# Patient Record
Sex: FEMALE | Race: WHITE | NOT HISPANIC OR LATINO | Employment: OTHER | URBAN - METROPOLITAN AREA
[De-identification: names, ages, dates, MRNs, and addresses within clinical notes are randomized per-mention and may not be internally consistent; named-entity substitution may affect disease eponyms.]

---

## 2017-01-23 ENCOUNTER — GENERIC CONVERSION - ENCOUNTER (OUTPATIENT)
Dept: OTHER | Facility: OTHER | Age: 62
End: 2017-01-23

## 2017-02-06 ENCOUNTER — ALLSCRIPTS OFFICE VISIT (OUTPATIENT)
Dept: OTHER | Facility: OTHER | Age: 62
End: 2017-02-06

## 2017-08-21 ENCOUNTER — GENERIC CONVERSION - ENCOUNTER (OUTPATIENT)
Dept: OTHER | Facility: OTHER | Age: 62
End: 2017-08-21

## 2017-09-20 ENCOUNTER — APPOINTMENT (OUTPATIENT)
Dept: LAB | Facility: OTHER | Age: 62
End: 2017-09-20
Payer: COMMERCIAL

## 2017-09-20 ENCOUNTER — TRANSCRIBE ORDERS (OUTPATIENT)
Dept: LAB | Facility: OTHER | Age: 62
End: 2017-09-20

## 2017-09-20 DIAGNOSIS — E55.9 UNSPECIFIED VITAMIN D DEFICIENCY: ICD-10-CM

## 2017-09-20 DIAGNOSIS — R74.8 OTHER NONSPECIFIC ABNORMAL SERUM ENZYME LEVELS: ICD-10-CM

## 2017-09-20 DIAGNOSIS — K80.10 CALCULUS OF GALLBLADDER WITH CHOLECYSTITIS WITHOUT BILIARY OBSTRUCTION, UNSPECIFIED CHOLECYSTITIS ACUITY: ICD-10-CM

## 2017-09-20 DIAGNOSIS — F41.9 ANXIETY HYPERVENTILATION: ICD-10-CM

## 2017-09-20 DIAGNOSIS — F45.8 ANXIETY HYPERVENTILATION: ICD-10-CM

## 2017-09-20 DIAGNOSIS — E78.5 OTHER AND UNSPECIFIED HYPERLIPIDEMIA: ICD-10-CM

## 2017-09-20 DIAGNOSIS — R73.09 OTHER ABNORMAL GLUCOSE: Primary | ICD-10-CM

## 2017-09-20 LAB
25(OH)D3 SERPL-MCNC: 34.3 NG/ML (ref 30–100)
ALBUMIN SERPL BCP-MCNC: 3.8 G/DL (ref 3.5–5)
ALP SERPL-CCNC: 58 U/L (ref 46–116)
ALT SERPL W P-5'-P-CCNC: 54 U/L (ref 12–78)
ANION GAP SERPL CALCULATED.3IONS-SCNC: 9 MMOL/L (ref 4–13)
AST SERPL W P-5'-P-CCNC: 41 U/L (ref 5–45)
BASOPHILS # BLD AUTO: 0.03 THOUSANDS/ΜL (ref 0–0.1)
BASOPHILS NFR BLD AUTO: 1 % (ref 0–1)
BILIRUB SERPL-MCNC: 1.06 MG/DL (ref 0.2–1)
BUN SERPL-MCNC: 19 MG/DL (ref 5–25)
CALCIUM SERPL-MCNC: 9.7 MG/DL (ref 8.3–10.1)
CHLORIDE SERPL-SCNC: 105 MMOL/L (ref 100–108)
CHOLEST SERPL-MCNC: 271 MG/DL (ref 50–200)
CO2 SERPL-SCNC: 25 MMOL/L (ref 21–32)
CREAT SERPL-MCNC: 0.67 MG/DL (ref 0.6–1.3)
EOSINOPHIL # BLD AUTO: 0.05 THOUSAND/ΜL (ref 0–0.61)
EOSINOPHIL NFR BLD AUTO: 1 % (ref 0–6)
ERYTHROCYTE [DISTWIDTH] IN BLOOD BY AUTOMATED COUNT: 15.4 % (ref 11.6–15.1)
EST. AVERAGE GLUCOSE BLD GHB EST-MCNC: 108 MG/DL
GFR SERPL CREATININE-BSD FRML MDRD: 95 ML/MIN/1.73SQ M
GLUCOSE P FAST SERPL-MCNC: 100 MG/DL (ref 65–99)
HBA1C MFR BLD: 5.4 % (ref 4.2–6.3)
HCT VFR BLD AUTO: 42.4 % (ref 34.8–46.1)
HDLC SERPL-MCNC: 127 MG/DL (ref 40–60)
HGB BLD-MCNC: 14.4 G/DL (ref 11.5–15.4)
LDLC SERPL CALC-MCNC: 128 MG/DL (ref 0–100)
LYMPHOCYTES # BLD AUTO: 0.63 THOUSANDS/ΜL (ref 0.6–4.47)
LYMPHOCYTES NFR BLD AUTO: 15 % (ref 14–44)
MCH RBC QN AUTO: 33.7 PG (ref 26.8–34.3)
MCHC RBC AUTO-ENTMCNC: 34 G/DL (ref 31.4–37.4)
MCV RBC AUTO: 99 FL (ref 82–98)
MONOCYTES # BLD AUTO: 0.4 THOUSAND/ΜL (ref 0.17–1.22)
MONOCYTES NFR BLD AUTO: 10 % (ref 4–12)
NEUTROPHILS # BLD AUTO: 3.08 THOUSANDS/ΜL (ref 1.85–7.62)
NEUTS SEG NFR BLD AUTO: 73 % (ref 43–75)
NRBC BLD AUTO-RTO: 0 /100 WBCS
PLATELET # BLD AUTO: 113 THOUSANDS/UL (ref 149–390)
PMV BLD AUTO: 10.3 FL (ref 8.9–12.7)
POTASSIUM SERPL-SCNC: 3.6 MMOL/L (ref 3.5–5.3)
PROT SERPL-MCNC: 7.3 G/DL (ref 6.4–8.2)
RBC # BLD AUTO: 4.27 MILLION/UL (ref 3.81–5.12)
SODIUM SERPL-SCNC: 139 MMOL/L (ref 136–145)
TRIGL SERPL-MCNC: 82 MG/DL
TSH SERPL DL<=0.05 MIU/L-ACNC: 0.63 UIU/ML (ref 0.36–3.74)
WBC # BLD AUTO: 4.2 THOUSAND/UL (ref 4.31–10.16)

## 2017-09-20 PROCEDURE — 82306 VITAMIN D 25 HYDROXY: CPT

## 2017-09-20 PROCEDURE — 85025 COMPLETE CBC W/AUTO DIFF WBC: CPT

## 2017-09-20 PROCEDURE — 83036 HEMOGLOBIN GLYCOSYLATED A1C: CPT

## 2017-09-20 PROCEDURE — 80061 LIPID PANEL: CPT

## 2017-09-20 PROCEDURE — 36415 COLL VENOUS BLD VENIPUNCTURE: CPT

## 2017-09-20 PROCEDURE — 84443 ASSAY THYROID STIM HORMONE: CPT

## 2017-09-20 PROCEDURE — 80053 COMPREHEN METABOLIC PANEL: CPT

## 2017-09-22 ENCOUNTER — ALLSCRIPTS OFFICE VISIT (OUTPATIENT)
Dept: OTHER | Facility: OTHER | Age: 62
End: 2017-09-22

## 2017-09-22 DIAGNOSIS — Z01.818 ENCOUNTER FOR OTHER PREPROCEDURAL EXAMINATION: ICD-10-CM

## 2017-10-04 ENCOUNTER — APPOINTMENT (EMERGENCY)
Dept: CT IMAGING | Facility: HOSPITAL | Age: 62
End: 2017-10-04
Payer: COMMERCIAL

## 2017-10-04 ENCOUNTER — HOSPITAL ENCOUNTER (EMERGENCY)
Facility: HOSPITAL | Age: 62
Discharge: HOME | End: 2017-10-04
Attending: EMERGENCY MEDICINE | Admitting: EMERGENCY MEDICINE
Payer: COMMERCIAL

## 2017-10-04 VITALS
DIASTOLIC BLOOD PRESSURE: 80 MMHG | SYSTOLIC BLOOD PRESSURE: 133 MMHG | HEART RATE: 75 BPM | BODY MASS INDEX: 28.72 KG/M2 | WEIGHT: 172.4 LBS | HEIGHT: 65 IN | RESPIRATION RATE: 20 BRPM | OXYGEN SATURATION: 95 % | TEMPERATURE: 97.8 F

## 2017-10-04 DIAGNOSIS — S06.0X9A CONCUSSION: Primary | ICD-10-CM

## 2017-10-04 PROCEDURE — 99283 EMERGENCY DEPT VISIT LOW MDM: CPT

## 2017-10-04 PROCEDURE — 70450 CT HEAD/BRAIN W/O DYE: CPT

## 2017-10-04 PROCEDURE — 70486 CT MAXILLOFACIAL W/O DYE: CPT

## 2017-10-04 RX ORDER — CYCLOBENZAPRINE HCL 5 MG
TABLET ORAL
COMMUNITY
Start: 2017-02-06 | End: 2017-10-04

## 2017-10-04 RX ORDER — CITALOPRAM 20 MG/1
TABLET ORAL
COMMUNITY
Start: 2017-07-14 | End: 2019-02-04 | Stop reason: HOSPADM

## 2017-10-04 RX ORDER — HYDROXYZINE PAMOATE 50 MG/1
CAPSULE ORAL
COMMUNITY
Start: 2016-12-06 | End: 2017-10-04

## 2017-10-04 RX ORDER — ATORVASTATIN CALCIUM 10 MG/1
TABLET, FILM COATED ORAL
COMMUNITY
Start: 2017-03-06 | End: 2019-02-04 | Stop reason: HOSPADM

## 2017-10-04 NOTE — DISCHARGE INSTRUCTIONS
Concussion   WHAT YOU NEED TO KNOW:   A concussion is a mild brain injury  It is usually caused by a bump or blow to the head from a fall, a motor vehicle crash, or a sports injury  Sometimes being shaken forcefully may cause a concussion  DISCHARGE INSTRUCTIONS:   Have someone else call 911 for the following:   · Someone tries to wake you and cannot do so  · You have a seizure, increasing confusion, or a change in personality  · Your speech becomes slurred, or you have new vision problems  Return to the emergency department if:   · You have a severe headache that does not go away  · You have arm or leg weakness, numbness, or new problems with coordination  · You have blood or clear fluid coming out of the ears or nose  Contact your healthcare provider if:   · You have nausea or are vomiting  · You feel more sleepy than usual     · Your symptoms get worse  · Your symptoms last longer than 6 weeks after the injury  · You have questions or concerns about your condition or care  Medicines:   · Acetaminophen  helps to decrease pain  It is available without a doctor's order  Ask how much to take and how often to take it  Follow directions  Acetaminophen can cause liver damage if not taken correctly  · NSAIDs , such as ibuprofen, help decrease swelling and pain  NSAIDs can cause stomach bleeding or kidney problems in certain people  If you take blood thinner medicine, always ask your healthcare provider if NSAIDs are safe for you  Always read the medicine label and follow directions  · Take your medicine as directed  Contact your healthcare provider if you think your medicine is not helping or if you have side effects  Tell him or her if you are allergic to any medicine  Keep a list of the medicines, vitamins, and herbs you take  Include the amounts, and when and why you take them  Bring the list or the pill bottles to follow-up visits   Carry your medicine list with you in case of an emergency  Follow up with your healthcare provider as directed:  Write down your questions so you remember to ask them during your visits  Self-care:   · Rest  from physical and mental activities as directed  Mental activities are those that require thinking, concentration, and attention  You will need to rest until your symptoms are gone  Rest will allow you to recover from your concussion  Ask your healthcare provider when you can return to work and other daily activities  · Have someone stay with you for the first 24 hours after your injury  Your healthcare provider should be contacted if your symptoms get worse, or you develop new symptoms  · Do not participate in sports and physical activities until your healthcare provider says it is okay  They could make your symptoms worse or lead to another concussion  Your healthcare provider will tell you when it is okay for you to return to sports or physical activities  Prevent another concussion:   · Wear protective sports equipment that fit properly  Helmets help decrease your risk of a serious brain injury  Talk to your healthcare provider about ways you can decrease your risk for a concussion if you play sports  · Wear your seat belt  every time you travel  This helps to decrease your risk of a head injury if you are in a car accident  © 2017 [facility] [facility] Information is for End User's use only and may not be sold, redistributed or otherwise used for commercial purposes  All illustrations and images included in CareNotes® are the copyrighted property of [email or web address] , [employer]  or [employer]  The above information is an  only  It is not intended as medical advice for individual conditions or treatments  Talk to your doctor, nurse or pharmacist before following any medical regimen to see if it is safe and effective for you

## 2017-10-04 NOTE — ED NOTES
Discharge instructions reviewed with pt  Pt verbalized understanding, with no further questions at this time       Juan Natarajan RN  10/04/17 0485

## 2017-10-04 NOTE — ED PROVIDER NOTES
History  Chief Complaint   Patient presents with    Headache     Pt c/o hitting head 3 weeks ago and has been getting constant dull headaches  Pt states shes supposed to have surgery tomorrow for gallbladder removal  Pt is anxious  Right sided headache where pt it head  This is a 73-year-old female who presents emergency department with headache  The patient fell approximately 3 weeks ago  She states that she struck her head and right temple area  She states that she has had a persistent headache since that time and is growing increasingly concerned about this  She continues to be tender over the right temple and right orbit  No vision changes  This was a mechanical fall  The patient denies any neck pain  No chest pain  No shortness of breath  No abdominal pain  No back pain  The patient is due to have a gallbladder surgery tomorrow and was concerned that this may affect her surgery  Differential diagnosis includes concussion, intracranial hemorrhage, orbital fracture  Prior to Admission Medications   Prescriptions Last Dose Informant Patient Reported? Taking?   atorvastatin (LIPITOR) 10 mg tablet   Yes Yes   Sig: Take by mouth   cholecalciferol (VITAMIN D3) 1,000 units tablet   Yes Yes   Sig: Take by mouth   citalopram (CeleXA) 20 mg tablet   Yes Yes   Sig: Take by mouth      Facility-Administered Medications: None       Past Medical History:   Diagnosis Date    Anxiety     Hyperlipidemia        Past Surgical History:   Procedure Laterality Date    BILATERAL OOPHORECTOMY      MASTECTOMY Bilateral        History reviewed  No pertinent family history  I have reviewed and agree with the history as documented  Social History   Substance Use Topics    Smoking status: Never Smoker    Smokeless tobacco: Never Used    Alcohol use No        Review of Systems   Constitutional: Negative for chills and fever  HENT: Negative for congestion and facial swelling      Eyes: Negative for photophobia, pain, discharge, redness, itching and visual disturbance  Respiratory: Negative for cough and shortness of breath  Gastrointestinal: Negative for abdominal pain and nausea  Genitourinary: Negative for urgency  Musculoskeletal: Negative for back pain and neck pain  Skin: Negative for color change, rash and wound  Allergic/Immunologic: Negative for immunocompromised state  Neurological: Positive for headaches  Negative for dizziness, facial asymmetry and light-headedness  Hematological: Does not bruise/bleed easily  Psychiatric/Behavioral: Negative for confusion  All other systems reviewed and are negative  Physical Exam  ED Triage Vitals   Temperature Pulse Respirations Blood Pressure SpO2   10/04/17 1142 10/04/17 1142 10/04/17 1142 10/04/17 1147 10/04/17 1142   97 8 °F (36 6 °C) 75 20 133/80 95 %      Temp Source Heart Rate Source Patient Position - Orthostatic VS BP Location FiO2 (%)   10/04/17 1142 10/04/17 1142 10/04/17 1142 10/04/17 1142 --   Oral Monitor Lying Left arm       Pain Score       10/04/17 1142       4           Physical Exam   Constitutional: She is oriented to person, place, and time  She appears well-developed  No distress  HENT:   Head: Normocephalic  Right Ear: External ear normal    Left Ear: External ear normal    Nose: Nose normal    Mouth/Throat: Oropharynx is clear and moist    Tender over right temple and right lateral orbit  No deformity noted  Eyes: Conjunctivae and EOM are normal  Pupils are equal, round, and reactive to light  Right eye exhibits no discharge  Left eye exhibits no discharge  No scleral icterus  Neck: Normal range of motion  Neck supple  Cardiovascular: Normal rate, regular rhythm, normal heart sounds and intact distal pulses  Pulmonary/Chest: Effort normal and breath sounds normal  No stridor  No respiratory distress  She has no wheezes  Abdominal: Soft  She exhibits no distension  There is no tenderness   There is no rebound and no guarding  Musculoskeletal: Normal range of motion  She exhibits no edema, tenderness or deformity  No midline C-spine tenderness  Neurological: She is alert and oriented to person, place, and time  No cranial nerve deficit or sensory deficit  She exhibits normal muscle tone  Coordination normal    Skin: Skin is warm and dry  No rash noted  Psychiatric: She has a normal mood and affect  Thought content normal    Nursing note and vitals reviewed  ED Medications  Medications - No data to display    Diagnostic Studies  Labs Reviewed - No data to display    CT facial bones without contrast   Final Result      No acute displaced fracture seen         Workstation performed: [id number]         CT head without contrast   Final Result      No acute intracranial abnormality  Workstation performed: [id number]             Procedures  Procedures      Phone Contacts  ED Phone Contact    ED Course  ED Course                                MDM    CritCare Time    Disposition  Final diagnoses:   Concussion     ED Disposition     ED Disposition Condition Comment    Discharge  [address] discharge to home/self care  Condition at discharge: Stable        Follow-up Information     Follow up With Specialties Details Why Contact Info    Jordy Drivers, [facility] In 1 week  [address] [place] [address]  [phone]          Patient's Medications   Discharge Prescriptions    No medications on file     No discharge procedures on file      ED Provider  Electronically Signed by       Sher Tam MD  10/04/17 7422

## 2017-10-05 ENCOUNTER — HOSPITAL ENCOUNTER (OUTPATIENT)
Facility: HOSPITAL | Age: 62
Setting detail: OUTPATIENT SURGERY
Discharge: HOME | End: 2017-10-05
Attending: SURGERY | Admitting: SURGERY
Payer: COMMERCIAL

## 2017-10-05 ENCOUNTER — ANESTHESIA (OUTPATIENT)
Dept: PERIOP | Facility: HOSPITAL | Age: 62
End: 2017-10-05
Payer: COMMERCIAL

## 2017-10-05 ENCOUNTER — ANESTHESIA EVENT (OUTPATIENT)
Dept: PERIOP | Facility: HOSPITAL | Age: 62
End: 2017-10-05
Payer: COMMERCIAL

## 2017-10-05 VITALS
DIASTOLIC BLOOD PRESSURE: 77 MMHG | WEIGHT: 164 LBS | TEMPERATURE: 98 F | HEIGHT: 66 IN | SYSTOLIC BLOOD PRESSURE: 142 MMHG | RESPIRATION RATE: 17 BRPM | BODY MASS INDEX: 26.36 KG/M2 | OXYGEN SATURATION: 94 % | HEART RATE: 99 BPM

## 2017-10-05 DIAGNOSIS — K82.4 GALLBLADDER POLYP: ICD-10-CM

## 2017-10-05 PROCEDURE — 88304 TISSUE EXAM BY PATHOLOGIST: CPT | Performed by: SURGERY

## 2017-10-05 RX ORDER — OXYCODONE HYDROCHLORIDE AND ACETAMINOPHEN 5; 325 MG/1; MG/1
2 TABLET ORAL EVERY 4 HOURS PRN
Status: DISCONTINUED | OUTPATIENT
Start: 2017-10-05 | End: 2017-10-05 | Stop reason: HOSPADM

## 2017-10-05 RX ORDER — MEPERIDINE HYDROCHLORIDE 25 MG/ML
25 INJECTION INTRAMUSCULAR; INTRAVENOUS; SUBCUTANEOUS AS NEEDED
Status: DISCONTINUED | OUTPATIENT
Start: 2017-10-05 | End: 2017-10-05 | Stop reason: HOSPADM

## 2017-10-05 RX ORDER — CLONAZEPAM 0.5 MG/1
0.5 TABLET ORAL 2 TIMES DAILY PRN
COMMUNITY
End: 2018-02-05

## 2017-10-05 RX ORDER — METOCLOPRAMIDE HYDROCHLORIDE 5 MG/ML
10 INJECTION INTRAMUSCULAR; INTRAVENOUS ONCE AS NEEDED
Status: DISCONTINUED | OUTPATIENT
Start: 2017-10-05 | End: 2017-10-05 | Stop reason: HOSPADM

## 2017-10-05 RX ORDER — LABETALOL HYDROCHLORIDE 5 MG/ML
INJECTION, SOLUTION INTRAVENOUS AS NEEDED
Status: DISCONTINUED | OUTPATIENT
Start: 2017-10-05 | End: 2017-10-05 | Stop reason: SURG

## 2017-10-05 RX ORDER — PROMETHAZINE HYDROCHLORIDE 25 MG/ML
25 INJECTION, SOLUTION INTRAMUSCULAR; INTRAVENOUS ONCE AS NEEDED
Status: DISCONTINUED | OUTPATIENT
Start: 2017-10-05 | End: 2017-10-05 | Stop reason: HOSPADM

## 2017-10-05 RX ORDER — OXYCODONE HYDROCHLORIDE AND ACETAMINOPHEN 5; 325 MG/1; MG/1
1 TABLET ORAL EVERY 4 HOURS PRN
Status: DISCONTINUED | OUTPATIENT
Start: 2017-10-05 | End: 2017-10-05 | Stop reason: HOSPADM

## 2017-10-05 RX ORDER — MIDAZOLAM HYDROCHLORIDE 1 MG/ML
INJECTION INTRAMUSCULAR; INTRAVENOUS AS NEEDED
Status: DISCONTINUED | OUTPATIENT
Start: 2017-10-05 | End: 2017-10-05 | Stop reason: SURG

## 2017-10-05 RX ORDER — PROPOFOL 10 MG/ML
INJECTION, EMULSION INTRAVENOUS AS NEEDED
Status: DISCONTINUED | OUTPATIENT
Start: 2017-10-05 | End: 2017-10-05 | Stop reason: SURG

## 2017-10-05 RX ORDER — ONDANSETRON 2 MG/ML
INJECTION INTRAMUSCULAR; INTRAVENOUS AS NEEDED
Status: DISCONTINUED | OUTPATIENT
Start: 2017-10-05 | End: 2017-10-05 | Stop reason: SURG

## 2017-10-05 RX ORDER — EPHEDRINE SULFATE 50 MG/ML
INJECTION, SOLUTION INTRAVENOUS AS NEEDED
Status: DISCONTINUED | OUTPATIENT
Start: 2017-10-05 | End: 2017-10-05 | Stop reason: SURG

## 2017-10-05 RX ORDER — LIDOCAINE HYDROCHLORIDE 10 MG/ML
INJECTION, SOLUTION INFILTRATION; PERINEURAL AS NEEDED
Status: DISCONTINUED | OUTPATIENT
Start: 2017-10-05 | End: 2017-10-05 | Stop reason: SURG

## 2017-10-05 RX ORDER — BUPIVACAINE HYDROCHLORIDE AND EPINEPHRINE 2.5; 5 MG/ML; UG/ML
INJECTION, SOLUTION EPIDURAL; INFILTRATION; INTRACAUDAL; PERINEURAL AS NEEDED
Status: DISCONTINUED | OUTPATIENT
Start: 2017-10-05 | End: 2017-10-05 | Stop reason: HOSPADM

## 2017-10-05 RX ORDER — SODIUM CHLORIDE, SODIUM LACTATE, POTASSIUM CHLORIDE, CALCIUM CHLORIDE 600; 310; 30; 20 MG/100ML; MG/100ML; MG/100ML; MG/100ML
INJECTION, SOLUTION INTRAVENOUS CONTINUOUS PRN
Status: DISCONTINUED | OUTPATIENT
Start: 2017-10-05 | End: 2017-10-05 | Stop reason: SURG

## 2017-10-05 RX ORDER — MIDAZOLAM HYDROCHLORIDE 1 MG/ML
1 INJECTION INTRAMUSCULAR; INTRAVENOUS
Status: COMPLETED | OUTPATIENT
Start: 2017-10-05 | End: 2017-10-05

## 2017-10-05 RX ORDER — METOCLOPRAMIDE HYDROCHLORIDE 5 MG/ML
INJECTION INTRAMUSCULAR; INTRAVENOUS AS NEEDED
Status: DISCONTINUED | OUTPATIENT
Start: 2017-10-05 | End: 2017-10-05 | Stop reason: SURG

## 2017-10-05 RX ORDER — FENTANYL CITRATE/PF 50 MCG/ML
25 SYRINGE (ML) INJECTION
Status: COMPLETED | OUTPATIENT
Start: 2017-10-05 | End: 2017-10-05

## 2017-10-05 RX ORDER — FENTANYL CITRATE 50 UG/ML
INJECTION, SOLUTION INTRAMUSCULAR; INTRAVENOUS AS NEEDED
Status: DISCONTINUED | OUTPATIENT
Start: 2017-10-05 | End: 2017-10-05 | Stop reason: SURG

## 2017-10-05 RX ORDER — SUCCINYLCHOLINE CHLORIDE 20 MG/ML
INJECTION INTRAMUSCULAR; INTRAVENOUS AS NEEDED
Status: DISCONTINUED | OUTPATIENT
Start: 2017-10-05 | End: 2017-10-05 | Stop reason: SURG

## 2017-10-05 RX ORDER — SODIUM CHLORIDE 9 MG/ML
INJECTION, SOLUTION INTRAVENOUS AS NEEDED
Status: DISCONTINUED | OUTPATIENT
Start: 2017-10-05 | End: 2017-10-05 | Stop reason: HOSPADM

## 2017-10-05 RX ORDER — ROCURONIUM BROMIDE 10 MG/ML
INJECTION, SOLUTION INTRAVENOUS AS NEEDED
Status: DISCONTINUED | OUTPATIENT
Start: 2017-10-05 | End: 2017-10-05 | Stop reason: SURG

## 2017-10-05 RX ADMIN — MIDAZOLAM 1 MG: 1 INJECTION INTRAMUSCULAR; INTRAVENOUS at 13:23

## 2017-10-05 RX ADMIN — DEXAMETHASONE SODIUM PHOSPHATE 10 MG: 10 INJECTION INTRAMUSCULAR; INTRAVENOUS at 12:04

## 2017-10-05 RX ADMIN — MIDAZOLAM HYDROCHLORIDE 2 MG: 1 INJECTION, SOLUTION INTRAMUSCULAR; INTRAVENOUS at 11:50

## 2017-10-05 RX ADMIN — FENTANYL CITRATE 25 MCG: 50 INJECTION INTRAMUSCULAR; INTRAVENOUS at 13:28

## 2017-10-05 RX ADMIN — ONDANSETRON 4 MG: 2 INJECTION INTRAMUSCULAR; INTRAVENOUS at 12:04

## 2017-10-05 RX ADMIN — LIDOCAINE HYDROCHLORIDE 50 MG: 10 INJECTION, SOLUTION INFILTRATION; PERINEURAL at 11:59

## 2017-10-05 RX ADMIN — LABETALOL HYDROCHLORIDE 10 MG: 5 INJECTION, SOLUTION INTRAVENOUS at 12:57

## 2017-10-05 RX ADMIN — EPHEDRINE SULFATE 5 MG: 50 INJECTION, SOLUTION INTRAMUSCULAR; INTRAVENOUS; SUBCUTANEOUS at 12:21

## 2017-10-05 RX ADMIN — ROCURONIUM BROMIDE 20 MG: 10 INJECTION, SOLUTION INTRAVENOUS at 12:18

## 2017-10-05 RX ADMIN — FENTANYL CITRATE 25 MCG: 50 INJECTION INTRAMUSCULAR; INTRAVENOUS at 13:23

## 2017-10-05 RX ADMIN — FENTANYL CITRATE 100 MCG: 50 INJECTION INTRAMUSCULAR; INTRAVENOUS at 11:59

## 2017-10-05 RX ADMIN — MIDAZOLAM 1 MG: 1 INJECTION INTRAMUSCULAR; INTRAVENOUS at 13:37

## 2017-10-05 RX ADMIN — CEFAZOLIN SODIUM 2000 MG: 2 SOLUTION INTRAVENOUS at 12:01

## 2017-10-05 RX ADMIN — FENTANYL CITRATE 25 MCG: 50 INJECTION INTRAMUSCULAR; INTRAVENOUS at 13:20

## 2017-10-05 RX ADMIN — SODIUM CHLORIDE, SODIUM LACTATE, POTASSIUM CHLORIDE, AND CALCIUM CHLORIDE: .6; .31; .03; .02 INJECTION, SOLUTION INTRAVENOUS at 11:50

## 2017-10-05 RX ADMIN — PROPOFOL 200 MG: 10 INJECTION, EMULSION INTRAVENOUS at 11:59

## 2017-10-05 RX ADMIN — SUCCINYLCHOLINE CHLORIDE 100 MG: 20 INJECTION, SOLUTION INTRAMUSCULAR; INTRAVENOUS at 11:59

## 2017-10-05 RX ADMIN — METOCLOPRAMIDE HYDROCHLORIDE 10 MG: 5 INJECTION INTRAMUSCULAR; INTRAVENOUS at 12:04

## 2017-10-05 RX ADMIN — FENTANYL CITRATE 25 MCG: 50 INJECTION INTRAMUSCULAR; INTRAVENOUS at 13:17

## 2017-10-05 RX ADMIN — LABETALOL HYDROCHLORIDE 10 MG: 5 INJECTION, SOLUTION INTRAVENOUS at 13:01

## 2017-10-05 RX ADMIN — OXYCODONE HYDROCHLORIDE AND ACETAMINOPHEN 1 TABLET: 5; 325 TABLET ORAL at 14:55

## 2017-10-05 NOTE — DISCHARGE INSTRUCTIONS
Please call the office when you leave to schedule an appointment to be seen in 2-3 weeks [phone]  Anesthesia Precautions:  1 ) Have a responsible person drive you home and someone to stay with you at home (in case of dizziness)  2 ) Rest and relax for 24 hours  3 ) Drink Clear liquids until there is no nausea or vomiting, then resume diet as normal   4 ) Diet as tolerated  5 ) Do not drink alcohol, drive any vehicle, operate mechanical equipment (e g  Sewing machine, kitchen stove, etc ) or make any critical decisions for 24 hours  Activity:    Do not lift more than 10 pounds (a gallon of milk) for 1-2 weeks post-operatively    Walking is encouraged  Normal daily activities including climbing steps are okay  Do not engage in strenuous activity or contact sports for 4-6 weeks post-operatively  Return to work:    Return to work to be discussed at first post-operative visit  Diet:    You may return to your normal heart healthy diet  Wound Care: Your wound is closed with Histoacryl  It is okay to shower  Wash incision gently with soap and water and pat dry  Do not soak incisions in bath water or swim for two weeks  Do not apply any creams or ointments  Ice as needed  Pain Medication:    Please take as directed  No driving while taking narcotic pain medications    Other:  If you have questions after discharge please call the office    If you have increased pain, fever >101 5, increased drainage, redness or a bad smell at your surgery site, please call us immediately or come directly to the Emergency Room

## 2017-10-05 NOTE — ANESTHESIA POSTPROCEDURE EVALUATION
Post-Op Assessment Note      CV Status:  Stable    Mental Status:  Alert    Hydration Status:  Stable    PONV Controlled:  None    Airway Patency:  Patent    Post Op Vitals Reviewed: Yes          Staff: Anesthesiologist, CRNA           BP      Temp      Pulse     Resp      SpO2

## 2017-10-05 NOTE — ANESTHESIA PREPROCEDURE EVALUATION
Review of Systems/Medical History  Patient summary reviewed  Chart reviewed  History of anesthetic complications PONV    Cardiovascular  Hyperlipidemia,    Pulmonary  Negative pulmonary ROS ,        GI/Hepatic      Comment: GB polyp     Negative  ROS        Endo/Other  Negative endo/other ROS      GYN       Hematology  Negative hematology ROS      Musculoskeletal  Negative musculoskeletal ROS        Neurology  Negative neurology ROS      Psychology   Anxiety,            Physical Exam    Airway    Mallampati score: II  TM Distance: <3 FB  Neck ROM: full     Dental   No notable dental hx     Cardiovascular      Pulmonary      Other Findings  Poss anterior larynx      Anesthesia Plan  ASA Score- 2       Anesthesia Type- general  Comment: Intraop Anitemics      Induction- intravenous      Informed Consent  Anesthetic plan and risks discussed with patient

## 2017-10-05 NOTE — OP NOTE
OPERATIVE REPORT  PATIENT NAME: Ragini Wong    :  1955  MRN: [id number]  Pt Location: AN OR ROOM 02    SURGERY DATE: 10/5/2017    Surgeon(s) and Role:     * Ella Jean MD - Elvia Sousa MD - Primary    Preop Diagnosis:  Gallbladder polyp [K82 4]    Post-Op Diagnosis Codes:     * Gallbladder polyp [K82 4]    Procedure(s) (LRB):  LAPAROSCOPIC CHOLECYSTECTOMY (N/A)    Specimen(s):  ID Type Source Tests Collected by Time Destination   1 : Fidencio Heart, CRNP Tissue Gallbladder TISSUE EXAM Lorena Lovelace MD 10/5/2017 1222        Estimated Blood Loss:   Minimal    Drains:       Anesthesia Type:   General    Operative Indications:  Gallbladder polyp [K82 4]      Operative Findings:  Independent, ASA 2, wound class 2, BMI 27, weight 164, height 66  History of anesthetic complications PONV    Cardiovascular  Hyperlipidemia,  Pulmonary  Negative pulmonary ROS ,    GI/Hepatic     Comment: GB polyp   Negative  ROS    Endo/Other  Negative endo/other ROS  GYN   Hematology  Negative hematology ROS     Musculoskeletal  Negative musculoskeletal ROS    Neurology  Negative neurology ROS     Psychology   Anxiety,            Complications:   None    Procedure and Technique:  Patient was identified visually and via armband  Placed in supine position  After general anesthesia the abdomen was prepped and draped in a sterile fashion  0 25% Marcaine with epinephrine was utilized  Incision was made in the umbilicus  This carried down to skin subcutaneous tissue  Under direct vision a 10 mm trocar was inserted  This was followed by CO2 insufflation with a back pressure of 15  Three additional trocars placed in upper aspect of the abdomen under direct vision  The gallbladder was distended  A dome down technique was then utilized  The cystic duct was dissected free  The cystic duct was dilated in caliber  It was clipped x3 and divided  Cystic artery was clipped x2 and divided    The gallbladder was then removed from the liver bed using electrocautery  The area was irrigated  It was aspirated dry  Hemostasis was assured  The gallbladder was placed in Endo-Catch bag and removed through the umbilicus  Fascia was closed with 0 Vicryl suture followed by 4 Monocryl suture and Dermabond  The patient tolerated the procedure well  Sponges count correct     I was present for the entire procedure    Patient Disposition:  PACU     SIGNATURE: Christina Santiago MD  DATE: October 5, 2017  TIME: 1:11 PM

## 2017-11-16 ENCOUNTER — ALLSCRIPTS OFFICE VISIT (OUTPATIENT)
Dept: OTHER | Facility: OTHER | Age: 62
End: 2017-11-16

## 2017-11-17 NOTE — PROGRESS NOTES
Assessment    1  S/P bilateral breast implants (V43 82) (Z98 82)   2  Painful breasts (611 71) (N64 4)    Plan  Painful breasts, S/P bilateral breast implants    · 1 - Zoe Ring MD, Angela Samayoa  (Surgical Oncology) Co-Management  *  Status: Active  Requested LZP:[id number]  Care Summary provided  : Yes    Discussion/Summary    Painful and hard breasts s/o b/l implants- no sign of infection  Pt needs to contact her surgeon for appropriate testing and evaluation  Chief Complaint  hard breast implants      History of Present Illness  HPI: Pt comes with hardness and pain of her breast implants  She had elective b/l mastectomy with implants at [facility] 7 years ago due to being BRCA 1+  She didn't like those implants and had them redone in 2013 by a surgeon in [place]  since they have been very firm  Korene Carbon than she thinks they should be  Her niece had the same thing done and hers aren't firm and are much softer  LAtely they have gotten more firm, bordering on hard to the touch and painful to touch  rash, redness, or warmth  No f/c/s  No open sores or drainage   Breast Pain (Brief): The patient is being seen for an initial evaluation of breast pain  Symptoms:  breast pain, but-- no breast lump,-- no breast redness,-- no nipple soreness,-- no localized swelling,-- no localized drainage-- and-- no rash  No associated symptoms are reported  Review of Systems   Constitutional: No fever, no chills, feels well, no tiredness, no recent weight gain or loss  ENT: no ear ache, no loss of hearing, no nosebleeds or nasal discharge, no sore throat or hoarseness  Cardiovascular: no complaints of slow or fast heart rate, no chest pain, no palpitations, no leg claudication or lower extremity edema  Respiratory: no complaints of shortness of breath, no wheezing, no dyspnea on exertion, no orthopnea or PND    Breasts: breast pain, but-- no breast swelling,-- no reddening of the breast,-- no breast lump,-- no breast itching-- and-- no nipple discharge  Gastrointestinal: no complaints of abdominal pain, no constipation, no nausea or diarrhea, no vomiting, no bloody stools  ROS reviewed  Active Problems    1  Abnormal glucose (790 29) (R73 09)   2  Abnormal liver enzymes (790 5) (R74 8)   3  Alcoholism (303 90) (F10 20)   4  Antibody response examination (V72 61) (Z01 84)   5  Anxiety (300 00) (F41 9)   6  Back pain (724 5) (M54 9)   7  Blood in stool (578 1) (K92 1)   8  Depression (311) (F32 9)   9  Dermatitis (692 9) (L30 9)   10  Encounter for gynecological examination without abnormal finding (V72 31) (Z01 419)   11  Encounter for routine gynecological examination with Papanicolaou smear of cervix  (V72 31,V76 2) (Z01 419)   12  Facial trauma (959 09) (S09 93XA)   13  Flu vaccine need (V04 81) (Z23)   14  Gallbladder polyp (575 6) (K82 4)   15  Hyperlipidemia, unspecified (272 4) (E78 5)   16  Knee pain (719 46) (M25 569)   17  Mild vitamin D deficiency (268 9) (E55 9)   18  Need for revaccination (V05 9) (Z23)   19  Need for tetanus booster (V03 7) (Z23)   20  Painful breasts (611 71) (N64 4)   21  Post-menopausal (V49 81) (Z78 0)   22  Preop examination (V72 84) (Z01 818)   23  S/P bilateral breast implants (V43 82) (Z98 82)   24  Screening for HPV (human papillomavirus) (V73 81) (Z11 51)   25  Screening mammogram, encounter for (V76 12) (Z12 31)    Past Medical History  Active Problems And Past Medical History Reviewed: The active problems and past medical history were reviewed and updated today  Surgical History  Surgical History Reviewed: The surgical history was reviewed and updated today  Social History     · Never a smoker   · Social alcohol use (Z78 9)  The social history was reviewed and updated today  Family History  Family History Reviewed: The family history was reviewed and updated today  Current Meds   1   Atorvastatin Calcium 20 MG Oral Tablet; take 1/2 tablet daily as directed; Therapy: 38LOK0660 to (Evaluate:11Nov2017)  Requested for: 21HZX0106; Last Rx:10Zyh1611 Ordered   2  Citalopram Hydrobromide 20 MG Oral Tablet; TAKE 1 TABLET AT BEDTIME; Therapy: 87MKP1370 to (Evaluate:20Jan2018)  Requested for: 15MEJ7310; Last Rx:70Xbg0221 Ordered   3  ClonazePAM 0 5 MG Oral Tablet; take 1 tablet twice a day as needed; Therapy: 96Elj4135 to (21 )  Requested for: 55LRF7010; Last Rx:44Fbm9290 Ordered   4  Desonide 0 05 % External Cream; APPLY TO NASAL AREA BID X 1 WEEK; Therapy: 91OYN0527 to (Last Rx:56Xuf3284)  Requested for: 61TCU4667 Ordered   5  HydrOXYzine Pamoate 50 MG Oral Capsule; TAKE 1 CAPSULE 3 TIMES DAILY AS NEEDED; Therapy: 85IUJ1645 to (Evaluate:15Jan2017)  Requested for: 14OXJ3784; Last Rx:96Uoo3037 Ordered   6  Ibuprofen 600 MG Oral Tablet; TAKE 1 TABLET 3 TIMES DAILY WITH MEALS; Therapy: 91GPK4285 to (Evaluate:20Mar2016)  Requested for: 08KKU5772; Last Rx:06Rrk5222 Ordered   7  Vitamin D 1000 UNIT Oral Tablet; 1 TAB QD; Therapy: 74WIG7080 to (Evaluate:21Nfo6441); Last Rx:10Nov2015 Ordered    The medication list was reviewed and updated today  Allergies  1  No Known Drug Allergies   2  No Known Drug Allergies    Vitals   Recorded: 07OQI0006 10:36AM   Temperature 98 2 F   Heart Rate 83   Systolic 659   Diastolic 68   Height 5 ft 5 in   Weight 168 lb 6 oz   BMI Calculated 28 02   BSA Calculated 1 84   O2 Saturation 93       Physical Exam   Constitutional  General appearance: No acute distress, well appearing and well nourished  Ears, Nose, Mouth, and Throat  Oropharynx: Normal with no erythema, edema, exudate or lesions  Pulmonary  Respiratory effort: No increased work of breathing or signs of respiratory distress  Auscultation of lungs: Clear to auscultation  Cardiovascular  Auscultation of heart: Normal rate and rhythm, normal S1 and S2, without murmurs     Examination of extremities for edema and/or varicosities: Normal    Abdomen  Abdomen: Non-tender, no masses  Lymphatic  Palpation of lymph nodes in neck: No lymphadenopathy  Additional Exam:  b/l breast exam- no redness, or warmth   very firm breasts b/l, tender to touch over entire breast         Future Appointments    Date/Time Provider Specialty Site   11/20/2017 10:30 AM Amanda Mcdermott M D  [facility]       Signatures   Electronically signed by : [facility]; Nov 16 2017 11:55AM EST                       (Author)    Electronically signed by : FRANSICO Kaur ; Nov 16 2017  5:15PM EST

## 2017-11-20 ENCOUNTER — ALLSCRIPTS OFFICE VISIT (OUTPATIENT)
Dept: OTHER | Facility: OTHER | Age: 62
End: 2017-11-20

## 2017-11-27 ENCOUNTER — GENERIC CONVERSION - ENCOUNTER (OUTPATIENT)
Dept: OTHER | Facility: OTHER | Age: 62
End: 2017-11-27

## 2017-12-04 ENCOUNTER — GENERIC CONVERSION - ENCOUNTER (OUTPATIENT)
Dept: OTHER | Facility: OTHER | Age: 62
End: 2017-12-04

## 2018-01-13 VITALS
WEIGHT: 168.38 LBS | TEMPERATURE: 98.2 F | OXYGEN SATURATION: 93 % | SYSTOLIC BLOOD PRESSURE: 104 MMHG | BODY MASS INDEX: 28.06 KG/M2 | HEIGHT: 65 IN | HEART RATE: 83 BPM | DIASTOLIC BLOOD PRESSURE: 68 MMHG

## 2018-01-14 VITALS
DIASTOLIC BLOOD PRESSURE: 76 MMHG | RESPIRATION RATE: 14 BRPM | SYSTOLIC BLOOD PRESSURE: 108 MMHG | HEART RATE: 70 BPM | WEIGHT: 171 LBS | HEIGHT: 65 IN | BODY MASS INDEX: 28.49 KG/M2

## 2018-01-15 VITALS
RESPIRATION RATE: 16 BRPM | SYSTOLIC BLOOD PRESSURE: 110 MMHG | WEIGHT: 164 LBS | BODY MASS INDEX: 27.32 KG/M2 | HEIGHT: 65 IN | DIASTOLIC BLOOD PRESSURE: 78 MMHG | HEART RATE: 90 BPM

## 2018-01-17 NOTE — CONSULTS
History of Present Illness  Today, I am seeing Mrs Bridgette Merida for the first time, who is a 65year old female with a history of BRCA who underwent bilateral prophylactic mastectomy with immediate TE/Implant reconstruction at [facility] in 2009  As per the patient she didn't like the end result and eventually had revisions done in 2014 close to home in [place]  Patient now complaints that over the last 6 months started to develop bilateral breast pain mostly on the medial aspect and associated "hardness " feel of the implants itself  She denies any change in her history  Denies fever, chills, shortness of breast and chest pain with exertion  Of notice, patient was very emotional about discussing her situation  Patient characteristics  Significant medical history: BRCA   Prior abdominal surgery: Open appendectomy, laparoscopic cholecystectomy, laparoscopic ovarian resection   Tobacco use: none   Current bra size: 36 C   Desired bra size: A or B cup   BMI: 27 3  PMH/FH of DVT/PE: none  PMH/FH of miscarriages: none  Occupation: retired flight attendance            Discussion/Summary    Assessment and Plan:  1) Bilateral breast capsular contracture, baker Grad IV     Patient has manifested that discomfort is unbearable and has caused significant stress in her marriage  She would like to know her options  Pertinent reconstruction options were presented as outlined below  A detailed conversation was had regarding the patient's options for breast reconstruction  Five main points, which are explained to all breast reconstruction patients, were discussed  1) Breast reconstruction is an optional process  In addition, breast reconstruction can be performed in an immediate and delayed fashion  Even if a patient does not opt for reconstruction now, it can performed at a later time    2) Breast reconstruction is a multi-stage process which involves multiple surgeries spaced several months apart  The entire process can take over one year  The patient can stop within this process and/or resume it again at any time  3) The major goal of breast reconstruction is to have the patient look normal in clothing  When naked, there will always be scars and other stigmata of the breast reconstruction process  4) Asymmetries are often present during the reconstruction process  Several operations may be needed, including surgery to the non-cancerous breast, to achieve satisfactory results  5) No matter the reconstructive method, there are ways that the reconstruction can fail and a secondary reconstructive plan would need to be created  Next, a general discussion regarding all available methods of breast reconstruction were discussed  The types of reconstructions described included:    1) Tissue expander and implant based reconstruction, both single and multi-stage approaches  2) Autologous only reconstructions, including free abdominal-tissue based reconstructions  3) Combination procedures, particularly latissismus dorsi flaps combined with either expanders or implants  For each of the reconstruction methods mentioned above, the risks, benefits, alternatives, scarring, and recovery time were discussed in great detail  Specific risks detailed included bleeding, infection, hematoma, seroma, scarring, pain, wound healing complications, flap loss, fat necrosis, capsular contracture, need for implant removal, donor site complications, bulge, hernia, umbilical necrosis, need for urgent reoperation, and need for dressing changes were discussed      The patient would be a candidate for:     -Implant based reconstruction: yes    -Abdominally based free flap reconstruction: yes but only if wishes to go smaller cup ( A or B) as she described and will need CT abdomen/pelvis to evaluate perforators given history of surgery    -LD+implant based reconstruction: Yes    After a long discussion about these factors, the patient would like to have both implants removed and not to pursue any other breast reconstruction methods  She is mostly in discomfort due to the pain and is tired of undergoing surgeries to restore her breast      I recommended to undergo bilateral capsulectomy and implant removal  I will have my surgical coordinator, La Todd, to look for potential days  This will be outpatient surgery with possible overnight stay for pain control if needed  We will plan to see her for a preoperative visit once a surgical date has been identified  45 minutes were spent face to face with the patient, of which over half were counseling and coordination of care  The patient was counseled regarding risks and benefits of treatment options        Signatures   Electronically signed by : FRANSICO Law ; Nov 20 2017  2:51PM EST                       (Author)

## 2018-01-22 VITALS
WEIGHT: 167.25 LBS | DIASTOLIC BLOOD PRESSURE: 72 MMHG | HEART RATE: 83 BPM | HEIGHT: 65 IN | OXYGEN SATURATION: 95 % | SYSTOLIC BLOOD PRESSURE: 128 MMHG | BODY MASS INDEX: 27.86 KG/M2

## 2018-01-22 VITALS — WEIGHT: 165 LBS | BODY MASS INDEX: 27.49 KG/M2 | HEIGHT: 65 IN

## 2018-01-23 NOTE — MISCELLANEOUS
Message  GI Reminder Recall Sebas Chinchilla:   Date: 12/04/2017   Dear Kishore Saenz:     Review of our records shows you are due for the following: Colonoscopy  Our records indicate that you are due at this time to have a follow-up examination for a colonoscopy  As you now, these tests are done to prevent colon cancer, a very common disease in the [place] and responsible for the thousands of patient deaths each year  We at [facility] are concerned for your health, and would very much appreciate you getting in touch with us at your earliest convenience, Again, this examination is vital to your proper health maintenance and for the prevention of cancer  Please call the following office to schedule your appointment:   [address] [facility] [phone]  We look forward to hearing from you!      Sincerely,         Signatures   Electronically signed by : Lila Vu, ; Dec  4 2017 12:23PM EST                       (Author)

## 2018-01-24 ENCOUNTER — TELEPHONE (OUTPATIENT)
Dept: GASTROENTEROLOGY | Facility: CLINIC | Age: 63
End: 2018-01-24

## 2018-01-25 PROBLEM — Z12.11 SCREEN FOR COLON CANCER: Status: ACTIVE | Noted: 2018-01-25

## 2018-02-05 ENCOUNTER — OFFICE VISIT (OUTPATIENT)
Dept: INTERNAL MEDICINE CLINIC | Facility: CLINIC | Age: 63
End: 2018-02-05
Payer: COMMERCIAL

## 2018-02-05 ENCOUNTER — APPOINTMENT (OUTPATIENT)
Dept: LAB | Facility: CLINIC | Age: 63
End: 2018-02-05
Payer: COMMERCIAL

## 2018-02-05 VITALS
WEIGHT: 173.2 LBS | SYSTOLIC BLOOD PRESSURE: 106 MMHG | TEMPERATURE: 98 F | OXYGEN SATURATION: 94 % | DIASTOLIC BLOOD PRESSURE: 70 MMHG | HEART RATE: 88 BPM | BODY MASS INDEX: 28.82 KG/M2

## 2018-02-05 DIAGNOSIS — E78.5 HYPERLIPIDEMIA: ICD-10-CM

## 2018-02-05 DIAGNOSIS — R05.9 COUGH: Primary | ICD-10-CM

## 2018-02-05 DIAGNOSIS — F10.982 ALCOHOL-INDUCED INSOMNIA (HCC): ICD-10-CM

## 2018-02-05 DIAGNOSIS — R53.83 FATIGUE, UNSPECIFIED TYPE: ICD-10-CM

## 2018-02-05 DIAGNOSIS — R73.09 OTHER ABNORMAL GLUCOSE: ICD-10-CM

## 2018-02-05 LAB
ALBUMIN SERPL BCP-MCNC: 4.3 G/DL (ref 3.5–5)
ALP SERPL-CCNC: 94 U/L (ref 46–116)
ALT SERPL W P-5'-P-CCNC: 62 U/L (ref 12–78)
ANION GAP SERPL CALCULATED.3IONS-SCNC: 9 MMOL/L (ref 4–13)
AST SERPL W P-5'-P-CCNC: 73 U/L (ref 5–45)
BASOPHILS # BLD AUTO: 0.1 THOUSANDS/ΜL (ref 0–0.1)
BASOPHILS NFR BLD AUTO: 3 % (ref 0–1)
BILIRUB SERPL-MCNC: 0.51 MG/DL (ref 0.2–1)
BUN SERPL-MCNC: 10 MG/DL (ref 5–25)
CALCIUM SERPL-MCNC: 8.8 MG/DL (ref 8.3–10.1)
CHLORIDE SERPL-SCNC: 105 MMOL/L (ref 100–108)
CHOLEST SERPL-MCNC: 314 MG/DL (ref 50–200)
CO2 SERPL-SCNC: 26 MMOL/L (ref 21–32)
CREAT SERPL-MCNC: 0.54 MG/DL (ref 0.6–1.3)
EOSINOPHIL # BLD AUTO: 0.03 THOUSAND/ΜL (ref 0–0.61)
EOSINOPHIL NFR BLD AUTO: 1 % (ref 0–6)
ERYTHROCYTE [DISTWIDTH] IN BLOOD BY AUTOMATED COUNT: 14.3 % (ref 11.6–15.1)
GFR SERPL CREATININE-BSD FRML MDRD: 102 ML/MIN/1.73SQ M
GLUCOSE SERPL-MCNC: 90 MG/DL (ref 65–140)
HCT VFR BLD AUTO: 39.8 % (ref 34.8–46.1)
HDLC SERPL-MCNC: 161 MG/DL (ref 40–60)
HGB BLD-MCNC: 13.2 G/DL (ref 11.5–15.4)
LDLC SERPL CALC-MCNC: 131 MG/DL (ref 0–100)
LYMPHOCYTES # BLD AUTO: 1.02 THOUSANDS/ΜL (ref 0.6–4.47)
LYMPHOCYTES NFR BLD AUTO: 32 % (ref 14–44)
MCH RBC QN AUTO: 31.6 PG (ref 26.8–34.3)
MCHC RBC AUTO-ENTMCNC: 33.2 G/DL (ref 31.4–37.4)
MCV RBC AUTO: 95 FL (ref 82–98)
MONOCYTES # BLD AUTO: 0.26 THOUSAND/ΜL (ref 0.17–1.22)
MONOCYTES NFR BLD AUTO: 8 % (ref 4–12)
NEUTROPHILS # BLD AUTO: 1.78 THOUSANDS/ΜL (ref 1.85–7.62)
NEUTS SEG NFR BLD AUTO: 56 % (ref 43–75)
NRBC BLD AUTO-RTO: 0 /100 WBCS
PLATELET # BLD AUTO: 98 THOUSANDS/UL (ref 149–390)
PMV BLD AUTO: 9.8 FL (ref 8.9–12.7)
POTASSIUM SERPL-SCNC: 4 MMOL/L (ref 3.5–5.3)
PROT SERPL-MCNC: 7.9 G/DL (ref 6.4–8.2)
RBC # BLD AUTO: 4.18 MILLION/UL (ref 3.81–5.12)
SODIUM SERPL-SCNC: 140 MMOL/L (ref 136–145)
TRIGL SERPL-MCNC: 112 MG/DL
TSH SERPL DL<=0.05 MIU/L-ACNC: 0.65 UIU/ML (ref 0.36–3.74)
WBC # BLD AUTO: 3.19 THOUSAND/UL (ref 4.31–10.16)

## 2018-02-05 PROCEDURE — 80053 COMPREHEN METABOLIC PANEL: CPT

## 2018-02-05 PROCEDURE — 36415 COLL VENOUS BLD VENIPUNCTURE: CPT

## 2018-02-05 PROCEDURE — 85025 COMPLETE CBC W/AUTO DIFF WBC: CPT

## 2018-02-05 PROCEDURE — 99214 OFFICE O/P EST MOD 30 MIN: CPT | Performed by: NURSE PRACTITIONER

## 2018-02-05 PROCEDURE — 80061 LIPID PANEL: CPT

## 2018-02-05 PROCEDURE — 84443 ASSAY THYROID STIM HORMONE: CPT

## 2018-02-05 RX ORDER — ATORVASTATIN CALCIUM 20 MG/1
TABLET, FILM COATED ORAL
COMMUNITY
Start: 2017-12-25 | End: 2018-02-05 | Stop reason: CLARIF

## 2018-02-05 RX ORDER — LORAZEPAM 1 MG/1
1 TABLET ORAL
COMMUNITY
Start: 2017-11-27 | End: 2018-02-05

## 2018-02-05 RX ORDER — IBUPROFEN 600 MG/1
1 TABLET ORAL EVERY 8 HOURS
COMMUNITY
Start: 2016-02-29 | End: 2018-02-05

## 2018-02-05 RX ORDER — DOXEPIN HYDROCHLORIDE 50 MG/1
50 CAPSULE ORAL
Qty: 90 CAPSULE | Refills: 3
Start: 2018-02-05 | End: 2018-02-08 | Stop reason: SDUPTHER

## 2018-02-05 NOTE — PROGRESS NOTES
Assessment/Plan:  History of alcoholism still drinking want to quit recommend local rehabs and alternative    Insomnia- take the doxepin every night to sleep    Depression- continue Celexa    Check labs as above    Allergic rhinitis- recommend humidification in the home and recommend Simply Saline nasal wash- you can get this in the childrens section at the pharm     Diagnoses and all orders for this visit:    Cough  -     XR chest pa & lateral    Fatigue, unspecified type  -     CBC and differential; Future  -     Comprehensive metabolic panel; Future  -     Lipid panel; Future  -     TSH, 3rd generation; Future    Alcohol-induced insomnia (HCC)  -     doxepin (SINEquan) 50 mg capsule; Take 1 capsule (50 mg total) by mouth daily at bedtime as needed for sleep    Other orders  -     Discontinue: atorvastatin (LIPITOR) 20 mg tablet;   -     Discontinue: LORazepam (ATIVAN) 1 mg tablet; Take 1 tablet by mouth  -     Discontinue: ibuprofen (MOTRIN) 600 mg tablet; Take 1 tablet by mouth every 8 (eight) hours          Subjective:      Patient ID: Zack Frost is a 65 y o  female  Patient presents today with a multitude of problems  She states most notably she was in rehab again on [place] she admitted herself was a local facility the experience was a nightmare she states that she sat in the room with no clothes for 48 hours there was essentially no heat and her average room temperature was 40 degrees  She was there for approximately 10 days she decided to leave against medical advice and she was taken to a bus stop where her  picked her up  She is back to drinking again she is drinking 3-4 large Bores of vodka  She is frustrated by her need to drink  She wants to quit drinking but she does not know how about going about doing it  She does not have a psychiatrist she never established 1  She has had nasal congestion postnasal drip and cough on and off for a month    No fevers or chills no shortness of breath no wheeze  She finally had her gallbladder out and the pathology was benign    She has been having some painful implants it was suggested that they come out but if she gets the implants out she will not have them replaced  She is not concerned with this at this point        The following portions of the patient's history were reviewed and updated as appropriate: allergies, current medications, past family history, past medical history, past social history, past surgical history and problem list     Review of Systems   Constitutional: Positive for chills and fatigue  Negative for appetite change, diaphoresis, fever and unexpected weight change  HENT: Positive for postnasal drip and sinus pressure  Negative for sneezing  Eyes: Negative for visual disturbance  Respiratory: Positive for cough  Negative for chest tightness and shortness of breath  Cardiovascular: Negative  Negative for chest pain, palpitations and leg swelling  Gastrointestinal: Negative  Negative for abdominal pain and blood in stool  Endocrine: Negative for cold intolerance, heat intolerance, polydipsia, polyphagia and polyuria  Genitourinary: Negative  Negative for difficulty urinating, dysuria, frequency and urgency  Musculoskeletal: Negative  Negative for arthralgias and myalgias  Skin: Negative for rash and wound  Neurological: Negative for dizziness, weakness, light-headedness and headaches  Hematological: Negative for adenopathy  Psychiatric/Behavioral: Positive for sleep disturbance  Negative for confusion and dysphoric mood  The patient is nervous/anxious  Objective:     Physical Exam   Constitutional: She is oriented to person, place, and time  She appears well-developed  No distress  HENT:   Head: Normocephalic and atraumatic     Nose: Nose normal    Mouth/Throat: Oropharynx is clear and moist    Eyes: Conjunctivae and EOM are normal  Pupils are equal, round, and reactive to light    Neck: Normal range of motion  Neck supple  No JVD present  No tracheal deviation present  No thyromegaly present  Cardiovascular: Normal rate, regular rhythm, normal heart sounds and intact distal pulses  Exam reveals no gallop and no friction rub  No murmur heard  Pulmonary/Chest: Effort normal and breath sounds normal  No respiratory distress  She has no wheezes  She has no rales  Abdominal: Soft  Bowel sounds are normal  She exhibits no distension  There is no tenderness  Musculoskeletal: Normal range of motion  She exhibits no edema  Lymphadenopathy:     She has no cervical adenopathy  Neurological: She is alert and oriented to person, place, and time  No cranial nerve deficit  Skin: Skin is warm and dry  No rash noted  She is not diaphoretic     Psychiatric: Judgment and thought content normal    She is quite tearful through the entire exam

## 2018-02-05 NOTE — PATIENT INSTRUCTIONS
History of alcoholism still drinking want to quit recommend local rehabs and alternative    Insomnia- take the doxepin every night to sleep    Depression- continue Celexa    Check labs as above    Allergic rhinitis- recommend humidification in the home and recommend Simply Saline nasal wash- you can get this in the childrens section at the pharm

## 2018-02-06 ENCOUNTER — TRANSCRIBE ORDERS (OUTPATIENT)
Dept: ADMINISTRATIVE | Age: 63
End: 2018-02-06

## 2018-02-06 ENCOUNTER — APPOINTMENT (OUTPATIENT)
Dept: RADIOLOGY | Facility: CLINIC | Age: 63
End: 2018-02-06
Payer: COMMERCIAL

## 2018-02-06 DIAGNOSIS — Z01.818 ENCOUNTER FOR OTHER PREPROCEDURAL EXAMINATION: ICD-10-CM

## 2018-02-06 PROCEDURE — 71046 X-RAY EXAM CHEST 2 VIEWS: CPT

## 2018-02-08 DIAGNOSIS — F10.982 ALCOHOL-INDUCED INSOMNIA (HCC): ICD-10-CM

## 2018-02-08 RX ORDER — DOXEPIN HYDROCHLORIDE 50 MG/1
50 CAPSULE ORAL
Qty: 90 CAPSULE | Refills: 0 | Status: SHIPPED | OUTPATIENT
Start: 2018-02-08 | End: 2018-05-09 | Stop reason: SDUPTHER

## 2018-02-08 NOTE — TELEPHONE ENCOUNTER
PT CALLED FOR RESULTS OF LABS AND CHEST XRAY  SHE ALSO WANTS THE SCRIPTS FOR ALCOHOL REHAB MEDICATION SENT TO [facility]  PT WANTS A CALL FROM DORIE ABOUT HOW TO GO ABOUT THE MEDICATION   [phone]

## 2018-02-08 NOTE — TELEPHONE ENCOUNTER
In chart please advise    Also please advise on medication, pharm never received so I re pended order

## 2018-02-09 ENCOUNTER — TELEPHONE (OUTPATIENT)
Dept: INTERNAL MEDICINE CLINIC | Facility: CLINIC | Age: 63
End: 2018-02-09

## 2018-02-09 NOTE — TELEPHONE ENCOUNTER
WANTS TO SPEAK WITH MARY LOU REGARDING THERE TESTING SHE WANTS PATIENT TO HAVE DONE    PLEASE CALL HER AFTER 12 NOON

## 2018-02-09 NOTE — TELEPHONE ENCOUNTER
Spoke w/ pt to find out more info    She wouldn't give me any info just said it has to do w/ her alcoholism/med and its personal so she needs to speak w/ [facility]

## 2018-03-07 NOTE — PROGRESS NOTES
History of Present Illness    Revaccination   Vaccine Information: Vaccine(s) Given (names): [id number]  Unable to reach by phone  Spoke with regarding vaccine out of temperature range and risks and benefits of revaccination  Action(s): Pt will be revaccinated  Appointment scheduled: [id number]  Pt called (attempt 1): [id number]  Revaccination Completed: [id number]  Active Problems    1  Abnormal glucose (790 29) (R73 09)   2  Abnormal liver enzymes (790 5) (R74 8)   3  Alcoholism (303 90) (F10 20)   4  Antibody response examination (V72 61) (Z01 84)   5  Anxiety (300 00) (F41 9)   6  Back pain (724 5) (M54 9)   7  Blood in stool (578 1) (K92 1)   8  Depression (311) (F32 9)   9  Encounter for gynecological examination without abnormal finding (V72 31) (Z01 419)   10  Encounter for routine gynecological examination with Papanicolaou smear of cervix    (V72 31,V76 2) (Z01 419)   11  Facial trauma (959 09) (S09 93XA)   12  Flu vaccine need (V04 81) (Z23)   13  Gallbladder polyp (575 6) (K82 4)   14  Hyperlipidemia, unspecified (272 4) (E78 5)   15  Knee pain (719 46) (M25 569)   16  Mild vitamin D deficiency (268 9) (E55 9)   17  Need for revaccination (V05 9) (Z23)   18  Need for tetanus booster (V03 7) (Z23)   19  Post-menopausal (V49 81) (Z78 0)   20  Screening for HPV (human papillomavirus) (V73 81) (Z11 51)   21  Screening mammogram, encounter for (V76 12) (Z12 31)    Immunizations  Influenza --- Monty Nice: 10-Nov-2015; Tamika Benedict: Unknown   PPSV --- Monty Nice: Never   Tdap --- Series1: 10-Nov-2015; Tamika Benedict: Unknown   Zoster --- Series1: Never     Current Meds   1  Atorvastatin Calcium 10 MG Oral Tablet; TAKE 1 TABLET DAILY AS DIRECTED   2  Citalopram Hydrobromide 20 MG Oral Tablet; TAKE 1 TABLET AT BEDTIME   3  HydrOXYzine Pamoate 50 MG Oral Capsule; TAKE 1 CAPSULE 3 TIMES DAILY AS   NEEDED   4  Ibuprofen 600 MG Oral Tablet; TAKE 1 TABLET 3 TIMES DAILY WITH MEALS   5   Ibuprofen 800 MG Oral Tablet   6  Vitamin D 1000 UNIT Oral Tablet; 1 TAB QD    Allergies    1  No Known Drug Allergies   2   No Known Drug Allergies    Signatures   Electronically signed by : FRANSICO Amaral ; Dec 22 2016 11:54AM EST

## 2018-03-20 ENCOUNTER — TELEPHONE (OUTPATIENT)
Dept: GASTROENTEROLOGY | Facility: CLINIC | Age: 63
End: 2018-03-20

## 2018-03-20 NOTE — TELEPHONE ENCOUNTER
03/20/18  Screened by: Teofilo Arenas    Referring Provider  Pre- Screening: There is no height or weight on file to calculate BMI  Has patient been referred for a routine screening Colonoscopy? no  Is the patient over 48years of age? yes    Do you have a family history of colon cancer? no    Do you have a personal history of colon polyps? yes    Do you have any of the following symptoms? NONE    Have you had a coronary or vascular stent within the last year? no    Have you had a heart attack or stroke in the last 6 months? no    Have you had intestinal surgery in the last 3 months? no    Do you have problems with: NO    Do you use: NO    Have you been hospitalized in the last Month? no    Have you been diagnosed with a bleeding disorder or anemia? no    Have you had chest pain (angina) or breathing problems  (COPD) in the last 3 months? no    Do you have any difficulty walking up a flight of stairs? no    Have you had Kidney failure or insufficiency? yes    Have you had heart valve surgery? no    Are you confined to a wheelchair? no    Do you take Other blood thinning medications no    Have you been diagnosed with Diabetes or are you taking any   Diabetic medications? no    : If patient answers NO to medical questions, then schedule procedure  If patient answers YES to medical questions, then schedule office appointment  Previous Colonoscopy yes  Date and Facility of last colonoscopy?      Comments: 04/03/18

## 2018-03-21 PROBLEM — Z86.010 HX OF COLONIC POLYP: Status: ACTIVE | Noted: 2018-03-21

## 2018-03-21 PROBLEM — Z86.0100 HX OF COLONIC POLYP: Status: ACTIVE | Noted: 2018-03-21

## 2018-03-27 ENCOUNTER — TRANSCRIBE ORDERS (OUTPATIENT)
Dept: GASTROENTEROLOGY | Facility: CLINIC | Age: 63
End: 2018-03-27

## 2018-04-02 ENCOUNTER — ANESTHESIA EVENT (OUTPATIENT)
Dept: PERIOP | Facility: HOSPITAL | Age: 63
End: 2018-04-02
Payer: COMMERCIAL

## 2018-04-02 RX ORDER — SODIUM CHLORIDE, SODIUM LACTATE, POTASSIUM CHLORIDE, CALCIUM CHLORIDE 600; 310; 30; 20 MG/100ML; MG/100ML; MG/100ML; MG/100ML
100 INJECTION, SOLUTION INTRAVENOUS CONTINUOUS
Status: CANCELLED | OUTPATIENT
Start: 2018-04-02

## 2018-04-03 ENCOUNTER — ANESTHESIA (OUTPATIENT)
Dept: PERIOP | Facility: HOSPITAL | Age: 63
End: 2018-04-03
Payer: COMMERCIAL

## 2018-04-03 ENCOUNTER — HOSPITAL ENCOUNTER (OUTPATIENT)
Facility: HOSPITAL | Age: 63
Setting detail: OUTPATIENT SURGERY
Discharge: HOME | End: 2018-04-03
Attending: INTERNAL MEDICINE | Admitting: INTERNAL MEDICINE
Payer: COMMERCIAL

## 2018-04-03 VITALS
BODY MASS INDEX: 27.58 KG/M2 | HEART RATE: 78 BPM | DIASTOLIC BLOOD PRESSURE: 69 MMHG | TEMPERATURE: 97.6 F | HEIGHT: 65 IN | SYSTOLIC BLOOD PRESSURE: 119 MMHG | RESPIRATION RATE: 15 BRPM | WEIGHT: 165.57 LBS | OXYGEN SATURATION: 97 %

## 2018-04-03 DIAGNOSIS — Z86.010 HX OF COLONIC POLYP: ICD-10-CM

## 2018-04-03 PROCEDURE — 45384 COLONOSCOPY W/LESION REMOVAL: CPT | Performed by: INTERNAL MEDICINE

## 2018-04-03 PROCEDURE — 88305 TISSUE EXAM BY PATHOLOGIST: CPT | Performed by: PATHOLOGY

## 2018-04-03 RX ORDER — PROPOFOL 10 MG/ML
INJECTION, EMULSION INTRAVENOUS AS NEEDED
Status: DISCONTINUED | OUTPATIENT
Start: 2018-04-03 | End: 2018-04-03 | Stop reason: SURG

## 2018-04-03 RX ORDER — SODIUM CHLORIDE, SODIUM LACTATE, POTASSIUM CHLORIDE, CALCIUM CHLORIDE 600; 310; 30; 20 MG/100ML; MG/100ML; MG/100ML; MG/100ML
INJECTION, SOLUTION INTRAVENOUS CONTINUOUS PRN
Status: DISCONTINUED | OUTPATIENT
Start: 2018-04-03 | End: 2018-04-03 | Stop reason: SURG

## 2018-04-03 RX ADMIN — PROPOFOL 50 MG: 10 INJECTION, EMULSION INTRAVENOUS at 09:31

## 2018-04-03 RX ADMIN — PROPOFOL 150 MG: 10 INJECTION, EMULSION INTRAVENOUS at 09:28

## 2018-04-03 RX ADMIN — SODIUM CHLORIDE, SODIUM LACTATE, POTASSIUM CHLORIDE, AND CALCIUM CHLORIDE: .6; .31; .03; .02 INJECTION, SOLUTION INTRAVENOUS at 09:20

## 2018-04-03 RX ADMIN — PROPOFOL 40 MG: 10 INJECTION, EMULSION INTRAVENOUS at 09:34

## 2018-04-03 NOTE — ANESTHESIA POSTPROCEDURE EVALUATION
Post-Op Assessment Note      CV Status:  Stable    Mental Status:  Alert and awake    Hydration Status:  Euvolemic    PONV Controlled:  Controlled    Airway Patency:  Patent    Post Op Vitals Reviewed: Yes          Staff: CRNA           BP   110/76   Temp      Pulse  87   Resp   16   SpO2   99%

## 2018-04-03 NOTE — DISCHARGE INSTRUCTIONS
Colonoscopy   WHAT YOU NEED TO KNOW:   A colonoscopy is a procedure to examine the inside of your colon (intestine) with a scope  Polyps or tissue growths may have been removed during your colonoscopy  It is normal to feel bloated and to have some abdominal discomfort  You should be passing gas  If you have hemorrhoids or you had polyps removed, you may have a small amount of bleeding  DISCHARGE INSTRUCTIONS:   Seek care immediately if:   · You have a large amount of bright red blood in your bowel movements  · Your abdomen is hard and firm and you have severe pain  · You have sudden trouble breathing  Contact your healthcare provider if:   · You develop a rash or hives  · You have a fever within 24 hours of your procedure  · You have not had a bowel movement for 3 days after your procedure  · You have questions or concerns about your condition or care  Activity:   · Do not lift, strain, or run  for 3 days after your procedure  · Rest after your procedure  You have been given medicine to relax you  Do not  drive or make important decisions until the day after your procedure  Return to your normal activity as directed  · Relieve gas and discomfort from bloating  by lying on your right side with a heating pad on your abdomen  You may need to take short walks to help the gas move out  Eat small meals until bloating is relieved  If you had polyps removed: For 7 days after your procedure:  · Do not  take aspirin  · Do not  go on long car rides  Help prevent constipation:   · Eat a variety of healthy foods  Healthy foods include fruit, vegetables, whole-grain breads, low-fat dairy products, beans, lean meat, and fish  Ask if you need to be on a special diet  Your healthcare provider may recommend that you eat high-fiber foods such as cooked beans  Fiber helps you have regular bowel movements  · Drink liquids as directed    Adults should drink between 9 and 13 eight-ounce cups of liquid every day  Ask what amount is best for you  For most people, good liquids to drink are water, juice, and milk  · Exercise as directed  Talk to your healthcare provider about the best exercise plan for you  Exercise can help prevent constipation, decrease your blood pressure and improve your health  Follow up with your healthcare provider as directed:  Write down your questions so you remember to ask them during your visits  © 2017 [employer] Information is for End User's use only and may not be sold, redistributed or otherwise used for commercial purposes  All illustrations and images included in CareNotes® are the copyrighted property of [employer]  or [employer]  The above information is an  only  It is not intended as medical advice for individual conditions or treatments  Talk to your doctor, nurse or pharmacist before following any medical regimen to see if it is safe and effective for you

## 2018-04-03 NOTE — ANESTHESIA PREPROCEDURE EVALUATION
Review of Systems/Medical History  Patient summary reviewed  Chart reviewed      Cardiovascular  Exercise tolerance: good,     Pulmonary  Negative pulmonary ROS        GI/Hepatic    Bowel prep       Negative  ROS        Endo/Other  Negative endo/other ROS      GYN      Comment: Bilateral mastectomy  Hematology  Negative hematology ROS      Musculoskeletal  Negative musculoskeletal ROS        Neurology  Negative neurology ROS      Psychology           Physical Exam    Airway    Mallampati score: III  TM Distance: >3 FB  Neck ROM: full     Dental   Comment: Caps  No loose ,     Cardiovascular  Rhythm: regular, Rate: normal,     Pulmonary  Breath sounds clear to auscultation,     Other Findings        Anesthesia Plan  ASA Score- 2     Anesthesia Type- IV sedation with anesthesia with ASA Monitors  Additional Monitors:   Airway Plan:         Plan Factors-  Patient did not smoke on day of surgery  Induction- intravenous  Postoperative Plan-     Informed Consent- Anesthetic plan and risks discussed with patient  I personally reviewed this patient with the CRNA  Discussed and agreed on the Anesthesia Plan with the CRNA  Leidy Davis

## 2018-04-03 NOTE — PERIOPERATIVE NURSING NOTE
Pt states she had a double masectomy due to strong family hx of breast cancer and had no lymph nodes removed so there is no limb alert necessary

## 2018-04-03 NOTE — OP NOTE
**** GI/ENDOSCOPY REPORT ****     PATIENT NAME: Javi Valadez ------ VISIT ID:  Patient ID:   [id number] YOB: 1955     INTRODUCTION: Colonoscopy - A 65 female patient presents for an outpatient   Colonoscopy at [facility]  PREVIOUS COLONOSCOPY: Patient's last colonoscopy was 6 years ago  INDICATIONS: Surveillance  Screening for personal history of polyps  CONSENT:  The benefits, risks, and alternatives to the procedure were   discussed and informed consent was obtained from the patient  PREPARATION: EKG, pulse, pulse oximetry and blood pressure were monitored   throughout the procedure  The patient was identified by myself both   verbally and by visual inspection of ID band  Airway Assessment   Classification: Airway class 2 - Visualization of the soft palate, fauces   and uvula  ASA Classification: Class 2 - Patient has mild to moderate   systemic disturbance that may or may not be related to the disorder   requiring surgery  MEDICATIONS: Anesthesia-check records     PROCEDURE:  The endoscope was passed without difficulty through the anus   under direct visualization and advanced to the cecum, confirmed by   appendiceal orifice and ileocecal valve  The scope was withdrawn and the   mucosa was carefully examined  The quality of the preparation was adequate   prep  Cecal Intubation Time: 4 minutes(s) Scope Withdrawal Time: 5   minutes(s)     RECTAL EXAM: Normal rectal exam      FINDINGS:  Two polyps, measuring less than 5 mm in size, were found in the   ascending colon  All polyps were completely removed by hot biopsy   polypectomy  The polyps were retrieved  Otherwise, the colon appeared to   be normal  Normal retroversion     COMPLICATIONS: There were no complications  IMPRESSIONS: Two polyps found in the ascending colon; all polyps removed   by hot biopsy polypectomy  RECOMMENDATIONS: Follow-up on the results of the biopsy specimens  Colonoscopy recommended in 5 years  ESTIMATED BLOOD LOSS: None  PATHOLOGY SPECIMENS: Two polyps completely removed by hot biopsy   polypectomy   PROCEDURE CODES: : Colonoscopy with removal of tumor(s), polyp(s), or   other lesion(s) by hot biopsy forceps or bipolar cautery     ICD-9 Codes: V12 72 Personal history of colonic polyps 211 3 Benign   neoplasm of colon     ICD-10 Codes: Z86 010 Personal history of colonic polyps K63 5 Polyp of   colon     PERFORMED BY: FRANSICO Stafford  [facility]  on 04/03/2018  Version 1, electronically signed by FRANSICO Leon , D O  on   04/03/2018 at 09:42

## 2018-05-09 ENCOUNTER — TELEPHONE (OUTPATIENT)
Dept: INTERNAL MEDICINE CLINIC | Facility: CLINIC | Age: 63
End: 2018-05-09

## 2018-05-09 DIAGNOSIS — F10.982 ALCOHOL-INDUCED INSOMNIA (HCC): ICD-10-CM

## 2018-05-09 RX ORDER — DOXEPIN HYDROCHLORIDE 50 MG/1
50 CAPSULE ORAL
Qty: 90 CAPSULE | Refills: 0 | Status: SHIPPED | OUTPATIENT
Start: 2018-05-09 | End: 2018-10-07 | Stop reason: SDUPTHER

## 2018-05-09 NOTE — TELEPHONE ENCOUNTER
I called pt back as per Dr Geovanna Brandon and she is coming in now  As per Mario he needs to talk to her quick before prescribing meds  There is no appt he just said to send her in right away

## 2018-05-09 NOTE — TELEPHONE ENCOUNTER
Pt cld stating she stopped drinking a little bit yesterday to start weining herself and she is shaking like a leaf  Is there anything that Dr Marilynn Huynh could call in  She is feeling miserable  Please call pt and advise [phone]

## 2018-05-29 DIAGNOSIS — E78.5 HYPERLIPIDEMIA, UNSPECIFIED HYPERLIPIDEMIA TYPE: Primary | ICD-10-CM

## 2018-05-29 RX ORDER — ATORVASTATIN CALCIUM 20 MG/1
TABLET, FILM COATED ORAL
Qty: 15 TABLET | Refills: 3 | Status: SHIPPED | OUTPATIENT
Start: 2018-05-29 | End: 2018-10-07 | Stop reason: SDUPTHER

## 2018-10-07 DIAGNOSIS — E78.5 HYPERLIPIDEMIA, UNSPECIFIED HYPERLIPIDEMIA TYPE: ICD-10-CM

## 2018-10-07 DIAGNOSIS — F10.982 ALCOHOL-INDUCED INSOMNIA (HCC): ICD-10-CM

## 2018-10-07 RX ORDER — DOXEPIN HYDROCHLORIDE 50 MG/1
CAPSULE ORAL
Qty: 90 CAPSULE | Refills: 0 | Status: SHIPPED | OUTPATIENT
Start: 2018-10-07 | End: 2019-01-04 | Stop reason: SDUPTHER

## 2018-10-09 RX ORDER — ATORVASTATIN CALCIUM 20 MG/1
TABLET, FILM COATED ORAL
Qty: 15 TABLET | Refills: 3 | Status: SHIPPED | OUTPATIENT
Start: 2018-10-09 | End: 2019-02-08 | Stop reason: SDUPTHER

## 2018-10-11 ENCOUNTER — TELEPHONE (OUTPATIENT)
Dept: INTERNAL MEDICINE CLINIC | Facility: CLINIC | Age: 63
End: 2018-10-11

## 2018-10-11 DIAGNOSIS — N65.1 BREAST RECONSTRUCTION DISPROPORTION: Primary | ICD-10-CM

## 2018-10-11 NOTE — TELEPHONE ENCOUNTER
I put a referral in for Dr Faith Pham, he is in [facility] but he comes down here several times a month   He is perfect for her

## 2018-10-11 NOTE — TELEPHONE ENCOUNTER
Pt would like a referral to see a plastic surgeon in the [place] [address] network  She has breast implants due to having a double mastectomy  One of the implants are deflating and has a lot of scar tissue  She had her implants put in my a  in [place] but she does not want to travel that far   Please advise       [phone]

## 2018-11-01 ENCOUNTER — TELEPHONE (OUTPATIENT)
Dept: PLASTIC SURGERY | Facility: CLINIC | Age: 63
End: 2018-11-01

## 2018-11-01 NOTE — TELEPHONE ENCOUNTER
Patient "No-Show'd" last appointment originally scheduled for 10/31 at our [facility] office with Dr Sourav Gonzáles  Patient returned phone call today after missing her appointment yesterday and stated she is already working with an alternate physician that was located closer for treatment  I advised the patient to please give our office a call if she needs any of our services in the future  She agreed

## 2019-01-04 DIAGNOSIS — F10.982 ALCOHOL-INDUCED INSOMNIA (HCC): ICD-10-CM

## 2019-01-04 RX ORDER — DOXEPIN HYDROCHLORIDE 50 MG/1
CAPSULE ORAL
Qty: 90 CAPSULE | Refills: 0 | Status: SHIPPED | OUTPATIENT
Start: 2019-01-04 | End: 2019-02-04 | Stop reason: HOSPADM

## 2019-01-24 ENCOUNTER — OFFICE VISIT (OUTPATIENT)
Dept: FAMILY MEDICINE CLINIC | Facility: CLINIC | Age: 64
End: 2019-01-24
Payer: COMMERCIAL

## 2019-01-24 VITALS
RESPIRATION RATE: 18 BRPM | DIASTOLIC BLOOD PRESSURE: 78 MMHG | SYSTOLIC BLOOD PRESSURE: 130 MMHG | WEIGHT: 175.2 LBS | TEMPERATURE: 97.8 F | HEART RATE: 82 BPM | OXYGEN SATURATION: 92 % | HEIGHT: 65 IN | BODY MASS INDEX: 29.19 KG/M2

## 2019-01-24 DIAGNOSIS — J02.9 ACUTE PHARYNGITIS, UNSPECIFIED ETIOLOGY: Primary | ICD-10-CM

## 2019-01-24 LAB — S PYO AG THROAT QL: NEGATIVE

## 2019-01-24 PROCEDURE — 87070 CULTURE OTHR SPECIMN AEROBIC: CPT | Performed by: FAMILY MEDICINE

## 2019-01-24 PROCEDURE — 87880 STREP A ASSAY W/OPTIC: CPT | Performed by: FAMILY MEDICINE

## 2019-01-24 PROCEDURE — 99204 OFFICE O/P NEW MOD 45 MIN: CPT | Performed by: FAMILY MEDICINE

## 2019-01-24 RX ORDER — AMOXICILLIN AND CLAVULANATE POTASSIUM 875; 125 MG/1; MG/1
1 TABLET, FILM COATED ORAL EVERY 12 HOURS SCHEDULED
Qty: 14 TABLET | Refills: 0 | Status: SHIPPED | OUTPATIENT
Start: 2019-01-24 | End: 2019-01-31

## 2019-01-24 NOTE — PROGRESS NOTES
Assessment/Plan:    No problem-specific Assessment & Plan notes found for this encounter  Diagnoses and all orders for this visit:    Acute pharyngitis, unspecified etiology  after discussing risks and benefits of medication along with side effects will start Augmentin at this time  She was advised to take throat lozenges as needed  Follow up in 1 week or as needed        Subjective:      Patient ID: Nash Chakraborty is a 59 y o  female  Sore Throat  Patient complains of sore throat  Associated symptoms include sinus and nasal congestion and sore throat  Onset of symptoms was a few days ago, and have been gradually worsening since that time  She is drinking plenty of fluids  She has not had recent close exposure to someone with proven streptococcal pharyngitis  The following portions of the patient's history were reviewed and updated as appropriate:   She  has a past medical history of Anxiety; Cancer ([facility] ); Depression; and Hyperlipidemia  She   Patient Active Problem List    Diagnosis Date Noted    Acute pharyngitis 01/24/2019    Hx of colonic polyp 03/21/2018    Screen for colon cancer 01/25/2018    Alcoholism ([facility] ) 12/06/2016    Depression 12/06/2016    Mild vitamin D deficiency 07/08/2016    Anxiety 08/27/2015    Hyperlipidemia, unspecified 08/27/2015     She  has a past surgical history that includes Mastectomy (Bilateral); Bilateral oophorectomy; Appendectomy; Tonsillectomy; pr lap,cholecystectomy (N/A, 10/5/2017); Cholecystectomy; Colonoscopy; pr colonoscopy flx dx w/collj spec when pfrmd (N/A, 4/3/2018); and Breast reconstruction  Her family history includes Breast cancer in her maternal aunt and mother; Heart failure in her father; Ovarian cancer in her mother  She  reports that she has never smoked  She has never used smokeless tobacco  She reports that she does not drink alcohol or use drugs    Current Outpatient Prescriptions   Medication Sig Dispense Refill    atorvastatin (LIPITOR) 10 mg tablet Take by mouth      atorvastatin (LIPITOR) 20 mg tablet TAKE 1/2 TABLET DAILY AS DIRECTED  15 tablet 3    cholecalciferol (VITAMIN D3) 1,000 units tablet Take by mouth      citalopram (CeleXA) 20 mg tablet Take by mouth      doxepin (SINEquan) 50 mg capsule TAKE ONE CAPSULE BY MOUTH AT BEDTIME AS NEEDED 90 capsule 0     No current facility-administered medications for this visit  Current Outpatient Prescriptions on File Prior to Visit   Medication Sig    atorvastatin (LIPITOR) 10 mg tablet Take by mouth    atorvastatin (LIPITOR) 20 mg tablet TAKE 1/2 TABLET DAILY AS DIRECTED   cholecalciferol (VITAMIN D3) 1,000 units tablet Take by mouth    citalopram (CeleXA) 20 mg tablet Take by mouth    doxepin (SINEquan) 50 mg capsule TAKE ONE CAPSULE BY MOUTH AT BEDTIME AS NEEDED     No current facility-administered medications on file prior to visit  She has No Known Allergies       Review of Systems   Constitutional: Negative for activity change, appetite change, fatigue and fever  HENT: Positive for sore throat  Negative for congestion and ear discharge  Respiratory: Negative for cough and shortness of breath  Cardiovascular: Negative for chest pain and palpitations  Gastrointestinal: Negative for diarrhea and nausea  Musculoskeletal: Negative for arthralgias and back pain  Skin: Negative for color change and rash  Neurological: Negative for dizziness and headaches  Psychiatric/Behavioral: Negative for agitation and behavioral problems  Objective:      /78 (BP Location: Left arm, Patient Position: Sitting, Cuff Size: Standard)   Pulse 82   Temp 97 8 °F (36 6 °C) (Tympanic)   Resp 18   Ht 5' 5" (1 651 m)   Wt 79 5 kg (175 lb 3 2 oz)   SpO2 92%   BMI 29 15 kg/m²          Physical Exam   Constitutional: She is oriented to person, place, and time  She appears well-developed and well-nourished  No distress     HENT:   Head: Normocephalic and atraumatic  Nose: Nose normal    Mouth/Throat: Oropharyngeal exudate present  Eyes: Pupils are equal, round, and reactive to light  Conjunctivae are normal    Cardiovascular: Normal rate, regular rhythm and normal heart sounds  No murmur heard  Pulmonary/Chest: Effort normal and breath sounds normal  No respiratory distress  She has no wheezes  Abdominal: Soft  Bowel sounds are normal  She exhibits no distension  There is no tenderness  Neurological: She is alert and oriented to person, place, and time  Skin: Skin is warm and dry  No rash noted  She is not diaphoretic  No erythema  Psychiatric: She has a normal mood and affect

## 2019-01-27 LAB — BACTERIA THROAT CULT: NORMAL

## 2019-02-02 ENCOUNTER — APPOINTMENT (EMERGENCY)
Dept: CT IMAGING | Facility: HOSPITAL | Age: 64
DRG: 897 | End: 2019-02-02
Payer: COMMERCIAL

## 2019-02-02 ENCOUNTER — HOSPITAL ENCOUNTER (INPATIENT)
Facility: HOSPITAL | Age: 64
LOS: 2 days | Discharge: HOME | DRG: 897 | End: 2019-02-04
Attending: EMERGENCY MEDICINE | Admitting: GENERAL PRACTICE
Payer: COMMERCIAL

## 2019-02-02 ENCOUNTER — APPOINTMENT (INPATIENT)
Dept: ULTRASOUND IMAGING | Facility: HOSPITAL | Age: 64
DRG: 897 | End: 2019-02-02
Payer: COMMERCIAL

## 2019-02-02 ENCOUNTER — APPOINTMENT (EMERGENCY)
Dept: RADIOLOGY | Facility: HOSPITAL | Age: 64
DRG: 897 | End: 2019-02-02
Payer: COMMERCIAL

## 2019-02-02 DIAGNOSIS — S09.93XA INJURY OF TONGUE, INITIAL ENCOUNTER: ICD-10-CM

## 2019-02-02 DIAGNOSIS — F10.239 ALCOHOL WITHDRAWAL SEIZURE (HCC): ICD-10-CM

## 2019-02-02 DIAGNOSIS — F10.239 ALCOHOL WITHDRAWAL SEIZURE WITH COMPLICATION (HCC): ICD-10-CM

## 2019-02-02 DIAGNOSIS — F10.239 ALCOHOL WITHDRAWAL (HCC): Primary | ICD-10-CM

## 2019-02-02 DIAGNOSIS — D61.818 PANCYTOPENIA (HCC): ICD-10-CM

## 2019-02-02 DIAGNOSIS — F10.20 ALCOHOLISM (HCC): ICD-10-CM

## 2019-02-02 DIAGNOSIS — R56.9 ALCOHOL WITHDRAWAL SEIZURE WITH COMPLICATION (HCC): ICD-10-CM

## 2019-02-02 DIAGNOSIS — R56.9 ALCOHOL WITHDRAWAL SEIZURE (HCC): ICD-10-CM

## 2019-02-02 DIAGNOSIS — E87.6 HYPOKALEMIA: ICD-10-CM

## 2019-02-02 PROBLEM — F10.939 ALCOHOL WITHDRAWAL SEIZURE WITH COMPLICATION (HCC): Status: ACTIVE | Noted: 2019-02-02

## 2019-02-02 PROBLEM — E78.00 PURE HYPERCHOLESTEROLEMIA: Status: ACTIVE | Noted: 2019-02-02

## 2019-02-02 LAB
ALBUMIN SERPL BCP-MCNC: 3.3 G/DL (ref 3.5–5)
ALP SERPL-CCNC: 89 U/L (ref 46–116)
ALT SERPL W P-5'-P-CCNC: 36 U/L (ref 12–78)
AMMONIA PLAS-SCNC: 17 UMOL/L (ref 11–35)
AMPHETAMINES SERPL QL SCN: NEGATIVE
ANION GAP SERPL CALCULATED.3IONS-SCNC: 9 MMOL/L (ref 4–13)
ANION GAP SERPL CALCULATED.3IONS-SCNC: 9 MMOL/L (ref 4–13)
APTT PPP: 26 SECONDS (ref 26–38)
AST SERPL W P-5'-P-CCNC: 51 U/L (ref 5–45)
BARBITURATES UR QL: NEGATIVE
BASOPHILS # BLD AUTO: 0.02 THOUSANDS/ΜL (ref 0–0.1)
BASOPHILS NFR BLD AUTO: 1 % (ref 0–1)
BENZODIAZ UR QL: NEGATIVE
BILIRUB SERPL-MCNC: 0.8 MG/DL (ref 0.2–1)
BILIRUB UR QL STRIP: NEGATIVE
BUN SERPL-MCNC: 4 MG/DL (ref 5–25)
BUN SERPL-MCNC: 5 MG/DL (ref 5–25)
CALCIUM SERPL-MCNC: 8.2 MG/DL (ref 8.3–10.1)
CALCIUM SERPL-MCNC: 8.7 MG/DL (ref 8.3–10.1)
CHLORIDE SERPL-SCNC: 101 MMOL/L (ref 100–108)
CHLORIDE SERPL-SCNC: 101 MMOL/L (ref 100–108)
CLARITY UR: CLEAR
CO2 SERPL-SCNC: 25 MMOL/L (ref 21–32)
CO2 SERPL-SCNC: 27 MMOL/L (ref 21–32)
COCAINE UR QL: NEGATIVE
COLOR UR: YELLOW
CREAT SERPL-MCNC: 0.58 MG/DL (ref 0.6–1.3)
CREAT SERPL-MCNC: 0.61 MG/DL (ref 0.6–1.3)
EOSINOPHIL # BLD AUTO: 0.01 THOUSAND/ΜL (ref 0–0.61)
EOSINOPHIL NFR BLD AUTO: 0 % (ref 0–6)
ERYTHROCYTE [DISTWIDTH] IN BLOOD BY AUTOMATED COUNT: 15.9 % (ref 11.6–15.1)
ETHANOL SERPL-MCNC: <3 MG/DL (ref 0–3)
GFR SERPL CREATININE-BSD FRML MDRD: 97 ML/MIN/1.73SQ M
GFR SERPL CREATININE-BSD FRML MDRD: 99 ML/MIN/1.73SQ M
GLUCOSE SERPL-MCNC: 92 MG/DL (ref 65–140)
GLUCOSE SERPL-MCNC: 95 MG/DL (ref 65–140)
GLUCOSE UR STRIP-MCNC: NEGATIVE MG/DL
HCT VFR BLD AUTO: 30.2 % (ref 34.8–46.1)
HGB BLD-MCNC: 10.1 G/DL (ref 11.5–15.4)
HGB UR QL STRIP.AUTO: NEGATIVE
IMM GRANULOCYTES # BLD AUTO: 0.01 THOUSAND/UL (ref 0–0.2)
IMM GRANULOCYTES NFR BLD AUTO: 0 % (ref 0–2)
INR PPP: 1.06 (ref 0.86–1.17)
KETONES UR STRIP-MCNC: ABNORMAL MG/DL
LACTATE SERPL-SCNC: 1.2 MMOL/L (ref 0.5–2)
LEUKOCYTE ESTERASE UR QL STRIP: NEGATIVE
LYMPHOCYTES # BLD AUTO: 0.18 THOUSANDS/ΜL (ref 0.6–4.47)
LYMPHOCYTES NFR BLD AUTO: 8 % (ref 14–44)
MAGNESIUM SERPL-MCNC: 1.1 MG/DL (ref 1.6–2.6)
MAGNESIUM SERPL-MCNC: 1.9 MG/DL (ref 1.6–2.6)
MCH RBC QN AUTO: 30.5 PG (ref 26.8–34.3)
MCHC RBC AUTO-ENTMCNC: 33.4 G/DL (ref 31.4–37.4)
MCV RBC AUTO: 91 FL (ref 82–98)
METHADONE UR QL: NEGATIVE
MONOCYTES # BLD AUTO: 0.16 THOUSAND/ΜL (ref 0.17–1.22)
MONOCYTES NFR BLD AUTO: 7 % (ref 4–12)
NEUTROPHILS # BLD AUTO: 1.95 THOUSANDS/ΜL (ref 1.85–7.62)
NEUTS SEG NFR BLD AUTO: 84 % (ref 43–75)
NITRITE UR QL STRIP: NEGATIVE
NRBC BLD AUTO-RTO: 0 /100 WBCS
OPIATES UR QL SCN: NEGATIVE
PCP UR QL: NEGATIVE
PH UR STRIP.AUTO: 7 [PH] (ref 4.5–8)
PLATELET # BLD AUTO: 35 THOUSANDS/UL (ref 149–390)
PMV BLD AUTO: 9.9 FL (ref 8.9–12.7)
POTASSIUM SERPL-SCNC: 2.9 MMOL/L (ref 3.5–5.3)
POTASSIUM SERPL-SCNC: 3.7 MMOL/L (ref 3.5–5.3)
PROT SERPL-MCNC: 6.1 G/DL (ref 6.4–8.2)
PROT UR STRIP-MCNC: NEGATIVE MG/DL
PROTHROMBIN TIME: 13.7 SECONDS (ref 11.8–14.2)
RBC # BLD AUTO: 3.31 MILLION/UL (ref 3.81–5.12)
RETICS # AUTO: ABNORMAL 10*3/UL (ref 14097–95744)
RETICS # CALC: 3.28 % (ref 0.37–1.87)
SODIUM SERPL-SCNC: 135 MMOL/L (ref 136–145)
SODIUM SERPL-SCNC: 137 MMOL/L (ref 136–145)
SP GR UR STRIP.AUTO: 1.01 (ref 1–1.03)
THC UR QL: POSITIVE
TROPONIN I SERPL-MCNC: <0.02 NG/ML
TSH SERPL DL<=0.05 MIU/L-ACNC: 1.09 UIU/ML (ref 0.36–3.74)
UROBILINOGEN UR QL STRIP.AUTO: 1 E.U./DL
WBC # BLD AUTO: 2.33 THOUSAND/UL (ref 4.31–10.16)

## 2019-02-02 PROCEDURE — 85730 THROMBOPLASTIN TIME PARTIAL: CPT | Performed by: EMERGENCY MEDICINE

## 2019-02-02 PROCEDURE — 85610 PROTHROMBIN TIME: CPT | Performed by: EMERGENCY MEDICINE

## 2019-02-02 PROCEDURE — 82140 ASSAY OF AMMONIA: CPT | Performed by: EMERGENCY MEDICINE

## 2019-02-02 PROCEDURE — 83550 IRON BINDING TEST: CPT | Performed by: GENERAL PRACTICE

## 2019-02-02 PROCEDURE — 96361 HYDRATE IV INFUSION ADD-ON: CPT

## 2019-02-02 PROCEDURE — 80053 COMPREHEN METABOLIC PANEL: CPT | Performed by: EMERGENCY MEDICINE

## 2019-02-02 PROCEDURE — 96365 THER/PROPH/DIAG IV INF INIT: CPT

## 2019-02-02 PROCEDURE — 81003 URINALYSIS AUTO W/O SCOPE: CPT | Performed by: EMERGENCY MEDICINE

## 2019-02-02 PROCEDURE — 85025 COMPLETE CBC W/AUTO DIFF WBC: CPT | Performed by: EMERGENCY MEDICINE

## 2019-02-02 PROCEDURE — 84443 ASSAY THYROID STIM HORMONE: CPT | Performed by: EMERGENCY MEDICINE

## 2019-02-02 PROCEDURE — 82728 ASSAY OF FERRITIN: CPT | Performed by: GENERAL PRACTICE

## 2019-02-02 PROCEDURE — 83735 ASSAY OF MAGNESIUM: CPT | Performed by: EMERGENCY MEDICINE

## 2019-02-02 PROCEDURE — 99223 1ST HOSP IP/OBS HIGH 75: CPT | Performed by: GENERAL PRACTICE

## 2019-02-02 PROCEDURE — 85045 AUTOMATED RETICULOCYTE COUNT: CPT | Performed by: GENERAL PRACTICE

## 2019-02-02 PROCEDURE — 36415 COLL VENOUS BLD VENIPUNCTURE: CPT | Performed by: EMERGENCY MEDICINE

## 2019-02-02 PROCEDURE — 71045 X-RAY EXAM CHEST 1 VIEW: CPT

## 2019-02-02 PROCEDURE — 83540 ASSAY OF IRON: CPT | Performed by: GENERAL PRACTICE

## 2019-02-02 PROCEDURE — 80307 DRUG TEST PRSMV CHEM ANLYZR: CPT | Performed by: EMERGENCY MEDICINE

## 2019-02-02 PROCEDURE — 83735 ASSAY OF MAGNESIUM: CPT | Performed by: GENERAL PRACTICE

## 2019-02-02 PROCEDURE — 82746 ASSAY OF FOLIC ACID SERUM: CPT | Performed by: GENERAL PRACTICE

## 2019-02-02 PROCEDURE — 82607 VITAMIN B-12: CPT | Performed by: GENERAL PRACTICE

## 2019-02-02 PROCEDURE — 76700 US EXAM ABDOM COMPLETE: CPT

## 2019-02-02 PROCEDURE — 80048 BASIC METABOLIC PNL TOTAL CA: CPT | Performed by: GENERAL PRACTICE

## 2019-02-02 PROCEDURE — 70450 CT HEAD/BRAIN W/O DYE: CPT

## 2019-02-02 PROCEDURE — 99285 EMERGENCY DEPT VISIT HI MDM: CPT

## 2019-02-02 PROCEDURE — 96375 TX/PRO/DX INJ NEW DRUG ADDON: CPT

## 2019-02-02 PROCEDURE — 83605 ASSAY OF LACTIC ACID: CPT | Performed by: EMERGENCY MEDICINE

## 2019-02-02 PROCEDURE — 84484 ASSAY OF TROPONIN QUANT: CPT | Performed by: EMERGENCY MEDICINE

## 2019-02-02 PROCEDURE — G0480 DRUG TEST DEF 1-7 CLASSES: HCPCS | Performed by: EMERGENCY MEDICINE

## 2019-02-02 PROCEDURE — 93005 ELECTROCARDIOGRAM TRACING: CPT

## 2019-02-02 PROCEDURE — 80320 DRUG SCREEN QUANTALCOHOLS: CPT | Performed by: EMERGENCY MEDICINE

## 2019-02-02 RX ORDER — CLONIDINE HYDROCHLORIDE 0.1 MG/1
0.1 TABLET ORAL EVERY 12 HOURS SCHEDULED
Status: DISCONTINUED | OUTPATIENT
Start: 2019-02-02 | End: 2019-02-03

## 2019-02-02 RX ORDER — CHLORDIAZEPOXIDE HYDROCHLORIDE 25 MG/1
25 CAPSULE, GELATIN COATED ORAL ONCE
Status: COMPLETED | OUTPATIENT
Start: 2019-02-02 | End: 2019-02-02

## 2019-02-02 RX ORDER — OXYMETAZOLINE HYDROCHLORIDE 0.05 G/100ML
2 SPRAY NASAL ONCE
Status: COMPLETED | OUTPATIENT
Start: 2019-02-02 | End: 2019-02-02

## 2019-02-02 RX ORDER — ACETAMINOPHEN 325 MG/1
650 TABLET ORAL EVERY 6 HOURS PRN
Status: DISCONTINUED | OUTPATIENT
Start: 2019-02-02 | End: 2019-02-04 | Stop reason: HOSPADM

## 2019-02-02 RX ORDER — POTASSIUM CHLORIDE 20 MEQ/1
40 TABLET, EXTENDED RELEASE ORAL 2 TIMES DAILY
Status: COMPLETED | OUTPATIENT
Start: 2019-02-02 | End: 2019-02-03

## 2019-02-02 RX ORDER — MAGNESIUM SULFATE HEPTAHYDRATE 40 MG/ML
2 INJECTION, SOLUTION INTRAVENOUS ONCE
Status: COMPLETED | OUTPATIENT
Start: 2019-02-02 | End: 2019-02-02

## 2019-02-02 RX ORDER — POTASSIUM CHLORIDE 14.9 MG/ML
20 INJECTION INTRAVENOUS ONCE
Status: COMPLETED | OUTPATIENT
Start: 2019-02-02 | End: 2019-02-02

## 2019-02-02 RX ORDER — DIAZEPAM 5 MG/ML
10 INJECTION, SOLUTION INTRAMUSCULAR; INTRAVENOUS ONCE
Status: COMPLETED | OUTPATIENT
Start: 2019-02-02 | End: 2019-02-02

## 2019-02-02 RX ORDER — CHLORDIAZEPOXIDE HYDROCHLORIDE 25 MG/1
25 CAPSULE, GELATIN COATED ORAL EVERY 6 HOURS SCHEDULED
Status: DISCONTINUED | OUTPATIENT
Start: 2019-02-02 | End: 2019-02-04 | Stop reason: HOSPADM

## 2019-02-02 RX ORDER — ONDANSETRON 2 MG/ML
4 INJECTION INTRAMUSCULAR; INTRAVENOUS ONCE
Status: COMPLETED | OUTPATIENT
Start: 2019-02-02 | End: 2019-02-02

## 2019-02-02 RX ORDER — ECHINACEA PURPUREA EXTRACT 125 MG
1 TABLET ORAL 3 TIMES DAILY PRN
Status: DISCONTINUED | OUTPATIENT
Start: 2019-02-02 | End: 2019-02-04 | Stop reason: HOSPADM

## 2019-02-02 RX ADMIN — ONDANSETRON 4 MG: 2 INJECTION INTRAMUSCULAR; INTRAVENOUS at 09:13

## 2019-02-02 RX ADMIN — OXYMETAZOLINE HCL 2 SPRAY: 0.05 SPRAY NASAL at 10:07

## 2019-02-02 RX ADMIN — SODIUM CHLORIDE 1000 ML: 0.9 INJECTION, SOLUTION INTRAVENOUS at 09:11

## 2019-02-02 RX ADMIN — CHLORDIAZEPOXIDE HYDROCHLORIDE 25 MG: 25 CAPSULE ORAL at 17:29

## 2019-02-02 RX ADMIN — Medication 10 MG: at 09:15

## 2019-02-02 RX ADMIN — POTASSIUM CHLORIDE 20 MEQ: 200 INJECTION, SOLUTION INTRAVENOUS at 10:26

## 2019-02-02 RX ADMIN — THIAMINE HYDROCHLORIDE: 100 INJECTION, SOLUTION INTRAMUSCULAR; INTRAVENOUS at 17:30

## 2019-02-02 RX ADMIN — POTASSIUM CHLORIDE 40 MEQ: 1500 TABLET, EXTENDED RELEASE ORAL at 17:29

## 2019-02-02 RX ADMIN — MAGNESIUM SULFATE HEPTAHYDRATE 2 G: 40 INJECTION, SOLUTION INTRAVENOUS at 10:26

## 2019-02-02 RX ADMIN — CLONIDINE HYDROCHLORIDE 0.1 MG: 0.1 TABLET ORAL at 21:06

## 2019-02-02 RX ADMIN — SALINE NASAL SPRAY 1 SPRAY: 1.5 SOLUTION NASAL at 17:35

## 2019-02-02 RX ADMIN — CHLORDIAZEPOXIDE HYDROCHLORIDE 25 MG: 25 CAPSULE ORAL at 11:19

## 2019-02-02 NOTE — H&P
H&P- Froylan Felty 1955, 59 y o  female MRN: [id number]    Unit/Bed#: ED 26 Encounter: [id number]    Primary Care Provider: Komal Conner MD   Date and time admitted to hospital: 2/2/2019  8:46 AM        Hypokalemia   Assessment & Plan    Replace k   replace mag     Pancytopenia ([facility] )   Assessment & Plan    Likely due to etoh abuse; h/o thrombocytopenia  Anemia labs  Liver/spleen us     Pure hypercholesterolemia   Assessment & Plan    On lipitor as outpatient     Alcohol withdrawal seizure with complication ([facility] )   Assessment & Plan    Last use 3 days ago  Standing librium and clonidine  CIWA protocol liver/spleen us  Vitamin replacement IV     Depression   Assessment & Plan    On celexa-will hold for now     Anxiety   Assessment & Plan    On celexa and doxepin  Will hold for now with seizure     Alcoholism ([facility] )   Assessment & Plan    With seizure and tongue biting  Last etoh use 3 days ago  Start librium CIWA protocol  Clonidine             VTE Prophylaxis: Pharmacologic VTE Prophylaxis contraindicated due to thrombocytopenia  / sequential compression device   Code Status: full  POLST: POLST is not applicable to this patient  Discussion with family:     Anticipated Length of Stay:  Patient will be admitted on an Inpatient basis with an anticipated length of stay of  > 2 midnights  Justification for Hospital Stay:     Total Time for Visit, including Counseling / Coordination of Care: 30 minutes  Greater than 50% of this total time spent on direct patient counseling and coordination of care  Chief Complaint:   seizure    History of Present Illness:    Froylan Felty is a 59 y o  female who presents with seizure  Patient has h o etoh abuse for several years drinking etoh 0 5-1 bottle vodka daily  She was in rehab 1 year ago and relapsed after discharge-she quit drinking 3 days ago and has been having difficulty walking and tremors   Last night she had difficulty sleeping and this morning  found her with blood around her mouth and but tongue-no seizure activity noted and was sent to ED  Patient denies h/o etoh seizure  Review of Systems:    Review of Systems   HENT: Positive for postnasal drip and rhinorrhea  Eyes: Negative  Respiratory: Negative  Cardiovascular: Negative  Gastrointestinal: Negative  Genitourinary: Positive for frequency  Negative for difficulty urinating and dysuria  Musculoskeletal: Negative  Neurological: Positive for dizziness, tremors, seizures, weakness and light-headedness  Hematological: Negative  Psychiatric/Behavioral: Positive for agitation  Past Medical and Surgical History:     Past Medical History:   Diagnosis Date    Anxiety     Cancer [facility])     breast/ovarian had preventative masectomy/ovary removal    Depression     Last Assessed 8/27/2015    Hyperlipidemia     Last Assessed 8/7/2015       Past Surgical History:   Procedure Laterality Date    APPENDECTOMY      BILATERAL OOPHORECTOMY      Last Assessed 8/27/2015    BREAST RECONSTRUCTION      Last Assessed 8/27/2015    CHOLECYSTECTOMY      COLONOSCOPY      MASTECTOMY Bilateral     Radical; Last Assessed 8/27/2015    OH COLONOSCOPY FLX DX W/COLLJ SPEC WHEN PFRMD N/A 4/3/2018    Procedure: COLONOSCOPY;  Surgeon: Amrik Soria MD;  Location: [facility]; Service: Gastroenterology    OH LAP,CHOLECYSTECTOMY N/A 10/5/2017    Procedure: LAPAROSCOPIC CHOLECYSTECTOMY;  Surgeon: Bethanie Salazar MD;  Location: [facility];  Service: General    TONSILLECTOMY         Meds/Allergies:    Prior to Admission medications    Medication Sig Start Date End Date Taking?  Authorizing Provider   atorvastatin (LIPITOR) 10 mg tablet Take by mouth 3/6/17  Yes Historical Provider, MD   atorvastatin (LIPITOR) 20 mg tablet TAKE 1/2 TABLET DAILY AS DIRECTED  10/9/18  Yes SHERLY Alvarez   cholecalciferol (VITAMIN D3) 1,000 units tablet Take by mouth 11/10/15  Yes Historical Provider, MD   citalopram (CeleXA) 20 mg tablet Take by mouth 7/14/17  Yes Historical Provider, MD   doxepin (SINEquan) 50 mg capsule TAKE ONE CAPSULE BY MOUTH AT BEDTIME AS NEEDED 1/4/19  Yes Oliver Mullins MD     I have reviewed home medications with patient personally  Allergies: No Known Allergies    Social History:     Marital Status: /Civil Union   Occupation:   Patient Pre-hospital Living Situation:   Patient Pre-hospital Level of Mobility:   Patient Pre-hospital Diet Restrictions:   Substance Use History:   History   Alcohol Use    Yes     Comment: 1/2 bottel of vodka or wine per day      History   Smoking Status    Never Smoker   Smokeless Tobacco    Never Used     History   Drug Use No       Family History:    non-contributory    Physical Exam:     Vitals:   Blood Pressure: 146/90 (02/02/19 1100)  Pulse: (!) 106 (02/02/19 1100)  Temperature: 98 3 °F (36 8 °C) (02/02/19 0847)  Temp Source: Oral (02/02/19 0847)  Respirations: 20 (02/02/19 1100)  Height: 5' 6" (167 6 cm) (02/02/19 0847)  Weight - Scale: 79 7 kg (175 lb 11 3 oz) (02/02/19 0847)  SpO2: 96 % (02/02/19 1100)    Physical Exam   Constitutional: She is oriented to person, place, and time  She appears well-developed and well-nourished  HENT:   Head: Normocephalic and atraumatic  Eyes: Pupils are equal, round, and reactive to light  Cardiovascular: Normal rate and regular rhythm  Pulmonary/Chest: Effort normal and breath sounds normal    Abdominal: Soft  Bowel sounds are normal    Musculoskeletal: She exhibits no edema  Neurological: She is alert and oriented to person, place, and time  +resting hand treors   Skin: Skin is warm and dry  Additional Data:     Lab Results: I have personally reviewed pertinent reports          Results from last 7 days  Lab Units 02/02/19  0929   WBC Thousand/uL 2 33*   HEMOGLOBIN g/dL 10 1*   HEMATOCRIT % 30 2*   PLATELETS Thousands/uL 35*   NEUTROS PCT % 84*   LYMPHS PCT % 8*   MONOS PCT % 7 EOS PCT % 0       Results from last 7 days  Lab Units 02/02/19  0929   SODIUM mmol/L 137   POTASSIUM mmol/L 2 9*   CHLORIDE mmol/L 101   CO2 mmol/L 27   BUN mg/dL 5   CREATININE mg/dL 0 58*   ANION GAP mmol/L 9   CALCIUM mg/dL 8 2*   ALBUMIN g/dL 3 3*   TOTAL BILIRUBIN mg/dL 0 80   ALK PHOS U/L 89   ALT U/L 36   AST U/L 51*   GLUCOSE RANDOM mg/dL 95       Results from last 7 days  Lab Units 02/02/19  0929   INR  1 06               Results from last 7 days  Lab Units 02/02/19  0929   LACTIC ACID mmol/L 1 2       Imaging: I have personally reviewed pertinent reports  CT head without contrast   Final Result by Modesto Timmons MD (02/02 1010)      No acute intracranial abnormality  Workstation performed: [id number]         XR chest 1 view portable   ED Interpretation by Dorina Ramirez DO (02/02 0956)   No acute abnormality      Final Result by Modesto Timmons MD (02/02 1002)      Large hiatal hernia  No acute abnormality in the chest             Workstation performed: [id number]             EKG, Pathology, and Other Studies Reviewed on Admission:   · EKG:     Allscripts / Epic Records Reviewed: Yes     ** Please Note: This note has been constructed using a voice recognition system   **

## 2019-02-02 NOTE — PLAN OF CARE
DISCHARGE PLANNING     Discharge to home or other facility with appropriate resources Progressing        Knowledge Deficit     Patient/family/caregiver demonstrates understanding of disease process, treatment plan, medications, and discharge instructions Progressing        NEUROSENSORY - ADULT     Achieves stable or improved neurological status Progressing     Absence of seizures Progressing     Remains free of injury related to seizures activity Progressing     Achieves maximal functionality and self care Progressing        Potential for Falls     Patient will remain free of falls Progressing

## 2019-02-02 NOTE — ASSESSMENT & PLAN NOTE
· Found by  with blood around mouth with tongue bite  · Last use 4 days ago  · Librium and Clonidine  · CIWA protocol  · Seizure precautions

## 2019-02-02 NOTE — ED NOTES
Patient transported to [address] [facility], [address] [place] [address]  02/02/19 0113

## 2019-02-02 NOTE — ASSESSMENT & PLAN NOTE
Last use 3 days ago  Standing librium and clonidine  CIWA protocol liver/spleen us  Vitamin replacement IV

## 2019-02-02 NOTE — ED PROVIDER NOTES
History  Chief Complaint   Patient presents with    Withdrawal - Alcohol     pt brought in by EMS for alcohol withdrawal, last drink on Thursday about 1/2 bottle of vodka  pt drinks about 1/2 of bottle of vodka of wine daily, + confusin      59-year-old female with a history of alcoholism presenting for evaluation of possible confusion and blood coming from her mouth  The patient is a long-time alcoholic, she reports that she electively tried to stop drinking by herself on Thursday, her last drink was Thursday morning  She states that yesterday she had severe shaking in her typical withdrawal symptoms she went to bed last night in her usual health, she woke up this morning and her tongue was sore she had small amount of blood on her lips her , she appeared confused, EMS arrived and found her to mildly confused with heart rate in 130s, although it she is at proved in bedside although history physical exam concerning for seizure  She notes ongoing generalized shakiness and feeling uncomfortable, anxiety, rhinorrhea  But otherwise she feels her withdrawal symptoms are improving and she now has an appetite  She last tried to self detox approximately 5 weeks ago, she denies ever having DTs or withdrawal seizures before  She denies other drug use, she is interested in detoxing  At this time she notes a large contusion to the left anterior tongue but otherwise denies headache current auditory visual or balance complaint including hallucinations are formication neck pain or stiffness weakness paresthesias or anesthesia she denies having chest pain cough shortness of breath vomiting abdominal pain change in bowels or bladder joint pain swelling rashes injuries or other associated complaints  Complete review systems otherwise negative as noted            Prior to Admission Medications   Prescriptions Last Dose Informant Patient Reported?  Taking?   atorvastatin (LIPITOR) 10 mg tablet  Self Yes Yes   Sig: Take by mouth   atorvastatin (LIPITOR) 20 mg tablet   No Yes   Sig: TAKE 1/2 TABLET DAILY AS DIRECTED  cholecalciferol (VITAMIN D3) 1,000 units tablet  Self Yes Yes   Sig: Take by mouth   citalopram (CeleXA) 20 mg tablet  Self Yes Yes   Sig: Take by mouth   doxepin (SINEquan) 50 mg capsule   No Yes   Sig: TAKE ONE CAPSULE BY MOUTH AT BEDTIME AS NEEDED      Facility-Administered Medications: None       Past Medical History:   Diagnosis Date    Anxiety     Cancer [facility])     breast/ovarian had preventative masectomy/ovary removal    Depression     Last Assessed 8/27/2015    Hyperlipidemia     Last Assessed 8/7/2015       Past Surgical History:   Procedure Laterality Date    APPENDECTOMY      BILATERAL OOPHORECTOMY      Last Assessed 8/27/2015    BREAST RECONSTRUCTION      Last Assessed 8/27/2015    CHOLECYSTECTOMY      COLONOSCOPY      MASTECTOMY Bilateral     Radical; Last Assessed 8/27/2015    TX COLONOSCOPY FLX DX W/COLLJ SPEC WHEN PFRMD N/A 4/3/2018    Procedure: COLONOSCOPY;  Surgeon: Marilou Maya MD;  Location: [facility]; Service: Gastroenterology    TX LAP,CHOLECYSTECTOMY N/A 10/5/2017    Procedure: Herminia Kendall;  Surgeon: Lexi Marie MD;  [facility];  Service: General    TONSILLECTOMY         Family History   Problem Relation Age of Onset    Breast cancer Mother     Ovarian cancer Mother         unspecified laterality    Heart failure Father     Breast cancer Maternal Aunt      I have reviewed and agree with the history as documented  Social History   Substance Use Topics    Smoking status: Never Smoker    Smokeless tobacco: Never Used    Alcohol use Yes      Comment: 1/2 bottel of vodka or wine per day         Review of Systems   Constitutional: Positive for fatigue  Negative for activity change, appetite change, chills and fever  HENT: Positive for mouth sores  Negative for rhinorrhea and sore throat      Eyes: Negative for photophobia, pain and visual disturbance  Respiratory: Negative for cough and shortness of breath  Cardiovascular: Negative for chest pain and palpitations  Gastrointestinal: Negative for abdominal pain, diarrhea, nausea and vomiting  Genitourinary: Negative for dysuria, frequency and urgency  Musculoskeletal: Negative for arthralgias, back pain, myalgias, neck pain and neck stiffness  Skin: Negative for color change, rash and wound  Neurological: Positive for tremors  Negative for dizziness, weakness, light-headedness and headaches  Hematological: Negative for adenopathy  Does not bruise/bleed easily  Psychiatric/Behavioral: Positive for confusion  Negative for agitation, behavioral problems, hallucinations and sleep disturbance  The patient is not hyperactive  All other systems reviewed and are negative  Physical Exam  Physical Exam   Constitutional: She is oriented to person, place, and time  She appears well-developed and well-nourished  No distress  Awake alert clinically sober no acute distress she has moderate tremor   HENT:   Head: Normocephalic and atraumatic  Right Ear: External ear normal    Left Ear: External ear normal    Patient has mild-to-moderate contusion to her left anterior lateral tongue consistent with bite injury there is no active bleeding posterior oropharynx widely patent   Eyes: Pupils are equal, round, and reactive to light  EOM are normal    Neck: Normal range of motion  Neck supple  No tracheal deviation present  Cardiovascular: Normal rate, regular rhythm and normal heart sounds  Exam reveals no gallop and no friction rub  No murmur heard  Pulmonary/Chest: Effort normal and breath sounds normal  She has no wheezes  She has no rales  Abdominal: Soft  Bowel sounds are normal  She exhibits no distension  There is no tenderness  There is no rebound and no guarding  Musculoskeletal: Normal range of motion  She exhibits no edema or tenderness     Neurological: She is alert and oriented to person, place, and time  No cranial nerve deficit  She exhibits normal muscle tone  Coordination normal    Patient is clinically sober she has moderate resting tremor finger to nose or pronator drift intact muscle strength is 5 5 globally cranial nerves 2-12 were intact there is no nuchal rigidity or meningismus   Skin: Skin is warm and dry  No rash noted  Psychiatric: She has a normal mood and affect  Her behavior is normal    Nursing note and vitals reviewed        Vital Signs  ED Triage Vitals [02/02/19 0847]   Temperature Pulse Respirations Blood Pressure SpO2   98 3 °F (36 8 °C) 98 18 142/91 96 %      Temp Source Heart Rate Source Patient Position - Orthostatic VS BP Location FiO2 (%)   Oral Monitor Lying Right arm --      Pain Score       No Pain           Vitals:    02/02/19 1236 02/02/19 1329 02/02/19 1700 02/02/19 2100   BP: 133/65 134/66 142/74 139/78   Pulse: 94 95 92 92   Patient Position - Orthostatic VS: Lying Lying Lying Lying       Visual Acuity      ED Medications  Medications   potassium chloride (K-DUR,KLOR-CON) CR tablet 40 mEq (40 mEq Oral Given 7/3/78 9808)   folic acid 1 mg, thiamine (VITAMIN B1) 100 mg in sodium chloride 0 9 % 100 mL IV piggyback ( Intravenous New Bag 2/2/19 1730)   acetaminophen (TYLENOL) tablet 650 mg (not administered)   chlordiazePOXIDE (LIBRIUM) capsule 25 mg (25 mg Oral Given 2/2/19 1729)   cloNIDine (CATAPRES) tablet 0 1 mg (0 1 mg Oral Given 2/2/19 2106)   influenza vaccine, recombinant, quadrivalent (FLUBLOK) IM injection 0 5 mL (not administered)   sodium chloride (OCEAN) 0 65 % nasal spray 1 spray (1 spray Each Nare Given 2/2/19 1735)   oxymetazoline (AFRIN) 0 05 % nasal spray 2 spray (2 sprays Each Nare Given 2/2/19 1007)   ondansetron (ZOFRAN) injection 4 mg (4 mg Intravenous Given 2/2/19 0913)   diazepam (VALIUM) injection 10 mg (10 mg Intravenous Given 2/2/19 0915)   sodium chloride 0 9 % bolus 1,000 mL (0 mL Intravenous Stopped 2/2/19 1025) magnesium sulfate 2 g/50 mL IVPB (premix) 2 g (0 g Intravenous Stopped 2/2/19 1236)   potassium chloride 20 mEq IVPB (premix) (0 mEq Intravenous Stopped 2/2/19 1235)   chlordiazePOXIDE (LIBRIUM) capsule 25 mg (25 mg Oral Given 2/2/19 1119)       Diagnostic Studies  Results Reviewed     Procedure Component Value Units Date/Time    Reticulocytes [[id number]]  (Abnormal) Collected:  02/02/19 0929    Lab Status:  Final result Specimen:  Blood from Arm, Right Updated:  02/02/19 1402     Retic Ct Abs 110,900 (H)     Retic Ct Pct 3 28 (H) %     Vitamin B12 [[id number]] Collected:  02/02/19 0929    Lab Status: In process Specimen:  Blood from Arm, Right Updated:  02/02/19 1358    Iron Saturation % [[id number]] Collected:  02/02/19 0929    Lab Status: In process Specimen:  Blood from Arm, Right Updated:  02/02/19 1358    Folate [[id number]] Collected:  02/02/19 0929    Lab Status: In process Specimen:  Blood from Arm, Right Updated:  02/02/19 1357    Ferritin [[id number]] Collected:  02/02/19 0929    Lab Status: In process Specimen:  Blood from Arm, Right Updated:  02/02/19 1357    Rapid drug screen, urine [[id number]]  (Abnormal) Collected:  02/02/19 1119    Lab Status:  Final result Specimen:  Urine from Urine, Clean Catch Updated:  02/02/19 1137     Amph/Meth UR Negative     Barbiturate Ur Negative     Benzodiazepine Urine Negative     Cocaine Urine Negative     Methadone Urine Negative     Opiate Urine Negative     PCP Ur Negative     THC Urine Positive (A)    Narrative:         Presumptive report  If requested, specimen will be sent to reference lab for confirmation  FOR MEDICAL PURPOSES ONLY  IF CONFIRMATION NEEDED PLEASE CONTACT THE LAB WITHIN 5 DAYS      Drug Screen Cutoff Levels:  AMPHETAMINE/METHAMPHETAMINES  1000 ng/mL  BARBITURATES     200 ng/mL  BENZODIAZEPINES     200 ng/mL  COCAINE      300 ng/mL  METHADONE      300 ng/mL  OPIATES      300 ng/mL  PHENCYCLIDINE     25 ng/mL  THC       50 ng/mL    UA w Reflex to Microscopic w Reflex to Culture [[id number]]  (Abnormal) Collected:  02/02/19 1119    Lab Status:  Final result Specimen:  Urine from Urine, Clean Catch Updated:  02/02/19 1126     Color, UA Yellow     Clarity, UA Clear     Specific Gravity, UA 1 015     pH, UA 7 0     Leukocytes, UA Negative     Nitrite, UA Negative     Protein, UA Negative mg/dl      Glucose, UA Negative mg/dl      Ketones, UA Trace (A) mg/dl      Urobilinogen, UA 1 0 E U /dl      Bilirubin, UA Negative     Blood, UA Negative    CBC and differential [[id number]]  (Abnormal) Collected:  02/02/19 0929    Lab Status:  Final result Specimen:  Blood from Arm, Right Updated:  02/02/19 1020     WBC 2 33 (L) Thousand/uL      RBC 3 31 (L) Million/uL      Hemoglobin 10 1 (L) g/dL      Hematocrit 30 2 (L) %      MCV 91 fL      MCH 30 5 pg      MCHC 33 4 g/dL      RDW 15 9 (H) %      MPV 9 9 fL      Platelets 35 (LL) Thousands/uL      nRBC 0 /100 WBCs      Neutrophils Relative 84 (H) %      Immat GRANS % 0 %      Lymphocytes Relative 8 (L) %      Monocytes Relative 7 %      Eosinophils Relative 0 %      Basophils Relative 1 %      Neutrophils Absolute 1 95 Thousands/µL      Immature Grans Absolute 0 01 Thousand/uL      Lymphocytes Absolute 0 18 (L) Thousands/µL      Monocytes Absolute 0 16 (L) Thousand/µL      Eosinophils Absolute 0 01 Thousand/µL      Basophils Absolute 0 02 Thousands/µL     TSH [[id number]]  (Normal) Collected:  02/02/19 0929    Lab Status:  Final result Specimen:  Blood from Arm, Right Updated:  02/02/19 1009     TSH 3RD GENERATON 1 087 uIU/mL     Narrative:         Patients undergoing fluorescein dye angiography may retain small amounts of fluorescein in the body for 48-72 hours post procedure  Samples containing fluorescein can produce falsely depressed TSH values  If the patient had this procedure,a specimen should be resubmitted post fluorescein clearance            The recommended reference ranges for TSH during pregnancy are as follows:  First trimester 0 1 to 2 5 uIU/mL  Second trimester  0 2 to 3 0 uIU/mL  Third trimester 0 3 to 3 0 uIU/m      Magnesium [[id number]]  (Abnormal) Collected:  02/02/19 0929    Lab Status:  Final result Specimen:  Blood from Arm, Right Updated:  02/02/19 1009     Magnesium 1 1 (L) mg/dL     Comprehensive metabolic panel [[id number]]  (Abnormal) Collected:  02/02/19 0929    Lab Status:  Final result Specimen:  Blood from Arm, Right Updated:  02/02/19 1008     Sodium 137 mmol/L      Potassium 2 9 (L) mmol/L      Chloride 101 mmol/L      CO2 27 mmol/L      ANION GAP 9 mmol/L      BUN 5 mg/dL      Creatinine 0 58 (L) mg/dL      Glucose 95 mg/dL      Calcium 8 2 (L) mg/dL      AST 51 (H) U/L      ALT 36 U/L      Alkaline Phosphatase 89 U/L      Total Protein 6 1 (L) g/dL      Albumin 3 3 (L) g/dL      Total Bilirubin 0 80 mg/dL      eGFR 99 ml/min/1 73sq m     Narrative:         National Kidney Disease Education Program recommendations are as follows:  GFR calculation is accurate only with a steady state creatinine  Chronic Kidney disease less than 60 ml/min/1 73 sq  meters  Kidney failure less than 15 ml/min/1 73 sq  meters  Lactic acid, plasma [[id number]]  (Normal) Collected:  02/02/19 0929    Lab Status:  Final result Specimen:  Blood from Arm, Right Updated:  02/02/19 1001     LACTIC ACID 1 2 mmol/L     Narrative:         Result may be elevated if tourniquet was used during collection      Troponin I [[id number]]  (Normal) Collected:  02/02/19 0929    Lab Status:  Final result Specimen:  Blood from Arm, Right Updated:  02/02/19 1000     Troponin I <0 02 ng/mL     Ammonia [[id number]]  (Normal) Collected:  02/02/19 0929    Lab Status:  Final result Specimen:  Blood from Arm, Right Updated:  02/02/19 0953     Ammonia 17 umol/L     Ethanol [[id number]]  (Normal) Collected:  02/02/19 0929    Lab Status:  Final result Specimen:  Blood from Arm, Right Updated:  02/02/19 0952     Ethanol Lvl <3 mg/dL     Protime-INR [[id number]]  (Normal) Collected:  02/02/19 0929    Lab Status:  Final result Specimen:  Blood from Arm, Right Updated:  02/02/19 0949     Protime 13 7 seconds      INR 1 06    APTT [[id number]]  (Normal) Collected:  02/02/19 0929    Lab Status:  Final result Specimen:  Blood from Arm, Right Updated:  02/02/19 0949     PTT 26 seconds                  US abdomen complete   Final Result by Eric Garcia MD (02/02 1831)      Mild hepatomegaly and mild fatty liver change  Workstation performed: [id number]         CT head without contrast   Final Result by Demario Ugarte MD (02/02 1010)      No acute intracranial abnormality  Workstation performed: [id number]         XR chest 1 view portable   ED Interpretation by Trish Langley DO (02/02 0956)   No acute abnormality      Final Result by Demario Ugarte MD (02/02 1002)      Large hiatal hernia    No acute abnormality in the chest             Workstation performed: [id number]                    Procedures  Procedures       Phone Contacts  ED Phone Contact    ED Course                               MDM    Disposition  Final diagnoses:   Alcohol withdrawal ([facility] )   Alcohol withdrawal seizure ([facility] )   Injury of tongue, initial encounter   Hypokalemia     Time reflects when diagnosis was documented in both MDM as applicable and the Disposition within this note     Time User Action Codes Description Comment    2/2/2019 11:41 AM Magdalen Yue Add [F10 239] Alcohol withdrawal ([facility] )     2/2/2019 11:41 AM Magdalen Yue Add [F10 239,  R56 9] Alcohol withdrawal seizure ([facility] )     2/2/2019 11:41 AM Magdalen Yue Add [S09 93XA] Injury of tongue, initial encounter     2/2/2019 11:41 AM Dannis Officer W Add [E87 6] Hypokalemia     2/2/2019 11:41 AM Dannis Officer W Add [D75 9] Myelosuppression     2/2/2019 11:43 AM Eric Cr Remove [D75 9] Myelosuppression       ED Disposition     ED Disposition Condition Date/Time Comment    Admit Sat Feb 2, 2019 11:42 AM Case was discussed with Colin and the patient's admission status was agreed to be Admission Status: inpatient status to the service of Dr Rhona Manriquez   Follow-up Information    None         Current Discharge Medication List      CONTINUE these medications which have NOT CHANGED    Details   !! atorvastatin (LIPITOR) 10 mg tablet Take by mouth      !! atorvastatin (LIPITOR) 20 mg tablet TAKE 1/2 TABLET DAILY AS DIRECTED  Qty: 15 tablet, Refills: 3    Associated Diagnoses: Hyperlipidemia, unspecified hyperlipidemia type      cholecalciferol (VITAMIN D3) 1,000 units tablet Take by mouth      citalopram (CeleXA) 20 mg tablet Take by mouth      doxepin (SINEquan) 50 mg capsule TAKE ONE CAPSULE BY MOUTH AT BEDTIME AS NEEDED  Qty: 90 capsule, Refills: 0    Associated Diagnoses: Alcohol-induced insomnia ([address] )       ! ! - Potential duplicate medications found  Please discuss with provider  No discharge procedures on file      ED Provider  Electronically Signed by           Eb Miller DO  02/02/19 0894

## 2019-02-03 LAB
ALBUMIN SERPL BCP-MCNC: 3.3 G/DL (ref 3.5–5)
ALP SERPL-CCNC: 91 U/L (ref 46–116)
ALT SERPL W P-5'-P-CCNC: 33 U/L (ref 12–78)
ANION GAP SERPL CALCULATED.3IONS-SCNC: 10 MMOL/L (ref 4–13)
AST SERPL W P-5'-P-CCNC: 43 U/L (ref 5–45)
BILIRUB SERPL-MCNC: 0.8 MG/DL (ref 0.2–1)
BUN SERPL-MCNC: 5 MG/DL (ref 5–25)
CALCIUM SERPL-MCNC: 9 MG/DL (ref 8.3–10.1)
CHLORIDE SERPL-SCNC: 105 MMOL/L (ref 100–108)
CO2 SERPL-SCNC: 23 MMOL/L (ref 21–32)
CREAT SERPL-MCNC: 0.61 MG/DL (ref 0.6–1.3)
ERYTHROCYTE [DISTWIDTH] IN BLOOD BY AUTOMATED COUNT: 16.4 % (ref 11.6–15.1)
FERRITIN SERPL-MCNC: 51 NG/ML (ref 8–388)
FOLATE SERPL-MCNC: 19.8 NG/ML (ref 3.1–17.5)
GFR SERPL CREATININE-BSD FRML MDRD: 97 ML/MIN/1.73SQ M
GLUCOSE SERPL-MCNC: 107 MG/DL (ref 65–140)
GLUCOSE SERPL-MCNC: 83 MG/DL (ref 65–140)
HCT VFR BLD AUTO: 34.5 % (ref 34.8–46.1)
HGB BLD-MCNC: 11 G/DL (ref 11.5–15.4)
IRON SATN MFR SERPL: 38 %
IRON SERPL-MCNC: 108 UG/DL (ref 50–170)
MAGNESIUM SERPL-MCNC: 2.1 MG/DL (ref 1.6–2.6)
MCH RBC QN AUTO: 29.9 PG (ref 26.8–34.3)
MCHC RBC AUTO-ENTMCNC: 31.9 G/DL (ref 31.4–37.4)
MCV RBC AUTO: 94 FL (ref 82–98)
PLATELET # BLD AUTO: 46 THOUSANDS/UL (ref 149–390)
PMV BLD AUTO: 10 FL (ref 8.9–12.7)
POTASSIUM SERPL-SCNC: 3.7 MMOL/L (ref 3.5–5.3)
PROT SERPL-MCNC: 6.3 G/DL (ref 6.4–8.2)
RBC # BLD AUTO: 3.68 MILLION/UL (ref 3.81–5.12)
SODIUM SERPL-SCNC: 138 MMOL/L (ref 136–145)
TIBC SERPL-MCNC: 283 UG/DL (ref 250–450)
VIT B12 SERPL-MCNC: 482 PG/ML (ref 100–900)
WBC # BLD AUTO: 2.81 THOUSAND/UL (ref 4.31–10.16)

## 2019-02-03 PROCEDURE — 97163 PT EVAL HIGH COMPLEX 45 MIN: CPT

## 2019-02-03 PROCEDURE — 80053 COMPREHEN METABOLIC PANEL: CPT | Performed by: GENERAL PRACTICE

## 2019-02-03 PROCEDURE — 99232 SBSQ HOSP IP/OBS MODERATE 35: CPT | Performed by: PHYSICIAN ASSISTANT

## 2019-02-03 PROCEDURE — G8978 MOBILITY CURRENT STATUS: HCPCS

## 2019-02-03 PROCEDURE — 82948 REAGENT STRIP/BLOOD GLUCOSE: CPT

## 2019-02-03 PROCEDURE — G8979 MOBILITY GOAL STATUS: HCPCS

## 2019-02-03 PROCEDURE — 86038 ANTINUCLEAR ANTIBODIES: CPT | Performed by: PHYSICIAN ASSISTANT

## 2019-02-03 PROCEDURE — 80074 ACUTE HEPATITIS PANEL: CPT | Performed by: PHYSICIAN ASSISTANT

## 2019-02-03 PROCEDURE — 83735 ASSAY OF MAGNESIUM: CPT | Performed by: GENERAL PRACTICE

## 2019-02-03 PROCEDURE — 85027 COMPLETE CBC AUTOMATED: CPT | Performed by: GENERAL PRACTICE

## 2019-02-03 PROCEDURE — 87389 HIV-1 AG W/HIV-1&-2 AB AG IA: CPT | Performed by: PHYSICIAN ASSISTANT

## 2019-02-03 RX ADMIN — THIAMINE HYDROCHLORIDE: 100 INJECTION, SOLUTION INTRAMUSCULAR; INTRAVENOUS at 08:18

## 2019-02-03 RX ADMIN — POTASSIUM CHLORIDE 40 MEQ: 1500 TABLET, EXTENDED RELEASE ORAL at 08:10

## 2019-02-03 RX ADMIN — CHLORDIAZEPOXIDE HYDROCHLORIDE 25 MG: 25 CAPSULE ORAL at 13:21

## 2019-02-03 RX ADMIN — CHLORDIAZEPOXIDE HYDROCHLORIDE 25 MG: 25 CAPSULE ORAL at 23:16

## 2019-02-03 RX ADMIN — CHLORDIAZEPOXIDE HYDROCHLORIDE 25 MG: 25 CAPSULE ORAL at 05:36

## 2019-02-03 RX ADMIN — CHLORDIAZEPOXIDE HYDROCHLORIDE 25 MG: 25 CAPSULE ORAL at 18:12

## 2019-02-03 RX ADMIN — CLONIDINE HYDROCHLORIDE 0.1 MG: 0.1 TABLET ORAL at 08:10

## 2019-02-03 RX ADMIN — CHLORDIAZEPOXIDE HYDROCHLORIDE 25 MG: 25 CAPSULE ORAL at 00:26

## 2019-02-03 NOTE — ASSESSMENT & PLAN NOTE
· With suspected ETOH withdrawal seizure: found by  with tongue bite with blood around mouth  · Last ETOH use was 4 days ago  · On Librium and Clonidine  Plan to taper  · CIWA protocol  Currently, scores are low  · She declines rehab  She has been to rehab twice  She is interested in outpatient ETOH cessation resources  Case management involvement  · Continued ETOH cessation  Thiamine/Folate/MVN

## 2019-02-03 NOTE — CONSULTS
Patient name & :    Jose Francisco Hdez [birth year]  Date & time of consultation:  2/3/19, 6pm EST  Location of patient:  [facility] medicine  Location of doctor: [place], [place]  ___________________________________________________________  Chief Complaint: Telepsychiatry consultation  This evaluation was conducted via Telepsychiatry with the assistance of onsite staff  History of Present Illness: This pt is a  59 yr old female  Chart reviewed and case discussed with treatment team  She came to the hosptial and was admitted on  after she had an alcoohol withdrawal related seizure  She was also found to have hypokalemia and pancytopenia  She was admitted to medicine   Telepsychiatry was consulted for assessment and recommendations for management  Per treatment team, the reason for the consult is 'alcohol withdrawal ' There is no concern for SI or HI or agitation per treatment team     Per chart her home psych meds of celexa and doxepin were held on admission due to the seizure  Upon interview pt is very cooperative, calm , interactive, pleasant and appropriate  Her  is at bedside with pt's permission  She says that she came to the hosptial due to an alcohol withdrawal related seizure  This is her first seizure  She was trying to detox from alcohol on her own  She was sober from alcohol for around 90 days when she relapsed weeks ago after she found a bottle of alcohol in the home  She has had an alcohol issue for years  She denies drug use  She has been to rehab before  She seems very open to treatment but wants to consider other treatment options  She reports a psych history of depression and anxiety  She takes celexa at home, consistently  She has been on it for 3-4 years  It has had limited efficacy but she has taken it in conjuction with her alcohol use  She is also on doxepin which she takes at night only when needed   She does not have a psychiatrist  Her meds are prescribed by her PCP  She has never been hosptialized before in a psychiatric facility  She says that lately her mood has not been overly depressed  She has had some mild depression  She reports good support from her   He does not drink  Her  says that due to her being retired she often gets bored at home and this contributes to her alcohol probelm  Pt says that her depression has never been associated with SI  She denies hx of suicide attempts  Collateral:  see HPI    did not endorse any safety concerns  Psychiatric History/Treatment History:    -Inpatient: denies  -Outpatient:   denies  -History of suicide attempts: denies     Drug/Alcohol History: see HPI   BAL 0 on admission, UDS + Cannabis    Medical History:  -Medical problems: see chart  -Current medications:   see chart  Her home psych meds of celexa 20mg daily and doxepin 50mg qhs prn were held on admission   -Medication Allergies:  nkda     Family Psychiatric History:  brother with hx of alcohol abuse and suicide completion     Social History:      -Employment: retired; used to work in the [employer] : pt lives with   -Stressors: yes see HPI  -Strength/supports: pt says that her  is very supportive     Mental Status Exam:  Appearance and attire:  hospital attire, seated on bed,  well nourished, fair grooming and hygiene  Attitude and behavior: cooperative, calm, interactive, appropriate, pleasant, jovial  Speech: clear, coherent, RRR, not rapid or pressured  Affect and mood: full, nonlabile, mood congruent  Association and thought processes: goal directed  Thought content: no overt delusions  denies SI and HI  Perception:  pt does not appear to be responding to internal stimuli    Sensorium, memory, and orientation:  AAOx3  Intellectual functioning: average  Insight and judgment: fair     Diagnosis: Unspecified depressive disorder  Unspecified anxiety disorder  Alcohol use disorder     Impression/Risk Assessment/Treatment Recommendations:  -Recommended level of care: The patient is a 59 yr old female who presented to the hospital for an alcohol withdrawal seizure and she was also found to have hypokalemia and pancytopenia  She reports a long hx of alcohol abuse and hx of mild depression and anxiety  She says that her depressino has never been associated with SI  Her  did not endorse any safety concerns  Thus the patient does not meet criteria for inpatient psychiatric hospitalization, as she  is not suicidal, homicidal, grossly psychotic or manic  As such, the patient is psychiatrically stable for discharge  If pt's condition changes please reconsult  Please have the team  provide the pt will referrals to outpt psychiatry, outpt therapy, and substance abuse treatment   The patient was strongly encouraged to follow up with these services and return to ED/call 911 if sx worsen  Pt and her  expressed agreement with this plan  -Recommended pharmacology/therapy/other treatments:     Continue alcohol withdrawal protocol  Continue to hold her home psych meds of celexa and doxepin given the recent seizure  They should be held for at least another two weeks  Pt was told to follow up closely with her outpt provider so that they can monitor how she is doing and provide her with insturctions on how to resume her psych medications once appropriate  Pt expressed agreement with this plan  All her questions were answered to her satisfaction  Thank you for this consult, please re-consult for follow up if indicated            Neil Song MD  Telepsychiatry

## 2019-02-03 NOTE — PLAN OF CARE
Problem: PHYSICAL THERAPY ADULT  Goal: Performs mobility at highest level of function for planned discharge setting  See evaluation for individualized goals  Treatment/Interventions: Functional transfer training, LE strengthening/ROM, Elevations, Therapeutic exercise, Endurance training, Patient/family training, Equipment eval/education, Bed mobility, Gait training, Spoke to nursing          See flowsheet documentation for full assessment, interventions and recommendations  Prognosis: Good  Problem List: Decreased endurance, Impaired balance, Decreased mobility  Assessment: Pt is 59 y o  female seen for PT evaluation s/p admit to [facility] on 2019 w/ Alcohol withdrawal seizure with complication ([facility] [address] )  PT consulted to assess pt's functional mobility and d/c needs  Order placed for PT eval and tx, w/ up w/ A order  Performed at least 2 patient identifiers during session: Name and   Comorbidities affecting pt's physical performance at time of assessment include: anxiety, cancer, depression, hyperlipidemia  PTA, pt was independent w/ all functional mobility w/ no AD, ambulates community distances and elevations, ambulates unrestricted distances and all terrain, has 0 BAIRON + 12 stairs from basement to bed/bath and retired  Personal factors affecting pt at time of IE include: inaccessible home environment, lives in multi-level house, inability to navigate community distances, unable to perform dynamic tasks in community, compliance and inability to perform IADLs  Please find objective findings from PT assessment regarding body systems outlined above with impairments and limitations including impaired balance, decreased endurance, decreased activity tolerance, decreased functional mobility tolerance and fall risk  The following objective measures performed on IE also reveal limitations: Barthel Index: 55/100 and Modified [place]: 3 (moderate disability)   Pt's clinical presentation is currently unstable/unpredictable seen in pt's presentation of unstable medical status requiring ongoing medical management/monitoring, tolerance to only 12 feet x 2 of ambulation and fatigue + c/o lightheadedness impacting overall mobility status  Pt to benefit from continued PT tx to address deficits as defined above and maximize level of functional independent mobility and consistency  From PT/mobility standpoint, recommendation at time of d/c would be home with family support pending progress in order to facilitate return to PLOF  Barriers to Discharge: Inaccessible home environment  Barriers to Discharge Comments: 12 stairs from basement to bed/bath  Recommendation: Home with family support     PT - OK to Discharge: (S) No    See flowsheet documentation for full assessment

## 2019-02-03 NOTE — ASSESSMENT & PLAN NOTE
· Likely due to ETOH abuse/ETOH Liver disease  Has a h/o thrombocytopenia  · Ultrasound showed a fatty/enlarged liver but spleen appeared normal/no evidence of portal hypertension noted  · Vitamin B12, Iron, and Folate normal   · ETOH Cessation  Monitor/trend CBC  · Check Hepatitis panel, HIV, [place]  · Hematology consult

## 2019-02-03 NOTE — UTILIZATION REVIEW
Initial Clinical Review    Admission: Date/Time/Statement: 2/2/19 @ 1143   Orders Placed This Encounter   Procedures    Inpatient Admission     Standing Status:   Standing     Number of Occurrences:   1     Order Specific Question:   Admitting Physician     Answer:   Rene Stein [[id number]]     Order Specific Question:   Level of Care     Answer:   Med Surg [[id number]]     Order Specific Question:   Estimated length of stay     Answer:   More than 2 Midnights     Order Specific Question:   Certification     Answer:   I certify that inpatient services are medically necessary for this patient for a duration of greater than two midnights  See H&P and MD Progress Notes for additional information about the patient's course of treatment  ED: Date/Time/Mode of Arrival:   ED Arrival Information     Expected Arrival Acuity Means of Arrival Escorted By Service Admission Type    - 2/2/2019 08:45 Emergent Ambulance [address]  [facility] General Medicine Emergency    Arrival Complaint    ALCOHOL WITHDRAWL        Chief Complaint:   Chief Complaint   Patient presents with    Withdrawal - Alcohol     pt brought in by EMS for alcohol withdrawal, last drink on Thursday about 1/2 bottle of vodka  pt drinks about 1/2 of bottle of vodka of wine daily, + confusin      History of Illness: 60 yo female to ED w/ seizure , biting tongue  Drinks 0 5-1 bottle vodka a day , last drink 3 days ago , difficulty walking and tremors        PE : resting hand tremors, agitation     ED Vital Signs:   ED Triage Vitals [02/02/19 0847]   Temperature Pulse Respirations Blood Pressure SpO2   98 3 °F (36 8 °C) 98 18 142/91 96 %      Temp Source Heart Rate Source Patient Position - Orthostatic VS BP Location FiO2 (%)   Oral Monitor Lying Right arm --      Pain Score       No Pain        Wt Readings from Last 1 Encounters:   02/02/19 79 kg (174 lb 2 6 oz)     Vital Signs (abnormal):  wnl     Pertinent Labs/Diagnostic Test Results: TCH + , wbc 2 33, H&H 10 1  30 2, plt   35, mg   1 1, K   2 9, BUN creat   5  0 58, roseanne   8 2, ast  51, total prot  6 1, alb 3 3  CT head- wnl    PCXR - wnl     ED Treatment:   Medication Administration from 02/02/2019 0845 to 02/02/2019 1320       Date/Time Order Dose Route Action Action by Comments     02/02/2019 1007 oxymetazoline (AFRIN) 0 05 % nasal spray 2 spray 2 spray Each Nare Given Beto Sebastian RN      02/02/2019 0913 ondansetron (ZOFRAN) injection 4 mg 4 mg Intravenous Given Beto Sebastian RN      02/02/2019 0915 diazepam (VALIUM) injection 10 mg 10 mg Intravenous Given Beto Sebastian RN      02/02/2019 1025 sodium chloride 0 9 % bolus 1,000 mL 0 mL Intravenous Stopped Beto Sebastian RN      02/02/2019 0911 sodium chloride 0 9 % bolus 1,000 mL 1,000 mL Intravenous [address] Beto Sebastian RN      02/02/2019 1236 magnesium sulfate 2 g/50 mL IVPB (premix) 2 g 0 g Intravenous Stopped Beto Sebastian RN      02/02/2019 1026 magnesium sulfate 2 g/50 mL IVPB (premix) 2 g 2 g Intravenous [address] [facility], [address] [place] [address]      02/02/2019 1235 potassium chloride 20 mEq IVPB (premix) 0 mEq Intravenous Stopped Beto Sebastian RN      02/02/2019 1026 potassium chloride 20 mEq IVPB (premix) 20 mEq Intravenous [address] Beto Sebastian RN      02/02/2019 1119 chlordiazePOXIDE (LIBRIUM) capsule 25 mg 25 mg Oral Given Beto Sebastian RN         Past Medical/Surgical History:    Active Ambulatory Problems     Diagnosis Date Noted    Screen for colon cancer 01/25/2018    [facility] 12/06/2016    Anxiety 08/27/2015    Depression 12/06/2016    Hyperlipidemia, unspecified 08/27/2015    Mild vitamin D deficiency 07/08/2016    Hx of colonic polyp 03/21/2018    Acute pharyngitis 01/24/2019     Past Medical History:   Diagnosis Date    Anxiety     Cancer ([address] )     Depression     Hyperlipidemia      Admitting Diagnosis: Alcohol withdrawal ([place] [address] ) [F10 239]  Hypokalemia [E87 6]  Alcohol withdrawal seizure ([place] [address] ) [B27 349, R56 9]  Injury of tongue, initial encounter [S09 93XA]  Age/Sex: 59 y o  female  Assessment/Plan:        Hypokalemia   Assessment & Plan     Replace k   replace mag      [facility] ([facility] )   Assessment & Plan     Likely due to etoh abuse; h/o thrombocytopenia  Anemia labs  Liver/spleen us      Pure hypercholesterolemia   Assessment & Plan     On lipitor as outpatient      Alcohol withdrawal seizure with complication ([facility] )   Assessment & Plan     Last use 3 days ago  Standing librium and clonidine  CIWA protocol liver/spleen us  Vitamin replacement IV      Depression   Assessment & Plan     On celexa-will hold for now      Anxiety   Assessment & Plan     On celexa and doxepin  Will hold for now with seizure      Alcoholism ([facility] )   Assessment & Plan     With seizure and tongue biting  Last etoh use 3 days ago  Start librium CIWA protocol  Clonidine             Admission Orders:  Scheduled Meds:   Current Facility-Administered Medications:  acetaminophen 650 mg Oral Q6H PRN    chlordiazePOXIDE 25 mg Oral Q6H [address]    cloNIDine 0 1 mg Oral H21D ROSE MARY    folic acid 1 mg, thiamine 100 mg in 0 9% sodium chloride 100 mL IVPB  Intravenous Daily    influenza vaccine 0 5 mL Intramuscular Prior to discharge    potassium chloride 40 mEq Oral BID    sodium chloride 1 spray Each Nare TID PRN       CIWA   SCD  Reg diet   Psych consult   OT PT eval   Bassam  Aspiration and seizure precautions  Up w/ assist   2/3 mg, cmp, cbc  Wbc  2 81, H&H   11 0  34 5, plt  46, total prot  6 3, alb 3 3

## 2019-02-03 NOTE — PHYSICAL THERAPY NOTE
Physical Therapy Evaluation     Patient's Name: Matt Clinton    Admitting Diagnosis  Alcohol withdrawal ([place] [address] ) [F10 239]  Hypokalemia [E87 6]  Alcohol withdrawal seizure ([place] [address] ) [V89 113, R56 9]  Injury of tongue, initial encounter [S09 93XA]    Problem List  Patient Active Problem List   Diagnosis    Screen for colon cancer    Alcoholism ([place] [address] )    Anxiety    Depression    Hyperlipidemia, unspecified    Mild vitamin D deficiency    Hx of colonic polyp    Acute pharyngitis    Alcohol withdrawal seizure with complication ([place] [address] )    Pure hypercholesterolemia    Pancytopenia ([place] [address] )    Hypokalemia       Past Medical History  Past Medical History:   Diagnosis Date    Anxiety     Cancer [facility])     breast/ovarian had preventative masectomy/ovary removal    Depression     Last Assessed 8/27/2015    Hyperlipidemia     Last Assessed 8/7/2015       Past Surgical History  Past Surgical History:   Procedure Laterality Date    APPENDECTOMY      BILATERAL OOPHORECTOMY      Last Assessed 8/27/2015    BREAST RECONSTRUCTION      Last Assessed 8/27/2015    CHOLECYSTECTOMY      COLONOSCOPY      MASTECTOMY Bilateral     Radical; Last Assessed 8/27/2015    AZ COLONOSCOPY FLX DX W/COLLJ SPEC WHEN PFRMD N/A 4/3/2018    Procedure: COLONOSCOPY;  Surgeon: Arnold Olivo MD;  Location: [facility];   Service: Gastroenterology    AZ LAP,CHOLECYSTECTOMY N/A 10/5/2017    Procedure: LAPAROSCOPIC CHOLECYSTECTOMY;  Surgeon: Haily Bowden MD;  Location: [facility];  Service: General    TONSILLECTOMY          02/03/19 1102   Note Type   Note type Eval only   Pain Assessment   Pain Assessment No/denies pain   Pain Score No Pain   Home Living   Type of Home House   Home Layout Multi-level;Bed/bath upstairs  (0 BAIRON + 12 stairs from basement)   Bathroom Shower/Tub Walk-in shower   Bathroom Toilet Standard   Bathroom Equipment Built-in shower seat   [address] Other (Comment)  (no DME) Prior Function   Level of [place] Independent with ADLs and functional mobility   Lives With Spouse   Receives Help From Family   ADL Assistance Independent   IADLs Needs assistance   Falls in the last 6 months 0   Vocational Retired   Comments Drives   Restrictions/Precautions   [place] Bearing Precautions Per Order No   Braces or Orthoses Other (Comment)  (none per patient)   Other Precautions Aspiration; Seizure; Fall Risk;Bed Alarm; Chair Alarm   General   Additional Pertinent History CT head without contrast (2/2/2019): IMPRESSION: No acute intracranial abnormality  Family/Caregiver Present No   Cognition   Overall Cognitive Status WFL   Arousal/Participation Uncooperative   Orientation Level Oriented X4   Memory Within functional limits   Following Commands Follows all commands and directions without difficulty   Comments Pt agreeable to participate in skilled PT evaluation   RUE Assessment   RUE Assessment WFL  (based on functional assessment)   LUE Assessment   LUE Assessment WFL  (based on functional assessment)   RLE Assessment   RLE Assessment WFL  (grossly assessed with functional mobility: at least 4/5)   LLE Assessment   LLE Assessment WFL  (grossly assessed with functional mobility: at least 4/5)   Coordination   Movements are Fluid and Coordinated 1   Sensation WFL   Light Touch   RLE Light Touch Grossly intact   LLE Light Touch Grossly intact   Bed Mobility   Supine to Sit 5  Supervision   Additional items Assist x 1; Increased time required;Verbal cues   Sit to Supine 6  Modified independent   Additional items Increased time required;HOB elevated   Transfers   Sit to Stand 5  Supervision   Additional items Assist x 1; Increased time required;Verbal cues   Stand to Sit 5  Supervision   Additional items Assist x 1; Increased time required;Verbal cues   Additional Comments Noted lightheadedness upon positional changes + ambulation; deferred stair training at this time    Ambulation/Elevation   Gait pattern WNL   Gait Assistance 5  Supervision  (c/o lightheadedness)   Additional items Assist x 1;Verbal cues   Assistive Device None   Distance 12' x 2   Stair Management Assistance Not tested   Balance   Static Sitting Good   Dynamic Sitting Good   Static Standing Fair +   Dynamic Standing Fair +   Ambulatory Fair   Endurance Deficit   Endurance Deficit Yes   Activity Tolerance   Activity Tolerance Treatment limited secondary to medical complications (Comment)  (Noted lightheadedness upon positional changes + ambulation)   Nurse Made Aware Frantz Modi CM verbalized patient appropriate for PT evaluation; made aware of session outcomes; post-session: patient returned BTB, all needs within reach and bed alarm engaged   Assessment   Prognosis Good   Problem List Decreased endurance; Impaired balance;Decreased mobility   Assessment Pt is 59 y o  female seen for PT evaluation s/p admit to [facility] on 2019 w/ Alcohol withdrawal seizure with complication ([facility] [address] )  PT consulted to assess pt's functional mobility and d/c needs  Order placed for PT eval and tx, w/ up w/ A order  Performed at least 2 patient identifiers during session: Name and   Comorbidities affecting pt's physical performance at time of assessment include: anxiety, cancer, depression, hyperlipidemia  PTA, pt was independent w/ all functional mobility w/ no AD, ambulates community distances and elevations, ambulates unrestricted distances and all terrain, has 0 BAIRON + 12 stairs from basement to bed/bath and retired  Personal factors affecting pt at time of IE include: inaccessible home environment, lives in multi-level house, inability to navigate community distances, unable to perform dynamic tasks in community, compliance and inability to perform IADLs   Please find objective findings from PT assessment regarding body systems outlined above with impairments and limitations including impaired balance, decreased endurance, decreased activity tolerance, decreased functional mobility tolerance and fall risk  The following objective measures performed on IE also reveal limitations: Barthel Index: 55/100 and Modified [place]: 3 (moderate disability)  Pt's clinical presentation is currently unstable/unpredictable seen in pt's presentation of unstable medical status requiring ongoing medical management/monitoring, tolerance to only 12 feet x 2 of ambulation and fatigue + c/o lightheadedness impacting overall mobility status  Pt to benefit from continued PT tx to address deficits as defined above and maximize level of functional independent mobility and consistency  From PT/mobility standpoint, recommendation at time of d/c would be home with family support pending progress in order to facilitate return to PLOF  Barriers to Discharge Inaccessible home environment   Barriers to Discharge Comments 12 stairs from basement to bed/bath   Goals   Patient Goals to return home   STG Expiration Date 02/10/19   Short Term Goal #1 In 7 days: Perform all bed mobility tasks independently to decrease caregiver burden, Perform all transfers independently to improve independence, Ambulate > 150 ft  with no AD independently w/o LOB and w/ normalized gait pattern 100% of the time, Navigate 12 stairs modified independent with unilateral handrail to facilitate return to previous living environment, Increase all balance 1 grade to decrease risk for falls, Complete exercise program independently and Complete % of the time   Treatment Day 0   Plan   Treatment/Interventions Functional transfer training;LE strengthening/ROM; Elevations; Therapeutic exercise; Endurance training;Patient/family training;Equipment eval/education; Bed mobility;Gait training;Spoke to nursing   PT Frequency Follow-up visit only   Recommendation   Recommendation Home with family support   PT - OK to Discharge No   Additional Comments Patient to trial stairs prior to d/c   Modified [place] Scale   Modified [place] Scale 3   Barthel Index   Feeding 10   Bathing 0   Grooming Score 5   Dressing Score 5   Bladder Score 10   Bowels Score 10   Toilet Use Score 5   Transfers (Bed/Chair) Score 10   Mobility (Level Surface) Score 0   Stairs Score 0   Barthel Index Score 55         Eddie Cage, PT, DPT

## 2019-02-03 NOTE — PROGRESS NOTES
Progress Note - Oren Paul 1955, 59 y o  female MRN: [id number]    Unit/Bed#: -01 Encounter: [id number]    Primary Care Provider: Sarita Akins MD   Date and time admitted to hospital: 2/2/2019  8:46 AM        Alcoholism ([facility] )   Assessment & Plan    · With suspected ETOH withdrawal seizure: found by  with tongue bite with blood around mouth  · Last ETOH use was 4 days ago  · On Librium and Clonidine  Plan to taper  · CIWA protocol  Currently, scores are low  · She declines rehab  She has been to rehab twice  She is interested in outpatient ETOH cessation resources  Case management involvement  · Continued ETOH cessation  Thiamine/Folate/MVN  Pancytopenia ([place] [address] )   Assessment & Plan    · Likely due to ETOH abuse/ETOH Liver disease  Has a h/o thrombocytopenia  · Ultrasound showed a fatty/enlarged liver but spleen appeared normal/no evidence of portal hypertension noted  · Vitamin B12, Iron, and Folate normal   · ETOH Cessation  Monitor/trend CBC  · Check Hepatitis panel, HIV, [place]  · Hematology consult  * Alcohol withdrawal seizure with complication ([place] [address] )   Assessment & Plan    · Found by  with blood around mouth with tongue bite  · Last use 4 days ago  · Librium and Clonidine  · CIWA protocol  · Seizure precautions  Hypokalemia   Assessment & Plan    · Potassium and Magnesium normalized with replacement  Pure hypercholesterolemia   Assessment & Plan    On Lipitor as outpatient  Depression   Assessment & Plan    · On Celexa as outpatient - held on admission  Anxiety   Assessment & Plan    · On Celexa and Doxepin- held on admission with seizure  VTE Pharmacologic Prophylaxis:   Pharmacologic: Pharmacologic VTE Prophylaxis contraindicated due to profound pancytopenia  Mechanical VTE Prophylaxis in Place: Yes    Patient Centered Rounds: I have performed bedside rounds with nursing staff today        Education and Discussions with Family / Patient: Discussed with patient  Time Spent for Care: 30 minutes  More than 50% of total time spent on counseling and coordination of care as described above  Current Length of Stay: 1 day(s)    Current Patient Status: Inpatient   Certification Statement: The patient will continue to require additional inpatient hospital stay due to treatment of ETOH withdrawal     Discharge Plan: Not medically cleared  Code Status: Level 1 - Full Code      Subjective:   Patient reports she feels better today  She reports minimal tremor  No anxiety  No chest pain or SOB  No nausea or vomiting  She reports she relapsed drinking ETOH about a month ago  She was found by her  with blood around her mouth and a tongue bite and withdrawal seizure was suspected  She reports she has been in rehab twice and is not interested in going back to rehab  She is interested in outpatient resources for alcohol cessation  Objective:     Vitals:   Temp (24hrs), Av 5 °F (36 9 °C), Min:98 1 °F (36 7 °C), Max:99 2 °F (37 3 °C)    Temp:  [98 1 °F (36 7 °C)-99 2 °F (37 3 °C)] 98 5 °F (36 9 °C)  HR:  [] 79  Resp:  [18-20] 18  BP: ()/(48-90) 90/54  SpO2:  [92 %-100 %] 93 %  Body mass index is 28 98 kg/m²  Input and Output Summary (last 24 hours): Intake/Output Summary (Last 24 hours) at 19 1030  Last data filed at 19 1848   Gross per 24 hour   Intake              390 ml   Output                0 ml   Net              390 ml       Physical Exam:     Physical Exam   Constitutional: She is oriented to person, place, and time  No distress  HENT:   Head: Normocephalic and atraumatic  Eyes: No scleral icterus  Neck: Neck supple  Cardiovascular: Normal rate and regular rhythm  No murmur heard  Pulmonary/Chest: Effort normal and breath sounds normal  No respiratory distress  She has no wheezes  She has no rales  Abdominal: Soft   Bowel sounds are normal  She exhibits no distension  There is no tenderness  Musculoskeletal: She exhibits no edema  Neurological: She is alert and oriented to person, place, and time  Skin: Skin is warm and dry  She is not diaphoretic  Vitals reviewed  Additional Data:     Labs:      Results from last 7 days  Lab Units 02/03/19  0531 02/02/19  0929   WBC Thousand/uL 2 81* 2 33*   HEMOGLOBIN g/dL 11 0* 10 1*   HEMATOCRIT % 34 5* 30 2*   PLATELETS Thousands/uL 46* 35*   NEUTROS PCT %  --  84*   LYMPHS PCT %  --  8*   MONOS PCT %  --  7   EOS PCT %  --  0       Results from last 7 days  Lab Units 02/03/19  0531   POTASSIUM mmol/L 3 7   CHLORIDE mmol/L 105   CO2 mmol/L 23   BUN mg/dL 5   CREATININE mg/dL 0 61   CALCIUM mg/dL 9 0   ALK PHOS U/L 91   ALT U/L 33   AST U/L 43       Results from last 7 days  Lab Units 02/02/19  0929   INR  1 06       * I Have Reviewed All Lab Data Listed Above  * Additional Pertinent Lab Tests Reviewed: All Labs Within Last 24 Hours Reviewed    Imaging:    Imaging Reports Reviewed Today Include:   US: mild hepatomegaly, spleen normal   CT Head: no acute intracranial abnormality  Recent Cultures (last 7 days):           Last 24 Hours Medication List:     Current Facility-Administered Medications:  acetaminophen 650 mg Oral Q6H PRN Angel Epperson MD   chlordiazePOXIDE 25 mg Oral Q6H [address] Maria E Cr MD   cloNIDine 0 1 mg Oral Q12H [address] Maria E Cr MD   folic acid 1 mg, thiamine 100 mg in 0 9% sodium chloride 100 mL IVPB  Intravenous Daily Maria E Cr MD   influenza vaccine 0 5 mL Intramuscular Prior to discharge Laurence Cr MD   sodium chloride 1 spray Each Nare TID PRN Angel Epperson MD        Today, Patient Was Seen By: Jordyn Newby PA-C    ** Please Note: Dictation voice to text software may have been used in the creation of this document   **

## 2019-02-04 ENCOUNTER — TRANSITIONAL CARE MANAGEMENT (OUTPATIENT)
Dept: INTERNAL MEDICINE CLINIC | Facility: CLINIC | Age: 64
End: 2019-02-04

## 2019-02-04 VITALS
BODY MASS INDEX: 29.02 KG/M2 | SYSTOLIC BLOOD PRESSURE: 139 MMHG | RESPIRATION RATE: 18 BRPM | WEIGHT: 174.16 LBS | TEMPERATURE: 98.2 F | HEART RATE: 78 BPM | DIASTOLIC BLOOD PRESSURE: 90 MMHG | OXYGEN SATURATION: 99 % | HEIGHT: 65 IN

## 2019-02-04 LAB
ALBUMIN SERPL BCP-MCNC: 2.9 G/DL (ref 3.5–5)
ALP SERPL-CCNC: 79 U/L (ref 46–116)
ALT SERPL W P-5'-P-CCNC: 32 U/L (ref 12–78)
ANION GAP SERPL CALCULATED.3IONS-SCNC: 9 MMOL/L (ref 4–13)
AST SERPL W P-5'-P-CCNC: 44 U/L (ref 5–45)
ATRIAL RATE: 98 BPM
BASOPHILS # BLD AUTO: 0.03 THOUSANDS/ΜL (ref 0–0.1)
BASOPHILS NFR BLD AUTO: 1 % (ref 0–1)
BILIRUB SERPL-MCNC: 0.4 MG/DL (ref 0.2–1)
BUN SERPL-MCNC: 14 MG/DL (ref 5–25)
CALCIUM SERPL-MCNC: 8.9 MG/DL (ref 8.3–10.1)
CHLORIDE SERPL-SCNC: 106 MMOL/L (ref 100–108)
CO2 SERPL-SCNC: 24 MMOL/L (ref 21–32)
CREAT SERPL-MCNC: 0.69 MG/DL (ref 0.6–1.3)
EOSINOPHIL # BLD AUTO: 0.08 THOUSAND/ΜL (ref 0–0.61)
EOSINOPHIL NFR BLD AUTO: 3 % (ref 0–6)
ERYTHROCYTE [DISTWIDTH] IN BLOOD BY AUTOMATED COUNT: 16.7 % (ref 11.6–15.1)
GFR SERPL CREATININE-BSD FRML MDRD: 94 ML/MIN/1.73SQ M
GLUCOSE SERPL-MCNC: 96 MG/DL (ref 65–140)
HAV IGM SER QL: NORMAL
HBV CORE IGM SER QL: NORMAL
HBV SURFACE AG SER QL: NORMAL
HCT VFR BLD AUTO: 32.1 % (ref 34.8–46.1)
HCV AB SER QL: NORMAL
HGB BLD-MCNC: 10.3 G/DL (ref 11.5–15.4)
HIV 1+2 AB+HIV1 P24 AG SERPL QL IA: NORMAL
IMM GRANULOCYTES # BLD AUTO: 0.03 THOUSAND/UL (ref 0–0.2)
IMM GRANULOCYTES NFR BLD AUTO: 1 % (ref 0–2)
LYMPHOCYTES # BLD AUTO: 0.72 THOUSANDS/ΜL (ref 0.6–4.47)
LYMPHOCYTES NFR BLD AUTO: 28 % (ref 14–44)
MCH RBC QN AUTO: 30.1 PG (ref 26.8–34.3)
MCHC RBC AUTO-ENTMCNC: 32.1 G/DL (ref 31.4–37.4)
MCV RBC AUTO: 94 FL (ref 82–98)
MONOCYTES # BLD AUTO: 0.26 THOUSAND/ΜL (ref 0.17–1.22)
MONOCYTES NFR BLD AUTO: 10 % (ref 4–12)
NEUTROPHILS # BLD AUTO: 1.5 THOUSANDS/ΜL (ref 1.85–7.62)
NEUTS SEG NFR BLD AUTO: 57 % (ref 43–75)
NRBC BLD AUTO-RTO: 0 /100 WBCS
P AXIS: 62 DEGREES
PLATELET # BLD AUTO: 60 THOUSANDS/UL (ref 149–390)
PMV BLD AUTO: 10.2 FL (ref 8.9–12.7)
POTASSIUM SERPL-SCNC: 4.1 MMOL/L (ref 3.5–5.3)
PR INTERVAL: 162 MS
PROT SERPL-MCNC: 5.9 G/DL (ref 6.4–8.2)
QRS AXIS: 71 DEGREES
QRSD INTERVAL: 96 MS
QT INTERVAL: 386 MS
QTC INTERVAL: 492 MS
RBC # BLD AUTO: 3.42 MILLION/UL (ref 3.81–5.12)
RYE IGE QN: NEGATIVE
SODIUM SERPL-SCNC: 139 MMOL/L (ref 136–145)
T WAVE AXIS: 48 DEGREES
VENTRICULAR RATE: 98 BPM
WBC # BLD AUTO: 2.62 THOUSAND/UL (ref 4.31–10.16)

## 2019-02-04 PROCEDURE — 99239 HOSP IP/OBS DSCHRG MGMT >30: CPT | Performed by: INTERNAL MEDICINE

## 2019-02-04 PROCEDURE — 85025 COMPLETE CBC W/AUTO DIFF WBC: CPT | Performed by: PHYSICIAN ASSISTANT

## 2019-02-04 PROCEDURE — 90682 RIV4 VACC RECOMBINANT DNA IM: CPT | Performed by: GENERAL PRACTICE

## 2019-02-04 PROCEDURE — 93010 ELECTROCARDIOGRAM REPORT: CPT | Performed by: INTERNAL MEDICINE

## 2019-02-04 PROCEDURE — 80053 COMPREHEN METABOLIC PANEL: CPT | Performed by: PHYSICIAN ASSISTANT

## 2019-02-04 RX ORDER — CHLORDIAZEPOXIDE HYDROCHLORIDE 25 MG/1
CAPSULE, GELATIN COATED ORAL
Qty: 18 CAPSULE | Refills: 0 | Status: SHIPPED | OUTPATIENT
Start: 2019-02-04 | End: 2019-02-15 | Stop reason: CLARIF

## 2019-02-04 RX ADMIN — CHLORDIAZEPOXIDE HYDROCHLORIDE 25 MG: 25 CAPSULE ORAL at 05:28

## 2019-02-04 RX ADMIN — THIAMINE HYDROCHLORIDE: 100 INJECTION, SOLUTION INTRAMUSCULAR; INTRAVENOUS at 09:44

## 2019-02-04 RX ADMIN — INFLUENZA A VIRUS A/MICHIGAN/45/2015 (H1N1) RECOMBINANT HEMAGGLUTININ ANTIGEN, INFLUENZA A VIRUS A/SINGAPORE/INFIMH-16-0019/2016 (H3N2) RECOMBINANT HEMAGGLUTININ ANTIGEN, INFLUENZA B VIRUS B/MARYLAND/15/2016 RECOMBINANT HEMAGGLUTININ ANTIGEN, AND INFLUENZA B VIRUS B/PHUKET/3073/2013 RECOMBINANT HEMAGGLUTININ ANTIGEN 0.5 ML: 45; 45; 45; 45 INJECTION INTRAMUSCULAR at 13:15

## 2019-02-04 RX ADMIN — CHLORDIAZEPOXIDE HYDROCHLORIDE 25 MG: 25 CAPSULE ORAL at 12:00

## 2019-02-04 NOTE — DISCHARGE SUMMARY
Discharge Summary - Espinoza Jurado 59 y o  female MRN: [id number]  Unit/Bed#: -01 Encounter: [id number]    Admission Date:    2/2/2019   Discharge Date:   02/04/19   Admitting Diagnosis:   Alcohol withdrawal ([facility] [address] ) [F10 239]  Hypokalemia [E87 6]  Alcohol withdrawal seizure ([facility] [place] [address] ) [F10 239, R56 9]  Injury of tongue, initial encounter [O28 17IX]  Admitting Provider:   Noemí Mariee MD  Discharge Provider:   Kash Moffett MD     Primary Care Physician at Discharge:   Kash Moffett MD,[phone]    HPI:   Espinoza Jurado is a 59 y o  female who presents with seizure  Patient has h o etoh abuse for several years drinking etoh 0 5-1 bottle vodka daily  She was in rehab 1 year ago and relapsed after discharge-she quit drinking 3 days ago and has been having difficulty walking and tremors  Last night she had difficulty sleeping and this morning  found her with blood around her mouth and but tongue-no seizure activity noted and was sent to ED  Patient denies h/o etoh seizure  Procedures Performed:   No orders of the defined types were placed in this encounter  Hospital Course:   Patient remained alert, cooperative, seizure free and hemodynamically stable throughout the hospitalization  She only required 1 dose of Librium per CIWA protocol  She was maintained on Librium 25 mg every 6 hr during this hospitalization  On the morning of discharge patient and I had a lengthy conversation regarding her alcoholism and life-threatening consequences of alcohol withdrawal prompting this admission  Patient verbalized understanding and willingness to get back into her outpatient program as well as alcohol occur anonymous  She reached out to a sponsor and plans to go to a meeting this evening  She plans to work the 12 steps    She refuses inpatient rehab stating she did not get as much out of that last time as she did with her outpatient program   She is committed to her sobriety today   She will taper off Librium over the next 9 days by taking 25 mg 3 times daily for 3 days, b i d  For 3 days and then q h s  For 3 days  Patient was evaluated by Psychiatry and felt stable for discharge with close outpatient follow-up  Because of the seizure it was advised that she stay off of citalopram as well as doxepin for the next 2 weeks  Pancytopenia-patient is to be evaluated by Hematology prior to discharge and outpatient follow-up will be arranged  I have ordered CBC to be completed prior to our follow-up in 1 week  Current Facility-Administered Medications:     acetaminophen (TYLENOL) tablet 650 mg, 650 mg, Oral, Q6H PRN, Derek Dixon MD    chlordiazePOXIDE (LIBRIUM) capsule 25 mg, 25 mg, Oral, Q6H [facility], Khadiajh Cr MD, 25 mg at 84/68/38 0696    folic acid 1 mg, thiamine (VITAMIN B1) 100 mg in sodium chloride 0 9 % 100 mL IV piggyback, , Intravenous, Daily, Khadijah Cr MD    influenza vaccine, recombinant, quadrivalent (FLUBLOK) IM injection 0 5 mL, 0 5 mL, Intramuscular, Prior to discharge, Derek Dixon MD    sodium chloride (OCEAN) 0 65 % nasal spray 1 spray, 1 spray, Each Nare, TID PRN, Derek Dixon MD, 1 spray at 02/02/19 3660      Consulting Providers   Psychiatry, hematology    Significant Findings, Care, Treatment and Services Provided:   CT brain:IMPRESSION:     No acute intracranial abnormality  Abdominal ultrasound:IMPRESSION:     Mild hepatomegaly and mild fatty liver change      Complications:   None  Physical Exam:  General Appearance:    Alert, cooperative, no distress   Head:    Normocephalic, without obvious abnormality, atraumatic   Eyes:    PERRL, conjunctiva/corneas clear, EOM's intact       Nose:   Moist mucous membranes, no drainage or sinus tenderness   Throat:   No tenderness, no exudates   Neck:   Supple, symmetrical, trachea midline, no JVD   Lungs:     Clear to auscultation bilaterally, respirations unlabored   Heart:    Regular rate and rhythm, S1 and S2 normal, no murmur, rub   or gallop   Abdomen: Soft, non-tender, positive bowel sounds, no masses, no organomegaly   Extremities:  No pedal edema, calf tenderness  Distal pulses palpable  Neurologic:     CNII-XII intact  Labs:   Lab Results   Component Value Date    WBC 2 62 (L) 02/04/2019    WBC 3 17 (L) 11/04/2015    RBC 3 42 (L) 02/04/2019    RBC 4 56 11/04/2015    HGB 10 3 (L) 02/04/2019    HGB 15 0 11/04/2015    HCT 32 1 (L) 02/04/2019    HCT 44 8 11/04/2015    MCV 94 02/04/2019    MCV 98 11/04/2015    MCH 30 1 02/04/2019    MCH 32 9 11/04/2015    RDW 16 7 (H) 02/04/2019    RDW 13 4 11/04/2015    PLT 60 (L) 02/04/2019     11/04/2015     Lab Results   Component Value Date    CREATININE 0 69 02/04/2019    CREATININE 0 62 11/04/2015    BUN 14 02/04/2019    BUN 16 11/04/2015     11/04/2015    K 4 1 02/04/2019    K 4 2 11/04/2015     02/04/2019     11/04/2015    CO2 24 02/04/2019    CO2 29 11/04/2015    GLUCOSE 84 11/04/2015    PROT 7 7 11/04/2015    ALKPHOS 79 02/04/2019    ALKPHOS 74 11/04/2015    ALT 32 02/04/2019     (H) 11/04/2015    AST 44 02/04/2019    AST 62 (H) 11/04/2015    BILIDIR 0 14 11/04/2015       Treatments:  Benzodiazepines under [facility] protocol    Discharge Diagnosis:   Principal Problem:    Alcohol withdrawal seizure with complication ([facility] [address] )  Active Problems:    Alcoholism ([facility] [address] )    Anxiety    Depression    Pure hypercholesterolemia    Pancytopenia ([facility] [address] )    Hypokalemia  Resolved Problems:    * No resolved hospital problems  *      Condition at Discharge:   Good     Code Status: Level 1 - Full Code  Advance Directive and Living Will: <no information>  Power of :    POLST:      Discharge instructions/Information to patient and family:   See after visit summary for information provided to patient and family        Provisions for Follow-Up Care:  See after visit summary for information related to follow-up care and any pertinent home health orders  Disposition:   Home    Planned Readmission:   No    Discharge Statement   I spent 35 minutes discharging the patient  This time was spent on the day of discharge  I had direct contact with the patient on the day of discharge  Additional documentation is required if more than 30 minutes were spent on discharge  Greater than 50% of the total time was spent examining patient, answering all patient questions, arranging and discussing plan of care with patient as well as directly providing post-discharge instructions  Additional time then spent on discharge activities  Discharge Medications:  See after visit summary for reconciled discharge medications provided to patient and family        Jaime Contreras MD  2/4/2019,10:20 AM

## 2019-02-04 NOTE — DISCHARGE INSTRUCTIONS
Chlordiazepoxide (By mouth)   Chlordiazepoxide Hydrochloride (klor-dye-az-e-POX-alexus jorge l-droe-KLOR-alexus)  Treats anxiety, symptoms of alcohol withdrawal, and tremor  This medicine is a benzodiazepine  Brand Name(s):   There may be other brand names for this medicine  When This Medicine Should Not Be Used: You should not use chlordiazepoxide if you have had an allergic reaction to any other benzodiazepine medicines, such as Valium®, Xanax®, and Halcion®  How to Use This Medicine:   Tablet, Capsule  · Your doctor will tell you how much medicine to take and how often  · May be taken with or without food  · Drink plenty of liquids  If a dose is missed:   · Take as soon as possible if you are less than 1 hour late with your dose  · If it is almost time for your next regular dose, wait until then to take your medicine and skip the missed dose  · You should not use two doses at the same time  How to Store and Dispose of This Medicine:   · Store at room temperature, away from excess heat, moisture, and light  · Keep all medicine out of the reach of children  Drugs and Foods to Avoid:   Ask your doctor or pharmacist before using any other medicine, including over-the-counter medicines, vitamins, and herbal products  · Avoid drinking alcohol while taking this medicine  · You should not use this medicine with other drugs that make you sleepy such as sedatives, sleeping pills, antihistamines, some antidepressants, and narcotic pain-killers  Warnings While Using This Medicine:   · If you are pregnant or breastfeeding, talk to your doctor before taking this medicine  · Make sure your doctor knows if you have porphyria or liver or kidney disease before you take this medicine  · This medicine can be habit-forming  You should not use more than the prescribed amount or for longer periods than your doctor has ordered  · This medicine can cause dizziness or drowsiness   Be careful if driving or using machinery  · If you have taken this medicine for a long time or in large doses, do not stop taking it suddenly  You may need to take smaller and smaller doses before completely stopping  · The side effects of this medicine may be stronger in older adults and children  Possible Side Effects While Using This Medicine:   Call your doctor right away if you notice any of these side effects:  · Depressed mood or severe confusion  · Extreme unsteadiness (trouble standing)  · Severe drowsiness and weakness  · Slow heartbeat  · Sudden mood changes  · Trouble breathing  If you notice these less serious side effects, talk with your doctor:   · Blurred vision, headache  · Diarrhea or constipation  · Drowsiness, dizziness, clumsiness  · Dry mouth, upset stomach  · Feeling "hungover" the next morning after bedtime use  · Trouble concentrating  If you notice other side effects that you think are caused by this medicine, tell your doctor  Call your doctor for medical advice about side effects  You may report side effects to FDA at 3-993-FDA-8503  © 2017 [address] Information is for End User's use only and may not be sold, redistributed or otherwise used for commercial purposes  The above information is an  only  It is not intended as medical advice for individual conditions or treatments  Talk to your doctor, nurse or pharmacist before following any medical regimen to see if it is safe and effective for you

## 2019-02-05 NOTE — UTILIZATION REVIEW
Notification of Discharge  This is a Notification of Discharge from our facility [address]  Please be advised that this patient has been discharge from our facility  Below you will find the admission and discharge date and time including the patients disposition  PRESENTATION DATE: 2/2/2019  8:46 AM  IP ADMISSION DATE: 2/2/19 1143  DISCHARGE DATE: 2/4/2019  1:36 PM  DISPOSITION: [facility] Utilization Review Department  Phone: [phone]; Fax [phone]  [email or web address]  ATTENTION: Please call with any questions or concerns to [phone]  and carefully listen to the prompts so that you are directed to the right person  Send all requests for admission clinical reviews, approved or denied determinations and any other requests to fax [phone]   All voicemails are confidential

## 2019-02-08 DIAGNOSIS — E78.5 HYPERLIPIDEMIA, UNSPECIFIED HYPERLIPIDEMIA TYPE: ICD-10-CM

## 2019-02-08 RX ORDER — ATORVASTATIN CALCIUM 20 MG/1
TABLET, FILM COATED ORAL
Qty: 15 TABLET | Refills: 3 | Status: SHIPPED | OUTPATIENT
Start: 2019-02-08 | End: 2019-06-10 | Stop reason: SDUPTHER

## 2019-02-15 ENCOUNTER — TELEPHONE (OUTPATIENT)
Dept: INTERNAL MEDICINE CLINIC | Facility: CLINIC | Age: 64
End: 2019-02-15

## 2019-02-15 ENCOUNTER — OFFICE VISIT (OUTPATIENT)
Dept: INTERNAL MEDICINE CLINIC | Facility: CLINIC | Age: 64
End: 2019-02-15
Payer: COMMERCIAL

## 2019-02-15 VITALS
TEMPERATURE: 97.4 F | HEIGHT: 65 IN | HEART RATE: 99 BPM | WEIGHT: 171.8 LBS | SYSTOLIC BLOOD PRESSURE: 108 MMHG | DIASTOLIC BLOOD PRESSURE: 76 MMHG | BODY MASS INDEX: 28.62 KG/M2 | OXYGEN SATURATION: 98 %

## 2019-02-15 DIAGNOSIS — E78.00 PURE HYPERCHOLESTEROLEMIA: ICD-10-CM

## 2019-02-15 DIAGNOSIS — H10.31 ACUTE CONJUNCTIVITIS OF RIGHT EYE, UNSPECIFIED ACUTE CONJUNCTIVITIS TYPE: ICD-10-CM

## 2019-02-15 DIAGNOSIS — R94.31 QT PROLONGATION: ICD-10-CM

## 2019-02-15 DIAGNOSIS — F33.9 RECURRENT MAJOR DEPRESSIVE DISORDER, REMISSION STATUS UNSPECIFIED (HCC): ICD-10-CM

## 2019-02-15 DIAGNOSIS — R56.9 ALCOHOL WITHDRAWAL SEIZURE WITH COMPLICATION (HCC): Primary | ICD-10-CM

## 2019-02-15 DIAGNOSIS — F10.239 ALCOHOL WITHDRAWAL SEIZURE WITH COMPLICATION (HCC): Primary | ICD-10-CM

## 2019-02-15 PROCEDURE — 93000 ELECTROCARDIOGRAM COMPLETE: CPT | Performed by: PHYSICIAN ASSISTANT

## 2019-02-15 PROCEDURE — 99495 TRANSJ CARE MGMT MOD F2F 14D: CPT | Performed by: PHYSICIAN ASSISTANT

## 2019-02-15 PROCEDURE — 1111F DSCHRG MED/CURRENT MED MERGE: CPT | Performed by: PHYSICIAN ASSISTANT

## 2019-02-15 RX ORDER — CITALOPRAM 20 MG/1
1 TABLET ORAL
COMMUNITY
Start: 2015-12-31 | End: 2019-02-15 | Stop reason: CLARIF

## 2019-02-15 RX ORDER — TOBRAMYCIN 3 MG/ML
2 SOLUTION/ DROPS OPHTHALMIC
Qty: 5 ML | Refills: 0 | Status: SHIPPED | OUTPATIENT
Start: 2019-02-15 | End: 2019-03-21

## 2019-02-15 NOTE — PROGRESS NOTES
Assessment/Plan:  I reviewed all of her hospital records no further seizures she has a red eye on the right but her vision is okay she blames this on sleeping with her contact I will treated with antibiotic eyedrops she will see Ophthalmology as soon as possible  She will be attending [place] [address] meetings  She has low hemoglobin and white count which are stable her platelets have returned to normal   I repeated EKG today which showed resolution of prolonged QT  Will follow-up in a week she has appointments to see Hematology  She is off of Librium and SSRI her mood is good  No problem-specific Assessment & Plan notes found for this encounter  Diagnoses and all orders for this visit:    Alcohol withdrawal seizure with complication ([facility] [address] )  -     POCT ECG  -     tobramycin (TOBREX) 0 3 % SOLN; Administer 2 drops to the right eye every 4 (four) hours while awake    Pure hypercholesterolemia  -     POCT ECG  -     tobramycin (TOBREX) 0 3 % SOLN; Administer 2 drops to the right eye every 4 (four) hours while awake    Recurrent major depressive disorder, remission status unspecified (HCC)  -     POCT ECG  -     tobramycin (TOBREX) 0 3 % SOLN; Administer 2 drops to the right eye every 4 (four) hours while awake    Acute conjunctivitis of right eye, unspecified acute conjunctivitis type  -     POCT ECG  -     tobramycin (TOBREX) 0 3 % SOLN; Administer 2 drops to the right eye every 4 (four) hours while awake    QT prolongation  -     POCT ECG  -     tobramycin (TOBREX) 0 3 % SOLN; Administer 2 drops to the right eye every 4 (four) hours while awake    Other orders  -     Discontinue: citalopram (CeleXA) 20 mg tablet; Take 1 tablet by mouth        Subjective:     Patient ID: Mira Arana is a 59 y o  female  She is here hospital l follow up  She had a alcohol withdrawal seizure a week and a half ago with pancytopenia some slight liver enzyme abnormalities and prolonged QT    Since her discharge she is feeling well   She has not gone to any AA meetings yet  She has a previous history of alcoholism and rehab  No previous DTs or withdrawal seizures  She has developed redness of her right eye from sleeping with her contact in since her discharge no change in her level of consciousness or appetite has been good she is feeling well active ambulatory now off of Librium and Celexa      The following portions of the patient's history were reviewed and updated as appropriate:  She  has a past medical history of Anxiety, Cancer ([place] [address] ), Depression, and Hyperlipidemia  She   Patient Active Problem List    Diagnosis Date Noted    Alcohol withdrawal seizure with complication ([place] [address] ) 56/77/1473    Pure hypercholesterolemia 02/02/2019    Pancytopenia ([place] [address] ) 02/02/2019    Hypokalemia 02/02/2019    Acute pharyngitis 01/24/2019    Hx of colonic polyp 03/21/2018    Screen for colon cancer 01/25/2018    Alcoholism ([place] [address] ) 12/06/2016    Depression 12/06/2016    Mild vitamin D deficiency 07/08/2016    Anxiety 08/27/2015    Hyperlipidemia, unspecified 08/27/2015     She  has a past surgical history that includes Mastectomy (Bilateral); Bilateral oophorectomy; Appendectomy; Tonsillectomy; pr lap,cholecystectomy (N/A, 10/5/2017); Cholecystectomy; Colonoscopy; pr colonoscopy flx dx w/collj spec when pfrmd (N/A, 4/3/2018); and Breast reconstruction  Her family history includes Breast cancer in her maternal aunt and mother; Heart failure in her father; Ovarian cancer in her mother  She  reports that she has never smoked  She has never used smokeless tobacco  She reports that she drinks alcohol  She reports that she does not use drugs  Current Outpatient Medications   Medication Sig Dispense Refill    atorvastatin (LIPITOR) 20 mg tablet TAKE 1/2 TABLET BY MOUTH DAILY AS DIRECTED   15 tablet 3    cholecalciferol (VITAMIN D3) 1,000 units tablet Take by mouth      tobramycin (TOBREX) 0 3 % SOLN Administer 2 drops to the right eye every 4 (four) hours while awake 5 mL 0     No current facility-administered medications for this visit  She has No Known Allergies       Review of Systems   Constitutional: Negative for activity change, appetite change, chills, diaphoresis, fatigue, fever and unexpected weight change  HENT: Negative for congestion, dental problem, drooling, ear discharge, ear pain, facial swelling, hearing loss, nosebleeds, postnasal drip, rhinorrhea, sinus pressure, sinus pain, sneezing, sore throat, tinnitus, trouble swallowing and voice change  Eyes: Positive for redness  Negative for photophobia, pain, discharge, itching and visual disturbance  Respiratory: Negative for apnea, cough, choking, chest tightness, shortness of breath, wheezing and stridor  Cardiovascular: Negative for chest pain, palpitations and leg swelling  Gastrointestinal: Negative for abdominal distention, abdominal pain, anal bleeding, blood in stool, constipation, diarrhea, nausea, rectal pain and vomiting  Endocrine: Negative for cold intolerance, heat intolerance, polydipsia, polyphagia and polyuria  Genitourinary: Negative for decreased urine volume, difficulty urinating, dysuria, enuresis, flank pain, frequency, genital sores, hematuria and urgency  Musculoskeletal: Negative for arthralgias, back pain, gait problem, joint swelling, myalgias, neck pain and neck stiffness  Skin: Negative for color change, pallor, rash and wound  Neurological: Negative for dizziness, tremors, seizures, syncope, facial asymmetry, speech difficulty, weakness, light-headedness, numbness and headaches  Hematological: Negative for adenopathy  Does not bruise/bleed easily  Psychiatric/Behavioral: Positive for sleep disturbance  Negative for agitation, behavioral problems, confusion, decreased concentration, dysphoric mood, hallucinations, self-injury and suicidal ideas  The patient is nervous/anxious  The patient is not hyperactive            Objective: Physical Exam   Constitutional: She is oriented to person, place, and time  She appears well-developed  HENT:   Head: Normocephalic  Right Ear: External ear normal    Left Ear: External ear normal    Mouth/Throat: Oropharynx is clear and moist    Eyes: Pupils are equal, round, and reactive to light  Right eye exhibits no chemosis  Right conjunctiva is injected  Neck: Neck supple  No thyromegaly present  Cardiovascular: Normal rate, regular rhythm, normal heart sounds and intact distal pulses  Pulmonary/Chest: Effort normal and breath sounds normal    Abdominal: Soft  Bowel sounds are normal    Musculoskeletal: Normal range of motion  She exhibits no edema  Neurological: She is alert and oriented to person, place, and time  She has normal reflexes  She displays normal reflexes  No cranial nerve deficit or sensory deficit  She exhibits normal muscle tone  Coordination normal    Skin: Skin is warm and dry  Vitals:    02/15/19 1525   BP: 108/76   BP Location: Left arm   Patient Position: Sitting   Pulse: 99   Temp: (!) 97 4 °F (36 3 °C)   TempSrc: Oral   SpO2: 98%   Weight: 77 9 kg (171 lb 12 8 oz)   Height: 5' 5" (1 651 m)       Transitional Care Management Review:  Horacio Zuniga is a 59 y o  female here for TCM follow up  During the TCM phone call patient stated:    TCM Call (since 1/15/2019)     Hospital care reviewed  Records reviewed    Patient was hospitial[facility] at  [facility]    Date of Admission  02/02/19    Date of discharge  02/04/19    Diagnosis  Alcohol withdrawal seizure; tongue injury    Disposition  Home      TCM Call (since 1/15/2019)     Scheduled for follow up?   Not Clinically Warranted    Not clinically warranted  substance abuse    Patients specialists  Other (comment)    Other specialists names  Venu/Onc-Wil Vasquez MD PhD    Other specialists contcat #  [phone]    Did you obtain your prescribed medications  -- LIBRIUM    Living Arrangements  Spouse or Significiant other              Lauren Cortez PA-C

## 2019-02-16 NOTE — TELEPHONE ENCOUNTER
David Silverman from [facility] called checking on status of alternative  They do not have tobramycin 0 3% in stock   Please advise     [phone], press option 2 twice

## 2019-02-16 NOTE — TELEPHONE ENCOUNTER
Informed Kp No at the pharmacy that the eye drop can be switched to Ofloxicin as per [place]

## 2019-02-18 ENCOUNTER — TELEPHONE (OUTPATIENT)
Dept: INTERNAL MEDICINE CLINIC | Facility: CLINIC | Age: 64
End: 2019-02-18

## 2019-02-18 NOTE — TELEPHONE ENCOUNTER
Patient saw [address] [place] [address] on 2/15 he suggested that she goes to an eye specialist  She cannot remember who he suggested     Please call patient back with info she would like to make this appt asap    Patient contact # [phone]

## 2019-02-21 ENCOUNTER — OFFICE VISIT (OUTPATIENT)
Dept: INTERNAL MEDICINE CLINIC | Facility: CLINIC | Age: 64
End: 2019-02-21
Payer: COMMERCIAL

## 2019-02-21 VITALS
SYSTOLIC BLOOD PRESSURE: 100 MMHG | BODY MASS INDEX: 28.89 KG/M2 | DIASTOLIC BLOOD PRESSURE: 62 MMHG | WEIGHT: 173.6 LBS | HEART RATE: 113 BPM | TEMPERATURE: 98.2 F | OXYGEN SATURATION: 95 %

## 2019-02-21 DIAGNOSIS — D61.818 PANCYTOPENIA (HCC): ICD-10-CM

## 2019-02-21 DIAGNOSIS — E78.00 PURE HYPERCHOLESTEROLEMIA: ICD-10-CM

## 2019-02-21 DIAGNOSIS — R56.9 ALCOHOL WITHDRAWAL SEIZURE WITH COMPLICATION (HCC): Primary | ICD-10-CM

## 2019-02-21 DIAGNOSIS — F33.9 RECURRENT MAJOR DEPRESSIVE DISORDER, REMISSION STATUS UNSPECIFIED (HCC): ICD-10-CM

## 2019-02-21 DIAGNOSIS — F10.239 ALCOHOL WITHDRAWAL SEIZURE WITH COMPLICATION (HCC): Primary | ICD-10-CM

## 2019-02-21 PROCEDURE — 99214 OFFICE O/P EST MOD 30 MIN: CPT | Performed by: PHYSICIAN ASSISTANT

## 2019-02-21 RX ORDER — MOXIFLOXACIN 5 MG/ML
SOLUTION/ DROPS OPHTHALMIC
Refills: 0 | COMMUNITY
Start: 2019-02-19 | End: 2019-03-21

## 2019-02-21 NOTE — PROGRESS NOTES
Assessment/Plan:  She is doing very well she has had no alcohol she is going to meetings inflammation right eye is improving following with Ophthalmology for corneal ulcers from her contacts six-month follow-up       Diagnoses and all orders for this visit:    Alcohol withdrawal seizure with complication ([facility] )    Pancytopenia ([facility] )    Recurrent major depressive disorder, remission status unspecified ([facility] )    Pure hypercholesterolemia    Other orders  -     moxifloxacin (VIGAMOX) 0 5 % ophthalmic solution; INSTILL 1 DROP INTO RIGHT EYE AS DIRECTED EVERY 1 HOUR WHILE AWAKE        No problem-specific Assessment & Plan notes found for this encounter  Subjective:      Patient ID: Abimbola Mazariegos is a 59 y o  female  She is back for follow-up she is doing very well she was seen last week because of hospitalization for alcohol withdrawal seizures  She has had no alcohol or any seizures her mood is good she sleeping pretty well  She was found have corneal ulcers on the right which are healing followed by Ophthalmology  Had pancytopenia she is scheduled to follow up with Hematology her numbers have improved LFTs are fine      The following portions of the patient's history were reviewed and updated as appropriate:   She has a past medical history of Anxiety, Cancer ([facility] ), Depression, and Hyperlipidemia ,  does not have any pertinent problems on file  ,   has a past surgical history that includes Mastectomy (Bilateral); Bilateral oophorectomy; Appendectomy; Tonsillectomy; pr lap,cholecystectomy (N/A, 10/5/2017); Cholecystectomy; Colonoscopy; pr colonoscopy flx dx w/collj spec when pfrmd (N/A, 4/3/2018); and Breast reconstruction  ,  family history includes Breast cancer in her maternal aunt and mother; Heart failure in her father; Ovarian cancer in her mother  ,   reports that she has never smoked  She has never used smokeless tobacco  She reports that she drank alcohol   She reports that she does not use drugs ,  has No Known Allergies     Current Outpatient Medications   Medication Sig Dispense Refill    atorvastatin (LIPITOR) 20 mg tablet TAKE 1/2 TABLET BY MOUTH DAILY AS DIRECTED  15 tablet 3    cholecalciferol (VITAMIN D3) 1,000 units tablet Take by mouth      moxifloxacin (VIGAMOX) 0 5 % ophthalmic solution INSTILL 1 DROP INTO RIGHT EYE AS DIRECTED EVERY 1 HOUR WHILE AWAKE  0    tobramycin (TOBREX) 0 3 % SOLN Administer 2 drops to the right eye every 4 (four) hours while awake (Patient not taking: Reported on 2/21/2019) 5 mL 0     No current facility-administered medications for this visit  Review of Systems   Constitutional: Negative for activity change, appetite change, chills, diaphoresis, fatigue, fever and unexpected weight change  HENT: Negative for congestion, dental problem, drooling, ear discharge, ear pain, facial swelling, hearing loss, nosebleeds, postnasal drip, rhinorrhea, sinus pressure, sinus pain, sneezing, sore throat, tinnitus, trouble swallowing and voice change  Eyes: Negative for photophobia, pain, discharge, redness, itching and visual disturbance  Respiratory: Negative for apnea, cough, choking, chest tightness, shortness of breath, wheezing and stridor  Cardiovascular: Negative for chest pain, palpitations and leg swelling  Gastrointestinal: Negative for abdominal distention, abdominal pain, anal bleeding, blood in stool, constipation, diarrhea, nausea, rectal pain and vomiting  Endocrine: Negative for cold intolerance, heat intolerance, polydipsia, polyphagia and polyuria  Genitourinary: Negative for decreased urine volume, difficulty urinating, dysuria, enuresis, flank pain, frequency, genital sores, hematuria and urgency  Musculoskeletal: Negative for arthralgias, back pain, gait problem, joint swelling, myalgias, neck pain and neck stiffness  Skin: Negative for color change, pallor, rash and wound     Neurological: Negative for dizziness, tremors, seizures, syncope, facial asymmetry, speech difficulty, weakness, light-headedness, numbness and headaches  Hematological: Negative for adenopathy  Does not bruise/bleed easily  Psychiatric/Behavioral: Negative for agitation, behavioral problems, confusion, decreased concentration, dysphoric mood, hallucinations, self-injury, sleep disturbance and suicidal ideas  The patient is not nervous/anxious and is not hyperactive  Objective:  Vitals:    02/21/19 1414   BP: 100/62   BP Location: Left arm   Patient Position: Sitting   Cuff Size: Standard   Pulse: (!) 113   Temp: 98 2 °F (36 8 °C)   SpO2: 95%   Weight: 78 7 kg (173 lb 9 6 oz)     Body mass index is 28 89 kg/m²  Physical Exam   Constitutional: She is oriented to person, place, and time  She appears well-developed  HENT:   Head: Normocephalic  Right Ear: External ear normal    Left Ear: External ear normal    Mouth/Throat: Oropharynx is clear and moist    Eyes: Pupils are equal, round, and reactive to light  Right conjunctiva is injected (Mild)  Left conjunctiva is not injected  Neck: Neck supple  No thyromegaly present  Cardiovascular: Normal rate, regular rhythm, normal heart sounds and intact distal pulses  Pulmonary/Chest: Effort normal and breath sounds normal    Abdominal: Soft  Bowel sounds are normal    Musculoskeletal: Normal range of motion  Neurological: She is alert and oriented to person, place, and time  She has normal reflexes  Skin: Skin is warm and dry

## 2019-03-04 ENCOUNTER — OFFICE VISIT (OUTPATIENT)
Dept: INTERNAL MEDICINE CLINIC | Facility: CLINIC | Age: 64
End: 2019-03-04
Payer: COMMERCIAL

## 2019-03-04 ENCOUNTER — APPOINTMENT (OUTPATIENT)
Dept: LAB | Facility: CLINIC | Age: 64
End: 2019-03-04
Payer: COMMERCIAL

## 2019-03-04 ENCOUNTER — TELEPHONE (OUTPATIENT)
Dept: INTERNAL MEDICINE CLINIC | Facility: CLINIC | Age: 64
End: 2019-03-04

## 2019-03-04 VITALS — DIASTOLIC BLOOD PRESSURE: 80 MMHG | HEART RATE: 94 BPM | SYSTOLIC BLOOD PRESSURE: 102 MMHG | OXYGEN SATURATION: 99 %

## 2019-03-04 DIAGNOSIS — D61.818 PANCYTOPENIA (HCC): Primary | ICD-10-CM

## 2019-03-04 DIAGNOSIS — E78.00 PURE HYPERCHOLESTEROLEMIA: ICD-10-CM

## 2019-03-04 DIAGNOSIS — M25.50 ARTHRALGIA, UNSPECIFIED JOINT: ICD-10-CM

## 2019-03-04 DIAGNOSIS — E78.5 HYPERLIPIDEMIA, UNSPECIFIED HYPERLIPIDEMIA TYPE: ICD-10-CM

## 2019-03-04 DIAGNOSIS — R53.83 FATIGUE, UNSPECIFIED TYPE: ICD-10-CM

## 2019-03-04 DIAGNOSIS — E55.9 VITAMIN D DEFICIENCY: ICD-10-CM

## 2019-03-04 DIAGNOSIS — F41.9 ANXIETY: ICD-10-CM

## 2019-03-04 DIAGNOSIS — F10.20 ALCOHOLISM (HCC): ICD-10-CM

## 2019-03-04 DIAGNOSIS — D61.818 PANCYTOPENIA (HCC): ICD-10-CM

## 2019-03-04 LAB
25(OH)D3 SERPL-MCNC: 39.5 NG/ML (ref 30–100)
BASOPHILS # BLD AUTO: 0.07 THOUSANDS/ΜL (ref 0–0.1)
BASOPHILS NFR BLD AUTO: 1 % (ref 0–1)
CHOLEST SERPL-MCNC: 159 MG/DL (ref 50–200)
CK SERPL-CCNC: 46 U/L (ref 26–192)
CRP SERPL QL: <3 MG/L
EOSINOPHIL # BLD AUTO: 0.05 THOUSAND/ΜL (ref 0–0.61)
EOSINOPHIL NFR BLD AUTO: 1 % (ref 0–6)
ERYTHROCYTE [DISTWIDTH] IN BLOOD BY AUTOMATED COUNT: 16.1 % (ref 11.6–15.1)
ERYTHROCYTE [SEDIMENTATION RATE] IN BLOOD: 56 MM/HOUR (ref 0–20)
HCT VFR BLD AUTO: 25.3 % (ref 34.8–46.1)
HDLC SERPL-MCNC: 58 MG/DL (ref 40–60)
HGB BLD-MCNC: 7.7 G/DL (ref 11.5–15.4)
IMM GRANULOCYTES # BLD AUTO: 0.02 THOUSAND/UL (ref 0–0.2)
IMM GRANULOCYTES NFR BLD AUTO: 0 % (ref 0–2)
LDLC SERPL CALC-MCNC: 72 MG/DL (ref 0–100)
LYMPHOCYTES # BLD AUTO: 0.98 THOUSANDS/ΜL (ref 0.6–4.47)
LYMPHOCYTES NFR BLD AUTO: 20 % (ref 14–44)
MAGNESIUM SERPL-MCNC: 2.2 MG/DL (ref 1.6–2.6)
MCH RBC QN AUTO: 28.1 PG (ref 26.8–34.3)
MCHC RBC AUTO-ENTMCNC: 30.4 G/DL (ref 31.4–37.4)
MCV RBC AUTO: 92 FL (ref 82–98)
MONOCYTES # BLD AUTO: 0.36 THOUSAND/ΜL (ref 0.17–1.22)
MONOCYTES NFR BLD AUTO: 7 % (ref 4–12)
NEUTROPHILS # BLD AUTO: 3.36 THOUSANDS/ΜL (ref 1.85–7.62)
NEUTS SEG NFR BLD AUTO: 71 % (ref 43–75)
NONHDLC SERPL-MCNC: 101 MG/DL
NRBC BLD AUTO-RTO: 0 /100 WBCS
PHOSPHATE SERPL-MCNC: 4.1 MG/DL (ref 2.3–4.1)
PLATELET # BLD AUTO: 265 THOUSANDS/UL (ref 149–390)
PMV BLD AUTO: 9.9 FL (ref 8.9–12.7)
RBC # BLD AUTO: 2.74 MILLION/UL (ref 3.81–5.12)
TRIGL SERPL-MCNC: 143 MG/DL
WBC # BLD AUTO: 4.84 THOUSAND/UL (ref 4.31–10.16)

## 2019-03-04 PROCEDURE — 80061 LIPID PANEL: CPT

## 2019-03-04 PROCEDURE — 36415 COLL VENOUS BLD VENIPUNCTURE: CPT

## 2019-03-04 PROCEDURE — 85025 COMPLETE CBC W/AUTO DIFF WBC: CPT

## 2019-03-04 PROCEDURE — 86200 CCP ANTIBODY: CPT

## 2019-03-04 PROCEDURE — 86430 RHEUMATOID FACTOR TEST QUAL: CPT

## 2019-03-04 PROCEDURE — 86431 RHEUMATOID FACTOR QUANT: CPT

## 2019-03-04 PROCEDURE — 82306 VITAMIN D 25 HYDROXY: CPT

## 2019-03-04 PROCEDURE — 82550 ASSAY OF CK (CPK): CPT

## 2019-03-04 PROCEDURE — 99214 OFFICE O/P EST MOD 30 MIN: CPT | Performed by: NURSE PRACTITIONER

## 2019-03-04 PROCEDURE — 84100 ASSAY OF PHOSPHORUS: CPT

## 2019-03-04 PROCEDURE — 83735 ASSAY OF MAGNESIUM: CPT

## 2019-03-04 PROCEDURE — 86140 C-REACTIVE PROTEIN: CPT

## 2019-03-04 PROCEDURE — 1036F TOBACCO NON-USER: CPT | Performed by: NURSE PRACTITIONER

## 2019-03-04 PROCEDURE — 85652 RBC SED RATE AUTOMATED: CPT

## 2019-03-04 PROCEDURE — 86618 LYME DISEASE ANTIBODY: CPT

## 2019-03-04 RX ORDER — DOXEPIN HYDROCHLORIDE 50 MG/1
50 CAPSULE ORAL
Refills: 0 | COMMUNITY
Start: 2019-02-28 | End: 2019-03-21

## 2019-03-04 NOTE — PROGRESS NOTES
Assessment/Plan:    Patient Instructions   ETOH abuse status post alcohol withdrawal seizure  She has not had a drink since her hospitalization  She is going to [employer] 3 times a week  Insomnia depression anxiety I will talk with Dr Natividad Rob to see what the safest antidepression medication would be for her I would like to consider Cymbalta with her myalgias arthralgias but this is concerning with history of seizure consider trazodone but did have abnormal EKG    Myalgias/arthralgias consider fibromyalgia with poor sleep check labs as noted to rule out autoimmune versus infectious versus other    Corneal abrasion after falling asleep with contact in following with Ophthalmology         Diagnoses and all orders for this visit:    Pancytopenia ([facility] )  -     CBC and differential; Future    Fatigue, unspecified type  -     Phosphorus; Future  -     Magnesium; Future    Arthralgia, unspecified joint  -     Sedimentation rate, automated; Future  -     RF Screen w/ Reflex to Titer; Future  -     CK; Future  -     C-reactive protein; Future  -     Lyme Antibody Profile with reflex to WB; Future  -     Cyclic citrul peptide antibody, IgG; Future    Pure hypercholesterolemia  -     Lipid panel; Future    Vitamin D deficiency  -     Vitamin D 25 hydroxy; Future    Alcoholism ([facility] )    Anxiety    Hyperlipidemia, unspecified hyperlipidemia type    Other orders  -     doxepin (SINEquan) 50 mg capsule; Take 50 mg by mouth daily at bedtime as needed         Subjective:      Patient ID: Froylan Felty is a 59 y o  female    Patient was hospitalized for alcohol withdrawal seizure she has not had a drink since the hospitalization  She is going AA meetings 3 times a week  She is trying to keep herself busy  Her biggest issue is sleep  She cannot fall asleep  She has doxepin at home that she still been taking  Her citalopram was discontinued during her hospitalization  She used to take clonazepam as well    She has an appointment with Hematology in several weeks  She has been feeling achy all over specifically to her shoulders upper arms wrists elbows hips and knees she wonders if it is arthritis    No bug bites tick bites no headaches no rashes etc        Current Outpatient Medications:     atorvastatin (LIPITOR) 20 mg tablet, TAKE 1/2 TABLET BY MOUTH DAILY AS DIRECTED , Disp: 15 tablet, Rfl: 3    cholecalciferol (VITAMIN D3) 1,000 units tablet, Take by mouth, Disp: , Rfl:     doxepin (SINEquan) 50 mg capsule, Take 50 mg by mouth daily at bedtime as needed, Disp: , Rfl: 0    moxifloxacin (VIGAMOX) 0 5 % ophthalmic solution, INSTILL 1 DROP INTO RIGHT EYE AS DIRECTED EVERY 1 HOUR WHILE AWAKE, Disp: , Rfl: 0    tobramycin (TOBREX) 0 3 % SOLN, Administer 2 drops to the right eye every 4 (four) hours while awake (Patient not taking: Reported on 2/21/2019), Disp: 5 mL, Rfl: 0    Recent Results (from the past 1008 hour(s))   POCT rapid strepA    Collection Time: 01/24/19  3:36 PM   Result Value Ref Range     RAPID STREP A Negative Negative   Throat culture    Collection Time: 01/24/19  3:43 PM   Result Value Ref Range    Throat Culture Negative for beta-hemolytic Streptococcus    ECG 12 lead    Collection Time: 02/02/19  8:58 AM   Result Value Ref Range    Ventricular Rate 98 BPM    Atrial Rate 98 BPM    OR Interval 162 ms    QRSD Interval 96 ms    QT Interval 386 ms    QTC Interval 492 ms    P [place] 62 degrees    QRS Axis 71 degrees    T Wave Axis 48 degrees   CBC and differential    Collection Time: 02/02/19  9:29 AM   Result Value Ref Range    WBC 2 33 (L) 4 31 - 10 16 Thousand/uL    RBC 3 31 (L) 3 81 - 5 12 Million/uL    Hemoglobin 10 1 (L) 11 5 - 15 4 g/dL    Hematocrit 30 2 (L) 34 8 - 46 1 %    MCV 91 82 - 98 fL    MCH 30 5 26 8 - 34 3 pg    MCHC 33 4 31 4 - 37 4 g/dL    RDW 15 9 (H) 11 6 - 15 1 %    MPV 9 9 8 9 - 12 7 fL    Platelets 35 (LL) 061 - 390 Thousands/uL    nRBC 0 /100 WBCs    Neutrophils Relative 84 (H) 43 - 75 %    Immat GRANS % 0 0 - 2 %    Lymphocytes Relative 8 (L) 14 - 44 %    Monocytes Relative 7 4 - 12 %    Eosinophils Relative 0 0 - 6 %    Basophils Relative 1 0 - 1 %    Neutrophils Absolute 1 95 1 85 - 7 62 Thousands/µL    Immature Grans Absolute 0 01 0 00 - 0 20 Thousand/uL    Lymphocytes Absolute 0 18 (L) 0 60 - 4 47 Thousands/µL    Monocytes Absolute 0 16 (L) 0 17 - 1 22 Thousand/µL    Eosinophils Absolute 0 01 0 00 - 0 61 Thousand/µL    Basophils Absolute 0 02 0 00 - 0 10 Thousands/µL   Protime-INR    Collection Time: 02/02/19  9:29 AM   Result Value Ref Range    Protime 13 7 11 8 - 14 2 seconds    INR 1 06 0 86 - 1 17   APTT    Collection Time: 02/02/19  9:29 AM   Result Value Ref Range    PTT 26 26 - 38 seconds   Comprehensive metabolic panel    Collection Time: 02/02/19  9:29 AM   Result Value Ref Range    Sodium 137 136 - 145 mmol/L    Potassium 2 9 (L) 3 5 - 5 3 mmol/L    Chloride 101 100 - 108 mmol/L    CO2 27 21 - 32 mmol/L    ANION GAP 9 4 - 13 mmol/L    BUN 5 5 - 25 mg/dL    Creatinine 0 58 (L) 0 60 - 1 30 mg/dL    Glucose 95 65 - 140 mg/dL    Calcium 8 2 (L) 8 3 - 10 1 mg/dL    AST 51 (H) 5 - 45 U/L    ALT 36 12 - 78 U/L    Alkaline Phosphatase 89 46 - 116 U/L    Total Protein 6 1 (L) 6 4 - 8 2 g/dL    Albumin 3 3 (L) 3 5 - 5 0 g/dL    Total Bilirubin 0 80 0 20 - 1 00 mg/dL    eGFR 99 ml/min/1 73sq m   TSH    Collection Time: 02/02/19  9:29 AM   Result Value Ref Range    TSH 3RD GENERATON 1 087 0 358 - 3 740 uIU/mL   Magnesium    Collection Time: 02/02/19  9:29 AM   Result Value Ref Range    Magnesium 1 1 (L) 1 6 - 2 6 mg/dL   Lactic acid, plasma    Collection Time: 02/02/19  9:29 AM   Result Value Ref Range    LACTIC ACID 1 2 0 5 - 2 0 mmol/L   Troponin I    Collection Time: 02/02/19  9:29 AM   Result Value Ref Range    Troponin I <0 02 <=0 04 ng/mL   Ammonia    Collection Time: 02/02/19  9:29 AM   Result Value Ref Range    Ammonia 17 11 - 35 umol/L   Ethanol    Collection Time: 02/02/19  9:29 AM Result Value Ref Range    Ethanol Lvl <3 0 - 3 mg/dL   Iron Saturation %    Collection Time: 02/02/19  9:29 AM   Result Value Ref Range    Iron Saturation 38 %    TIBC 283 250 - 450 ug/dL    Iron 108 50 - 170 ug/dL   Ferritin    Collection Time: 02/02/19  9:29 AM   Result Value Ref Range    Ferritin 51 8 - 388 ng/mL   Vitamin B12    Collection Time: 02/02/19  9:29 AM   Result Value Ref Range    Vitamin B-12 482 100 - 900 pg/mL   Folate    Collection Time: 02/02/19  9:29 AM   Result Value Ref Range    Folate 19 8 (H) 3 1 - 17 5 ng/mL   Reticulocytes    Collection Time: 02/02/19  9:29 AM   Result Value Ref Range    Retic Ct Abs 110,900 (H) 39,355-33,481    Retic Ct Pct 3 28 (H) 0 37 - 1 87 %   UA w Reflex to Microscopic w Reflex to Culture    Collection Time: 02/02/19 11:19 AM   Result Value Ref Range    Color, UA Yellow     Clarity, UA Clear     Specific [place], UA 1 015 1 003 - 1 030    pH, UA 7 0 4 5 - 8 0    Leukocytes, UA Negative Negative    Nitrite, UA Negative Negative    Protein, UA Negative Negative mg/dl    Glucose, UA Negative Negative mg/dl    Ketones, UA Trace (A) Negative mg/dl    Urobilinogen, UA 1 0 0 2, 1 0 E U /dl E U /dl    Bilirubin, UA Negative Negative    Blood, UA Negative Negative   Rapid drug screen, urine    Collection Time: 02/02/19 11:19 AM   Result Value Ref Range    Amph/Meth UR Negative Negative    Barbiturate Ur Negative Negative    Benzodiazepine Urine Negative Negative    Cocaine Urine Negative Negative    Methadone Urine Negative Negative    Opiate Urine Negative Negative    PCP Ur Negative Negative    THC Urine Positive (A) Negative   Basic metabolic panel    Collection Time: 02/02/19  4:20 PM   Result Value Ref Range    Sodium 135 (L) 136 - 145 mmol/L    Potassium 3 7 3 5 - 5 3 mmol/L    Chloride 101 100 - 108 mmol/L    CO2 25 21 - 32 mmol/L    ANION GAP 9 4 - 13 mmol/L    BUN 4 (L) 5 - 25 mg/dL    Creatinine 0 61 0 60 - 1 30 mg/dL    Glucose 92 65 - 140 mg/dL    Calcium 8 7 8 3 - 10 1 mg/dL    eGFR 97 ml/min/1 73sq m   Magnesium    Collection Time: 02/02/19  4:20 PM   Result Value Ref Range    Magnesium 1 9 1 6 - 2 6 mg/dL   CBC (With Platelets)    Collection Time: 02/03/19  5:31 AM   Result Value Ref Range    WBC 2 81 (L) 4 31 - 10 16 Thousand/uL    RBC 3 68 (L) 3 81 - 5 12 Million/uL    Hemoglobin 11 0 (L) 11 5 - 15 4 g/dL    Hematocrit 34 5 (L) 34 8 - 46 1 %    MCV 94 82 - 98 fL    MCH 29 9 26 8 - 34 3 pg    MCHC 31 9 31 4 - 37 4 g/dL    RDW 16 4 (H) 11 6 - 15 1 %    Platelets 46 (LL) 871 - 390 Thousands/uL    MPV 10 0 8 9 - 12 7 fL   Comprehensive metabolic panel    Collection Time: 02/03/19  5:31 AM   Result Value Ref Range    Sodium 138 136 - 145 mmol/L    Potassium 3 7 3 5 - 5 3 mmol/L    Chloride 105 100 - 108 mmol/L    CO2 23 21 - 32 mmol/L    ANION GAP 10 4 - 13 mmol/L    BUN 5 5 - 25 mg/dL    Creatinine 0 61 0 60 - 1 30 mg/dL    Glucose 83 65 - 140 mg/dL    Calcium 9 0 8 3 - 10 1 mg/dL    AST 43 5 - 45 U/L    ALT 33 12 - 78 U/L    Alkaline Phosphatase 91 46 - 116 U/L    Total Protein 6 3 (L) 6 4 - 8 2 g/dL    Albumin 3 3 (L) 3 5 - 5 0 g/dL    Total Bilirubin 0 80 0 20 - 1 00 mg/dL    eGFR 97 ml/min/1 73sq m   Magnesium    Collection Time: 02/03/19  5:31 AM   Result Value Ref Range    Magnesium 2 1 1 6 - 2 6 mg/dL   Hepatitis panel, acute    Collection Time: 02/03/19  9:59 AM   Result Value Ref Range    Hepatitis B Surface Ag Non-reactive Non-reactive, NonReactive - Confirmed    Hep A IgM Non-reactive Non-reactive, Equivocal-Suggest Recollect    Hepatitis C Ab Non-reactive Non-reactive    Hep B C IgM Non-reactive Non-reactive   HIV 1/2 AG-AB combo    Collection Time: 02/03/19  9:59 AM   Result Value Ref Range    HIV-1/HIV-2 Ab Non-Reactive Non-Reactive   [place] Screen w/ Reflex to Titer/Pattern    Collection Time: 02/03/19  9:59 AM   Result Value Ref Range    [place] Negative Negative   Fingerstick Glucose (POCT)    Collection Time: 02/03/19  3:59 PM   Result Value Ref Range    POC Glucose 107 65 - 140 mg/dl   CBC and differential    Collection Time: 02/04/19  4:55 AM   Result Value Ref Range    WBC 2 62 (L) 4 31 - 10 16 Thousand/uL    RBC 3 42 (L) 3 81 - 5 12 Million/uL    Hemoglobin 10 3 (L) 11 5 - 15 4 g/dL    Hematocrit 32 1 (L) 34 8 - 46 1 %    MCV 94 82 - 98 fL    MCH 30 1 26 8 - 34 3 pg    MCHC 32 1 31 4 - 37 4 g/dL    RDW 16 7 (H) 11 6 - 15 1 %    MPV 10 2 8 9 - 12 7 fL    Platelets 60 (L) 930 - 390 Thousands/uL    nRBC 0 /100 WBCs    Neutrophils Relative 57 43 - 75 %    Immat GRANS % 1 0 - 2 %    Lymphocytes Relative 28 14 - 44 %    Monocytes Relative 10 4 - 12 %    Eosinophils Relative 3 0 - 6 %    Basophils Relative 1 0 - 1 %    Neutrophils Absolute 1 50 (L) 1 85 - 7 62 Thousands/µL    Immature Grans Absolute 0 03 0 00 - 0 20 Thousand/uL    Lymphocytes Absolute 0 72 0 60 - 4 47 Thousands/µL    Monocytes Absolute 0 26 0 17 - 1 22 Thousand/µL    Eosinophils Absolute 0 08 0 00 - 0 61 Thousand/µL    Basophils Absolute 0 03 0 00 - 0 10 Thousands/µL   Comprehensive metabolic panel    Collection Time: 02/04/19  4:55 AM   Result Value Ref Range    Sodium 139 136 - 145 mmol/L    Potassium 4 1 3 5 - 5 3 mmol/L    Chloride 106 100 - 108 mmol/L    CO2 24 21 - 32 mmol/L    ANION GAP 9 4 - 13 mmol/L    BUN 14 5 - 25 mg/dL    Creatinine 0 69 0 60 - 1 30 mg/dL    Glucose 96 65 - 140 mg/dL    Calcium 8 9 8 3 - 10 1 mg/dL    AST 44 5 - 45 U/L    ALT 32 12 - 78 U/L    Alkaline Phosphatase 79 46 - 116 U/L    Total Protein 5 9 (L) 6 4 - 8 2 g/dL    Albumin 2 9 (L) 3 5 - 5 0 g/dL    Total Bilirubin 0 40 0 20 - 1 00 mg/dL    eGFR 94 ml/min/1 73sq m       The following portions of the patient's history were reviewed and updated as appropriate: allergies, current medications, past family history, past medical history, past social history, past surgical history and problem list      Review of Systems   Constitutional: Negative for appetite change, chills, diaphoresis, fatigue, fever and unexpected weight change  HENT: Negative for postnasal drip and sneezing  Eyes: Negative for visual disturbance  Respiratory: Negative for chest tightness and shortness of breath  Cardiovascular: Negative for chest pain, palpitations and leg swelling  Gastrointestinal: Negative for abdominal pain and blood in stool  Endocrine: Negative for cold intolerance, heat intolerance, polydipsia, polyphagia and polyuria  Genitourinary: Negative for difficulty urinating, dysuria, frequency and urgency  Musculoskeletal: Positive for arthralgias and myalgias  Skin: Negative for rash and wound  Neurological: Negative for dizziness, weakness, light-headedness and headaches  Hematological: Negative for adenopathy  Psychiatric/Behavioral: Negative for confusion, dysphoric mood and sleep disturbance  The patient is not nervous/anxious  Objective:      /80 (BP Location: Left arm, Patient Position: Sitting)   Pulse 94   SpO2 99%        Physical Exam   Constitutional: She is oriented to person, place, and time  She appears well-developed  No distress  HENT:   Head: Normocephalic and atraumatic  Nose: Nose normal    Mouth/Throat: Oropharynx is clear and moist    Eyes: Pupils are equal, round, and reactive to light  Conjunctivae and EOM are normal    Neck: Normal range of motion  Neck supple  No JVD present  No tracheal deviation present  No thyromegaly present  Cardiovascular: Normal rate, regular rhythm, normal heart sounds and intact distal pulses  Exam reveals no gallop and no friction rub  No murmur heard  Pulmonary/Chest: Effort normal and breath sounds normal  No respiratory distress  She has no wheezes  She has no rales  Abdominal: Soft  Bowel sounds are normal  She exhibits no distension  There is no tenderness  Musculoskeletal: Normal range of motion  She exhibits no edema  Lymphadenopathy:     She has no cervical adenopathy     Neurological: She is alert and oriented to person, place, and time  No cranial nerve deficit  Skin: Skin is warm and dry  No rash noted  She is not diaphoretic  Psychiatric: She has a normal mood and affect   Her behavior is normal  Judgment and thought content normal

## 2019-03-04 NOTE — TELEPHONE ENCOUNTER
----- Message from Lisset Brooks, [address] sent at 3/4/2019  5:09 PM EST -----  I dont have all her labs back but her hemoglobin came back even lower-   It should be 12 she was 10 in the hospital but now she is 7 7- I need her to get a sooner appt w/ hematology and I need her to do a stool test, I will put order in the system

## 2019-03-04 NOTE — PATIENT INSTRUCTIONS
ETOH abuse status post alcohol withdrawal seizure  She has not had a drink since her hospitalization  She is going to [employer] 3 times a week      Insomnia depression anxiety I will talk with Dr Wilder Manzano to see what the safest antidepression medication would be for her I would like to consider Cymbalta with her myalgias arthralgias but this is concerning with history of seizure consider trazodone but did have abnormal EKG    Myalgias/arthralgias consider fibromyalgia with poor sleep check labs as noted to rule out autoimmune versus infectious versus other    Corneal abrasion after falling asleep with contact in following with Ophthalmology

## 2019-03-05 ENCOUNTER — TRANSCRIBE ORDERS (OUTPATIENT)
Dept: LAB | Facility: CLINIC | Age: 64
End: 2019-03-05

## 2019-03-05 DIAGNOSIS — D61.818 PANCYTOPENIA (HCC): Primary | ICD-10-CM

## 2019-03-05 LAB
B BURGDOR IGG SER IA-ACNC: 0.05
B BURGDOR IGM SER IA-ACNC: 0.31
CRYOGLOB RF SER-ACNC: ABNORMAL [IU]/ML
RHEUMATOID FACT SER QL LA: POSITIVE

## 2019-03-06 ENCOUNTER — TELEPHONE (OUTPATIENT)
Dept: INTERNAL MEDICINE CLINIC | Facility: CLINIC | Age: 64
End: 2019-03-06

## 2019-03-06 LAB — CCP IGA+IGG SERPL IA-ACNC: 4 UNITS (ref 0–19)

## 2019-03-06 NOTE — TELEPHONE ENCOUNTER
The safest thing would be arthritis strength Tylenol 2 tablets 2-3 times daily available over the counter

## 2019-03-06 NOTE — TELEPHONE ENCOUNTER
Patient see's [place] she is going out of town today @ 4:30 pm ,but she is having bad joint pain would like something for the pain to be sent to her [facility] pharmacy          Please call patient went sent to pharmacy

## 2019-03-11 ENCOUNTER — TELEPHONE (OUTPATIENT)
Dept: INTERNAL MEDICINE CLINIC | Facility: CLINIC | Age: 64
End: 2019-03-11

## 2019-03-11 DIAGNOSIS — F41.9 ANXIETY: ICD-10-CM

## 2019-03-11 DIAGNOSIS — M25.50 ARTHRALGIA, UNSPECIFIED JOINT: Primary | ICD-10-CM

## 2019-03-11 DIAGNOSIS — F10.20 ALCOHOLISM (HCC): ICD-10-CM

## 2019-03-11 NOTE — TELEPHONE ENCOUNTER
PT  [facility]LIN   HAD  SOME  BLOOD  WORK  DONE  TODAY  HAVING  IT  DONE  IN [place]   WANTS  [address]  TO  CALL  HER  TOMORROW  WITH  RESULTS  AND  DOES  SHE  NEED  ANOTHER  APPT   CALL  PT  [phone]

## 2019-03-12 ENCOUNTER — APPOINTMENT (OUTPATIENT)
Dept: LAB | Facility: CLINIC | Age: 64
End: 2019-03-12
Payer: COMMERCIAL

## 2019-03-12 DIAGNOSIS — D61.818 PANCYTOPENIA (HCC): ICD-10-CM

## 2019-03-12 LAB — HEMOCCULT STL QL IA: NEGATIVE

## 2019-03-12 PROCEDURE — G0328 FECAL BLOOD SCRN IMMUNOASSAY: HCPCS

## 2019-03-12 RX ORDER — GABAPENTIN 300 MG/1
CAPSULE ORAL
Qty: 45 CAPSULE | Refills: 2 | Status: SHIPPED | OUTPATIENT
Start: 2019-03-12 | End: 2019-03-21 | Stop reason: SDUPTHER

## 2019-03-12 NOTE — TELEPHONE ENCOUNTER
Spoke to pt we will start neurontin 300mg at bedtime x 3 nights then icnrease to 600mg and follow up in 10 days

## 2019-03-13 ENCOUNTER — TELEPHONE (OUTPATIENT)
Dept: INTERNAL MEDICINE CLINIC | Facility: CLINIC | Age: 64
End: 2019-03-13

## 2019-03-15 ENCOUNTER — DOCUMENTATION (OUTPATIENT)
Dept: HEMATOLOGY ONCOLOGY | Facility: CLINIC | Age: 64
End: 2019-03-15

## 2019-03-15 NOTE — PROGRESS NOTES
Called pt & went over her benefits & the services the f/c offer she said that she has no concerns at this time but will call back if she does

## 2019-03-21 ENCOUNTER — TELEPHONE (OUTPATIENT)
Dept: INTERNAL MEDICINE CLINIC | Facility: CLINIC | Age: 64
End: 2019-03-21

## 2019-03-21 ENCOUNTER — OFFICE VISIT (OUTPATIENT)
Dept: INTERNAL MEDICINE CLINIC | Facility: CLINIC | Age: 64
End: 2019-03-21
Payer: COMMERCIAL

## 2019-03-21 VITALS
HEIGHT: 65 IN | RESPIRATION RATE: 14 BRPM | DIASTOLIC BLOOD PRESSURE: 72 MMHG | BODY MASS INDEX: 28.89 KG/M2 | HEART RATE: 92 BPM | SYSTOLIC BLOOD PRESSURE: 102 MMHG

## 2019-03-21 DIAGNOSIS — F41.9 ANXIETY: ICD-10-CM

## 2019-03-21 DIAGNOSIS — D61.818 PANCYTOPENIA (HCC): ICD-10-CM

## 2019-03-21 DIAGNOSIS — M25.50 ARTHRALGIA, UNSPECIFIED JOINT: ICD-10-CM

## 2019-03-21 DIAGNOSIS — R56.9 ALCOHOL WITHDRAWAL SEIZURE WITH COMPLICATION (HCC): ICD-10-CM

## 2019-03-21 DIAGNOSIS — E78.00 PURE HYPERCHOLESTEROLEMIA: Primary | ICD-10-CM

## 2019-03-21 DIAGNOSIS — F10.20 ALCOHOLISM (HCC): ICD-10-CM

## 2019-03-21 DIAGNOSIS — D61.818 PANCYTOPENIA (HCC): Primary | ICD-10-CM

## 2019-03-21 DIAGNOSIS — F10.239 ALCOHOL WITHDRAWAL SEIZURE WITH COMPLICATION (HCC): ICD-10-CM

## 2019-03-21 DIAGNOSIS — M77.02 MEDIAL EPICONDYLITIS OF LEFT ELBOW: ICD-10-CM

## 2019-03-21 DIAGNOSIS — R73.01 IMPAIRED FASTING BLOOD SUGAR: ICD-10-CM

## 2019-03-21 DIAGNOSIS — E78.5 HYPERLIPIDEMIA, UNSPECIFIED HYPERLIPIDEMIA TYPE: ICD-10-CM

## 2019-03-21 PROCEDURE — 99214 OFFICE O/P EST MOD 30 MIN: CPT | Performed by: NURSE PRACTITIONER

## 2019-03-21 RX ORDER — MELOXICAM 15 MG/1
15 TABLET ORAL DAILY
Refills: 6 | COMMUNITY
Start: 2019-03-09 | End: 2019-04-15

## 2019-03-21 RX ORDER — GABAPENTIN 300 MG/1
CAPSULE ORAL
Qty: 120 CAPSULE | Refills: 5 | Status: SHIPPED | OUTPATIENT
Start: 2019-03-21 | End: 2019-05-20

## 2019-03-21 NOTE — ASSESSMENT & PLAN NOTE
Still with anxiety during the day states her sleep is markedly improved with 600 mg of Neurontin, we will add 300 of Neurontin in the morning for several days and then increase to 1 in the afternoon as well    We will contact by phone in 2 weeks to see how she is feeling we could consider adding back an SSRI if she still has anxiety

## 2019-03-21 NOTE — ASSESSMENT & PLAN NOTE
On 03/04 patient completed labs that showed a white count of 2 7 with a hemoglobin of 7 7  At that point we tried to expedite an appointment with Hematology, and we recommend a follow-up CBC in approximately 3 days  In reviewing her chart does not look like she ever completed those labs    We will check stat CBC today

## 2019-03-21 NOTE — PROGRESS NOTES
Assessment/Plan:    Alcoholism ([facility] )  Patient has not had a drink in approximately 6 weeks  She goes to 3 meetings a week  Anxiety  Still with anxiety during the day states her sleep is markedly improved with 600 mg of Neurontin, we will add 300 of Neurontin in the morning for several days and then increase to 1 in the afternoon as well  We will contact by phone in 2 weeks to see how she is feeling we could consider adding back an SSRI if she still has anxiety    Hyperlipidemia, unspecified  Stable on statin recheck labs approximately 4 months    Pancytopenia ([facility] )  On 03/04 patient completed labs that showed a white count of 2 7 with a hemoglobin of 7 7  At that point we tried to expedite an appointment with Hematology, and we recommend a follow-up CBC in approximately 3 days  In reviewing her chart does not look like she ever completed those labs  We will check stat CBC today    Alcohol withdrawal seizure with complication [facility])  Patient has not had a drink in approximately 6 weeks       Diagnoses and all orders for this visit:    Pure hypercholesterolemia    Arthralgia, unspecified joint  -     gabapentin (NEURONTIN) 300 mg capsule; Take 1 in the am and 1 in the afternoon and 2 tabs at bedtime    Anxiety  -     gabapentin (NEURONTIN) 300 mg capsule; Take 1 in the am and 1 in the afternoon and 2 tabs at bedtime    Alcoholism (HCC)  -     gabapentin (NEURONTIN) 300 mg capsule; Take 1 in the am and 1 in the afternoon and 2 tabs at bedtime    Hyperlipidemia, unspecified hyperlipidemia type  -     CBC and differential; Future  -     Comprehensive metabolic panel; Future  -     Lipid panel; Future  -     TSH, 3rd generation with Free T4 reflex; Future    Impaired fasting blood sugar  -     Hemoglobin A1C; Future    Medial epicondylitis of left elbow  -     Ambulatory referral to Sports Medicine;  Future    Pancytopenia ([facility] )    Alcohol withdrawal seizure with complication ([place] [id number] )    Other orders  -     meloxicam (MOBIC) 15 mg tablet; Take 15 mg by mouth daily With food         There are no Patient Instructions on file for this visit  Subjective:      Patient ID: Amita Hernandez is a 59 y o  female    Patient started on Neurontin she started with 1 tablet at bedtime and then after several days she increased it to 2 tablets she states her sleep is markedly improved  She still feels anxious during the day  She gets this weird creepy crawly sensation all over her body  She still has pain in her left elbow and in her left knee  She would like to see an orthopedic for this    No chest pain or shortness of breath  No evidence of bleeding        Current Outpatient Medications:     atorvastatin (LIPITOR) 20 mg tablet, TAKE 1/2 TABLET BY MOUTH DAILY AS DIRECTED , Disp: 15 tablet, Rfl: 3    cholecalciferol (VITAMIN D3) 1,000 units tablet, Take by mouth, Disp: , Rfl:     gabapentin (NEURONTIN) 300 mg capsule, Take 1 in the am and 1 in the afternoon and 2 tabs at bedtime, Disp: 120 capsule, Rfl: 5    meloxicam (MOBIC) 15 mg tablet, Take 15 mg by mouth daily With food, Disp: , Rfl: 6    Recent Results (from the past 1008 hour(s))   CBC and differential    Collection Time: 03/04/19  1:58 PM   Result Value Ref Range    WBC 4 84 4 31 - 10 16 Thousand/uL    RBC 2 74 (L) 3 81 - 5 12 Million/uL    Hemoglobin 7 7 (L) 11 5 - 15 4 g/dL    Hematocrit 25 3 (L) 34 8 - 46 1 %    MCV 92 82 - 98 fL    MCH 28 1 26 8 - 34 3 pg    MCHC 30 4 (L) 31 4 - 37 4 g/dL    RDW 16 1 (H) 11 6 - 15 1 %    MPV 9 9 8 9 - 12 7 fL    Platelets 119 529 - 607 Thousands/uL    nRBC 0 /100 WBCs    Neutrophils Relative 71 43 - 75 %    Immat GRANS % 0 0 - 2 %    Lymphocytes Relative 20 14 - 44 %    Monocytes Relative 7 4 - 12 %    Eosinophils Relative 1 0 - 6 %    Basophils Relative 1 0 - 1 %    Neutrophils Absolute 3 36 1 85 - 7 62 Thousands/µL    Immature Grans Absolute 0 02 0 00 - 0 20 Thousand/uL    Lymphocytes Absolute 0 98 0 60 - 4 47 Thousands/µL Monocytes Absolute 0 36 0 17 - 1 22 Thousand/µL    Eosinophils Absolute 0 05 0 00 - 0 61 Thousand/µL    Basophils Absolute 0 07 0 00 - 0 10 Thousands/µL   Lipid panel    Collection Time: 03/04/19  1:58 PM   Result Value Ref Range    Cholesterol 159 50 - 200 mg/dL    Triglycerides 143 <=150 mg/dL    HDL, Direct 58 40 - 60 mg/dL    LDL Calculated 72 0 - 100 mg/dL    Non-HDL-Chol (CHOL-HDL) 101 mg/dl   Vitamin D 25 hydroxy    Collection Time: 03/04/19  1:58 PM   Result Value Ref Range    Vit D, 25-Hydroxy 39 5 30 0 - 100 0 ng/mL   Sedimentation rate, automated    Collection Time: 03/04/19  1:58 PM   Result Value Ref Range    Sed Rate 56 (H) 0 - 20 mm/hour   Phosphorus    Collection Time: 03/04/19  1:58 PM   Result Value Ref Range    Phosphorus 4 1 2 3 - 4 1 mg/dL   Magnesium    Collection Time: 03/04/19  1:58 PM   Result Value Ref Range    Magnesium 2 2 1 6 - 2 6 mg/dL   RF Screen w/ Reflex to Titer    Collection Time: 03/04/19  1:58 PM   Result Value Ref Range    Rheumatoid Factor Positive (A) Negative   CK    Collection Time: 03/04/19  1:58 PM   Result Value Ref Range    Total CK 46 26 - 192 U/L   C-reactive protein    Collection Time: 03/04/19  1:58 PM   Result Value Ref Range    CRP <3 0 <3 0 mg/L   Lyme Antibody Profile with reflex to WB    Collection Time: 03/04/19  1:58 PM   Result Value Ref Range    LYME AB IGG 0 05 0 00 - 0 79    LYME AB IGM 0 31 0 10 - 0 07   Cyclic citrul peptide antibody, IgG    Collection Time: 03/04/19  1:58 PM   Result Value Ref Range    Cyclic Citrullin Peptide Ab 4 0 - 19 units   Rheumatoid factor quant (Reflex Only- Do Not Order)    Collection Time: 03/04/19  1:58 PM   Result Value Ref Range    RF Quantitation 10 IU/mL (A) (none)   Occult Blood, Fecal Immunochemical    Collection Time: 03/12/19  2:36 PM   Result Value Ref Range    OCCULT BLD, FECAL IMMUNOLOGICAL Negative Negative       The following portions of the patient's history were reviewed and updated as appropriate: allergies, current medications, past family history, past medical history, past social history, past surgical history and problem list      Review of Systems   Constitutional: Negative for appetite change, chills, diaphoresis, fatigue, fever and unexpected weight change  HENT: Negative for postnasal drip and sneezing  Eyes: Negative for visual disturbance  Respiratory: Negative for chest tightness and shortness of breath  Cardiovascular: Negative for chest pain, palpitations and leg swelling  Gastrointestinal: Negative for abdominal pain and blood in stool  Endocrine: Negative for cold intolerance, heat intolerance, polydipsia, polyphagia and polyuria  Genitourinary: Negative for difficulty urinating, dysuria, frequency and urgency  Musculoskeletal: Positive for arthralgias and myalgias  Skin: Negative for rash and wound  Neurological: Negative for dizziness, weakness, light-headedness and headaches  Hematological: Negative for adenopathy  Psychiatric/Behavioral: Negative for confusion, dysphoric mood and sleep disturbance  The patient is not nervous/anxious  Objective:      Vitals:    03/21/19 1425   BP: 102/72   Pulse: 92   Resp: 14          Physical Exam   Constitutional: She is oriented to person, place, and time  She appears well-developed  No distress  HENT:   Head: Normocephalic and atraumatic  Nose: Nose normal    Mouth/Throat: Oropharynx is clear and moist    Eyes: Pupils are equal, round, and reactive to light  Conjunctivae and EOM are normal    Neck: Normal range of motion  Neck supple  No JVD present  No tracheal deviation present  No thyromegaly present  Cardiovascular: Normal rate, regular rhythm, normal heart sounds and intact distal pulses  Exam reveals no gallop and no friction rub  No murmur heard  Pulmonary/Chest: Effort normal and breath sounds normal  No respiratory distress  She has no wheezes  She has no rales  Abdominal: Soft   Bowel sounds are normal  She exhibits no distension  There is no tenderness  Musculoskeletal: Normal range of motion  She exhibits no edema  Lymphadenopathy:     She has no cervical adenopathy  Neurological: She is alert and oriented to person, place, and time  No cranial nerve deficit  Skin: Skin is warm and dry  No rash noted  She is not diaphoretic  Psychiatric: She has a normal mood and affect   Her behavior is normal  Judgment and thought content normal

## 2019-03-22 ENCOUNTER — TELEPHONE (OUTPATIENT)
Dept: INTERNAL MEDICINE CLINIC | Facility: CLINIC | Age: 64
End: 2019-03-22

## 2019-03-22 ENCOUNTER — APPOINTMENT (OUTPATIENT)
Dept: LAB | Facility: CLINIC | Age: 64
End: 2019-03-22
Payer: COMMERCIAL

## 2019-03-22 DIAGNOSIS — D61.818 PANCYTOPENIA (HCC): ICD-10-CM

## 2019-03-22 LAB
BASOPHILS # BLD AUTO: 0.07 THOUSANDS/ΜL (ref 0–0.1)
BASOPHILS NFR BLD AUTO: 2 % (ref 0–1)
EOSINOPHIL # BLD AUTO: 0.05 THOUSAND/ΜL (ref 0–0.61)
EOSINOPHIL NFR BLD AUTO: 1 % (ref 0–6)
ERYTHROCYTE [DISTWIDTH] IN BLOOD BY AUTOMATED COUNT: 15.9 % (ref 11.6–15.1)
HCT VFR BLD AUTO: 28.4 % (ref 34.8–46.1)
HGB BLD-MCNC: 8.3 G/DL (ref 11.5–15.4)
IMM GRANULOCYTES # BLD AUTO: 0.01 THOUSAND/UL (ref 0–0.2)
IMM GRANULOCYTES NFR BLD AUTO: 0 % (ref 0–2)
LYMPHOCYTES # BLD AUTO: 1.2 THOUSANDS/ΜL (ref 0.6–4.47)
LYMPHOCYTES NFR BLD AUTO: 31 % (ref 14–44)
MCH RBC QN AUTO: 25.9 PG (ref 26.8–34.3)
MCHC RBC AUTO-ENTMCNC: 29.2 G/DL (ref 31.4–37.4)
MCV RBC AUTO: 89 FL (ref 82–98)
MONOCYTES # BLD AUTO: 0.3 THOUSAND/ΜL (ref 0.17–1.22)
MONOCYTES NFR BLD AUTO: 8 % (ref 4–12)
NEUTROPHILS # BLD AUTO: 2.23 THOUSANDS/ΜL (ref 1.85–7.62)
NEUTS SEG NFR BLD AUTO: 58 % (ref 43–75)
NRBC BLD AUTO-RTO: 0 /100 WBCS
PLATELET # BLD AUTO: 283 THOUSANDS/UL (ref 149–390)
PMV BLD AUTO: 9.8 FL (ref 8.9–12.7)
RBC # BLD AUTO: 3.21 MILLION/UL (ref 3.81–5.12)
WBC # BLD AUTO: 3.86 THOUSAND/UL (ref 4.31–10.16)

## 2019-03-22 PROCEDURE — 85025 COMPLETE CBC W/AUTO DIFF WBC: CPT

## 2019-03-22 PROCEDURE — 36415 COLL VENOUS BLD VENIPUNCTURE: CPT

## 2019-03-22 NOTE — TELEPHONE ENCOUNTER
Danelle from [facility] called for [employer], labs are scanned into chart   FYI

## 2019-03-22 NOTE — TELEPHONE ENCOUNTER
Pt called for blood work results, she had them done this morning so they are still active   Call when finalized     [phone]

## 2019-03-22 NOTE — TELEPHONE ENCOUNTER
----- Message from Joseph Weir sent at 3/22/2019  5:16 PM EDT -----  Her hemoglobin is better it was 7 7 now 8 3  We will see what hematology says

## 2019-03-28 DIAGNOSIS — F10.982 ALCOHOL-INDUCED INSOMNIA (HCC): ICD-10-CM

## 2019-03-28 RX ORDER — DOXEPIN HYDROCHLORIDE 50 MG/1
CAPSULE ORAL
Qty: 90 CAPSULE | Refills: 0 | Status: SHIPPED | OUTPATIENT
Start: 2019-03-28 | End: 2019-09-20 | Stop reason: ALTCHOICE

## 2019-04-11 ENCOUNTER — APPOINTMENT (OUTPATIENT)
Dept: RADIOLOGY | Facility: CLINIC | Age: 64
End: 2019-04-11
Payer: COMMERCIAL

## 2019-04-11 ENCOUNTER — OFFICE VISIT (OUTPATIENT)
Dept: OBGYN CLINIC | Facility: CLINIC | Age: 64
End: 2019-04-11
Payer: COMMERCIAL

## 2019-04-11 ENCOUNTER — OFFICE VISIT (OUTPATIENT)
Dept: HEMATOLOGY ONCOLOGY | Facility: CLINIC | Age: 64
End: 2019-04-11
Payer: COMMERCIAL

## 2019-04-11 VITALS
HEIGHT: 65 IN | BODY MASS INDEX: 28.16 KG/M2 | DIASTOLIC BLOOD PRESSURE: 76 MMHG | HEART RATE: 96 BPM | SYSTOLIC BLOOD PRESSURE: 110 MMHG | WEIGHT: 169 LBS

## 2019-04-11 VITALS
OXYGEN SATURATION: 99 % | WEIGHT: 169 LBS | HEIGHT: 65 IN | BODY MASS INDEX: 28.16 KG/M2 | DIASTOLIC BLOOD PRESSURE: 76 MMHG | SYSTOLIC BLOOD PRESSURE: 110 MMHG | RESPIRATION RATE: 16 BRPM | HEART RATE: 96 BPM

## 2019-04-11 DIAGNOSIS — D61.818 PANCYTOPENIA (HCC): Primary | ICD-10-CM

## 2019-04-11 DIAGNOSIS — M25.562 CHRONIC PAIN OF BOTH KNEES: ICD-10-CM

## 2019-04-11 DIAGNOSIS — M17.0 PRIMARY OSTEOARTHRITIS OF BOTH KNEES: Primary | ICD-10-CM

## 2019-04-11 DIAGNOSIS — M22.2X2 PATELLOFEMORAL SYNDROME OF BOTH KNEES: ICD-10-CM

## 2019-04-11 DIAGNOSIS — M25.561 CHRONIC PAIN OF BOTH KNEES: ICD-10-CM

## 2019-04-11 DIAGNOSIS — D64.9 ANEMIA, UNSPECIFIED TYPE: ICD-10-CM

## 2019-04-11 DIAGNOSIS — G89.29 CHRONIC PAIN OF BOTH KNEES: ICD-10-CM

## 2019-04-11 DIAGNOSIS — M22.2X1 PATELLOFEMORAL SYNDROME OF BOTH KNEES: ICD-10-CM

## 2019-04-11 PROCEDURE — 73562 X-RAY EXAM OF KNEE 3: CPT

## 2019-04-11 PROCEDURE — 99243 OFF/OP CNSLTJ NEW/EST LOW 30: CPT | Performed by: FAMILY MEDICINE

## 2019-04-11 PROCEDURE — 99243 OFF/OP CNSLTJ NEW/EST LOW 30: CPT | Performed by: INTERNAL MEDICINE

## 2019-04-11 RX ORDER — MELOXICAM 15 MG/1
15 TABLET ORAL DAILY
Qty: 30 TABLET | Refills: 1 | Status: SHIPPED | OUTPATIENT
Start: 2019-04-11 | End: 2019-09-20 | Stop reason: ALTCHOICE

## 2019-04-15 ENCOUNTER — OFFICE VISIT (OUTPATIENT)
Dept: OBGYN CLINIC | Facility: CLINIC | Age: 64
End: 2019-04-15
Payer: COMMERCIAL

## 2019-04-15 ENCOUNTER — TELEPHONE (OUTPATIENT)
Dept: OBGYN CLINIC | Facility: HOSPITAL | Age: 64
End: 2019-04-15

## 2019-04-15 VITALS
HEART RATE: 101 BPM | HEIGHT: 65 IN | WEIGHT: 169 LBS | SYSTOLIC BLOOD PRESSURE: 109 MMHG | BODY MASS INDEX: 28.16 KG/M2 | DIASTOLIC BLOOD PRESSURE: 75 MMHG

## 2019-04-15 DIAGNOSIS — M75.102 ROTATOR CUFF SYNDROME OF LEFT SHOULDER: ICD-10-CM

## 2019-04-15 DIAGNOSIS — M77.12 LATERAL EPICONDYLITIS OF LEFT ELBOW: Primary | ICD-10-CM

## 2019-04-15 PROCEDURE — 99213 OFFICE O/P EST LOW 20 MIN: CPT | Performed by: FAMILY MEDICINE

## 2019-04-15 PROCEDURE — 20551 NJX 1 TENDON ORIGIN/INSJ: CPT | Performed by: FAMILY MEDICINE

## 2019-04-15 RX ORDER — LIDOCAINE HYDROCHLORIDE 20 MG/ML
2 INJECTION, SOLUTION INFILTRATION; PERINEURAL
Status: COMPLETED | OUTPATIENT
Start: 2019-04-15 | End: 2019-04-15

## 2019-04-15 RX ORDER — TRIAMCINOLONE ACETONIDE 40 MG/ML
40 INJECTION, SUSPENSION INTRA-ARTICULAR; INTRAMUSCULAR
Status: COMPLETED | OUTPATIENT
Start: 2019-04-15 | End: 2019-04-15

## 2019-04-15 RX ADMIN — LIDOCAINE HYDROCHLORIDE 2 ML: 20 INJECTION, SOLUTION INFILTRATION; PERINEURAL at 13:55

## 2019-04-15 RX ADMIN — TRIAMCINOLONE ACETONIDE 40 MG: 40 INJECTION, SUSPENSION INTRA-ARTICULAR; INTRAMUSCULAR at 13:55

## 2019-04-16 ENCOUNTER — APPOINTMENT (OUTPATIENT)
Dept: LAB | Facility: CLINIC | Age: 64
End: 2019-04-16
Payer: COMMERCIAL

## 2019-04-16 DIAGNOSIS — D61.818 PANCYTOPENIA (HCC): ICD-10-CM

## 2019-04-16 DIAGNOSIS — D64.9 ANEMIA, UNSPECIFIED TYPE: ICD-10-CM

## 2019-04-16 LAB
BASOPHILS # BLD AUTO: 0.02 THOUSANDS/ΜL (ref 0–0.1)
BASOPHILS NFR BLD AUTO: 0 % (ref 0–1)
EOSINOPHIL # BLD AUTO: 0 THOUSAND/ΜL (ref 0–0.61)
EOSINOPHIL NFR BLD AUTO: 0 % (ref 0–6)
ERYTHROCYTE [DISTWIDTH] IN BLOOD BY AUTOMATED COUNT: 16.5 % (ref 11.6–15.1)
FERRITIN SERPL-MCNC: 5 NG/ML (ref 8–388)
HCT VFR BLD AUTO: 27.1 % (ref 34.8–46.1)
HGB BLD-MCNC: 7.8 G/DL (ref 11.5–15.4)
HGB RETIC QN AUTO: 19.6 PG (ref 30–38.3)
IMM GRANULOCYTES # BLD AUTO: 0.02 THOUSAND/UL (ref 0–0.2)
IMM GRANULOCYTES NFR BLD AUTO: 0 % (ref 0–2)
IMM RETICS NFR: 28.8 % (ref 0–14)
IRON SATN MFR SERPL: 5 %
IRON SERPL-MCNC: 22 UG/DL (ref 50–170)
LYMPHOCYTES # BLD AUTO: 0.52 THOUSANDS/ΜL (ref 0.6–4.47)
LYMPHOCYTES NFR BLD AUTO: 7 % (ref 14–44)
MCH RBC QN AUTO: 23.7 PG (ref 26.8–34.3)
MCHC RBC AUTO-ENTMCNC: 28.8 G/DL (ref 31.4–37.4)
MCV RBC AUTO: 82 FL (ref 82–98)
MONOCYTES # BLD AUTO: 0.21 THOUSAND/ΜL (ref 0.17–1.22)
MONOCYTES NFR BLD AUTO: 3 % (ref 4–12)
NEUTROPHILS # BLD AUTO: 6.22 THOUSANDS/ΜL (ref 1.85–7.62)
NEUTS SEG NFR BLD AUTO: 90 % (ref 43–75)
NRBC BLD AUTO-RTO: 0 /100 WBCS
PLATELET # BLD AUTO: 347 THOUSANDS/UL (ref 149–390)
PMV BLD AUTO: 9.8 FL (ref 8.9–12.7)
RBC # BLD AUTO: 3.29 MILLION/UL (ref 3.81–5.12)
RETICS # AUTO: ABNORMAL 10*3/UL (ref 14097–95744)
RETICS # CALC: 2.29 % (ref 0.37–1.87)
TIBC SERPL-MCNC: 474 UG/DL (ref 250–450)
VIT B12 SERPL-MCNC: 395 PG/ML (ref 100–900)
WBC # BLD AUTO: 6.99 THOUSAND/UL (ref 4.31–10.16)

## 2019-04-16 PROCEDURE — 85025 COMPLETE CBC W/AUTO DIFF WBC: CPT | Performed by: INTERNAL MEDICINE

## 2019-04-16 PROCEDURE — 36415 COLL VENOUS BLD VENIPUNCTURE: CPT | Performed by: INTERNAL MEDICINE

## 2019-04-16 PROCEDURE — 85046 RETICYTE/HGB CONCENTRATE: CPT | Performed by: INTERNAL MEDICINE

## 2019-04-16 PROCEDURE — 83010 ASSAY OF HAPTOGLOBIN QUANT: CPT | Performed by: INTERNAL MEDICINE

## 2019-04-16 PROCEDURE — 83540 ASSAY OF IRON: CPT

## 2019-04-16 PROCEDURE — 82607 VITAMIN B-12: CPT | Performed by: INTERNAL MEDICINE

## 2019-04-16 PROCEDURE — 82728 ASSAY OF FERRITIN: CPT

## 2019-04-16 PROCEDURE — 83550 IRON BINDING TEST: CPT

## 2019-04-17 LAB — HAPTOGLOB SERPL-MCNC: 155 MG/DL (ref 34–200)

## 2019-04-26 ENCOUNTER — OFFICE VISIT (OUTPATIENT)
Dept: HEMATOLOGY ONCOLOGY | Facility: CLINIC | Age: 64
End: 2019-04-26
Payer: COMMERCIAL

## 2019-04-26 VITALS
DIASTOLIC BLOOD PRESSURE: 64 MMHG | HEART RATE: 105 BPM | TEMPERATURE: 97.8 F | WEIGHT: 170.5 LBS | BODY MASS INDEX: 28.41 KG/M2 | SYSTOLIC BLOOD PRESSURE: 108 MMHG | OXYGEN SATURATION: 98 % | HEIGHT: 65 IN | RESPIRATION RATE: 18 BRPM

## 2019-04-26 DIAGNOSIS — D64.9 ANEMIA, UNSPECIFIED TYPE: Primary | ICD-10-CM

## 2019-04-26 DIAGNOSIS — D61.818 PANCYTOPENIA (HCC): ICD-10-CM

## 2019-04-26 PROCEDURE — 99214 OFFICE O/P EST MOD 30 MIN: CPT | Performed by: INTERNAL MEDICINE

## 2019-04-30 RX ORDER — SODIUM CHLORIDE 9 MG/ML
20 INJECTION, SOLUTION INTRAVENOUS CONTINUOUS
Status: DISCONTINUED | OUTPATIENT
Start: 2019-05-01 | End: 2019-05-01

## 2019-05-01 ENCOUNTER — HOSPITAL ENCOUNTER (OUTPATIENT)
Dept: INFUSION CENTER | Facility: CLINIC | Age: 64
Discharge: HOME | End: 2019-05-01

## 2019-05-01 RX ORDER — SODIUM CHLORIDE 9 MG/ML
20 INJECTION, SOLUTION INTRAVENOUS CONTINUOUS
Status: DISCONTINUED | OUTPATIENT
Start: 2019-05-02 | End: 2019-05-05 | Stop reason: HOSPADM

## 2019-05-02 ENCOUNTER — OFFICE VISIT (OUTPATIENT)
Dept: GASTROENTEROLOGY | Facility: CLINIC | Age: 64
End: 2019-05-02
Payer: COMMERCIAL

## 2019-05-02 ENCOUNTER — HOSPITAL ENCOUNTER (OUTPATIENT)
Dept: INFUSION CENTER | Facility: CLINIC | Age: 64
Discharge: HOME | End: 2019-05-02
Payer: COMMERCIAL

## 2019-05-02 VITALS
SYSTOLIC BLOOD PRESSURE: 112 MMHG | RESPIRATION RATE: 16 BRPM | DIASTOLIC BLOOD PRESSURE: 74 MMHG | HEIGHT: 65 IN | WEIGHT: 169 LBS | HEART RATE: 104 BPM | BODY MASS INDEX: 28.16 KG/M2

## 2019-05-02 VITALS
SYSTOLIC BLOOD PRESSURE: 115 MMHG | HEART RATE: 103 BPM | DIASTOLIC BLOOD PRESSURE: 60 MMHG | TEMPERATURE: 98.5 F | RESPIRATION RATE: 18 BRPM

## 2019-05-02 DIAGNOSIS — D50.8 OTHER IRON DEFICIENCY ANEMIA: ICD-10-CM

## 2019-05-02 DIAGNOSIS — D61.818 PANCYTOPENIA (HCC): ICD-10-CM

## 2019-05-02 DIAGNOSIS — Z86.010 HX OF COLONIC POLYP: Primary | ICD-10-CM

## 2019-05-02 PROBLEM — D64.9 ANEMIA: Status: ACTIVE | Noted: 2019-05-02

## 2019-05-02 PROBLEM — D64.9 ABSOLUTE ANEMIA: Status: ACTIVE | Noted: 2019-05-02

## 2019-05-02 PROCEDURE — 96365 THER/PROPH/DIAG IV INF INIT: CPT

## 2019-05-02 PROCEDURE — 99214 OFFICE O/P EST MOD 30 MIN: CPT | Performed by: NURSE PRACTITIONER

## 2019-05-02 RX ADMIN — SODIUM CHLORIDE 20 ML/HR: 0.9 INJECTION, SOLUTION INTRAVENOUS at 11:44

## 2019-05-02 RX ADMIN — IRON SUCROSE 300 MG: 20 INJECTION, SOLUTION INTRAVENOUS at 11:58

## 2019-05-02 NOTE — PROGRESS NOTES
Pt tolerated #1/4 venofer infusion well without incident  Pt discharged in stable condition and is aware of next appointment  AVS provided

## 2019-05-02 NOTE — PLAN OF CARE
Problem: Potential for Falls  Goal: Patient will remain free of falls  Description  INTERVENTIONS:  - Assess patient frequently for physical needs  -  Identify cognitive and physical deficits and behaviors that affect risk of falls  -  [place] fall precautions as indicated by assessment   - Educate patient/family on patient safety including physical limitations  - Instruct patient to call for assistance with activity based on assessment  - Modify environment to reduce risk of injury  - Consider OT/PT consult to assist with strengthening/mobility  Outcome: Progressing     Problem: Knowledge Deficit  Goal: Patient/family/caregiver demonstrates understanding of disease process, treatment plan, medications, and discharge instructions  Description  Complete learning assessment and assess knowledge base    Interventions:  - Provide teaching at level of understanding  - Provide teaching via preferred learning methods  Outcome: Progressing

## 2019-05-07 RX ORDER — SODIUM CHLORIDE 9 MG/ML
20 INJECTION, SOLUTION INTRAVENOUS CONTINUOUS
Status: DISCONTINUED | OUTPATIENT
Start: 2019-05-08 | End: 2019-05-11 | Stop reason: HOSPADM

## 2019-05-08 ENCOUNTER — HOSPITAL ENCOUNTER (OUTPATIENT)
Dept: INFUSION CENTER | Facility: CLINIC | Age: 64
Discharge: HOME | End: 2019-05-08
Payer: COMMERCIAL

## 2019-05-08 VITALS
TEMPERATURE: 98.2 F | SYSTOLIC BLOOD PRESSURE: 121 MMHG | HEART RATE: 86 BPM | RESPIRATION RATE: 18 BRPM | DIASTOLIC BLOOD PRESSURE: 65 MMHG

## 2019-05-08 PROCEDURE — 96365 THER/PROPH/DIAG IV INF INIT: CPT

## 2019-05-08 RX ADMIN — SODIUM CHLORIDE 20 ML/HR: 0.9 INJECTION, SOLUTION INTRAVENOUS at 11:46

## 2019-05-08 RX ADMIN — IRON SUCROSE 300 MG: 20 INJECTION, SOLUTION INTRAVENOUS at 11:59

## 2019-05-08 NOTE — PROGRESS NOTES
Pt offers no complaints  Tolerated #2/4 venofer infusion well without incident  Pt discharged in stable condition and is aware of next appointment

## 2019-05-08 NOTE — PLAN OF CARE
Problem: Potential for Falls  Goal: Patient will remain free of falls  Description  INTERVENTIONS:  - Assess patient frequently for physical needs  -  Identify cognitive and physical deficits and behaviors that affect risk of falls  -  [place] fall precautions as indicated by assessment   - Educate patient/family on patient safety including physical limitations  - Instruct patient to call for assistance with activity based on assessment  - Modify environment to reduce risk of injury  - Consider OT/PT consult to assist with strengthening/mobility  Outcome: Progressing     Problem: Knowledge Deficit  Goal: Patient/family/caregiver demonstrates understanding of disease process, treatment plan, medications, and discharge instructions  Description  Complete learning assessment and assess knowledge base    Interventions:  - Provide teaching at level of understanding  - Provide teaching via preferred learning methods  Outcome: Progressing

## 2019-05-10 RX ORDER — SODIUM CHLORIDE 9 MG/ML
20 INJECTION, SOLUTION INTRAVENOUS CONTINUOUS
Status: DISCONTINUED | OUTPATIENT
Start: 2019-05-13 | End: 2019-05-16 | Stop reason: HOSPADM

## 2019-05-13 ENCOUNTER — TELEPHONE (OUTPATIENT)
Dept: GASTROENTEROLOGY | Facility: CLINIC | Age: 64
End: 2019-05-13

## 2019-05-13 ENCOUNTER — HOSPITAL ENCOUNTER (OUTPATIENT)
Dept: INFUSION CENTER | Facility: CLINIC | Age: 64
Discharge: HOME | End: 2019-05-13
Payer: COMMERCIAL

## 2019-05-13 VITALS
RESPIRATION RATE: 18 BRPM | SYSTOLIC BLOOD PRESSURE: 101 MMHG | HEART RATE: 99 BPM | TEMPERATURE: 98 F | DIASTOLIC BLOOD PRESSURE: 58 MMHG

## 2019-05-13 PROCEDURE — 96365 THER/PROPH/DIAG IV INF INIT: CPT

## 2019-05-13 RX ADMIN — SODIUM CHLORIDE 20 ML/HR: 0.9 INJECTION, SOLUTION INTRAVENOUS at 09:43

## 2019-05-13 RX ADMIN — IRON SUCROSE 300 MG: 20 INJECTION, SOLUTION INTRAVENOUS at 09:43

## 2019-05-13 NOTE — PROGRESS NOTES
Pt tolerated Venofer 3/4 without difficulty or complaint  Pt AVS Provided   Pt D/ishaan in stable condition

## 2019-05-17 DIAGNOSIS — D50.9 IRON DEFICIENCY ANEMIA, UNSPECIFIED IRON DEFICIENCY ANEMIA TYPE: ICD-10-CM

## 2019-05-17 RX ORDER — SODIUM CHLORIDE 9 MG/ML
20 INJECTION, SOLUTION INTRAVENOUS ONCE
Status: CANCELLED | OUTPATIENT
Start: 2019-05-22

## 2019-05-19 ENCOUNTER — ANESTHESIA EVENT (OUTPATIENT)
Dept: GASTROENTEROLOGY | Facility: HOSPITAL | Age: 64
End: 2019-05-19

## 2019-05-20 ENCOUNTER — ANESTHESIA (OUTPATIENT)
Dept: GASTROENTEROLOGY | Facility: HOSPITAL | Age: 64
End: 2019-05-20

## 2019-05-20 ENCOUNTER — HOSPITAL ENCOUNTER (OUTPATIENT)
Dept: GASTROENTEROLOGY | Facility: HOSPITAL | Age: 64
Setting detail: OUTPATIENT SURGERY
Discharge: HOME | End: 2019-05-20
Attending: INTERNAL MEDICINE | Admitting: INTERNAL MEDICINE
Payer: COMMERCIAL

## 2019-05-20 ENCOUNTER — TELEPHONE (OUTPATIENT)
Dept: GASTROENTEROLOGY | Facility: CLINIC | Age: 64
End: 2019-05-20

## 2019-05-20 VITALS
TEMPERATURE: 97.7 F | DIASTOLIC BLOOD PRESSURE: 60 MMHG | HEART RATE: 79 BPM | RESPIRATION RATE: 24 BRPM | HEIGHT: 65 IN | WEIGHT: 163.14 LBS | OXYGEN SATURATION: 96 % | SYSTOLIC BLOOD PRESSURE: 126 MMHG | BODY MASS INDEX: 27.18 KG/M2

## 2019-05-20 DIAGNOSIS — D64.9 ANEMIA: ICD-10-CM

## 2019-05-20 PROCEDURE — 43235 EGD DIAGNOSTIC BRUSH WASH: CPT | Performed by: INTERNAL MEDICINE

## 2019-05-20 PROCEDURE — 45378 DIAGNOSTIC COLONOSCOPY: CPT | Performed by: INTERNAL MEDICINE

## 2019-05-20 RX ORDER — LIDOCAINE HYDROCHLORIDE 10 MG/ML
INJECTION, SOLUTION INFILTRATION; PERINEURAL AS NEEDED
Status: DISCONTINUED | OUTPATIENT
Start: 2019-05-20 | End: 2019-05-20 | Stop reason: SURG

## 2019-05-20 RX ORDER — SODIUM CHLORIDE, SODIUM LACTATE, POTASSIUM CHLORIDE, CALCIUM CHLORIDE 600; 310; 30; 20 MG/100ML; MG/100ML; MG/100ML; MG/100ML
INJECTION, SOLUTION INTRAVENOUS CONTINUOUS PRN
Status: DISCONTINUED | OUTPATIENT
Start: 2019-05-20 | End: 2019-05-20 | Stop reason: SURG

## 2019-05-20 RX ORDER — PROPOFOL 10 MG/ML
INJECTION, EMULSION INTRAVENOUS AS NEEDED
Status: DISCONTINUED | OUTPATIENT
Start: 2019-05-20 | End: 2019-05-20 | Stop reason: SURG

## 2019-05-20 RX ORDER — SODIUM CHLORIDE, SODIUM LACTATE, POTASSIUM CHLORIDE, CALCIUM CHLORIDE 600; 310; 30; 20 MG/100ML; MG/100ML; MG/100ML; MG/100ML
125 INJECTION, SOLUTION INTRAVENOUS CONTINUOUS
Status: DISCONTINUED | OUTPATIENT
Start: 2019-05-20 | End: 2019-05-24 | Stop reason: HOSPADM

## 2019-05-20 RX ADMIN — LIDOCAINE HYDROCHLORIDE 50 MG: 10 INJECTION, SOLUTION INFILTRATION; PERINEURAL at 09:59

## 2019-05-20 RX ADMIN — PROPOFOL 40 MG: 10 INJECTION, EMULSION INTRAVENOUS at 10:10

## 2019-05-20 RX ADMIN — PROPOFOL 20 MG: 10 INJECTION, EMULSION INTRAVENOUS at 10:12

## 2019-05-20 RX ADMIN — PROPOFOL 50 MG: 10 INJECTION, EMULSION INTRAVENOUS at 10:00

## 2019-05-20 RX ADMIN — PROPOFOL 50 MG: 10 INJECTION, EMULSION INTRAVENOUS at 09:59

## 2019-05-20 RX ADMIN — SODIUM CHLORIDE, SODIUM LACTATE, POTASSIUM CHLORIDE, AND CALCIUM CHLORIDE 125 ML/HR: .6; .31; .03; .02 INJECTION, SOLUTION INTRAVENOUS at 09:17

## 2019-05-20 RX ADMIN — PROPOFOL 40 MG: 10 INJECTION, EMULSION INTRAVENOUS at 10:08

## 2019-05-20 RX ADMIN — PROPOFOL 20 MG: 10 INJECTION, EMULSION INTRAVENOUS at 10:01

## 2019-05-20 RX ADMIN — PROPOFOL 20 MG: 10 INJECTION, EMULSION INTRAVENOUS at 10:06

## 2019-05-20 RX ADMIN — PROPOFOL 40 MG: 10 INJECTION, EMULSION INTRAVENOUS at 10:04

## 2019-05-20 RX ADMIN — PROPOFOL 20 MG: 10 INJECTION, EMULSION INTRAVENOUS at 10:02

## 2019-05-20 RX ADMIN — PROPOFOL 20 MG: 10 INJECTION, EMULSION INTRAVENOUS at 10:14

## 2019-05-20 RX ADMIN — SODIUM CHLORIDE, SODIUM LACTATE, POTASSIUM CHLORIDE, AND CALCIUM CHLORIDE: .6; .31; .03; .02 INJECTION, SOLUTION INTRAVENOUS at 09:45

## 2019-05-22 ENCOUNTER — HOSPITAL ENCOUNTER (OUTPATIENT)
Dept: INFUSION CENTER | Facility: CLINIC | Age: 64
Discharge: HOME | End: 2019-05-22
Payer: COMMERCIAL

## 2019-05-22 VITALS
TEMPERATURE: 97.8 F | RESPIRATION RATE: 18 BRPM | SYSTOLIC BLOOD PRESSURE: 107 MMHG | DIASTOLIC BLOOD PRESSURE: 76 MMHG | HEART RATE: 112 BPM

## 2019-05-22 DIAGNOSIS — D50.9 IRON DEFICIENCY ANEMIA, UNSPECIFIED IRON DEFICIENCY ANEMIA TYPE: Primary | ICD-10-CM

## 2019-05-22 PROCEDURE — 96365 THER/PROPH/DIAG IV INF INIT: CPT

## 2019-05-22 PROCEDURE — 96366 THER/PROPH/DIAG IV INF ADDON: CPT

## 2019-05-22 RX ORDER — SODIUM CHLORIDE 9 MG/ML
20 INJECTION, SOLUTION INTRAVENOUS ONCE
Status: CANCELLED | OUTPATIENT
Start: 2019-05-22

## 2019-05-22 RX ORDER — SODIUM CHLORIDE 9 MG/ML
20 INJECTION, SOLUTION INTRAVENOUS ONCE
Status: COMPLETED | OUTPATIENT
Start: 2019-05-22 | End: 2019-05-22

## 2019-05-22 RX ADMIN — IRON SUCROSE 300 MG: 20 INJECTION, SOLUTION INTRAVENOUS at 10:31

## 2019-05-22 RX ADMIN — SODIUM CHLORIDE 20 ML/HR: 0.9 INJECTION, SOLUTION INTRAVENOUS at 10:24

## 2019-05-22 NOTE — PROGRESS NOTES
Pt completed her treatment 4 of 4 of Venofer without any difficulty, complaints, or reaction  PT d/c'ed in stable condition

## 2019-06-10 DIAGNOSIS — E78.5 HYPERLIPIDEMIA, UNSPECIFIED HYPERLIPIDEMIA TYPE: ICD-10-CM

## 2019-06-10 RX ORDER — ATORVASTATIN CALCIUM 20 MG/1
TABLET, FILM COATED ORAL
Qty: 15 TABLET | Refills: 3 | Status: SHIPPED | OUTPATIENT
Start: 2019-06-10 | End: 2019-09-20 | Stop reason: ALTCHOICE

## 2019-07-24 ENCOUNTER — APPOINTMENT (OUTPATIENT)
Dept: LAB | Facility: CLINIC | Age: 64
End: 2019-07-24
Payer: COMMERCIAL

## 2019-07-24 DIAGNOSIS — R73.01 IMPAIRED FASTING BLOOD SUGAR: ICD-10-CM

## 2019-07-24 DIAGNOSIS — E78.5 HYPERLIPIDEMIA, UNSPECIFIED HYPERLIPIDEMIA TYPE: ICD-10-CM

## 2019-07-24 LAB
ALBUMIN SERPL BCP-MCNC: 3.9 G/DL (ref 3.5–5)
ALP SERPL-CCNC: 70 U/L (ref 46–116)
ALT SERPL W P-5'-P-CCNC: 34 U/L (ref 12–78)
ANION GAP SERPL CALCULATED.3IONS-SCNC: 3 MMOL/L (ref 4–13)
AST SERPL W P-5'-P-CCNC: 21 U/L (ref 5–45)
BASOPHILS # BLD AUTO: 0.03 THOUSANDS/ΜL (ref 0–0.1)
BASOPHILS NFR BLD AUTO: 1 % (ref 0–1)
BILIRUB SERPL-MCNC: 0.3 MG/DL (ref 0.2–1)
BUN SERPL-MCNC: 13 MG/DL (ref 5–25)
CALCIUM SERPL-MCNC: 9.1 MG/DL (ref 8.3–10.1)
CHLORIDE SERPL-SCNC: 107 MMOL/L (ref 100–108)
CHOLEST SERPL-MCNC: 165 MG/DL (ref 50–200)
CO2 SERPL-SCNC: 25 MMOL/L (ref 21–32)
CREAT SERPL-MCNC: 0.6 MG/DL (ref 0.6–1.3)
EOSINOPHIL # BLD AUTO: 0.04 THOUSAND/ΜL (ref 0–0.61)
EOSINOPHIL NFR BLD AUTO: 1 % (ref 0–6)
ERYTHROCYTE [DISTWIDTH] IN BLOOD BY AUTOMATED COUNT: 18.2 % (ref 11.6–15.1)
EST. AVERAGE GLUCOSE BLD GHB EST-MCNC: 111 MG/DL
GFR SERPL CREATININE-BSD FRML MDRD: 97 ML/MIN/1.73SQ M
GLUCOSE P FAST SERPL-MCNC: 114 MG/DL (ref 65–99)
HBA1C MFR BLD: 5.5 % (ref 4.2–6.3)
HCT VFR BLD AUTO: 38.3 % (ref 34.8–46.1)
HDLC SERPL-MCNC: 59 MG/DL (ref 40–60)
HGB BLD-MCNC: 12.5 G/DL (ref 11.5–15.4)
IMM GRANULOCYTES # BLD AUTO: 0.01 THOUSAND/UL (ref 0–0.2)
IMM GRANULOCYTES NFR BLD AUTO: 0 % (ref 0–2)
LDLC SERPL CALC-MCNC: 88 MG/DL (ref 0–100)
LYMPHOCYTES # BLD AUTO: 1.25 THOUSANDS/ΜL (ref 0.6–4.47)
LYMPHOCYTES NFR BLD AUTO: 28 % (ref 14–44)
MCH RBC QN AUTO: 28.8 PG (ref 26.8–34.3)
MCHC RBC AUTO-ENTMCNC: 32.6 G/DL (ref 31.4–37.4)
MCV RBC AUTO: 88 FL (ref 82–98)
MONOCYTES # BLD AUTO: 0.3 THOUSAND/ΜL (ref 0.17–1.22)
MONOCYTES NFR BLD AUTO: 7 % (ref 4–12)
NEUTROPHILS # BLD AUTO: 2.81 THOUSANDS/ΜL (ref 1.85–7.62)
NEUTS SEG NFR BLD AUTO: 63 % (ref 43–75)
NONHDLC SERPL-MCNC: 106 MG/DL
NRBC BLD AUTO-RTO: 0 /100 WBCS
PLATELET # BLD AUTO: 223 THOUSANDS/UL (ref 149–390)
PMV BLD AUTO: 10.5 FL (ref 8.9–12.7)
POTASSIUM SERPL-SCNC: 3.8 MMOL/L (ref 3.5–5.3)
PROT SERPL-MCNC: 7 G/DL (ref 6.4–8.2)
RBC # BLD AUTO: 4.34 MILLION/UL (ref 3.81–5.12)
SODIUM SERPL-SCNC: 135 MMOL/L (ref 136–145)
TRIGL SERPL-MCNC: 90 MG/DL
TSH SERPL DL<=0.05 MIU/L-ACNC: 1.11 UIU/ML (ref 0.36–3.74)
WBC # BLD AUTO: 4.44 THOUSAND/UL (ref 4.31–10.16)

## 2019-07-24 PROCEDURE — 36415 COLL VENOUS BLD VENIPUNCTURE: CPT

## 2019-07-24 PROCEDURE — 85025 COMPLETE CBC W/AUTO DIFF WBC: CPT

## 2019-07-24 PROCEDURE — 84443 ASSAY THYROID STIM HORMONE: CPT

## 2019-07-24 PROCEDURE — 80053 COMPREHEN METABOLIC PANEL: CPT

## 2019-07-24 PROCEDURE — 83036 HEMOGLOBIN GLYCOSYLATED A1C: CPT

## 2019-07-24 PROCEDURE — 80061 LIPID PANEL: CPT

## 2019-07-26 ENCOUNTER — OFFICE VISIT (OUTPATIENT)
Dept: HEMATOLOGY ONCOLOGY | Facility: CLINIC | Age: 64
End: 2019-07-26
Payer: COMMERCIAL

## 2019-07-26 VITALS
DIASTOLIC BLOOD PRESSURE: 74 MMHG | HEIGHT: 60 IN | OXYGEN SATURATION: 98 % | TEMPERATURE: 98 F | RESPIRATION RATE: 18 BRPM | HEART RATE: 62 BPM | WEIGHT: 154.5 LBS | BODY MASS INDEX: 30.33 KG/M2 | SYSTOLIC BLOOD PRESSURE: 102 MMHG

## 2019-07-26 DIAGNOSIS — D50.9 IRON DEFICIENCY ANEMIA, UNSPECIFIED IRON DEFICIENCY ANEMIA TYPE: Primary | ICD-10-CM

## 2019-07-26 PROCEDURE — 99214 OFFICE O/P EST MOD 30 MIN: CPT | Performed by: INTERNAL MEDICINE

## 2019-07-27 NOTE — PROGRESS NOTES
HEMATOLOGY / ONCOLOGY CLINIC NOTE    Primary Care Provider: Julia Bowen MD  Referring Provider:    MRN: [id number]  : 1955    Reason for Encounter:    Chief Complaint   Patient presents with    Follow-up     3 Months          Hematology / Oncology History:     Maria D Espinoza is a 62 y o  female who came in to establish care with hematology    1, anemia  - iron deficiency anemia  - had a colonoscopy in 2018, with small polyps  2, pancytopenia  - likely due to chronic drinking habits / possible underlying bone marrow deficiency, poor production, and liver injury       Assessment / Plan:       1  Pancytopenia ([address] )  White blood cell and platelets are normal     2  Anemia, unspecified type  - microcytic anemia  - status post EGD colonoscopy, in 2019  No major evidence for bleeding  Stop  - received Venofer in 2019, iron, hemoglobin in normal range  Will continue observation from now  - will check labs, every 3-4 months in follow patient up in 1 year        - CBC and differential; Future  - Iron Panel (Includes Iron Saturation, Iron, and TIBC); Future        25   minutes were spent face to face with patient with greater than 50% of the time spent in counseling or coordination of care including discussions of treatment instructions  All of the patient's questions were answered to their satisfactory during this discussion  Advised pt to call if there is any further questions  Interval History:   2019:  Patient is a 26-year-old female, with history of alcoholism, has been sober for 2 months, depression, cytopenia, came in to establish care with hematology for cytopenia  Reported overall has been at baseline health status  She was hospitalized for possible alcohol withdrawal, seizure, has been sober for 2 months  Reported has no bleeding, body weight stable, no other constitutional symptoms    No history of malignancies, no history of chemo radiation therapy in the past         4/26/2019:  Came in for follow-up  Reported intermittent positional dizziness lightheadedness  No over gross bleeding  Not on blood thinner;  do not take NSAIDs on a regular basis      7/26/2019:  Came for follow-up  Reported doing well, has been sober  No bleeding, energy level increased after iron infusion  Problem list:       Patient Active Problem List   Diagnosis    Screen for colon cancer    [facility] ([facility] )    Anxiety    Depression    Hyperlipidemia, unspecified    Mild vitamin D deficiency    Hx of colonic polyp    Acute pharyngitis    Alcohol withdrawal seizure with complication (HCC)    Pure hypercholesterolemia    Pancytopenia (HCC)    Hypokalemia    Absolute anemia    Anemia    Iron deficiency anemia, unspecified       PHYSICIAL EXAMINATION:       Vital Signs:   [unfilled]  Body mass index is 30 17 kg/m²  Body surface area is 1 67 meters squared  No major finding on physical examination  GEN: Alert, awake oriented x3, in no acute distress  HEENT- No pallor, icterus, cyanosis, no oral mucosal lesions,   LAD - no palpable cervical, clavicle, axillary, inguinal LAD  Heart- normal S1 S2, regular rate and rhythm, No murmur, rubs  Lungs- decreased breathing sound bilateral    Abdomen- soft, Non tender, bowel sounds present  Extremities- No cyanosis, clubbing, edema  Neuro- No focal neurological deficit           PAST MEDICAL HISTORY:   has a past medical history of Anemia, Anxiety, Cancer ([facility] ), Colon polyp, Depression, and Hyperlipidemia  PAST SURGICAL HISTORY:   has a past surgical history that includes Mastectomy (Bilateral); Bilateral oophorectomy; Appendectomy; Tonsillectomy; pr lap,cholecystectomy (N/A, 10/5/2017); Cholecystectomy; Colonoscopy; pr colonoscopy flx dx w/collj spec when pfrmd (N/A, 4/3/2018); and Breast reconstruction      CURRENT MEDICATIONS:     Current Outpatient Medications   Medication Sig Dispense Refill    atorvastatin (LIPITOR) 20 mg tablet TAKE 1/2 TABLET BY MOUTH DAILY AS DIRECTED  15 tablet 3    calcium acetate (PHOSLO) 667 mg capsule Take 1,334 mg by mouth 3 (three) times a day with meals      cholecalciferol (VITAMIN D3) 1,000 units tablet Take by mouth      doxepin (SINEquan) 50 mg capsule TAKE ONE CAPSULE BY MOUTH AT BEDTIME AS NEEDED 90 capsule 0    meloxicam (MOBIC) 15 mg tablet Take 1 tablet (15 mg total) by mouth daily 30 tablet 1     No current facility-administered medications for this visit  [unfilled]    SOCIAL HISTORY:   reports that she has been smoking  She has smoked for the past 0 00 years  She has never used smokeless tobacco  She reports that she drank alcohol  She reports that she does not use drugs  FAMILY HISTORY:  family history includes Breast cancer in her maternal aunt and mother; Heart failure in her father; Ovarian cancer in her mother  ALLERGIES:  has No Known Allergies  REVIEW OF SYSTEMS:  Please note that a 14-point review of systems was performed to include Constitutional, HEENT, Respiratory, CVS, GI, , Musculoskeletal, Integumentary, Neurologic, Rheumatologic, Endocrinologic, Psychiatric, Lymphatic, and Hematologic/Oncologic systems were reviewed and are negative unless otherwise stated in HPI  Positive and negative findings pertinent to this evaluation are incorporated into the history of present illness            Lab Re  Lab Results   Component Value Date    WBC 4 44 07/24/2019    HGB 12 5 07/24/2019    HCT 38 3 07/24/2019    MCV 88 07/24/2019     07/24/2019   sults   Component Value Date    WBC 6 99 04/16/2019    HGB 7 8 (L) 04/16/2019    HCT 27 1 (L) 04/16/2019    MCV 82 04/16/2019     04/16/2019      Component Value Date     11/04/2015    SODIUM 135 (L) 07/24/2019    K 3 8 07/24/2019     07/24/2019    CO2 25 07/24/2019    ANIONGAP 6 11/04/2015    AGAP 3 (L) 07/24/2019    BUN 13 07/24/2019    CREATININE 0 60 07/24/2019    GLUC 96 02/04/2019 GLUF 114 (H) 07/24/2019    CALCIUM 9 1 07/24/2019    AST 21 07/24/2019    ALT 34 07/24/2019    ALKPHOS 70 07/24/2019    PROT 7 7 11/04/2015    TP 7 0 07/24/2019    BILITOT 0 62 11/04/2015    TBILI 0 30 07/24/2019    EGFR 97 07/24/2019       CBC with diff:   Results from last 7 days   Lab Units 07/24/19  1302   WBC Thousand/uL 4 44   HEMOGLOBIN g/dL 12 5   HEMATOCRIT % 38 3   MCV fL 88   PLATELETS Thousands/uL 223   MCH pg 28 8   MCHC g/dL 32 6   RDW % 18 2*   MPV fL 10 5   NRBC AUTO /100 WBCs 0       CMP:  Results from last 7 days   Lab Units 07/24/19  1302   POTASSIUM mmol/L 3 8   CHLORIDE mmol/L 107   CO2 mmol/L 25   BUN mg/dL 13   CREATININE mg/dL 0 60   CALCIUM mg/dL 9 1   AST U/L 21   ALT U/L 34   ALK PHOS U/L 70   EGFR ml/min/1 73sq m 97           IMAGING:    No orders to display     No results found

## 2019-09-17 ENCOUNTER — TELEPHONE (OUTPATIENT)
Dept: INTERNAL MEDICINE CLINIC | Facility: CLINIC | Age: 64
End: 2019-09-17

## 2019-09-17 NOTE — TELEPHONE ENCOUNTER
Pt has not been seen since 03/21/19   Pt wanted to make an appt to see Graham Guerrero for a checkup  I made an appt for her on Friday at 9:45  Pt wanted to know does she need lab work before her appt          Please call patient to let her [phone]  OR LEAVE VOICEMAIL pt home phone

## 2019-09-20 ENCOUNTER — OFFICE VISIT (OUTPATIENT)
Dept: INTERNAL MEDICINE CLINIC | Facility: CLINIC | Age: 64
End: 2019-09-20
Payer: COMMERCIAL

## 2019-09-20 VITALS
BODY MASS INDEX: 29.8 KG/M2 | WEIGHT: 151.8 LBS | DIASTOLIC BLOOD PRESSURE: 72 MMHG | SYSTOLIC BLOOD PRESSURE: 104 MMHG | RESPIRATION RATE: 14 BRPM | HEART RATE: 64 BPM | HEIGHT: 60 IN

## 2019-09-20 DIAGNOSIS — F41.9 ANXIETY: ICD-10-CM

## 2019-09-20 DIAGNOSIS — M25.50 ARTHRALGIA, UNSPECIFIED JOINT: ICD-10-CM

## 2019-09-20 DIAGNOSIS — Z78.0 POSTMENOPAUSAL: ICD-10-CM

## 2019-09-20 DIAGNOSIS — F10.20 ALCOHOLISM (HCC): ICD-10-CM

## 2019-09-20 DIAGNOSIS — R73.01 IMPAIRED FASTING BLOOD SUGAR: ICD-10-CM

## 2019-09-20 DIAGNOSIS — E78.00 PURE HYPERCHOLESTEROLEMIA: ICD-10-CM

## 2019-09-20 DIAGNOSIS — E78.5 HYPERLIPIDEMIA, UNSPECIFIED HYPERLIPIDEMIA TYPE: Primary | ICD-10-CM

## 2019-09-20 DIAGNOSIS — Z12.4 SCREENING FOR CERVICAL CANCER: ICD-10-CM

## 2019-09-20 DIAGNOSIS — M54.5 LOW BACK PAIN, UNSPECIFIED BACK PAIN LATERALITY, UNSPECIFIED CHRONICITY, WITH SCIATICA PRESENCE UNSPECIFIED: ICD-10-CM

## 2019-09-20 DIAGNOSIS — Z23 ENCOUNTER FOR IMMUNIZATION: ICD-10-CM

## 2019-09-20 DIAGNOSIS — M17.0 PRIMARY OSTEOARTHRITIS OF BOTH KNEES: ICD-10-CM

## 2019-09-20 PROCEDURE — 90682 RIV4 VACC RECOMBINANT DNA IM: CPT | Performed by: NURSE PRACTITIONER

## 2019-09-20 PROCEDURE — 90471 IMMUNIZATION ADMIN: CPT | Performed by: NURSE PRACTITIONER

## 2019-09-20 PROCEDURE — 99214 OFFICE O/P EST MOD 30 MIN: CPT | Performed by: NURSE PRACTITIONER

## 2019-09-20 RX ORDER — GABAPENTIN 300 MG/1
CAPSULE ORAL
Refills: 5 | COMMUNITY
Start: 2019-08-17 | End: 2019-10-29 | Stop reason: SDUPTHER

## 2019-09-20 RX ORDER — GABAPENTIN 300 MG/1
CAPSULE ORAL
Qty: 120 CAPSULE | Refills: 5 | Status: SHIPPED | OUTPATIENT
Start: 2019-09-20 | End: 2020-12-03

## 2019-09-20 RX ORDER — MELOXICAM 15 MG/1
15 TABLET ORAL DAILY
Qty: 30 TABLET | Refills: 1
Start: 2019-09-20 | End: 2019-10-31

## 2019-09-20 NOTE — PROGRESS NOTES
Assessment/Plan:    Patient Instructions    ETOH has not had a drink since January congratulations! Hyperlipidemia she would like to consider coming off of her cholesterol medicine in light of recently losing 25 lb  I discussed that this is a reasonable plan  We will stop medication recheck labs for months she will follow back at that time as well  Lower back pain likely musculoskeletal refer to physical therapy     health maintenance flu shot given, prescription for bone density and gynecology referral made         Diagnoses and all orders for this visit:    Hyperlipidemia, unspecified hyperlipidemia type  -     Lipid panel; Future  -     TSH, 3rd generation with Free T4 reflex; Future    Primary osteoarthritis of both knees  -     meloxicam (MOBIC) 15 mg tablet; Take 1 tablet (15 mg total) by mouth daily    Impaired fasting blood sugar  -     CBC and differential; Future  -     Comprehensive metabolic panel; Future  -     Hemoglobin A1C; Future    Pure hypercholesterolemia    Screening for cervical cancer  -     Ambulatory referral to Gynecology; Future    Postmenopausal  -     DXA bone density spine hip and pelvis; Future    Low back pain, unspecified back pain laterality, unspecified chronicity, with sciatica presence unspecified  -     Ambulatory referral to Physical Therapy; Future    Encounter for immunization  -     FLUBLOK: influenza vaccine, quadrivalent, recombinant, PF, 0 5 mL    Other orders  -     gabapentin (NEURONTIN) 300 mg capsule; TAKE 1 CAPSULE IN THE AM AND 1 CAPSULE IN THE AFTERNOON AND 2 CAPSULES AT BEDTIME         Subjective:      Patient ID: Kandis Lopez is a 62 y o  female     Patient is feeling great, she has not had a drink since January  She occasionally will do a meeting but does not do them on a routine basis  Anxiety is well controlled without any significant medication  She does suffer from lower back pain    She would like to consider seeing an orthopedic for her back  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 she thinks that is contributing to the back pain  She had her implants changed around THE [facility] because she was having a lot of problems and discomfort with them  She has no history of breast cancer but she was bra cup positive  Strong family history of breast cancer  She needs to see gynecology  She has never had a bone density  She is taking calcium and vitamin- D  She is now going to the gym 3 times a week  She would like to consider coming off her cholesterol medicine  Current Outpatient Medications:     cholecalciferol (VITAMIN D3) 1,000 units tablet, Take by mouth, Disp: , Rfl:     gabapentin (NEURONTIN) 300 mg capsule, TAKE 1 CAPSULE IN THE AM AND 1 CAPSULE IN THE AFTERNOON AND 2 CAPSULES AT BEDTIME, Disp: , Rfl: 5    meloxicam (MOBIC) 15 mg tablet, Take 1 tablet (15 mg total) by mouth daily, Disp: 30 tablet, Rfl: 1    No results found for this or any previous visit (from the past 1008 hour(s))  The following portions of the patient's history were reviewed and updated as appropriate: allergies, current medications, past family history, past medical history, past social history, past surgical history and problem list      Review of Systems   Constitutional: Negative for appetite change, chills, diaphoresis, fatigue, fever and unexpected weight change  HENT: Negative for postnasal drip and sneezing  Eyes: Negative for visual disturbance  Respiratory: Negative for chest tightness and shortness of breath  Cardiovascular: Negative for chest pain, palpitations and leg swelling  Gastrointestinal: Negative for abdominal pain and blood in stool  Endocrine: Negative for cold intolerance, heat intolerance, polydipsia, polyphagia and polyuria  Genitourinary: Negative for difficulty urinating, dysuria, frequency and urgency  Musculoskeletal: Positive for back pain  Negative for arthralgias and myalgias     Skin: Negative for rash and wound  Neurological: Negative for dizziness, weakness, light-headedness and headaches  Hematological: Negative for adenopathy  Psychiatric/Behavioral: Negative for confusion, dysphoric mood and sleep disturbance  The patient is not nervous/anxious  Objective:      /72 (BP Location: Left arm, Patient Position: Sitting, Cuff Size: Standard)   Pulse 64   Resp 14   Ht 5' (1 524 m)   Wt 68 9 kg (151 lb 12 8 oz)   BMI 29 65 kg/m²        Physical Exam   Constitutional: She is oriented to person, place, and time  She appears well-developed  No distress  HENT:   Head: Normocephalic and atraumatic  Nose: Nose normal    Mouth/Throat: Oropharynx is clear and moist    Eyes: Pupils are equal, round, and reactive to light  Conjunctivae and EOM are normal    Neck: Normal range of motion  Neck supple  No JVD present  No tracheal deviation present  No thyromegaly present  Cardiovascular: Normal rate, regular rhythm, normal heart sounds and intact distal pulses  Exam reveals no gallop and no friction rub  No murmur heard  Pulmonary/Chest: Effort normal and breath sounds normal  No respiratory distress  She has no wheezes  She has no rales  Abdominal: Soft  Bowel sounds are normal  She exhibits no distension  There is no tenderness  Musculoskeletal: Normal range of motion  She exhibits no edema  Lymphadenopathy:     She has no cervical adenopathy  Neurological: She is alert and oriented to person, place, and time  No cranial nerve deficit  Skin: Skin is warm and dry  No rash noted  She is not diaphoretic  Psychiatric: She has a normal mood and affect  Her behavior is normal  Judgment and thought content normal    BMI Counseling: Body mass index is 29 65 kg/m²  The BMI is above normal  Nutrition recommendations include decreasing fast food intake and moderation in carbohydrate intake  Exercise recommendations include moderate physical activity 150 minutes/week   No pharmacotherapy was ordered

## 2019-09-20 NOTE — PATIENT INSTRUCTIONS
ETOH has not had a drink since January congratulations! Hyperlipidemia she would like to consider coming off of her cholesterol medicine in light of recently losing 25 lb  I discussed that this is a reasonable plan  We will stop medication recheck labs for months she will follow back at that time as well        Lower back pain likely musculoskeletal refer to physical therapy     health maintenance flu shot given, prescription for bone density and gynecology referral made

## 2019-10-07 ENCOUNTER — EVALUATION (OUTPATIENT)
Dept: PHYSICAL THERAPY | Facility: CLINIC | Age: 64
End: 2019-10-07
Payer: COMMERCIAL

## 2019-10-07 DIAGNOSIS — M54.50 RIGHT LOW BACK PAIN, UNSPECIFIED CHRONICITY, UNSPECIFIED WHETHER SCIATICA PRESENT: Primary | ICD-10-CM

## 2019-10-07 PROCEDURE — 97112 NEUROMUSCULAR REEDUCATION: CPT | Performed by: PHYSICAL THERAPIST

## 2019-10-07 PROCEDURE — 97162 PT EVAL MOD COMPLEX 30 MIN: CPT | Performed by: PHYSICAL THERAPIST

## 2019-10-07 PROCEDURE — 97110 THERAPEUTIC EXERCISES: CPT | Performed by: PHYSICAL THERAPIST

## 2019-10-07 NOTE — PROGRESS NOTES
PT Evaluation     Today's date: 10/7/2019  Patient name: Kristopher Gillespie  : 1955  MRN: [id number]  Referring provider: SHERLY Buenrostro  Dx:   Encounter Diagnosis     ICD-10-CM    1  Right low back pain, unspecified chronicity, unspecified whether sciatica present M54 5                   Assessment  Assessment details: Kristopher Gillespie is a 62 y o  female referred with primary diagnosis of Right low back pain, unspecified chronicity, unspecified whether sciatica present  (primary encounter diagnosis)   Patient presents with the following functional limitations: Pain with rolling over in bed, prolonged standing and sitting  Patient demonstrates poor postural awareness and significant deficits in lumbar ROM and core strength  Back pain decreased with contraction of lower abs and lumbar paraspinals  Treatment to include: Manual therapy techniques, extremity/core strengthening, neuromuscular control exercises, instruction in a comprehensive HEP, and modalities as needed  They will benefit from skilled PT services to address the above functional deficits and to decrease pain to promote a return to their premorbid level of function  Impairments: abnormal or restricted ROM, activity intolerance, pain with function, poor posture  and poor body mechanics  Functional limitations: Pain with rolling over in bed, prolonged standing and sitting  Understanding of Dx/Px/POC: good   Prognosis: fair    Goals  STG 4 weeks  1  Patient will report pain as a 3-4/10 at worst with working  2  Patient will correct posture with VC's  LTG (8 weeks)  1  Patient will report pain as a 0-1/10 at worst with work activities  2  Patient will roll over in bed without pain  3  Patient will resume her previous level of function without limitations related to pain  4  Patient will be independent and compliant with a HEP in order to maintain gains made with skilled PT services      Plan  Patient would benefit from: skilled physical therapy  Planned therapy interventions: abdominal trunk stabilization, manual therapy, neuromuscular re-education, patient education, postural training, strengthening, stretching, therapeutic activities, therapeutic exercise and home exercise program  Frequency: 2x week  Duration in weeks: 8  Plan of Care beginning date: 10/7/2019  Plan of Care expiration date: 2019  Treatment plan discussed with: patient        Subjective Evaluation    History of Present Illness  Mechanism of injury: 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  Since that time her back started hurting  Has been going to the chiropractor every other week with only temporary symptom relief  She is referred now to outpatient PT services  Not a recurrent problem   Quality of life: fair    Pain  Current pain ratin  At best pain ratin  At worst pain rating: 10  Location: Across entire lower back  Quality: throbbing and dull ache    Social Support  Steps to enter house: yes  15  Stairs in house: no   Lives in: multiple-level home  Lives with: spouse    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[employer])  Exercise history: 2x/wk  light weights, stretching and yoga  Diagnostic Tests  No diagnostic tests performed    FCE comments: No sleep disturbances related to pain  (-) cough/sneeze, bowel/bladder  No LE weakness  Pain with getting in/out of the car after working  Pain sit to stand transfers  Negotiates stairs step-to  No saddle anesthesiasTreatments  Previous treatment: medication  Current treatment: medication  Patient Goals  Patient goals for therapy: decreased pain, independence with ADLs/IADLs and return to sport/leisure activities          Objective     Concurrent Complaints  Positive for history of cancer   Negative for night pain, disturbed sleep, bladder dysfunction, bowel dysfunction, saddle (S4) numbness and history of trauma    Static Posture     Lumbar Spine   Increased lordosis  Postural Observations  Seated posture: fair  Standing posture: fair  Correction of posture: makes symptoms better        Palpation   Left   No palpable tenderness to the quadratus lumborum  Tenderness of the erector spinae and lumbar paraspinals  Right   No palpable tenderness to the quadratus lumborum  Tenderness of the erector spinae and lumbar paraspinals  Tenderness     Lumbar Spine  Tenderness in the spinous process (L3-5)  No tenderness in the left transverse process and right transverse process  Left Hip   No tenderness in the ASIS and PSIS  Right Hip   No tenderness in the ASIS and PSIS       Neurological Testing     Sensation     Lumbar   Left   Intact: light touch    Right   Intact: light touch    Reflexes   Left   Patellar (L4): normal (2+)  Achilles (S1): normal (2+)    Right   Patellar (L4): normal (2+)  Achilles (S1): normal (2+)    Active Range of Motion     Lumbar   Flexion:  with pain Restriction level: moderate  Extension:  with pain Restriction level: maximal  Left rotation:  with pain Restriction level: moderate  Right rotation:  with pain Restriction level: moderate    Joint Play   Joints within functional limits: L1, L2, L3, L4 and L5     Pain: L3, L4 and L5   Mechanical Assessment    Cervical      Thoracic      Lumbar    Standing flexion: repeated movements   Pain intensity: worse  Pain level: increased  Lying flexion: repeated movements  Pain intensity: worse  Pain level: increased  Standing extension: repeated movements  Pain intensity: worse  Pain level: increased  Lying extension: repeated movements  Pain intensity: worse  Pain level: increased    Strength/Myotome Testing     Left Hip   Planes of Motion   Flexion: 4  Extension: 4    Right Hip   Planes of Motion   Flexion: 4  Extension: 4    Left Knee   Flexion: 4+  Extension: 4+    Right Knee   Flexion: 4+  Extension: 4+    Left Ankle/Foot   Dorsiflexion: 4+    Right Ankle/Foot   Dorsiflexion: 4+    Tests     Lumbar   Positive prone instability   Negative SIJ compression, sacroiliac distraction, sacral thrust  and sacral spring   Left   Negative passive SLR and slump test      Right   Negative passive SLR and slump test      Left Hip   Negative LISETTE and FADIR  Right Hip   Negative LISETTE and FADIR  Additional Tests Details  Bilateral LE traction relieved pain    General Comments:    Lower quarter screen   Hips: unremarkable  Knees: unremarkable  Foot/ankle: unremarkable             Precautions: Depression, Anxiety, Anemia, CA      Manual              Ari  LE traction                                                                     Exercise Diary  10/7            Pt education Lumbar anatomy and pathology  Postural awareness and body mechanics  Core stabilization            Nustep with UE             Webslide rows H,M,L             Standing HS stretch ari Instructed 3x30"            Standing quad stretch ari  Instructed 3x30"            PPT Instructed 5" x20            PPT with march             bridges             T-band hip abd supine hooklying             Sidelying clamshells ari               Prone LE raise             Prone UE raise                                                                                                                         Modalities

## 2019-10-08 ENCOUNTER — OFFICE VISIT (OUTPATIENT)
Dept: PHYSICAL THERAPY | Facility: CLINIC | Age: 64
End: 2019-10-08
Payer: COMMERCIAL

## 2019-10-08 DIAGNOSIS — M54.50 RIGHT LOW BACK PAIN, UNSPECIFIED CHRONICITY, UNSPECIFIED WHETHER SCIATICA PRESENT: Primary | ICD-10-CM

## 2019-10-08 PROCEDURE — 97140 MANUAL THERAPY 1/> REGIONS: CPT | Performed by: PHYSICAL THERAPIST

## 2019-10-08 PROCEDURE — 97110 THERAPEUTIC EXERCISES: CPT | Performed by: PHYSICAL THERAPIST

## 2019-10-08 PROCEDURE — 97112 NEUROMUSCULAR REEDUCATION: CPT | Performed by: PHYSICAL THERAPIST

## 2019-10-08 NOTE — PROGRESS NOTES
Daily Note     Today's date: 10/8/2019  Patient name: Krystal Little  : 1955  MRN: [id number]  Referring provider: SHERLY Bragg  Dx:   Encounter Diagnosis     ICD-10-CM    1  Right low back pain, unspecified chronicity, unspecified whether sciatica present M54 5                   Subjective: Patient reports she is feeling a little stiff this morning, otherwise no new complaints since IE yesterday  Objective: See treatment diary below      Assessment: Initiated exercises this date to address impairments noted during initial evaluation  Pt required minimal verbal cues to perform exercises with correct form and technique  Focus of session was on proper core muscle activation and maintaining neutral lumbar spine during UE strengthening exercises  Tolerated treatment well  Patient demonstrated fatigue post treatment and would benefit from continued PT  Educated pt that she may experience post exercise soreness and that it is normal       Plan: Progress treatment as tolerated  Precautions: Depression, Anxiety, Anemia, CA      Manual  10/7 10-8           Ari  LE traction  JG                                                                   Exercise Diary  10/7 10-8           Pt education Lumbar anatomy and pathology  Postural awareness and body mechanics  Core stabilization            Nustep with UE  7 mins, L 5           Webslide rows H,M,L  2 x 10 ea GTB           Standing HS stretch ari Instructed 3x30" 3 x 30" ea           Standing quad stretch ari  Instructed 3x30" 3 x 30" ea           PPT Instructed 5" x20 20 x 5"           PPT with march  2  x 10 2"   hold           bridges  20 x 5"            T-band hip abd supine hooklying  20 x 5" GTB           Sidelying clamshells ari    20 x 5" ea GTB           Prone LE raise  NV           Prone UE raise  NV                                                                                                                       Modalities

## 2019-10-16 ENCOUNTER — OFFICE VISIT (OUTPATIENT)
Dept: PHYSICAL THERAPY | Facility: CLINIC | Age: 64
End: 2019-10-16
Payer: COMMERCIAL

## 2019-10-16 DIAGNOSIS — M54.50 RIGHT LOW BACK PAIN, UNSPECIFIED CHRONICITY, UNSPECIFIED WHETHER SCIATICA PRESENT: Primary | ICD-10-CM

## 2019-10-16 PROCEDURE — 97110 THERAPEUTIC EXERCISES: CPT | Performed by: PHYSICAL THERAPIST

## 2019-10-16 PROCEDURE — 97140 MANUAL THERAPY 1/> REGIONS: CPT | Performed by: PHYSICAL THERAPIST

## 2019-10-16 NOTE — PROGRESS NOTES
Daily Note     Today's date: 10/16/2019  Patient name: Marizol Shi  : 1955  MRN: [id number]  Referring provider: SHERLY Lewis  Dx:   Encounter Diagnosis     ICD-10-CM    1  Right low back pain, unspecified chronicity, unspecified whether sciatica present M54 5                   Subjective: States she has not been compliant w/ HEP, notices soreness after work that resolves with rest  Is eager to get stronger  Objective: See treatment diary below      Assessment: Completed exercise with correct technique and demonstrated moderate fatigue after exercise  Stressed importance of HEP this session  Pt would benefit from continued PT services to reduce pain and increase level of function  Plan: Progress treatment as tolerated  Precautions: Depression, Anxiety, Anemia, CA      Manual  10/7 10-8 10-16          [place]  LE traction  JG MM                                                                  Exercise Diary  10/7 10-8 10-16          Pt education Lumbar anatomy and pathology  Postural awareness and body mechanics  Core stabilization            Nustep with UE  7 mins, L 5 7 min L5          Webslide rows H,M,L  2 x 10 ea GTB 2x10 ea GTB          Standing HS stretch adair Instructed 3x30" 3 x 30" ea 2x30" ea          Standing quad stretch adair  Instructed 3x30" 3 x 30" ea 3x30" ea          PPT Instructed 5" x20 20 x 5" 20x5"          PPT with march  2  x 10 2"   hold 2x10x2"          bridges  20 x 5"  20x5"          T-band hip abd supine hooklying  20 x 5" GTB 20 x 5" GTB          Sidelying clamshells adair    20 x 5" ea GTB 20x5" ea GTB          Prone LE raise  NV x20 ea          Prone UE raise  NV x20 ea2                                                                                                                      Modalities

## 2019-10-18 ENCOUNTER — OFFICE VISIT (OUTPATIENT)
Dept: PHYSICAL THERAPY | Facility: CLINIC | Age: 64
End: 2019-10-18
Payer: COMMERCIAL

## 2019-10-18 DIAGNOSIS — M54.50 RIGHT LOW BACK PAIN, UNSPECIFIED CHRONICITY, UNSPECIFIED WHETHER SCIATICA PRESENT: Primary | ICD-10-CM

## 2019-10-18 PROCEDURE — 97110 THERAPEUTIC EXERCISES: CPT | Performed by: PHYSICAL THERAPIST

## 2019-10-18 PROCEDURE — 97112 NEUROMUSCULAR REEDUCATION: CPT | Performed by: PHYSICAL THERAPIST

## 2019-10-18 PROCEDURE — 97140 MANUAL THERAPY 1/> REGIONS: CPT | Performed by: PHYSICAL THERAPIST

## 2019-10-18 NOTE — PROGRESS NOTES
Daily Note     Today's date: 10/18/2019  Patient name: Wesly Baker  : 1955  MRN: [id number]  Referring provider: SHERLY Cardenas  Dx:   Encounter Diagnosis     ICD-10-CM    1  Right low back pain, unspecified chronicity, unspecified whether sciatica present M54 5                   Subjective: Patient reports continued non-compliance with HEP despite therapist education regarding importance of HEP performance last session  Not having any pain today since she has not been working  Objective: See treatment diary below      Assessment: Tolerated treatment well  Patient would benefit from continued PT   VC's for correct performance of TE today  No pain during or after session  Provided WHEP and encouraged patient again to perform daily  Plan: Continue per plan of care  Precautions: Depression, Anxiety, Anemia, CA      Manual  10/7 10-8 10-16 10/18         Ari  LE traction  JG MM JF                                                                 Exercise Diary  10/7 10-8 10-16 10/18         Pt education Lumbar anatomy and pathology  Postural awareness and body mechanics  Core stabilization            Nustep with UE  7 mins, L 5 7 min L5 L5x8'         Webslide rows H,M,L  2 x 10 ea GTB 2x10 ea GTB 2x15 ea GTB         Standing HS stretch ari Instructed 3x30" 3 x 30" ea 2x30" ea 3x30"         Standing quad stretch ari  Instructed 3x30" 3 x 30" ea 3x30" ea 3x30"         PPT Instructed 5" x20 20 x 5" 20x5" DC         PPT with march  2  x 10 2"   hold 2x10x2" 3" x30         bridges  20 x 5"  20x5" 5" x30         T-band hip abd supine hooklying  20 x 5" GTB 20 x 5" GTB 20 x 5" GTB         Sidelying clamshells ari    20 x 5" ea GTB 20x5" ea GTB 20 x 5" GTB         Prone LE raise  NV x20 ea Opposite UE/LE x20         Prone UE raise  NV x20 ea2 DC                                                                                                                     Modalities

## 2019-10-23 ENCOUNTER — OFFICE VISIT (OUTPATIENT)
Dept: PHYSICAL THERAPY | Facility: CLINIC | Age: 64
End: 2019-10-23
Payer: COMMERCIAL

## 2019-10-23 DIAGNOSIS — M54.50 RIGHT LOW BACK PAIN, UNSPECIFIED CHRONICITY, UNSPECIFIED WHETHER SCIATICA PRESENT: Primary | ICD-10-CM

## 2019-10-23 PROCEDURE — 97110 THERAPEUTIC EXERCISES: CPT

## 2019-10-23 PROCEDURE — 97140 MANUAL THERAPY 1/> REGIONS: CPT

## 2019-10-23 PROCEDURE — 97112 NEUROMUSCULAR REEDUCATION: CPT

## 2019-10-23 NOTE — PROGRESS NOTES
Daily Note     Today's date: 10/23/2019  Patient name: Edouard Sánchez  : 1955  MRN: [id number]  Referring provider: SHERLY Jones  Dx:   Encounter Diagnosis     ICD-10-CM    1  Right low back pain, unspecified chronicity, unspecified whether sciatica present M54 5        Start Time: 0816  Stop Time: 0900  Total time in clinic (min): 44 minutes  1 on 1 time from 8:16am to 9:00am    Subjective: Patient reported that her pain varies from 3/10 to 8/10 on R side of her lower back  Objective: See treatment diary below      Assessment:  Continued with treatment plan, progressed to higher resistance level with postural re- education muscle strength  Pt reported with progressions  Tolerated treatment well  Patient demonstrated fatigue post treatment, exhibited good technique with therapeutic exercises and would benefit from continued PTOverall post treatment patient reported minimal R sided LB soreness but no pain  Plan: Continue per plan of care  Continue to progress patient as able  Precautions: Depression, Anxiety, Anemia, CA      Manual  10/7 10-8 10-16 10/18 10/23        Ari  LE traction  JG MM JF SC        R lumbar paraspinal IASTM      NV? Exercise Diary  10/7 10-8 10-16 10/18 10/23       Pt education Lumbar anatomy and pathology  Postural awareness and body mechanics    Core stabilization           Nustep with UE  7 mins, L 5 7 min L5 L5x8' L5 10 min        Webslide rows H,M,L  2 x 10 ea GTB 2x10 ea GTB 2x15 ea GTB 2x 15 BTB        Standing HS stretch ari Instructed 3x30" 3 x 30" ea 2x30" ea 3x30" 30" x 3        Standing quad stretch ari  Instructed 3x30" 3 x 30" ea 3x30" ea 3x30" 30" x 3        PPT Instructed 5" x20 20 x 5" 20x5" DC D/C       PPT with march  2  x 10 2"   hold 2x10x2" 3" x30 30x 3"        bridges  20 x 5"  20x5" 5" x30 5" 30x        T-band hip abd supine hooklying  20 x 5" GTB 20 x 5" GTB 20 x 5" GTB resume NV with blue        Sidelying clamshells adair  20 x 5" ea GTB 20x5" ea GTB 20 x 5" GTB BTB 20x 5"        Prone LE raise  NV x20 ea Opposite UE/LE x20 Opp   UE and LE 20x        Prone UE raise  NV x20 ea2 DC D/C                   Progress as able:             Hip 3 ways with wts      NV       [place] walking all directions                                                                 Modalities

## 2019-10-25 ENCOUNTER — OFFICE VISIT (OUTPATIENT)
Dept: PHYSICAL THERAPY | Facility: CLINIC | Age: 64
End: 2019-10-25
Payer: COMMERCIAL

## 2019-10-25 DIAGNOSIS — M54.50 RIGHT LOW BACK PAIN, UNSPECIFIED CHRONICITY, UNSPECIFIED WHETHER SCIATICA PRESENT: Primary | ICD-10-CM

## 2019-10-25 PROCEDURE — 97150 GROUP THERAPEUTIC PROCEDURES: CPT

## 2019-10-25 PROCEDURE — 97140 MANUAL THERAPY 1/> REGIONS: CPT

## 2019-10-25 PROCEDURE — 97112 NEUROMUSCULAR REEDUCATION: CPT

## 2019-10-25 NOTE — PROGRESS NOTES
Daily Note     Today's date: 10/25/2019  Patient name: Seema Cardenas  : 1955  MRN: [id number]  Referring provider: SHERLY Pathak  Dx:   Encounter Diagnosis     ICD-10-CM    1  Right low back pain, unspecified chronicity, unspecified whether sciatica present M54 5                   Subjective: Patient states she continues to have the pinch in R lower lumbar region  Reports she has not been working for 9 days and got new shoes which seem to be helping with the pain  Denies soreness from last visit  Objective: See treatment diary below      Assessment: Tolerated treatment well  Patient was late to therapy today, therefore did not complete full minutes on NuStep  Continued Introduced standing hip 3 ways this visit with good tolerance and minimal fatigue  Initiate IASTM to R lumbar paraspinal next visit  Patient demonstrated fatigue post treatment, exhibited good technique with therapeutic exercises and would benefit from continued PT      Plan: Continue per plan of care  Progress treatment as tolerated  Precautions: Depression, Anxiety, Anemia, CA      Manual  10/7 10-8 10-16 10/18 10/23 10/25       Ari  LE traction  JG MM JF SC RA       R lumbar paraspinal IASTM       NV                                                  Exercise Diary  10/7 10-8 10-16 10/18 10/23 10/25      Pt education Lumbar anatomy and pathology  Postural awareness and body mechanics    Core stabilization           Nustep with UE  7 mins, L 5 7 min L5 L5x8' L5 10 min  L6 8 min      Webslide rows H,M,L  2 x 10 ea GTB 2x10 ea GTB 2x15 ea GTB 2x 15 BTB  2x15  BTB      Standing HS stretch ari Instructed 3x30" 3 x 30" ea 2x30" ea 3x30" 30" x 3  nv      Standing quad stretch ari  Instructed 3x30" 3 x 30" ea 3x30" ea 3x30" 30" x 3  nv      PPT Instructed 5" x20 20 x 5" 20x5" DC D/C       PPT with march  2  x 10 2"   hold 2x10x2" 3" x30 30x 3"  30x 3"      bridges  20 x 5"  20x5" 5" x30 5" 30x  5" 30x      T-band hip abd supine hooklying  20 x 5" GTB 20 x 5" GTB 20 x 5" GTB resume NV with blue  20x 5"      Sidelying clamshells adair  20 x 5" ea GTB 20x5" ea GTB 20 x 5" GTB BTB 20x 5"  BTB  20x 5"      Prone LE raise  NV x20 ea Opposite UE/LE x20 Opp   UE and LE 20x  opp UE and LE 20x      Prone UE raise  NV x20 ea2 DC D/C                   Progress as able:             Hip 3 ways with wts      NV 1#  2x10 ea      [place] walking all directions                                                                 Modalities

## 2019-10-29 ENCOUNTER — TELEPHONE (OUTPATIENT)
Dept: INTERNAL MEDICINE CLINIC | Facility: CLINIC | Age: 64
End: 2019-10-29

## 2019-10-29 ENCOUNTER — OFFICE VISIT (OUTPATIENT)
Dept: OBGYN CLINIC | Facility: CLINIC | Age: 64
End: 2019-10-29
Payer: COMMERCIAL

## 2019-10-29 VITALS
HEART RATE: 83 BPM | DIASTOLIC BLOOD PRESSURE: 60 MMHG | HEIGHT: 61 IN | BODY MASS INDEX: 28.32 KG/M2 | SYSTOLIC BLOOD PRESSURE: 90 MMHG | WEIGHT: 150 LBS

## 2019-10-29 DIAGNOSIS — M62.9 HAMSTRING TIGHTNESS OF BOTH LOWER EXTREMITIES: ICD-10-CM

## 2019-10-29 DIAGNOSIS — M77.12 LATERAL EPICONDYLITIS OF LEFT ELBOW: ICD-10-CM

## 2019-10-29 DIAGNOSIS — R29.898 WEAKNESS OF BOTH HIPS: ICD-10-CM

## 2019-10-29 DIAGNOSIS — M79.673 PAIN OF FOOT, UNSPECIFIED LATERALITY: Primary | ICD-10-CM

## 2019-10-29 DIAGNOSIS — M17.12 PRIMARY OSTEOARTHRITIS OF LEFT KNEE: Primary | ICD-10-CM

## 2019-10-29 DIAGNOSIS — M22.2X2 PATELLOFEMORAL SYNDROME OF BOTH KNEES: ICD-10-CM

## 2019-10-29 DIAGNOSIS — M17.11 PRIMARY OSTEOARTHRITIS OF RIGHT KNEE: ICD-10-CM

## 2019-10-29 DIAGNOSIS — M22.2X1 PATELLOFEMORAL SYNDROME OF BOTH KNEES: ICD-10-CM

## 2019-10-29 PROCEDURE — 20610 DRAIN/INJ JOINT/BURSA W/O US: CPT | Performed by: FAMILY MEDICINE

## 2019-10-29 PROCEDURE — 99213 OFFICE O/P EST LOW 20 MIN: CPT | Performed by: FAMILY MEDICINE

## 2019-10-29 RX ORDER — LIDOCAINE HYDROCHLORIDE 10 MG/ML
4 INJECTION, SOLUTION INFILTRATION; PERINEURAL
Status: COMPLETED | OUTPATIENT
Start: 2019-10-29 | End: 2019-10-29

## 2019-10-29 RX ORDER — MELOXICAM 15 MG/1
15 TABLET ORAL DAILY
Qty: 30 TABLET | Refills: 2 | Status: SHIPPED | OUTPATIENT
Start: 2019-10-29 | End: 2020-02-07 | Stop reason: ALTCHOICE

## 2019-10-29 RX ORDER — LIDOCAINE HYDROCHLORIDE 10 MG/ML
2 INJECTION, SOLUTION INFILTRATION; PERINEURAL
Status: COMPLETED | OUTPATIENT
Start: 2019-10-29 | End: 2019-10-29

## 2019-10-29 RX ORDER — TRIAMCINOLONE ACETONIDE 40 MG/ML
40 INJECTION, SUSPENSION INTRA-ARTICULAR; INTRAMUSCULAR
Status: COMPLETED | OUTPATIENT
Start: 2019-10-29 | End: 2019-10-29

## 2019-10-29 RX ADMIN — LIDOCAINE HYDROCHLORIDE 2 ML: 10 INJECTION, SOLUTION INFILTRATION; PERINEURAL at 10:58

## 2019-10-29 RX ADMIN — TRIAMCINOLONE ACETONIDE 40 MG: 40 INJECTION, SUSPENSION INTRA-ARTICULAR; INTRAMUSCULAR at 10:58

## 2019-10-29 RX ADMIN — LIDOCAINE HYDROCHLORIDE 4 ML: 10 INJECTION, SOLUTION INFILTRATION; PERINEURAL at 10:58

## 2019-10-29 NOTE — TELEPHONE ENCOUNTER
Referred To:   HECTOR Ja[facility]        Phone [phone]  [facility] [address]

## 2019-10-29 NOTE — PROGRESS NOTES
Assessment/Plan:  Assessment/Plan   Diagnoses and all orders for this visit:    Primary osteoarthritis of left knee  -     Large joint arthrocentesis: L knee  -     meloxicam (MOBIC) 15 mg tablet; Take 1 tablet (15 mg total) by mouth daily  -     Ambulatory referral to Physical Therapy; Future    Primary osteoarthritis of right knee  -     meloxicam (MOBIC) 15 mg tablet; Take 1 tablet (15 mg total) by mouth daily  -     Ambulatory referral to Physical Therapy; Future    Patellofemoral syndrome of both knees  -     Ambulatory referral to Physical Therapy; Future    Weakness of both hips  -     Ambulatory referral to Physical Therapy; Future    Hamstring tightness of both lower extremities  -     Ambulatory referral to Physical Therapy; Future    Lateral epicondylitis of left elbow  -     Ambulatory referral to Orthopedic Surgery; Future      81-year-old female with osteoarthritis of both knees with left knee pain more than 1 year duration  Discussed with patient physical exam, impression and plan  Physical exam noted for mild swelling of the knee  She has tenderness of the medial and lateral joint lines of the knee  She has range of motion limited to extension of -5° and flexion to 120° both knees  She has hamstring tightness both lower extremities and weakness both hips with abduction  There is no groin pain with LISETTE and FADDIR maneuvers of the hips  Clinical impression is symptomatic from osteoarthritis and patellofemoral syndrome  I discussed regimen anti-inflammatory, supplements, physical therapy, and corticosteroid injection to which she agreed  I administered mixture of 4 cc 1% lidocaine and 1 cc Kenalog to the left knee without complication  She is to resume taking meloxicam 15 mg once daily food consistently, take tumeric 500 mg twice daily, take Osteo Bi-Flex twice daily, and start physical therapy as soon as possible and do home exercises as directed    She will follow up with me in 6 weeks at which point she will be re-evaluated  Subjective:   Patient ID: Jazz Pierre is a 62 y o  female  Chief Complaint   Patient presents with    Left Elbow - Follow-up, Pain    Left Knee - Follow-up, Pain    elbow recurring pain      pt states pain to elbow feels bruising throbbing pain constant, sharp    Knee Pain     pt states knee injection requesting next possible       29-year-old female with osteoarthritis of both knees following up for bilateral knee pain more than 1 year duration  She was last seen in this regard more than 6 months ago at which point she was prescribed meloxicam 15 mg once daily and recommended on tumeric and Osteo Bi-Flex supplements  She reports worsening of pain in the left knee over the past month  She has pain described as generalized to the knee but worse at the medial aspect, intermittent, achy and sometimes sharp, nonradiating, associated swelling, worse with bearing weight and ambulation, and improved with rest   She has been working part time as a  and spending more time standing and ambulating which worsens her pain  Knee Pain   This is a chronic problem  The current episode started more than 1 year ago  The problem has been gradually worsening  Associated symptoms include arthralgias and joint swelling  Pertinent negatives include no numbness or weakness  The symptoms are aggravated by standing and walking  She has tried rest for the symptoms  The treatment provided mild relief  Review of Systems   Musculoskeletal: Positive for arthralgias and joint swelling  Neurological: Negative for weakness and numbness  Objective:  Vitals:    10/29/19 1034   BP: 90/60   Pulse: 83   Weight: 68 kg (150 lb)   Height: 5' 1" (1 549 m)     Right Knee Exam     Muscle Strength   The patient has normal right knee strength      Range of Motion   Extension: -5   Flexion: 120       Left Knee Exam     Muscle Strength   The patient has normal left knee strength  Tenderness   The patient is experiencing tenderness in the medial joint line and lateral joint line  Range of Motion   Extension: -5   Flexion: 120     Tests   Varus: negative Valgus: negative    Other   Swelling: mild  Effusion: no effusion present      Right Hip Exam     Muscle Strength   Flexion: 5/5     Tests   LISETTE: negative    Comments:  Hamstring tightness      Left Hip Exam     Muscle Strength   Flexion: 5/5     Tests   LISETTE: negative    Comments:  Hamstring tightness          Observations   Left Knee   Negative for effusion  Physical Exam   Constitutional: She is oriented to person, place, and time  She appears well-developed  No distress  HENT:   Head: Normocephalic  Eyes: Conjunctivae are normal    Neck: No tracheal deviation present  Cardiovascular: Normal rate  Pulmonary/Chest: Effort normal  No respiratory distress  Abdominal: She exhibits no distension  Musculoskeletal:        Left knee: She exhibits no effusion  Neurological: She is alert and oriented to person, place, and time  Skin: Skin is warm and dry  Psychiatric: She has a normal mood and affect  Her behavior is normal    Nursing note and vitals reviewed          Large joint arthrocentesis: L knee  Date/Time: 10/29/2019 10:58 AM  Consent given by: patient  Site marked: site marked  Timeout: Immediately prior to procedure a time out was called to verify the correct patient, procedure, equipment, support staff and site/side marked as required   Supporting Documentation  Indications: pain   Procedure Details  Location: knee - L knee  Preparation: Patient was prepped and draped in the usual sterile fashion  Needle size: 22 G  Ultrasound guidance: no  Approach: anteromedial  Medications administered: 2 mL lidocaine 1 %; 4 mL lidocaine 1 %; 40 mg triamcinolone acetonide 40 mg/mL    Patient tolerance: patient tolerated the procedure well with no immediate complications  Dressing:  Sterile dressing applied

## 2019-10-29 NOTE — TELEPHONE ENCOUNTER
Pt called she wants to know if Emmy Borges could order her a referral to a podiatrist    She would like to go to someone either in [facility] or [facility] area        Pt phone [phone]

## 2019-10-30 ENCOUNTER — OFFICE VISIT (OUTPATIENT)
Dept: PHYSICAL THERAPY | Facility: CLINIC | Age: 64
End: 2019-10-30
Payer: COMMERCIAL

## 2019-10-30 DIAGNOSIS — M54.50 RIGHT LOW BACK PAIN, UNSPECIFIED CHRONICITY, UNSPECIFIED WHETHER SCIATICA PRESENT: Primary | ICD-10-CM

## 2019-10-30 PROCEDURE — 97112 NEUROMUSCULAR REEDUCATION: CPT | Performed by: PHYSICAL THERAPIST

## 2019-10-30 PROCEDURE — 97110 THERAPEUTIC EXERCISES: CPT | Performed by: PHYSICAL THERAPIST

## 2019-10-30 NOTE — PROGRESS NOTES
Daily Note     Today's date: 10/30/2019  Patient name: Nicho Roche  : 1955  MRN: [id number]  Referring provider: SHERLY Sahni  Dx:   Encounter Diagnosis     ICD-10-CM    1  Right low back pain, unspecified chronicity, unspecified whether sciatica present M54 5                   Subjective: No pain today  She reports pain is less intense, less frequent and doesn't last as long  Objective: See treatment diary below      Assessment: Tolerated treatment well  Patient would benefit from continued PT  Trial of massage roller to right lumbar paraspinals  Increased resistance of TE and added new TE for core strengthening  Reports feeling very good after session  Plan: Continue per plan of care  Precautions: Depression, Anxiety, Anemia, CA      Manual  10/7 10-8 10-16 10/18 10/23 10/25 10/30      Ari  LE traction  JG MM JF SC RA JF      R lumbar paraspinal IASTM       NV Green roller stick JF                                                 Exercise Diary  10/7 10-8 10-16 10/18 10/23 10/25 10/30     Pt education Lumbar anatomy and pathology  Postural awareness and body mechanics  Core stabilization           Nustep with UE  7 mins, L 5 7 min L5 L5x8' L5 10 min  L6 8 min upright bike today L3x8'     Webslide rows H,M,L  2 x 10 ea GTB 2x10 ea GTB 2x15 ea GTB 2x 15 BTB  2x15  BTB 2x15  BTB     T-band D2 flexion diagnols       GTB 2x10     Standing HS stretch ari Instructed 3x30" 3 x 30" ea 2x30" ea 3x30" 30" x 3  nv 3x30"     Standing quad stretch ari  Instructed 3x30" 3 x 30" ea 3x30" ea 3x30" 30" x 3  nv      PPT Instructed 5" x20 20 x 5" 20x5" DC D/C       PPT with march  2  x 10 2"   hold 2x10x2" 3" x30 30x 3"  30x 3" Level 1 x20     bridges  20 x 5"  20x5" 5" x30 5" 30x  5" 30x GTB 5" x30     T-band hip abd supine hooklying  20 x 5" GTB 20 x 5" GTB 20 x 5" GTB resume NV with blue  20x 5" DC     Sidelying clamshells ari    20 x 5" ea GTB 20x5" ea GTB 20 x 5" GTB BTB 20x 5" BTB  20x 5" BTB  20x 5"     Prone LE raise  NV x20 ea Opposite UE/LE x20 Opp   UE and LE 20x  opp UE and LE 20x opp UE and LE 20x     Prone UE raise  NV x20 ea2 DC D/C                   Progress as able:             Hip 3 ways with wts      NV 1#  2x10 ea np     [place] walking all directions                                                                 Modalities

## 2019-10-31 ENCOUNTER — OFFICE VISIT (OUTPATIENT)
Dept: OBGYN CLINIC | Facility: CLINIC | Age: 64
End: 2019-10-31
Payer: COMMERCIAL

## 2019-10-31 VITALS
RESPIRATION RATE: 18 BRPM | HEIGHT: 61 IN | WEIGHT: 151.3 LBS | SYSTOLIC BLOOD PRESSURE: 103 MMHG | DIASTOLIC BLOOD PRESSURE: 61 MMHG | HEART RATE: 70 BPM | BODY MASS INDEX: 28.57 KG/M2

## 2019-10-31 DIAGNOSIS — M18.0 OSTEOARTHRITIS OF CARPOMETACARPAL (CMC) JOINT OF BOTH THUMBS: Primary | ICD-10-CM

## 2019-10-31 DIAGNOSIS — M77.12 LATERAL EPICONDYLITIS OF LEFT ELBOW: ICD-10-CM

## 2019-10-31 PROCEDURE — 20600 DRAIN/INJ JOINT/BURSA W/O US: CPT | Performed by: ORTHOPAEDIC SURGERY

## 2019-10-31 PROCEDURE — 20551 NJX 1 TENDON ORIGIN/INSJ: CPT | Performed by: ORTHOPAEDIC SURGERY

## 2019-10-31 PROCEDURE — 99213 OFFICE O/P EST LOW 20 MIN: CPT | Performed by: ORTHOPAEDIC SURGERY

## 2019-10-31 RX ORDER — TRIAMCINOLONE ACETONIDE 40 MG/ML
20 INJECTION, SUSPENSION INTRA-ARTICULAR; INTRAMUSCULAR
Status: COMPLETED | OUTPATIENT
Start: 2019-10-31 | End: 2019-10-31

## 2019-10-31 RX ORDER — LIDOCAINE HYDROCHLORIDE 10 MG/ML
2.5 INJECTION, SOLUTION INFILTRATION; PERINEURAL
Status: COMPLETED | OUTPATIENT
Start: 2019-10-31 | End: 2019-10-31

## 2019-10-31 RX ADMIN — Medication 0.05 MEQ: at 11:56

## 2019-10-31 RX ADMIN — LIDOCAINE HYDROCHLORIDE 2.5 ML: 10 INJECTION, SOLUTION INFILTRATION; PERINEURAL at 11:56

## 2019-10-31 RX ADMIN — TRIAMCINOLONE ACETONIDE 20 MG: 40 INJECTION, SUSPENSION INTRA-ARTICULAR; INTRAMUSCULAR at 11:56

## 2019-10-31 NOTE — PROGRESS NOTES
CHIEF COMPLAINT:  Chief Complaint   Patient presents with    Left Elbow - Pain       SUBJECTIVE:  Irma Carrillo is a 62y o  year old  female who presents to the office for evaluation of her left elbow and bilateral thumb pain after being refered by Dr Megan Moctezuma  Pt received 1 CSI for left lateral epicondylitis by dr Megan Moctezuma on 4/15/19  Pt states that she believes that she previously say me at [facility]  Pt states that the pain in her left elbow returned about 1 month ago  Pt states that she has been having bilateral thumb pain that makes it difficult to grasp objects  Pt denies injury  PAST MEDICAL HISTORY:  Past Medical History:   Diagnosis Date    Anemia     IRON  INFUSIONS    Anxiety     Cancer ([facility] [address] )     breast/ovarian had preventative masectomy/ovary removal    Colon polyp     Depression     Last Assessed 8/27/2015    Hyperlipidemia     Last Assessed 8/7/2015       PAST SURGICAL HISTORY:  Past Surgical History:   Procedure Laterality Date    APPENDECTOMY      BILATERAL OOPHORECTOMY      Last Assessed 8/27/2015    BREAST RECONSTRUCTION      Last Assessed 8/27/2015    CHOLECYSTECTOMY      COLONOSCOPY      MASTECTOMY Bilateral     Radical; Last Assessed 8/27/2015    PA COLONOSCOPY FLX DX W/COLLJ SPEC WHEN PFRMD N/A 4/3/2018    Procedure: COLONOSCOPY;  Surgeon: Lauren Dickey MD;  Location: [facility];   Service: Gastroenterology    PA LAP,CHOLECYSTECTOMY N/A 10/5/2017    Procedure: Dede Caruso;  Surgeon: Hugo Marion MD;  [facility];  Service: General    TONSILLECTOMY         FAMILY HISTORY:  Family History   Problem Relation Age of Onset    Breast cancer Mother     Ovarian cancer Mother         unspecified laterality    Heart failure Father     Breast cancer Maternal Aunt        SOCIAL HISTORY:  Social History     Tobacco Use    Smoking status: Never Smoker    Smokeless tobacco: Never Used   Substance Use Topics    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    Drug use: No       MEDICATIONS:    Current Outpatient Medications:     cholecalciferol (VITAMIN D3) 1,000 units tablet, Take by mouth, Disp: , Rfl:     gabapentin (NEURONTIN) 300 mg capsule, TAKE 1 CAPSULE IN THE AM AND 1 CAPSULE IN THE AFTERNOON AND 2 CAPSULES AT BEDTIME, Disp: 120 capsule, Rfl: 5    meloxicam (MOBIC) 15 mg tablet, Take 1 tablet (15 mg total) by mouth daily, Disp: 30 tablet, Rfl: 2    ALLERGIES:  No Known Allergies    REVIEW OF SYSTEMS:  Review of Systems   Constitutional: Negative for chills, fever and unexpected weight change  HENT: Negative for hearing loss, nosebleeds and sore throat  Eyes: Negative for pain, redness and visual disturbance  Respiratory: Negative for cough, shortness of breath and wheezing  Cardiovascular: Negative for chest pain, palpitations and leg swelling  Gastrointestinal: Negative for abdominal pain, nausea and vomiting  Endocrine: Negative for polydipsia and polyuria  Genitourinary: Negative for dysuria and hematuria  Skin: Negative for rash and wound  Neurological: Negative for dizziness and headaches  Psychiatric/Behavioral: Negative for decreased concentration, dysphoric mood and suicidal ideas  The patient is not nervous/anxious          VITALS:  Vitals:    10/31/19 1132   BP: 103/61   Pulse: 70   Resp: 18       LABS:  HgA1c:   Lab Results   Component Value Date    HGBA1C 5 5 07/24/2019     BMP:   Lab Results   Component Value Date    GLUCOSE 84 11/04/2015    CALCIUM 9 1 07/24/2019     11/04/2015    K 3 8 07/24/2019    CO2 25 07/24/2019     07/24/2019    BUN 13 07/24/2019    CREATININE 0 60 07/24/2019       _____________________________________________________  PHYSICAL EXAMINATION:  General: well developed and well nourished, alert, oriented times 3 and appears comfortable  Psychiatric: Normal  HEENT: Trachea Midline, No torticollis  Pulmonary: No audible wheezing or respiratory distress   Skin: No masses, erythema, lacerations, fluctation, ulcerations  Neurovascular: Sensation Intact to the Median, Ulnar, Radial Nerve, Motor Intact to the Median, Ulnar, Radial Nerve and Pulses Intact    MUSCULOSKELETAL EXAMINATION:  bilateral CMC Exam:  No adduction contracture  No hyperextension deformity of MCP joint  Positive localized tenderness over radial and dorsal aspect of thumb (CMC joint)  Grind test is Positive for pain and Positive for crepitus  No triggering or tenderness over the A1 pulley  No pain with Finkelsteins maneuver     Left Elbow:    No swelling or deformity  Full range of motion with flexion, extension, supination and pronation  Positive tenderness to palpation over the Lateral Epicondyle  Pain with passive flexion of the wrist with elbow fully extended  Pain with resisted wrist extension with elbow fully extended  Pain with resisted forearm supination with the elbow fully extended  Positive tinels over the ulnar nerve at the elbow  ___________________________________________________  STUDIES REVIEWED:  No studies reviewed         PROCEDURES PERFORMED:  Small joint arthrocentesis: L thumb CMC  Date/Time: 10/31/2019 11:56 AM  Consent given by: patient  Site marked: site marked  Timeout: Immediately prior to procedure a time out was called to verify the correct patient, procedure, equipment, support staff and site/side marked as required   Supporting Documentation  Indications: pain   Procedure Details  Location: thumb - L thumb CMC  Preparation: Patient was prepped and draped in the usual sterile fashion  Needle size: 27 G  Ultrasound guidance: no  Medications administered: 0 05 mEq sodium bicarbonate 8 4 %; 20 mg triamcinolone acetonide 40 mg/mL; 2 5 mL lidocaine 1 %    Patient tolerance: patient tolerated the procedure well with no immediate complications  Dressing:  Sterile dressing applied    Hand/upper extremity injection: L elbow  Date/Time: 10/31/2019 11:57 AM  Consent given by: patient  Site marked: site marked  Timeout: Immediately prior to procedure a time out was called to verify the correct patient, procedure, equipment, support staff and site/side marked as required   Supporting Documentation  Indications: pain   Procedure Details  Condition:lateral epicondylitis Site: L elbow   Preparation: Patient was prepped and draped in the usual sterile fashion  Ultrasound guidance: no  Patient tolerance: patient tolerated the procedure well with no immediate complications  Dressing:  Sterile dressing applied             _____________________________________________________  ASSESSMENT/PLAN:    Bilateral CMC OA    *left sided CSI was administered today without complications  *Pt was advised that they may have increased pain for the next 24-36 hours from the CSI before feeling relief, during this time pt was advised to take NSAIDs and apply ice  *After the initial 36 hours pt is advised to apply heat and continue motion of the thumb to decrease discomfort  *Pt was advised that activity modification such as resting after increased activity or taking NSAIDs prior to increased activity may be necessary    *Pt was advised that these CSI should not be administered more frequently than 3 months apart  *Pt will follow up in 2 weeks where we will consider right Aia 16 CSI    left lateral epicondylitis  * CSI was offered and accepted by patient  * pt will follow up in the office in 2 weeks      Follow Up:  Return in about 2 weeks (around 11/14/2019)  To Do Next Visit:  Re-evaluation of current issue    General Discussions:  Aia 16 Arthritis: The anatomy and physiology of carpometacarpal joint arthritis was discussed with the patient today in the office    Deterioration of the articular cartilage eventually leads to hypermobility at the thumb Aia 16 joint, resulting in joint subluxation, osteophyte formation, cystic changes within the trapezium and base of the first metacarpal, as well as subchondral sclerosis  Eventually, pain, limited mobility, and compensatory hyperextension at the metacarpophalangeal joint may develop  While normal activity and usage of the thumb joint may provide a painful experience to the patient, this typically does not result in damage to the thumb or hand  Treatment options include resting thumb spica splints to decreased joint edema, pain, and inflammation  Therapy exercises to strengthen the thenar musculature may relieve pain, but do not alter the overall continued development of osteoarthritis  Oral medications, topical medications, corticosteroid injections may decrease pain and increase overall function  Eventually, approximately 5% of patients may require surgical intervention  Lateral Epicondylitis: The anatomy and physiology of lateral epicondylitis was discussed with the patient today  Typically, degenerative changes in the extensor carpi radialis brevis muscle occur over time  These degenerative changes appear as tears of the tendon on MRI  This creates pain over the lateral epicondyle (outside part of elbow)  This pain typically is made worse with palm down lifting activities as well as anything that involves strength and stability of the wrist   The pain may radiate from the wrist up to the elbow  At times, the shoulder may be weak as well which can predispose or cause continuation of the problem  Conservative treatment usually cures a majority of patients; however, this may take up to 6-9 months  Conservative treatment options typically include activity modification, therapy for strengthening of the shoulder and elbow, tennis elbow straps, and possible corticosteroid injections    Corticosteroid injections are very effective at relieving pain but do not alter the natural history of this process  Rather, steroid injections decrease the pain temporarily to allow for therapy to take place without discomfort  It was discussed that therapy to prevent recurrence is a "life long" process and that if patient relies on the steroid injection alone without performing therapeutic exercises the risk of recurrence is likely  Typical home regimen includes heat and stretching and resisted wrist extension exercises discussed in the office  Surgery is required in fewer than 10% of patients        Scribe Attestation    I,:   Barbara Aguayoely am acting as a scribe while in the presence of the attending physician :        I,:   Poonam Quigley MD personally performed the services described in this documentation    as scribed in my presence :

## 2019-11-01 ENCOUNTER — OFFICE VISIT (OUTPATIENT)
Dept: PHYSICAL THERAPY | Facility: CLINIC | Age: 64
End: 2019-11-01
Payer: COMMERCIAL

## 2019-11-01 DIAGNOSIS — M54.50 RIGHT LOW BACK PAIN, UNSPECIFIED CHRONICITY, UNSPECIFIED WHETHER SCIATICA PRESENT: Primary | ICD-10-CM

## 2019-11-01 PROCEDURE — 97530 THERAPEUTIC ACTIVITIES: CPT | Performed by: PHYSICAL THERAPIST

## 2019-11-01 PROCEDURE — 97110 THERAPEUTIC EXERCISES: CPT | Performed by: PHYSICAL THERAPIST

## 2019-11-01 NOTE — PROGRESS NOTES
Daily Note     Today's date: 2019  Patient name: Prakash Kapoor  : 1955  MRN: [id number]  Referring provider: SHERLY Marte  Dx:   Encounter Diagnosis     ICD-10-CM    1  Right low back pain, unspecified chronicity, unspecified whether sciatica present M54 5                   Subjective: Not having any pain today  Feeling much better overall  Objective: See treatment diary below      Assessment: Tolerated treatment well  Patient exhibited good technique with therapeutic exercises  Felt good after treatment  No reports of pain  Increased reps with lower ab exercise and felt well  Plan: Continue per plan of care  Precautions: Depression, Anxiety, Anemia, CA      Manual  10/7 10-8 10-16 10/18 10/23 10/25 10/30 11/1     Ari  LE traction  JG MM JF SC RA JF      R lumbar paraspinal IASTM       NV Green roller stick JF                                                 Exercise Diary  10/7 10-8 10-16 10/18 10/23 10/25 10/30 11/1    Pt education Lumbar anatomy and pathology  Postural awareness and body mechanics  Core stabilization           Nustep with UE  7 mins, L 5 7 min L5 L5x8' L5 10 min  L6 8 min upright bike today L3x8' upright bike today L5x10''    Webslide rows H,M,L  2 x 10 ea GTB 2x10 ea GTB 2x15 ea GTB 2x 15 BTB  2x15  BTB 2x15  BTB 2x15  BTB    T-band D2 flexion diagnols       GTB 2x10 GTB 2x10    Standing HS stretch ari Instructed 3x30" 3 x 30" ea 2x30" ea 3x30" 30" x 3  nv 3x30" np    Standing quad stretch ari  Instructed 3x30" 3 x 30" ea 3x30" ea 3x30" 30" x 3  nv      PPT Instructed 5" x20 20 x 5" 20x5" DC D/C       PPT with march  2  x 10 2"   hold 2x10x2" 3" x30 30x 3"  30x 3" Level 1 x20 Level 1 x30    bridges  20 x 5"  20x5" 5" x30 5" 30x  5" 30x GTB 5" x30 GTB 5" x30    T-band hip abd supine hooklying  20 x 5" GTB 20 x 5" GTB 20 x 5" GTB resume NV with blue  20x 5" DC     Sidelying clamshells ari    20 x 5" ea GTB 20x5" ea GTB 20 x 5" GTB BTB 20x 5" BTB  20x 5" BTB  20x 5" BTB  20x 5"    Prone LE raise  NV x20 ea Opposite UE/LE x20 Opp   UE and LE 20x  opp UE and LE 20x opp UE and LE 20x opp UE and LE 20x    Prone UE raise  NV x20 ea2 DC D/C                   Progress as able:             Hip 3 ways with wts      NV 1#  2x10 ea np     [place] walking all directions                                                                 Modalities

## 2019-11-06 ENCOUNTER — EVALUATION (OUTPATIENT)
Dept: PHYSICAL THERAPY | Facility: CLINIC | Age: 64
End: 2019-11-06
Payer: COMMERCIAL

## 2019-11-06 DIAGNOSIS — M54.50 RIGHT LOW BACK PAIN, UNSPECIFIED CHRONICITY, UNSPECIFIED WHETHER SCIATICA PRESENT: Primary | ICD-10-CM

## 2019-11-06 DIAGNOSIS — M79.673 PAIN OF FOOT, UNSPECIFIED LATERALITY: ICD-10-CM

## 2019-11-06 PROCEDURE — 97110 THERAPEUTIC EXERCISES: CPT | Performed by: PHYSICAL THERAPIST

## 2019-11-06 PROCEDURE — 97140 MANUAL THERAPY 1/> REGIONS: CPT | Performed by: PHYSICAL THERAPIST

## 2019-11-06 PROCEDURE — 97112 NEUROMUSCULAR REEDUCATION: CPT | Performed by: PHYSICAL THERAPIST

## 2019-11-06 NOTE — PROGRESS NOTES
PT Discharge    Today's date: 2019  Patient name: Coreen Mayorga  : 1955  MRN: [id number]  Referring provider: SHERLY Cooper  Dx:   Encounter Diagnosis     ICD-10-CM    1  Right low back pain, unspecified chronicity, unspecified whether sciatica present M54 5                   Assessment  Assessment details: Patient reports feeling 90% improved since starting PT services  Much less pain when at work  Symptoms are less intense and of a shorter duration  She demonstrates normal ROM of the l-spine, as well as, improved core strength  She is independent and compliant with a HEP and feels she no longer requires skilled PT services at this time  Impairments: abnormal or restricted ROM, activity intolerance, pain with function, poor posture  and poor body mechanics  Understanding of Dx/Px/POC: good   Prognosis: fair    Goals  STG 4 weeks  1  Patient will report pain as a 3-4/10 at worst with working - no tmet  2  Patient will correct posture with VC's - met  LTG (8 weeks)  1  Patient will report pain as a 0-1/10 at worst with work activities - not met  2  Patient will roll over in bed without pain - met  3  Patient will resume her previous level of function without limitations related to pain- met  4  Patient will be independent and compliant with a HEP in order to maintain gains made with skilled PT services  - met    Plan  Planned therapy interventions: patient education and home exercise program  Frequency: 2x week  Plan of Care beginning date: 10/7/2019  Plan of Care expiration date: 2019  Treatment plan discussed with: patient        Subjective Evaluation    History of Present Illness  Mechanism of injury: Patient states she has been virtually pain-free  When she does get some discomfort, it is much less intense and doesn't last very long  Patient feels 90% improved since starting PT services  (-) sleep disturbances  Patient performs HEP infrequently             Not a recurrent problem   Quality of life: fair    Pain  Current pain ratin  At best pain ratin  At worst pain ratin  Location: Across entire lower back  Quality: dull ache    Social Support  Steps to enter house: yes  15  Stairs in house: no   Lives in: multiple-level home  Lives with: spouse    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[employer])  Exercise history: 2x/wk  light weights, stretching and yoga  Diagnostic Tests  No diagnostic tests performed    FCE comments: No sleep disturbances related to pain  (-) cough/sneeze, bowel/bladder  No LE weakness  Pain with getting in/out of the car after working  Pain sit to stand transfers  Negotiates stairs step-to  No saddle anesthesiasTreatments  Previous treatment: medication  Current treatment: medication  Patient Goals  Patient goals for therapy: decreased pain, independence with ADLs/IADLs and return to sport/leisure activities          Objective     Concurrent Complaints  Positive for history of cancer  Negative for night pain, disturbed sleep, bladder dysfunction, bowel dysfunction, saddle (S4) numbness and history of trauma    Static Posture     Lumbar Spine   Increased lordosis  Postural Observations  Seated posture: fair  Standing posture: fair  Correction of posture: makes symptoms better        Tenderness     Lumbar Spine  Tenderness in the spinous process (L1)  No tenderness in the left transverse process and right transverse process  Left Hip   No tenderness in the ASIS and PSIS  Right Hip   No tenderness in the ASIS and PSIS       Neurological Testing     Sensation     Lumbar   Left   Intact: light touch    Right   Intact: light touch    Reflexes   Left   Patellar (L4): normal (2+)  Achilles (S1): normal (2+)    Right   Patellar (L4): normal (2+)  Achilles (S1): normal (2+)    Active Range of Motion     Lumbar   Flexion:  Restriction level: minimal  Extension:  Restriction level: minimal  Left rotation: Restriction level: minimal  Right rotation:  Restriction level: minimal    Joint Play   Joints within functional limits: L1, L2, L3, L4 and L5     Pain: L1     Strength/Myotome Testing     Left Hip   Planes of Motion   Flexion: 4+  Extension: 4+    Right Hip   Planes of Motion   Flexion: 4+  Extension: 4+    Left Knee   Flexion: 4+  Extension: 4+    Right Knee   Flexion: 4+  Extension: 4+    Left Ankle/Foot   Dorsiflexion: 4+    Right Ankle/Foot   Dorsiflexion: 4+    Tests     Lumbar   Negative SIJ compression, sacroiliac distraction, sacral thrust  and sacral spring   Left   Negative passive SLR and slump test      Right   Negative passive SLR and slump test      Left Hip   Negative LISETTE and FADIR  Right Hip   Negative LISETTE and FADIR  Additional Tests Details  Bilateral LE traction relieved pain    General Comments:    Lower quarter screen   Hips: unremarkable  Knees: unremarkable  Foot/ankle: unremarkable             Precautions: Depression, Anxiety, Anemia, CA        Manual  10/7 10-8 10-16 10/18 10/23 10/25 10/30 11/1  11/6     Ari   LE traction   JG MM JF SC RA JF    np     R lumbar paraspinal IASTM            NV Green roller stick JF    JF      PA mobs L1                  Grade III JF                                                           Exercise Diary  11/6 10-8 10-16 10/18 10/23 10/25 10/30 11/1     Pt education                    Nustep with UE  UBE L1x10' 7 mins, L 5 7 min L5 L5x8' L5 10 min  L6 8 min upright bike today L3x8' upright bike today L5x10''     Webslide rows H,M,L  Reviewed 2 x 10 ea GTB 2x10 ea GTB 2x15 ea GTB 2x 15 BTB  2x15  BTB 2x15  BTB 2x15  BTB     T-band D2 flexion diagnols   Reviewed           GTB 2x10 GTB 2x10     Standing HS stretch ari  Reviewed 3 x 30" ea 2x30" ea 3x30" 30" x 3  nv 3x30" np     Standing quad stretch ari   Reviewed 3 x 30" ea 3x30" ea 3x30" 30" x 3  nv         PPT  Reviewed 20 x 5" 20x5" DC D/C           PPT with march   Reviewed 2  x 10 2"   hold 2x10x2" 3" x30 30x 3"  30x 3" Level 1 x20 Level 1 x30     bridges  Reviewed 20 x 5"  20x5" 5" x30 5" 30x  5" 30x GTB 5" x30 GTB 5" x30     T-band hip abd supine hooklying   20 x 5" GTB 20 x 5" GTB 20 x 5" GTB resume NV with blue  20x 5" DC       Sidelying clamshells adair    Reviewed 20 x 5" ea GTB 20x5" ea GTB 20 x 5" GTB BTB 20x 5"  BTB  20x 5" BTB  20x 5" BTB  20x 5"     Prone LE raise   Reviewed NV x20 ea Opposite UE/LE x20 Opp   UE and LE 20x  opp UE and LE 20x opp UE and LE 20x opp UE and LE 20x     Prone UE raise   NV x20 ea2 DC D/C                                 Progress as able:                      Hip 3 ways with wts          NV 1#  2x10 ea np       [place] walking all directions                                                                                                                    Modalities

## 2019-11-08 ENCOUNTER — OFFICE VISIT (OUTPATIENT)
Dept: PHYSICAL THERAPY | Facility: CLINIC | Age: 64
End: 2019-11-08
Payer: COMMERCIAL

## 2019-11-08 DIAGNOSIS — M54.2 NECK PAIN: Primary | ICD-10-CM

## 2019-11-08 PROCEDURE — 97110 THERAPEUTIC EXERCISES: CPT | Performed by: PHYSICAL THERAPIST

## 2019-11-08 PROCEDURE — 97140 MANUAL THERAPY 1/> REGIONS: CPT | Performed by: PHYSICAL THERAPIST

## 2019-11-08 PROCEDURE — 97161 PT EVAL LOW COMPLEX 20 MIN: CPT | Performed by: PHYSICAL THERAPIST

## 2019-11-08 NOTE — PROGRESS NOTES
PT Evaluation     Today's date: 2019  Patient name: Seema Cardenas  : 1955  MRN: [id number]  Referring provider: MONTY Lyons  Dx:   Encounter Diagnosis     ICD-10-CM    1  Neck pain M54 2                   Assessment  Assessment details: Seema seaman 62 y o  female referred with primary diagnosis of Neck pain  (primary encounter diagnosis)   Patient presents with the following functional limitations: stiffness and discomfort with cervical rotation to the right  She has a trigger point in right upper trapezius and demonstrates mild ROM deficits with lateral flexion and rotation  Treatment to include: Manual therapy techniques, extremity/core strengthening, neuromuscular control exercises, instruction in a comprehensive HEP, and modalities as needed  They will benefit from skilled PT services to address the above functional deficits and to decrease pain to promote a return to their premorbid level of function  Impairments: abnormal or restricted ROM, activity intolerance, pain with function, poor posture  and poor body mechanics  Functional limitations: Pain with rolling over in bed, prolonged standing and sitting  Understanding of Dx/Px/POC: good   Prognosis: fair    Goals  STG 2 weeks  1  Patient will report pain as a 2-3/10 at worst with looking over her shoulder  2  Patient will correct posture with VC's  LTG (8 weeks)  1  Patient will report pain as a 0-1/10 at worst with work activities  2  Patient will roll over in bed without pain  3  Patient will resume her previous level of function without limitations related to pain  4  Patient will be independent and compliant with a HEP in order to maintain gains made with skilled PT services      Plan  Patient would benefit from: skilled physical therapy  Planned therapy interventions: manual therapy, neuromuscular re-education, patient education, postural training, strengthening, stretching, therapeutic activities, therapeutic exercise, home exercise program and joint mobilization  Frequency: 2x week  Duration in weeks: 4  Plan of Care beginning date: 2019  Plan of Care expiration date: 2019  Treatment plan discussed with: patient        Subjective Evaluation    History of Present Illness  Mechanism of injury: Patient states she has been having chronic neck pain for 6 months  She is a  and carries trays up on her right shoulder  right side of neck feels stiff when looking over right shoulder while driving  She presents today for direct access evaluation for neck pain  Not a recurrent problem   Quality of life: fair    Pain  Current pain ratin  At best pain ratin  At worst pain ratin  Location: Right UT region  Quality: tight and dull ache  Progression: no change    Social Support  Steps to enter house: yes  15  Stairs in house: no   Lives in: multiple-level home  Lives with: spouse    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[employer])  Exercise history: 2x/wk  light weights, stretching and yoga  Diagnostic Tests  No diagnostic tests performed    FCE comments: No sleep disturbances related to pain  (-) cough/sneeze, bowel/bladder  No LE weakness  Tightness lasts for only a few seconds at end range right cervical rotation  Denies weakness or paresthesias into bilateral UE  Treatments  Previous treatment: medication  Current treatment: medication  Patient Goals  Patient goals for therapy: decreased pain and increased motion          Objective     Concurrent Complaints  Positive for history of cancer  Negative for night pain, disturbed sleep, bladder dysfunction, bowel dysfunction, saddle (S4) numbness and history of trauma    Static Posture     Head  Forward  Shoulders  Rounded  Scapulae  Left protracted and right protracted  Lumbar Spine   Increased lordosis       Postural Observations  Seated posture: fair  Standing posture: fair  Correction of posture: makes symptoms better        Palpation     Right   No palpable tenderness to the cervical paraspinals and suboccipitals  Tenderness of the upper trapezius  Tenderness   Cervical Spine   Tenderness in the facet joint (Right C3-5)  No tenderness in the spinous process  Lumbar Spine  No tenderness in the left transverse process and right transverse process  Spinous process tenderness: L3-5  Left Hip   No tenderness in the ASIS and PSIS  Right Hip   No tenderness in the ASIS and PSIS  Neurological Testing     Sensation     Lumbar   Left   Intact: light touch    Right   Intact: light touch    Reflexes   Left   Patellar (L4): normal (2+)  Achilles (S1): normal (2+)    Right   Patellar (L4): normal (2+)  Achilles (S1): normal (2+)    Active Range of Motion   Cervical/Thoracic Spine       Cervical    Flexion: 40 degrees   Extension: 60 degrees      Left lateral flexion: 40 degrees     with pain  Right lateral flexion: 30 degrees     with pain  Left rotation: 65 degrees  Right rotation: 75 degrees    with pain    Joint Play     Hypomobile: C3, C4 and C5     Strength/Myotome Testing   Cervical Spine     Left   Normal strength    Right   Normal strength    Tests   Pain with max opening    Pain with max closing    Cervical   Negative vertical compression and lumbar distraction  Left   Positive Spurling's Test A  Right   Positive Spurling's Test A  Lumbar   Negative vertical compression  Precautions: Depression, Anxiety, Anemia, CA      Manual  11/8            Right UT stretch 3x30"            TPR right UT JF            UPA mobs right C3-5 JF                                          Exercise Diary  11/8            Pt education Cervical anatomy  Posture    Cane for TPR right UT            UBE standing 1/2 F/R             Webslide rows H,M,L             Bkwd shoulder rolls Instructed x30            Scapular retractions Instructed 5"x30            UT stretch right Instructed 3" x30 Cervical retractions             Levator scap stretch right                                                                                                                                                                              Modalities

## 2019-11-13 ENCOUNTER — APPOINTMENT (OUTPATIENT)
Dept: PHYSICAL THERAPY | Facility: CLINIC | Age: 64
End: 2019-11-13
Payer: COMMERCIAL

## 2019-11-15 ENCOUNTER — APPOINTMENT (OUTPATIENT)
Dept: PHYSICAL THERAPY | Facility: CLINIC | Age: 64
End: 2019-11-15
Payer: COMMERCIAL

## 2019-12-26 ENCOUNTER — HOSPITAL ENCOUNTER (OUTPATIENT)
Dept: MAMMOGRAPHY | Facility: CLINIC | Age: 64
Discharge: HOME | End: 2019-12-26
Payer: COMMERCIAL

## 2019-12-26 DIAGNOSIS — Z78.0 POSTMENOPAUSAL: ICD-10-CM

## 2019-12-26 PROCEDURE — 77080 DXA BONE DENSITY AXIAL: CPT

## 2019-12-27 ENCOUNTER — TELEPHONE (OUTPATIENT)
Dept: INTERNAL MEDICINE CLINIC | Facility: CLINIC | Age: 64
End: 2019-12-27

## 2020-01-30 ENCOUNTER — APPOINTMENT (OUTPATIENT)
Dept: LAB | Facility: CLINIC | Age: 65
End: 2020-01-30
Payer: COMMERCIAL

## 2020-01-30 DIAGNOSIS — R73.01 IMPAIRED FASTING BLOOD SUGAR: ICD-10-CM

## 2020-01-30 DIAGNOSIS — E78.5 HYPERLIPIDEMIA, UNSPECIFIED HYPERLIPIDEMIA TYPE: ICD-10-CM

## 2020-01-30 DIAGNOSIS — E87.6 HYPOKALEMIA: ICD-10-CM

## 2020-01-30 DIAGNOSIS — D61.818 PANCYTOPENIA (HCC): ICD-10-CM

## 2020-01-30 DIAGNOSIS — D50.9 IRON DEFICIENCY ANEMIA, UNSPECIFIED IRON DEFICIENCY ANEMIA TYPE: ICD-10-CM

## 2020-01-30 LAB
ALBUMIN SERPL BCP-MCNC: 3.9 G/DL (ref 3.5–5)
ALP SERPL-CCNC: 50 U/L (ref 46–116)
ALT SERPL W P-5'-P-CCNC: 28 U/L (ref 12–78)
ANION GAP SERPL CALCULATED.3IONS-SCNC: 3 MMOL/L (ref 4–13)
AST SERPL W P-5'-P-CCNC: 14 U/L (ref 5–45)
BASOPHILS # BLD AUTO: 0.07 THOUSANDS/ΜL (ref 0–0.1)
BASOPHILS NFR BLD AUTO: 2 % (ref 0–1)
BILIRUB SERPL-MCNC: 0.53 MG/DL (ref 0.2–1)
BUN SERPL-MCNC: 13 MG/DL (ref 5–25)
CALCIUM SERPL-MCNC: 9.9 MG/DL (ref 8.3–10.1)
CHLORIDE SERPL-SCNC: 107 MMOL/L (ref 100–108)
CHOLEST SERPL-MCNC: 244 MG/DL (ref 50–200)
CO2 SERPL-SCNC: 28 MMOL/L (ref 21–32)
CREAT SERPL-MCNC: 0.52 MG/DL (ref 0.6–1.3)
EOSINOPHIL # BLD AUTO: 0.07 THOUSAND/ΜL (ref 0–0.61)
EOSINOPHIL NFR BLD AUTO: 2 % (ref 0–6)
ERYTHROCYTE [DISTWIDTH] IN BLOOD BY AUTOMATED COUNT: 12.5 % (ref 11.6–15.1)
EST. AVERAGE GLUCOSE BLD GHB EST-MCNC: 103 MG/DL
FERRITIN SERPL-MCNC: 26 NG/ML (ref 8–388)
GFR SERPL CREATININE-BSD FRML MDRD: 102 ML/MIN/1.73SQ M
GLUCOSE SERPL-MCNC: 83 MG/DL (ref 65–140)
HBA1C MFR BLD: 5.2 % (ref 4.2–6.3)
HCT VFR BLD AUTO: 41.7 % (ref 34.8–46.1)
HDLC SERPL-MCNC: 80 MG/DL
HGB BLD-MCNC: 13.3 G/DL (ref 11.5–15.4)
IMM GRANULOCYTES # BLD AUTO: 0.01 THOUSAND/UL (ref 0–0.2)
IMM GRANULOCYTES NFR BLD AUTO: 0 % (ref 0–2)
IRON SATN MFR SERPL: 44 %
IRON SERPL-MCNC: 139 UG/DL (ref 50–170)
LDLC SERPL CALC-MCNC: 140 MG/DL (ref 0–100)
LYMPHOCYTES # BLD AUTO: 1.22 THOUSANDS/ΜL (ref 0.6–4.47)
LYMPHOCYTES NFR BLD AUTO: 29 % (ref 14–44)
MCH RBC QN AUTO: 30.9 PG (ref 26.8–34.3)
MCHC RBC AUTO-ENTMCNC: 31.9 G/DL (ref 31.4–37.4)
MCV RBC AUTO: 97 FL (ref 82–98)
MONOCYTES # BLD AUTO: 0.29 THOUSAND/ΜL (ref 0.17–1.22)
MONOCYTES NFR BLD AUTO: 7 % (ref 4–12)
NEUTROPHILS # BLD AUTO: 2.51 THOUSANDS/ΜL (ref 1.85–7.62)
NEUTS SEG NFR BLD AUTO: 60 % (ref 43–75)
NONHDLC SERPL-MCNC: 164 MG/DL
NRBC BLD AUTO-RTO: 0 /100 WBCS
PLATELET # BLD AUTO: 207 THOUSANDS/UL (ref 149–390)
PMV BLD AUTO: 9.5 FL (ref 8.9–12.7)
POTASSIUM SERPL-SCNC: 4.2 MMOL/L (ref 3.5–5.3)
PROT SERPL-MCNC: 6.9 G/DL (ref 6.4–8.2)
RBC # BLD AUTO: 4.31 MILLION/UL (ref 3.81–5.12)
SODIUM SERPL-SCNC: 138 MMOL/L (ref 136–145)
TIBC SERPL-MCNC: 313 UG/DL (ref 250–450)
TRIGL SERPL-MCNC: 121 MG/DL
TSH SERPL DL<=0.05 MIU/L-ACNC: 1.17 UIU/ML (ref 0.36–3.74)
WBC # BLD AUTO: 4.17 THOUSAND/UL (ref 4.31–10.16)

## 2020-01-30 PROCEDURE — 83036 HEMOGLOBIN GLYCOSYLATED A1C: CPT

## 2020-01-30 PROCEDURE — 82728 ASSAY OF FERRITIN: CPT

## 2020-01-30 PROCEDURE — 80061 LIPID PANEL: CPT

## 2020-01-30 PROCEDURE — 83540 ASSAY OF IRON: CPT

## 2020-01-30 PROCEDURE — 80053 COMPREHEN METABOLIC PANEL: CPT

## 2020-01-30 PROCEDURE — 83550 IRON BINDING TEST: CPT

## 2020-01-30 PROCEDURE — 36415 COLL VENOUS BLD VENIPUNCTURE: CPT

## 2020-01-30 PROCEDURE — 84443 ASSAY THYROID STIM HORMONE: CPT

## 2020-01-30 PROCEDURE — 85025 COMPLETE CBC W/AUTO DIFF WBC: CPT

## 2020-02-07 ENCOUNTER — OFFICE VISIT (OUTPATIENT)
Dept: OBGYN CLINIC | Facility: CLINIC | Age: 65
End: 2020-02-07
Payer: COMMERCIAL

## 2020-02-07 VITALS
DIASTOLIC BLOOD PRESSURE: 64 MMHG | WEIGHT: 156 LBS | HEIGHT: 65 IN | SYSTOLIC BLOOD PRESSURE: 102 MMHG | BODY MASS INDEX: 25.99 KG/M2

## 2020-02-07 DIAGNOSIS — Z01.419 ENCOUNTER FOR GYNECOLOGICAL EXAMINATION WITHOUT ABNORMAL FINDING: Primary | ICD-10-CM

## 2020-02-07 DIAGNOSIS — Z90.13 H/O BILATERAL MASTECTOMY: ICD-10-CM

## 2020-02-07 DIAGNOSIS — Z90.722 H/O BILATERAL OOPHORECTOMY: ICD-10-CM

## 2020-02-07 PROCEDURE — 87624 HPV HI-RISK TYP POOLED RSLT: CPT | Performed by: NURSE PRACTITIONER

## 2020-02-07 PROCEDURE — G0145 SCR C/V CYTO,THINLAYER,RESCR: HCPCS | Performed by: NURSE PRACTITIONER

## 2020-02-07 PROCEDURE — 3008F BODY MASS INDEX DOCD: CPT | Performed by: NURSE PRACTITIONER

## 2020-02-07 PROCEDURE — S0610 ANNUAL GYNECOLOGICAL EXAMINA: HCPCS | Performed by: NURSE PRACTITIONER

## 2020-02-07 RX ORDER — DIPHENOXYLATE HYDROCHLORIDE AND ATROPINE SULFATE 2.5; .025 MG/1; MG/1
1 TABLET ORAL DAILY
COMMUNITY

## 2020-02-07 RX ORDER — LANOLIN ALCOHOL/MO/W.PET/CERES
1 CREAM (GRAM) TOPICAL 3 TIMES DAILY
COMMUNITY
End: 2022-03-04 | Stop reason: CLARIF

## 2020-02-07 NOTE — PATIENT INSTRUCTIONS
Calcium/Vitamin D Supplement (By mouth)   Calcium (FEDE-see-um), Vitamin D (VYE-ta-min D)  Supplies your body with calcium if you need more than you get in your diet  Calcium helps prevent osteoporosis (weak or brittle bones)  Vitamin D helps your body use the calcium  Calcium and vitamin D are minerals that your body needs to work properly  Brand Name(s): Jai-Citrate, Jai-Citrate Plus Vitamin D, Calcet Petites, Calcium 600MG+D, Calcium Citrate +D3 Maximum, Caltrate 600 + D, Citracal + D, Citracal Calcium Citrate Petites with Vitamin D, Citracal Petites, Citracal Ultradense Calcium Citrate, Citracal Ultradense Calcium Citrate Petite w/Vit D, Citrus Calcium with Vitamin D, D-1000, D-2000, D3-400IU   There may be other brand names for this medicine  When This Medicine Should Not Be Used: You should not use this medicine if you have had an allergic reaction to calcium or vitamin D (ergocalciferol)  How to Use This Medicine:   Tablet, Long Acting Tablet, Fizzy Tablet, Liquid Filled Capsule, Chewable Tablet  · Your doctor will tell you how much medicine to use  Do not use more than directed  · Follow the instructions on the medicine label if you are using this medicine without a prescription  Ask your pharmacist or health caregiver if you are not sure how much calcium you should take in one day  · Most calcium supplements should be taken with food, but some kinds of calcium (such as calcium citrate) can be taken with or without food  Ask your health care provider or read the label on the bottle to see if you need to take your specific kind of calcium with food  Drink a full glass of water (8 ounces) with each dose  · If you are using the effervescent (fizzy) tablet, dissolve the tablet in about 6 to 8 ounces of water (3/4 cup to 1 cup)  After the tablet is completely dissolved, drink this mixture right away  Do not save any mixture to take later    · Carefully follow your doctor's instructions about any special diet   · If you need to take more than one dose each a day, take each dose at evenly spaced times, unless your doctor has told you otherwise  If a dose is missed:   · Take a dose as soon as you remember  If it is almost time for your next dose, wait until then and take a regular dose  Do not take extra medicine to make up for a missed dose  How to Store and Dispose of This Medicine:   · Store the medicine in a closed container at room temperature, away from heat, moisture, and direct light  If the effervescent (fizzy) tablet comes packaged in foil, do not open the foil until you are ready to use each tablet  · Ask your pharmacist, doctor, or health caregiver about the best way to dispose of any outdated medicine or medicine no longer needed  · Keep all medicine out of the reach of children  Never share your medicine with anyone  Drugs and Foods to Avoid:   Ask your doctor or pharmacist before using any other medicine, including over-the-counter medicines, vitamins, and herbal products  · Make sure your doctor knows if you are also using other supplements or medicines that contain calcium  Tell your doctor if you are also using gallium nitrate (Ganite®), cellulose sodium phosphate (Calcibind®), or etidronate (Didronel®)  · Calcium can change the way other medicines work if you take them at the same time  If you need to use other medicines, take them at least 2 hours before or 2 hours after you take your calcium supplement  This is particularly important if you are also using phenytoin (Dilantin®) or a tetracycline antibiotic to treat an infection (such as doxycycline, minocycline, Vibramycin®)  · Do not take your calcium supplement with a high-fiber meal (such as bran, whole-grain cereal or bread, fresh fruits)  Do not smoke cigarettes or cigars  Do not drink large amounts of alcohol or caffeine (for example, more than about 8 cups of coffee)    Warnings While Using This Medicine:   · Make sure your doctor knows if you are pregnant or breast feeding, or if you have kidney disease or have ever had kidney stones  Tell your doctor if you have had problems with too much calcium (hypercalcemia) or too little calcium in your blood (hypocalcemia)  Some health problems that can cause hypercalcemia are sarcoidosis, or problems with your parathyroid gland  · You should not use certain brands of this medicine if you have kidney disease or are on dialysis, because they may harm your kidneys  Ask your caregiver what brands are best for you  · Some health problems can affect how much calcium you should take  Tell your doctor if you have stomach or digestion problems, such as on-going diarrhea, not absorbing nutrients properly, or not having enough acid in your stomach  · This medicine might contain phenylalanine (aspartame)  This is only a concern if you have a disorder called phenylketonuria (a problem with amino acids)  If you have this condition, talk to your doctor before using this medicine  · If you are using a large amount of calcium or using it for a long time, your doctor might need to check your blood on a regular basis  Be sure to keep all appointments  Possible Side Effects While Using This Medicine:   Call your doctor right away if you notice any of these side effects:  · Headache that will not go away, dry mouth, loss of appetite, severe constipation  If you notice other side effects that you think are caused by this medicine, tell your doctor  Call your doctor for medical advice about side effects  You may report side effects to FDA at 3-518-FDA-3156  © 2017 [employer] Information is for End User's use only and may not be sold, redistributed or otherwise used for commercial purposes  The above information is an  only  It is not intended as medical advice for individual conditions or treatments   Talk to your doctor, nurse or pharmacist before following any medical regimen to see if it is safe and effective for you

## 2020-02-07 NOTE — PROGRESS NOTES
Diagnoses and all orders for this visit:    1  Encounter for gynecological examination without abnormal finding    -     Liquid-based pap, screening; Future  -     Liquid-based pap, screening  Pap collected today, discussed new guidelines  Healthy eating and nutrition, daily exercise discussed and SBE reinforced  Call with any issues and all questions and concerns addressed  2  H/O bilateral oophorectomy/3  H/O bilateral mastectomy  Discussed calcium and vitamin D daily intake  Other orders  -     calcium acetate (PHOSLO) 667 mg capsule; Take 1,334 mg by mouth 3 (three) times a day with meals  -     glucosamine-chondroitin 500-400 MG tablet; Take 1 tablet by mouth 3 (three) times a day  -     multivitamin (THERAGRAN) TABS; Take 1 tablet by mouth daily            All questions and concerns answered  Call with any questions  Pleasant 62 y o  nulliparous, postmenopausal female here for new patient annual exam  She had both ovaries removed and bilateral mastectomy done in in  through [facility] in [facility] because she tested positive for BRCA Gene mutation and her brother  from breast cancer  She denies having any history of cancer herself  She also has a history of alcohol abuse which she openly admits to and states that she is sober 2 years  She recently had cadaver tissue implants placed in 2019 and she likes them, told they would not develop any scar tissue  She feels they are very "flexible and allows her good movement"  She states she does not need any further ultrasounds or mammograms, but she will verify that again since I asked if her lymph nodes were removed in the axilla region and she was unsure  She denies any vaginal bleeding of any kind  Denies vaginal itching, discharge and odor  Denies pelvic pain, breast problems and urinary incontinence  Mild dyspareunia, still sexually active   She is up-to-date on mammogram-may not need any further testing, colonoscopy completed in 2019, next due in 2024 and dexa scan done in 2019, due next year  PAP collected today, last PAP collected in 2015, all PAPs in the past were negative  Vitals:    02/07/20 1055   BP: 102/64   BP Location: Left arm   Patient Position: Sitting   Cuff Size: Standard   Weight: 70 8 kg (156 lb)   Height: 5' 5" (1 651 m)     Body mass index is 25 96 kg/m²  No Known Allergies    Past Medical History:   Diagnosis Date    Anemia     IRON  INFUSIONS    Anxiety     Cancer ([facility] [address] )     breast/ovarian had preventative masectomy/ovary removal    Colon polyp     Depression     Last Assessed 8/27/2015    Hyperlipidemia     Last Assessed 8/7/2015     Past Surgical History:   Procedure Laterality Date    APPENDECTOMY      BILATERAL OOPHORECTOMY      Last Assessed 8/27/2015    BREAST RECONSTRUCTION      Last Assessed 8/27/2015    CHOLECYSTECTOMY      COLONOSCOPY      MASTECTOMY Bilateral     Radical; Last Assessed 8/27/2015    MI COLONOSCOPY FLX DX W/COLLJ SPEC WHEN PFRMD N/A 4/3/2018    Procedure: COLONOSCOPY;  Surgeon: Hien Hines MD;  [facility];   Service: Gastroenterology    MI LAP,CHOLECYSTECTOMY N/A 10/5/2017    Procedure: Sergio Bejnamin;  Surgeon: Samantha Gallardo MD;  [facility];  Service: General    TONSILLECTOMY       Family History   Problem Relation Age of Onset    Breast cancer Mother     Ovarian cancer Mother         unspecified laterality    Heart failure Father     Breast cancer Maternal Aunt     Colon cancer Neg Hx     Cervical cancer Neg Hx     Uterine cancer Neg Hx      Social History     Tobacco Use    Smoking status: Never Smoker    Smokeless tobacco: Never Used   Substance Use Topics    Alcohol use: Not Currently     Comment: recovering    Drug use: No       Current Outpatient Medications:     calcium acetate (PHOSLO) 667 mg capsule, Take 1,334 mg by mouth 3 (three) times a day with meals, Disp: , Rfl:     cholecalciferol (VITAMIN D3) 1,000 units tablet, Take by mouth, Disp: , Rfl:     gabapentin (NEURONTIN) 300 mg capsule, TAKE 1 CAPSULE IN THE AM AND 1 CAPSULE IN THE AFTERNOON AND 2 CAPSULES AT BEDTIME, Disp: 120 capsule, Rfl: 5    glucosamine-chondroitin 500-400 MG tablet, Take 1 tablet by mouth 3 (three) times a day, Disp: , Rfl:     multivitamin (THERAGRAN) TABS, Take 1 tablet by mouth daily, Disp: , Rfl:     OB History    Para Term  AB Living   0 0 0 0 0 0   SAB TAB Ectopic Multiple Live Births   0 0 0 0 0             Review of Systems   Constitutional: Negative for chills, fatigue, fever and unexpected weight change  Respiratory: Negative for shortness of breath  Gastrointestinal: Negative for anal bleeding, blood in stool, constipation and diarrhea  Genitourinary: Negative for difficulty urinating, dysuria and hematuria  OBGyn Exam     Physical Exam   Constitutional: She appears well-developed and well-nourished  No distress  Head: Normocephalic  Neck: Normal range of motion  Neck supple  Pulmonary: Effort normal   Breasts: Scarring, incisions on both sides, hx of complete masectomy  Abdominal: Soft  Non-tender, incision scar from appy  Pelvic exam was performed with patient supine  No labial fusion, thinning or leukoplakia  There is no rash, tenderness, lesion or injury on the right labia  There is no rash, tenderness, lesion or injury on the left labia  Uterus is not deviated, not enlarged, not fixed and not tender  Ureathral meatus w/o pus or discharge during bladder palpation and urethral stripping  Cervix exhibits no motion tenderness, no discharge and no friability, stenotic os: yes  Right adnexum displays no mass, no tenderness and no fullness  Left adnexum displays no mass, no tenderness and no fullness  No erythema or tenderness in the vagina, R/T vaginal dryness  No signs of atrophy, erosion, shortening and/or tightening of vaginal canal  No foreign body in the vagina   No signs of injury around the vagina  No vaginal discharge found  PAP collected w/o difficulty    Prolapse: None  Lymphadenopathy:          Right: No inguinal adenopathy present  Left: No inguinal adenopathy present  Perineum and anal area w/o lesions hemorrhoids or noticeable bleeding

## 2020-02-10 LAB
HPV HR 12 DNA CVX QL NAA+PROBE: NEGATIVE
HPV16 DNA CVX QL NAA+PROBE: NEGATIVE
HPV18 DNA CVX QL NAA+PROBE: NEGATIVE

## 2020-02-13 LAB
LAB AP GYN PRIMARY INTERPRETATION: NORMAL
Lab: NORMAL

## 2020-02-18 ENCOUNTER — OFFICE VISIT (OUTPATIENT)
Dept: INTERNAL MEDICINE CLINIC | Facility: CLINIC | Age: 65
End: 2020-02-18
Payer: COMMERCIAL

## 2020-02-18 VITALS
SYSTOLIC BLOOD PRESSURE: 102 MMHG | WEIGHT: 157.2 LBS | HEIGHT: 65 IN | BODY MASS INDEX: 26.19 KG/M2 | RESPIRATION RATE: 14 BRPM | HEART RATE: 80 BPM | DIASTOLIC BLOOD PRESSURE: 62 MMHG

## 2020-02-18 DIAGNOSIS — E55.9 MILD VITAMIN D DEFICIENCY: ICD-10-CM

## 2020-02-18 DIAGNOSIS — E78.5 HYPERLIPIDEMIA, UNSPECIFIED HYPERLIPIDEMIA TYPE: ICD-10-CM

## 2020-02-18 DIAGNOSIS — E55.9 VITAMIN D DEFICIENCY: ICD-10-CM

## 2020-02-18 DIAGNOSIS — R73.01 IMPAIRED FASTING BLOOD SUGAR: ICD-10-CM

## 2020-02-18 DIAGNOSIS — F10.20 ALCOHOLISM (HCC): Primary | ICD-10-CM

## 2020-02-18 DIAGNOSIS — D50.8 OTHER IRON DEFICIENCY ANEMIA: ICD-10-CM

## 2020-02-18 PROBLEM — F10.239 ALCOHOL WITHDRAWAL SEIZURE WITH COMPLICATION (HCC): Status: RESOLVED | Noted: 2019-02-02 | Resolved: 2020-02-18

## 2020-02-18 PROBLEM — R56.9 ALCOHOL WITHDRAWAL SEIZURE WITH COMPLICATION (HCC): Status: RESOLVED | Noted: 2019-02-02 | Resolved: 2020-02-18

## 2020-02-18 PROBLEM — D61.818 PANCYTOPENIA (HCC): Status: RESOLVED | Noted: 2019-02-02 | Resolved: 2020-02-18

## 2020-02-18 PROBLEM — F10.939 ALCOHOL WITHDRAWAL SEIZURE WITH COMPLICATION (HCC): Status: RESOLVED | Noted: 2019-02-02 | Resolved: 2020-02-18

## 2020-02-18 PROCEDURE — 99214 OFFICE O/P EST MOD 30 MIN: CPT | Performed by: NURSE PRACTITIONER

## 2020-02-18 PROCEDURE — 3008F BODY MASS INDEX DOCD: CPT | Performed by: NURSE PRACTITIONER

## 2020-02-18 PROCEDURE — 1036F TOBACCO NON-USER: CPT | Performed by: NURSE PRACTITIONER

## 2020-02-18 NOTE — PROGRESS NOTES
Assessment/Plan:    Patient Instructions   ETOH has not had a drink since January 2019 congratulations! Hyperlipidemia  Off cholesterol medication since fall  Has a slight rise in LDL although her cardiovascular risk score still remains only 2 7%  At this time will continue lifestyle modifications diet exercise weight loss recheck labs 6 months      Lower back pain  Stable without home physical therapy     health maintenance  Recommend new shingles vaccine otherwise up-to-date           Diagnoses and all orders for this visit:    Alcoholism ([address] )  -     calcium carbonate-vitamin D (OSCAL-D) 500 mg-200 units per tablet; Take 1 tablet by mouth 2 (two) times a day with meals    Hyperlipidemia, unspecified hyperlipidemia type  -     Lipid panel; Future  -     TSH, 3rd generation with Free T4 reflex; Future    Mild vitamin D deficiency    Other iron deficiency anemia  -     CBC and differential; Future  -     Comprehensive metabolic panel; Future    Impaired fasting blood sugar  -     Hemoglobin A1C; Future    Vitamin D deficiency  -     Vitamin D 25 hydroxy;  Future    Other orders  -     TURMERIC PO; Take by mouth 2 (two) times a day          Subjective:      Patient ID: Mae Jara is a 62 y o  female     Generally feeling well   has not had a drink since last February   part-time job at [employer] to keep her busy  Doing at home PT for lower back   Going back on [employer]  Going back to gym   Off chol meds x 6 months        Current Outpatient Medications:     cholecalciferol (VITAMIN D3) 1,000 units tablet, Take by mouth, Disp: , Rfl:     gabapentin (NEURONTIN) 300 mg capsule, TAKE 1 CAPSULE IN THE AM AND 1 CAPSULE IN THE AFTERNOON AND 2 CAPSULES AT BEDTIME (Patient taking differently: 2 (two) times a day TAKE 1 CAPSULE IN THE AM AND 1 CAPSULE IN THE AFTERNOON AND 2 CAPSULES AT BEDTIME), Disp: 120 capsule, Rfl: 5    glucosamine-chondroitin 500-400 MG tablet, Take 1 tablet by mouth 3 (three) times a day, Disp: , Rfl:     multivitamin (THERAGRAN) TABS, Take 1 tablet by mouth daily, Disp: , Rfl:     TURMERIC PO, Take by mouth 2 (two) times a day , Disp: , Rfl:     calcium carbonate-vitamin D (OSCAL-D) 500 mg-200 units per tablet, Take 1 tablet by mouth 2 (two) times a day with meals, Disp: , Rfl:     Recent Results (from the past 1008 hour(s))   Lipid panel    Collection Time: 01/30/20 10:22 AM   Result Value Ref Range    Cholesterol 244 (H) 50 - 200 mg/dL    Triglycerides 121 <=150 mg/dL    HDL, Direct 80 >=40 mg/dL    LDL Calculated 140 (H) 0 - 100 mg/dL    Non-HDL-Chol (CHOL-HDL) 164 mg/dl   Hemoglobin A1C    Collection Time: 01/30/20 10:22 AM   Result Value Ref Range    Hemoglobin A1C 5 2 4 2 - 6 3 %     mg/dl   TSH, 3rd generation with Free T4 reflex    Collection Time: 01/30/20 10:22 AM   Result Value Ref Range    TSH 3RD GENERATON 1 170 0 358 - 3 740 uIU/mL   Comprehensive metabolic panel (CMP)    Collection Time: 01/30/20 10:22 AM   Result Value Ref Range    Sodium 138 136 - 145 mmol/L    Potassium 4 2 3 5 - 5 3 mmol/L    Chloride 107 100 - 108 mmol/L    CO2 28 21 - 32 mmol/L    ANION GAP 3 (L) 4 - 13 mmol/L    BUN 13 5 - 25 mg/dL    Creatinine 0 52 (L) 0 60 - 1 30 mg/dL    Glucose 83 65 - 140 mg/dL    Calcium 9 9 8 3 - 10 1 mg/dL    AST 14 5 - 45 U/L    ALT 28 12 - 78 U/L    Alkaline Phosphatase 50 46 - 116 U/L    Total Protein 6 9 6 4 - 8 2 g/dL    Albumin 3 9 3 5 - 5 0 g/dL    Total Bilirubin 0 53 0 20 - 1 00 mg/dL    eGFR 102 ml/min/1 73sq m   CBC and differential    Collection Time: 01/30/20 10:22 AM   Result Value Ref Range    WBC 4 17 (L) 4 31 - 10 16 Thousand/uL    RBC 4 31 3 81 - 5 12 Million/uL    Hemoglobin 13 3 11 5 - 15 4 g/dL    Hematocrit 41 7 34 8 - 46 1 %    MCV 97 82 - 98 fL    MCH 30 9 26 8 - 34 3 pg    MCHC 31 9 31 4 - 37 4 g/dL    RDW 12 5 11 6 - 15 1 %    MPV 9 5 8 9 - 12 7 fL    Platelets 005 485 - 156 Thousands/uL    nRBC 0 /100 WBCs    Neutrophils Relative 60 43 - 75 % Immat GRANS % 0 0 - 2 %    Lymphocytes Relative 29 14 - 44 %    Monocytes Relative 7 4 - 12 %    Eosinophils Relative 2 0 - 6 %    Basophils Relative 2 (H) 0 - 1 %    Neutrophils Absolute 2 51 1 85 - 7 62 Thousands/µL    Immature Grans Absolute 0 01 0 00 - 0 20 Thousand/uL    Lymphocytes Absolute 1 22 0 60 - 4 47 Thousands/µL    Monocytes Absolute 0 29 0 17 - 1 22 Thousand/µL    Eosinophils Absolute 0 07 0 00 - 0 61 Thousand/µL    Basophils Absolute 0 07 0 00 - 0 10 Thousands/µL   Iron Saturation %    Collection Time: 01/30/20 10:22 AM   Result Value Ref Range    Iron Saturation 44 %    TIBC 313 250 - 450 ug/dL    Iron 139 50 - 170 ug/dL   Ferritin    Collection Time: 01/30/20 10:22 AM   Result Value Ref Range    Ferritin 26 8 - 388 ng/mL   Liquid-based pap, screening    Collection Time: 02/07/20 11:23 AM   Result Value Ref Range    Case Report       Gynecologic Cytology Report                       Case: [id number]                                  Authorizing Provider:  SHERLY Damon       Collected:           02/07/2020 [facility]              Ordering Location:     [facility]     Received:            02/07/2020 [facility]                                     [facility]                                                                               [place]                                                                       First Screen:          RA Johnson                                                    Rescreen:              RA Blair                                                         Specimen:    LIQUID-BASED PAP, SCREENING, Cervix                                                        Primary Interpretation Negative for intraepithelial lesion or malignancy     Specimen Adequacy       Satisfactory for evaluation  Endocervical/transformation zone component present      Additional Information       [address] FDA approved ,  and ThinPrep Imaging Duo System are utilized with strict adherence to the 's instruction manual to prepare gynecologic and non-gynecologic cytology specimens for the production of ThinPrep slides as well as for gynecologic ThinPrep imaging  These processes have been validated by our laboratory and/or by the   The Pap test is not a diagnostic procedure and should not be used as the sole means to detect cervical cancer  It is only a screening procedure to aid in the detection of cervical cancer and its precursors  Both false-negative and false-positive results have been experienced  Your patient's test result should be interpreted in this context together with the history and clinical findings  HPV High Risk    Collection Time: 02/07/20 11:23 AM   Result Value Ref Range    HPV Other HR Negative Negative    HPV16 Negative Negative    HPV18 Negative Negative       The following portions of the patient's history were reviewed and updated as appropriate: allergies, current medications, past family history, past medical history, past social history, past surgical history and problem list      Review of Systems   Constitutional: Negative for appetite change, chills, diaphoresis, fatigue, fever and unexpected weight change  HENT: Negative for postnasal drip and sneezing  Eyes: Negative for visual disturbance  Respiratory: Negative for chest tightness and shortness of breath  Cardiovascular: Negative for chest pain, palpitations and leg swelling  Gastrointestinal: Negative for abdominal pain and blood in stool  Endocrine: Negative for cold intolerance, heat intolerance, polydipsia, polyphagia and polyuria  Genitourinary: Negative for difficulty urinating, dysuria, frequency and urgency  Musculoskeletal: Negative for arthralgias and myalgias  Skin: Negative for rash and wound  Neurological: Negative for dizziness, weakness, light-headedness and headaches  Hematological: Negative for adenopathy  Psychiatric/Behavioral: Negative for confusion, dysphoric mood and sleep disturbance  The patient is not nervous/anxious  Objective:      /62 (BP Location: Left arm, Patient Position: Sitting, Cuff Size: Standard)   Pulse 80   Resp 14   Ht 5' 5" (1 651 m)   Wt 71 3 kg (157 lb 3 2 oz)   BMI 26 16 kg/m²        Physical Exam   Constitutional: She is oriented to person, place, and time  She appears well-developed  No distress  HENT:   Head: Normocephalic and atraumatic  Nose: Nose normal    Mouth/Throat: Oropharynx is clear and moist    Eyes: Pupils are equal, round, and reactive to light  Conjunctivae and EOM are normal    Neck: Normal range of motion  Neck supple  No JVD present  No tracheal deviation present  No thyromegaly present  Cardiovascular: Normal rate, regular rhythm, normal heart sounds and intact distal pulses  Exam reveals no gallop and no friction rub  No murmur heard  Pulmonary/Chest: Effort normal and breath sounds normal  No respiratory distress  She has no wheezes  She has no rales  Abdominal: Soft  Bowel sounds are normal  She exhibits no distension  There is no tenderness  Musculoskeletal: Normal range of motion  She exhibits no edema  Lymphadenopathy:     She has no cervical adenopathy  Neurological: She is alert and oriented to person, place, and time  No cranial nerve deficit  Skin: Skin is warm and dry  No rash noted  She is not diaphoretic  Psychiatric: She has a normal mood and affect  Her behavior is normal  Judgment and thought content normal    BMI Counseling: Body mass index is 26 16 kg/m²  The BMI is above normal  Nutrition recommendations include decreasing portion sizes and consuming healthier snacks  Exercise recommendations include exercising 3-5 times per week  No pharmacotherapy was ordered

## 2020-02-18 NOTE — PATIENT INSTRUCTIONS
ETOH has not had a drink since January 2019 congratulations! Hyperlipidemia  Off cholesterol medication since fall  Has a slight rise in LDL although her cardiovascular risk score still remains only 2 7%    At this time will continue lifestyle modifications diet exercise weight loss recheck labs 6 months      Lower back pain  Stable without home physical therapy     health maintenance  Recommend new shingles vaccine otherwise up-to-date

## 2020-03-23 ENCOUNTER — TELEPHONE (OUTPATIENT)
Dept: INTERNAL MEDICINE CLINIC | Facility: CLINIC | Age: 65
End: 2020-03-23

## 2020-03-23 NOTE — TELEPHONE ENCOUNTER
Leon Todd is out, I am not sure when she is coming back    She would need some form of visit with me

## 2020-03-23 NOTE — TELEPHONE ENCOUNTER
Patient  has been at work and this patient has been home alone  She wanted to know she has been having anxiety badly lately since she is by herself could Dr Maribell Escamilla or Oskar Chaudhry send a medication to her pharmacy to help her  The patient is on a sleeping medication and she is sleeping well she would like something for during the day  Please call the patient at [phone] to let her know if this can be done            Ph[facility]

## 2020-03-25 ENCOUNTER — TELEPHONE (OUTPATIENT)
Dept: INTERNAL MEDICINE CLINIC | Facility: CLINIC | Age: 65
End: 2020-03-25

## 2020-03-25 ENCOUNTER — TELEMEDICINE (OUTPATIENT)
Dept: INTERNAL MEDICINE CLINIC | Facility: CLINIC | Age: 65
End: 2020-03-25
Payer: COMMERCIAL

## 2020-03-25 DIAGNOSIS — F41.9 ANXIETY: Primary | ICD-10-CM

## 2020-03-25 PROCEDURE — 99213 OFFICE O/P EST LOW 20 MIN: CPT | Performed by: INTERNAL MEDICINE

## 2020-03-25 RX ORDER — ESCITALOPRAM OXALATE 10 MG/1
10 TABLET ORAL DAILY
Qty: 30 TABLET | Refills: 5 | Status: SHIPPED | OUTPATIENT
Start: 2020-03-25 | End: 2020-10-04

## 2020-03-25 NOTE — PROGRESS NOTES
Virtual Regular Visit    Problem List Items Addressed This Visit        Other    Anxiety - Primary    Relevant Medications    escitalopram (LEXAPRO) 10 mg tablet               Reason for visit is anxiety    Encounter provider Vlad Guardado MD    Provider located at [address] [place] [address]      Recent Visits  Date Type Provider Dept   03/23/20 Telephone Susanne 84 recent visits within past 7 days and meeting all other requirements     Future Appointments  No visits were found meeting these conditions  Showing future appointments within next 150 days and meeting all other requirements        After connecting through [email or web address], the patient was identified by name and date of birth  Gertrudis Pruett was informed that this is a telemedicine visit and that the visit is being conducted through telephone which may not be secure and therefore, might not be HIPAA-compliant  My office door was closed  No one else was in the room  She acknowledged consent and understanding of privacy and security of the video platform  The patient has agreed to participate and understands they can discontinue the visit at any time  Cherisse Schwab is a 62 y o  female who has been experiencing some anxiety lately related to the corona virus hand de mik  Her 's been stuck in [place], working there and she has more time at home to think and worry about things  So far her sleep has not been disturbed  She is not feeling sad, depressed, tearful or suicidal   She has not had a drink in over a year  She has never used benzodiazepines but does remember being on Zoloft years ago, she cannot recall why she stopped it  She uses gabapentin only if she is feeling restless before she goes to bed and it does help calm her down to go to sleep         Past Medical History:   Diagnosis Date    Anemia     IRON  INFUSIONS    Anxiety     [facility])     breast/ovarian had preventative masectomy/ovary removal    Colon polyp     Depression     Last Assessed 8/27/2015    H/O total mastectomy     Hyperlipidemia     Last Assessed 8/7/2015       Past Surgical History:   Procedure Laterality Date    APPENDECTOMY      BILATERAL OOPHORECTOMY      Last Assessed 8/27/2015    BREAST RECONSTRUCTION      Last Assessed 8/27/2015    CHOLECYSTECTOMY      COLONOSCOPY      MASTECTOMY Bilateral     Radical; Last Assessed 8/27/2015    VT COLONOSCOPY FLX DX W/COLLJ SPEC WHEN PFRMD N/A 4/3/2018    Procedure: COLONOSCOPY;  Surgeon: Daina Shea MD;  [facility]; Service: Gastroenterology    VT LAP,CHOLECYSTECTOMY N/A 10/5/2017    Procedure: Shraddha Carrasco;  Surgeon: Jakub Martino MD;  [facility];  Service: General    TONSILLECTOMY         Current Outpatient Medications   Medication Sig Dispense Refill    calcium carbonate-vitamin D (OSCAL-D) 500 mg-200 units per tablet Take 1 tablet by mouth 2 (two) times a day with meals      cholecalciferol (VITAMIN D3) 1,000 units tablet Take by mouth      gabapentin (NEURONTIN) 300 mg capsule TAKE 1 CAPSULE IN THE AM AND 1 CAPSULE IN THE AFTERNOON AND 2 CAPSULES AT BEDTIME (Patient taking differently: 2 (two) times a day TAKE 1 CAPSULE IN THE AM AND 1 CAPSULE IN THE AFTERNOON AND 2 CAPSULES AT BEDTIME) 120 capsule 5    glucosamine-chondroitin 500-400 MG tablet Take 1 tablet by mouth 3 (three) times a day      multivitamin (THERAGRAN) TABS Take 1 tablet by mouth daily      TURMERIC PO Take by mouth 2 (two) times a day       escitalopram (LEXAPRO) 10 mg tablet Take 1 tablet (10 mg total) by mouth daily 30 tablet 5     No current facility-administered medications for this visit  No Known Allergies    Review of Systems   Constitutional: Negative for appetite change, chills, diaphoresis, fatigue, fever and unexpected weight change  HENT: Negative for congestion, hearing loss and rhinorrhea  Eyes: Negative for visual disturbance  Respiratory: Negative for cough, chest tightness, shortness of breath and wheezing  Cardiovascular: Negative for chest pain, palpitations and leg swelling  Gastrointestinal: Negative for abdominal pain and blood in stool  Endocrine: Negative for cold intolerance, heat intolerance, polydipsia and polyuria  Genitourinary: Negative for difficulty urinating, dysuria, frequency and urgency  Musculoskeletal: Negative for arthralgias and myalgias  Skin: Negative for rash  Neurological: Negative for dizziness, weakness, light-headedness and headaches  Hematological: Does not bruise/bleed easily  Psychiatric/Behavioral: Negative for dysphoric mood and sleep disturbance  The patient is nervous/anxious  Assessment/plan:    Anxiety-patient has history of alcoholism in has been sober for over 1 year  We discussed implications of cross tolerance and risk for abuse, dependency with benzodiazepines and I have suggested we not go down that road  Instead we discussed pros and cons of SSRIs and decided to start Lexapro 5 mg daily for the 1st 4-10 days and then increase to 10 mg daily and follow-up by phone or in person in 1 month      I spent 16 minutes with the patient during this visit

## 2020-05-12 ENCOUNTER — TELEMEDICINE (OUTPATIENT)
Dept: INTERNAL MEDICINE CLINIC | Facility: CLINIC | Age: 65
End: 2020-05-12
Payer: COMMERCIAL

## 2020-05-12 VITALS
WEIGHT: 156.4 LBS | HEART RATE: 80 BPM | RESPIRATION RATE: 12 BRPM | DIASTOLIC BLOOD PRESSURE: 72 MMHG | SYSTOLIC BLOOD PRESSURE: 118 MMHG | TEMPERATURE: 96.7 F | BODY MASS INDEX: 26.06 KG/M2 | HEIGHT: 65 IN

## 2020-05-12 DIAGNOSIS — F41.9 ANXIETY: ICD-10-CM

## 2020-05-12 DIAGNOSIS — E78.5 HYPERLIPIDEMIA, UNSPECIFIED HYPERLIPIDEMIA TYPE: ICD-10-CM

## 2020-05-12 DIAGNOSIS — M18.12 PRIMARY OSTEOARTHRITIS OF FIRST CARPOMETACARPAL JOINT OF LEFT HAND: Primary | ICD-10-CM

## 2020-05-12 DIAGNOSIS — F33.9 RECURRENT MAJOR DEPRESSIVE DISORDER, REMISSION STATUS UNSPECIFIED (HCC): ICD-10-CM

## 2020-05-12 PROCEDURE — 1036F TOBACCO NON-USER: CPT | Performed by: INTERNAL MEDICINE

## 2020-05-12 PROCEDURE — 3008F BODY MASS INDEX DOCD: CPT | Performed by: INTERNAL MEDICINE

## 2020-05-12 PROCEDURE — 99214 OFFICE O/P EST MOD 30 MIN: CPT | Performed by: INTERNAL MEDICINE

## 2020-06-11 ENCOUNTER — OFFICE VISIT (OUTPATIENT)
Dept: OBGYN CLINIC | Facility: CLINIC | Age: 65
End: 2020-06-11
Payer: COMMERCIAL

## 2020-06-11 VITALS
HEART RATE: 80 BPM | BODY MASS INDEX: 26.06 KG/M2 | SYSTOLIC BLOOD PRESSURE: 96 MMHG | HEIGHT: 65 IN | DIASTOLIC BLOOD PRESSURE: 63 MMHG | WEIGHT: 156.4 LBS

## 2020-06-11 DIAGNOSIS — M77.12 LATERAL EPICONDYLITIS OF LEFT ELBOW: Primary | ICD-10-CM

## 2020-06-11 DIAGNOSIS — M18.11 PRIMARY OSTEOARTHRITIS OF FIRST CARPOMETACARPAL JOINT OF RIGHT HAND: ICD-10-CM

## 2020-06-11 PROCEDURE — 20551 NJX 1 TENDON ORIGIN/INSJ: CPT | Performed by: ORTHOPAEDIC SURGERY

## 2020-06-11 PROCEDURE — 1036F TOBACCO NON-USER: CPT | Performed by: ORTHOPAEDIC SURGERY

## 2020-06-11 PROCEDURE — 20600 DRAIN/INJ JOINT/BURSA W/O US: CPT | Performed by: ORTHOPAEDIC SURGERY

## 2020-06-11 PROCEDURE — 3008F BODY MASS INDEX DOCD: CPT | Performed by: ORTHOPAEDIC SURGERY

## 2020-06-11 PROCEDURE — 99213 OFFICE O/P EST LOW 20 MIN: CPT | Performed by: ORTHOPAEDIC SURGERY

## 2020-06-11 RX ORDER — TRIAMCINOLONE ACETONIDE 40 MG/ML
20 INJECTION, SUSPENSION INTRA-ARTICULAR; INTRAMUSCULAR
Status: COMPLETED | OUTPATIENT
Start: 2020-06-11 | End: 2020-06-11

## 2020-06-11 RX ORDER — TRIAMCINOLONE ACETONIDE 40 MG/ML
40 INJECTION, SUSPENSION INTRA-ARTICULAR; INTRAMUSCULAR
Status: COMPLETED | OUTPATIENT
Start: 2020-06-11 | End: 2020-06-11

## 2020-06-11 RX ORDER — LIDOCAINE HYDROCHLORIDE 10 MG/ML
2.5 INJECTION, SOLUTION INFILTRATION; PERINEURAL
Status: COMPLETED | OUTPATIENT
Start: 2020-06-11 | End: 2020-06-11

## 2020-06-11 RX ORDER — LIDOCAINE HYDROCHLORIDE 10 MG/ML
1 INJECTION, SOLUTION INFILTRATION; PERINEURAL
Status: COMPLETED | OUTPATIENT
Start: 2020-06-11 | End: 2020-06-11

## 2020-06-11 RX ADMIN — TRIAMCINOLONE ACETONIDE 20 MG: 40 INJECTION, SUSPENSION INTRA-ARTICULAR; INTRAMUSCULAR at 09:45

## 2020-06-11 RX ADMIN — LIDOCAINE HYDROCHLORIDE 2.5 ML: 10 INJECTION, SOLUTION INFILTRATION; PERINEURAL at 09:45

## 2020-06-11 RX ADMIN — Medication 0.05 MEQ: at 09:45

## 2020-06-11 RX ADMIN — LIDOCAINE HYDROCHLORIDE 1 ML: 10 INJECTION, SOLUTION INFILTRATION; PERINEURAL at 09:45

## 2020-06-11 RX ADMIN — TRIAMCINOLONE ACETONIDE 40 MG: 40 INJECTION, SUSPENSION INTRA-ARTICULAR; INTRAMUSCULAR at 09:45

## 2020-07-06 ENCOUNTER — TELEPHONE (OUTPATIENT)
Dept: INTERNAL MEDICINE CLINIC | Facility: CLINIC | Age: 65
End: 2020-07-06

## 2020-07-06 NOTE — TELEPHONE ENCOUNTER
Pt scheduled for tomorrow  Wanted to be seen in the office  States she has anxiety with work  1  Do you presently have a fever or flu-like symptoms? No  2  Do you have symptoms of an upper respiratory infection like runny nose, sore throat, or cough? No  3  Are you suffering from new headache that you have not had in the past?  No  4  Do you have/have you experienced any new shortness of breath recently? No  5  Do you have any new diarrhea, nausea or vomiting? No  6  Have you been in contact with anyone who has been sick or diagnosed with COVID-19?  No

## 2020-07-06 NOTE — TELEPHONE ENCOUNTER
Pt has some concerns about going back to work   Please call her  On anxiety med; dealing with stress    Call her on her mobile p#

## 2020-07-07 ENCOUNTER — OFFICE VISIT (OUTPATIENT)
Dept: INTERNAL MEDICINE CLINIC | Facility: CLINIC | Age: 65
End: 2020-07-07
Payer: COMMERCIAL

## 2020-07-07 VITALS
SYSTOLIC BLOOD PRESSURE: 120 MMHG | BODY MASS INDEX: 25.83 KG/M2 | HEART RATE: 72 BPM | HEIGHT: 65 IN | WEIGHT: 155 LBS | TEMPERATURE: 97.5 F | DIASTOLIC BLOOD PRESSURE: 74 MMHG | RESPIRATION RATE: 14 BRPM

## 2020-07-07 DIAGNOSIS — F41.9 ANXIETY: Primary | ICD-10-CM

## 2020-07-07 PROCEDURE — 1036F TOBACCO NON-USER: CPT | Performed by: NURSE PRACTITIONER

## 2020-07-07 PROCEDURE — 3008F BODY MASS INDEX DOCD: CPT | Performed by: NURSE PRACTITIONER

## 2020-07-07 PROCEDURE — 99213 OFFICE O/P EST LOW 20 MIN: CPT | Performed by: NURSE PRACTITIONER

## 2020-07-07 RX ORDER — BUSPIRONE HYDROCHLORIDE 5 MG/1
5 TABLET ORAL 2 TIMES DAILY
Qty: 60 TABLET | Refills: 5 | Status: SHIPPED | OUTPATIENT
Start: 2020-07-07 | End: 2021-04-02

## 2020-07-07 NOTE — LETTER
July 7, 2020     Patient: Uma Gomez   YOB: 1955   Date of Visit: 7/7/2020       To Whom it May Concern:    Uma Gomez is under my professional care  She was seen in my office on 7/7/2020  She may return to work on 7/8  If you have any questions or concerns, please don't hesitate to call           Sincerely,          SHERLY Zheng        CC: No Recipients

## 2020-07-07 NOTE — PROGRESS NOTES
Assessment/Plan:    Patient Instructions    General anxiety with underlying history of alcohol abuse  Patient is requesting something that she can use as needed for anxiety, I discussed that we should absolutely avoid any type of benzodiazepine that can be habit forming  She agrees  We have agreed on BuSpar she can take 1/2 to 1 whole tablet twice a day she will have better effective she is taking it on a routine basis  She should continue Lexapro  Follow-up in 1 month  Additionally patient is working at from [employer] and scheduled to work only 2 days a week  She feels that she is being taken advantage of and they are making her work 4 days a week  She is concerned about the environment that they are not following CDC guidelines to prevent spread of disease  She may need documentation limiting her to working only so many days per week and to wear appropriate masks  Diagnoses and all orders for this visit:    Anxiety  -     busPIRone (BUSPAR) 5 mg tablet; Take 1 tablet (5 mg total) by mouth 2 (two) times a day         Subjective:      Patient ID: Dora Carter is a 60 y o  female     Patient has always had anxiety, in the beginning of pandemic she had a tremendous amount so she was placed on Lexapro  For the most it keeps her at an even keel  But now things are getting bad again and she is still required to work and work is putting a lot of stress on her because they are short staffed, and she is feeling more anxious she is good on her days off but when she has to go to work she has a tremendous amount of anxiety and does not feel like she can focus and do what she needs to do  She would like something that she she take on as needed  She does have history of alcohol abuse wants to avoid anything that is habit forming          Current Outpatient Medications:     calcium carbonate-vitamin D (OSCAL-D) 500 mg-200 units per tablet, Take 1 tablet by mouth 2 (two) times a day with meals, Disp: , Rfl:     escitalopram (LEXAPRO) 10 mg tablet, Take 1 tablet (10 mg total) by mouth daily, Disp: 30 tablet, Rfl: 5    gabapentin (NEURONTIN) 300 mg capsule, TAKE 1 CAPSULE IN THE AM AND 1 CAPSULE IN THE AFTERNOON AND 2 CAPSULES AT BEDTIME (Patient taking differently: 2 (two) times a day TAKE 1 CAPSULE IN THE AM AND 1 CAPSULE IN THE AFTERNOON AND 2 CAPSULES AT BEDTIME), Disp: 120 capsule, Rfl: 5    glucosamine-chondroitin 500-400 MG tablet, Take 1 tablet by mouth 3 (three) times a day, Disp: , Rfl:     multivitamin (THERAGRAN) TABS, Take 1 tablet by mouth daily, Disp: , Rfl:     TURMERIC PO, Take by mouth 2 (two) times a day , Disp: , Rfl:     busPIRone (BUSPAR) 5 mg tablet, Take 1 tablet (5 mg total) by mouth 2 (two) times a day, Disp: 60 tablet, Rfl: 5    No results found for this or any previous visit (from the past 1008 hour(s))  The following portions of the patient's history were reviewed and updated as appropriate: allergies, current medications, past family history, past medical history, past social history, past surgical history and problem list      Review of Systems   Constitutional: Negative for appetite change, chills, diaphoresis, fatigue, fever and unexpected weight change  HENT: Negative for postnasal drip and sneezing  Eyes: Negative for visual disturbance  Respiratory: Negative for chest tightness and shortness of breath  Cardiovascular: Negative for chest pain, palpitations and leg swelling  Gastrointestinal: Negative for abdominal pain and blood in stool  Endocrine: Negative for cold intolerance, heat intolerance, polydipsia, polyphagia and polyuria  Genitourinary: Negative for difficulty urinating, dysuria, frequency and urgency  Musculoskeletal: Negative for arthralgias and myalgias  Skin: Negative for rash and wound  Neurological: Negative for dizziness, weakness, light-headedness and headaches  Hematological: Negative for adenopathy  Psychiatric/Behavioral: Negative for confusion, dysphoric mood and sleep disturbance  The patient is not nervous/anxious  Objective:      /74 (BP Location: Left arm, Patient Position: Sitting, Cuff Size: Standard)   Pulse 72   Temp 97 5 °F (36 4 °C) (Tympanic) Comment: no nsaids  Resp 14   Ht 5' 5" (1 651 m)   Wt 70 3 kg (155 lb)   BMI 25 79 kg/m²        Physical Exam   Constitutional: She is oriented to person, place, and time  She appears well-developed  No distress  HENT:   Head: Normocephalic and atraumatic  Nose: Nose normal    Mouth/Throat: Oropharynx is clear and moist    Eyes: Pupils are equal, round, and reactive to light  Conjunctivae and EOM are normal    Neck: Normal range of motion  Neck supple  No JVD present  No tracheal deviation present  No thyromegaly present  Cardiovascular: Normal rate, regular rhythm, normal heart sounds and intact distal pulses  Exam reveals no gallop and no friction rub  No murmur heard  Pulmonary/Chest: Effort normal and breath sounds normal  No respiratory distress  She has no wheezes  She has no rales  Abdominal: Soft  Bowel sounds are normal  She exhibits no distension  There is no tenderness  Musculoskeletal: Normal range of motion  She exhibits no edema  Lymphadenopathy:     She has no cervical adenopathy  Neurological: She is alert and oriented to person, place, and time  No cranial nerve deficit  Skin: Skin is warm and dry  No rash noted  She is not diaphoretic  Psychiatric: She has a normal mood and affect   Her behavior is normal  Judgment and thought content normal

## 2020-07-07 NOTE — PATIENT INSTRUCTIONS
General anxiety with underlying history of alcohol abuse  Patient is requesting something that she can use as needed for anxiety, I discussed that we should absolutely avoid any type of benzodiazepine that can be habit forming  She agrees  We have agreed on BuSpar she can take 1/2 to 1 whole tablet twice a day she will have better effective she is taking it on a routine basis  She should continue Lexapro  Follow-up in 1 month  Additionally patient is working at from [employer] and scheduled to work only 2 days a week  She feels that she is being taken advantage of and they are making her work 4 days a week  She is concerned about the environment that they are not following CDC guidelines to prevent spread of disease  She may need documentation limiting her to working only so many days per week and to wear appropriate masks

## 2020-09-30 ENCOUNTER — APPOINTMENT (OUTPATIENT)
Dept: LAB | Facility: CLINIC | Age: 65
End: 2020-09-30
Payer: COMMERCIAL

## 2020-09-30 DIAGNOSIS — E55.9 VITAMIN D DEFICIENCY: ICD-10-CM

## 2020-09-30 DIAGNOSIS — E78.5 HYPERLIPIDEMIA, UNSPECIFIED HYPERLIPIDEMIA TYPE: ICD-10-CM

## 2020-09-30 DIAGNOSIS — R73.01 IMPAIRED FASTING BLOOD SUGAR: ICD-10-CM

## 2020-09-30 DIAGNOSIS — D50.8 OTHER IRON DEFICIENCY ANEMIA: ICD-10-CM

## 2020-09-30 LAB
25(OH)D3 SERPL-MCNC: 41 NG/ML (ref 30–100)
ALBUMIN SERPL BCP-MCNC: 4 G/DL (ref 3.5–5)
ALP SERPL-CCNC: 53 U/L (ref 46–116)
ALT SERPL W P-5'-P-CCNC: 28 U/L (ref 12–78)
ANION GAP SERPL CALCULATED.3IONS-SCNC: 5 MMOL/L (ref 4–13)
AST SERPL W P-5'-P-CCNC: 17 U/L (ref 5–45)
BASOPHILS # BLD AUTO: 0.07 THOUSANDS/ΜL (ref 0–0.1)
BASOPHILS NFR BLD AUTO: 2 % (ref 0–1)
BILIRUB SERPL-MCNC: 0.48 MG/DL (ref 0.2–1)
BUN SERPL-MCNC: 20 MG/DL (ref 5–25)
CALCIUM SERPL-MCNC: 9.8 MG/DL (ref 8.3–10.1)
CHLORIDE SERPL-SCNC: 107 MMOL/L (ref 100–108)
CHOLEST SERPL-MCNC: 237 MG/DL (ref 50–200)
CO2 SERPL-SCNC: 28 MMOL/L (ref 21–32)
CREAT SERPL-MCNC: 0.54 MG/DL (ref 0.6–1.3)
EOSINOPHIL # BLD AUTO: 0.09 THOUSAND/ΜL (ref 0–0.61)
EOSINOPHIL NFR BLD AUTO: 2 % (ref 0–6)
ERYTHROCYTE [DISTWIDTH] IN BLOOD BY AUTOMATED COUNT: 14.3 % (ref 11.6–15.1)
EST. AVERAGE GLUCOSE BLD GHB EST-MCNC: 108 MG/DL
GFR SERPL CREATININE-BSD FRML MDRD: 100 ML/MIN/1.73SQ M
GLUCOSE P FAST SERPL-MCNC: 85 MG/DL (ref 65–99)
HBA1C MFR BLD: 5.4 %
HCT VFR BLD AUTO: 40.7 % (ref 34.8–46.1)
HDLC SERPL-MCNC: 70 MG/DL
HGB BLD-MCNC: 12.9 G/DL (ref 11.5–15.4)
IMM GRANULOCYTES # BLD AUTO: 0 THOUSAND/UL (ref 0–0.2)
IMM GRANULOCYTES NFR BLD AUTO: 0 % (ref 0–2)
LDLC SERPL CALC-MCNC: 142 MG/DL (ref 0–100)
LYMPHOCYTES # BLD AUTO: 1.27 THOUSANDS/ΜL (ref 0.6–4.47)
LYMPHOCYTES NFR BLD AUTO: 32 % (ref 14–44)
MCH RBC QN AUTO: 28.9 PG (ref 26.8–34.3)
MCHC RBC AUTO-ENTMCNC: 31.7 G/DL (ref 31.4–37.4)
MCV RBC AUTO: 91 FL (ref 82–98)
MONOCYTES # BLD AUTO: 0.41 THOUSAND/ΜL (ref 0.17–1.22)
MONOCYTES NFR BLD AUTO: 10 % (ref 4–12)
NEUTROPHILS # BLD AUTO: 2.15 THOUSANDS/ΜL (ref 1.85–7.62)
NEUTS SEG NFR BLD AUTO: 54 % (ref 43–75)
NONHDLC SERPL-MCNC: 167 MG/DL
NRBC BLD AUTO-RTO: 0 /100 WBCS
PLATELET # BLD AUTO: 203 THOUSANDS/UL (ref 149–390)
PMV BLD AUTO: 9.9 FL (ref 8.9–12.7)
POTASSIUM SERPL-SCNC: 4.3 MMOL/L (ref 3.5–5.3)
PROT SERPL-MCNC: 7.2 G/DL (ref 6.4–8.2)
RBC # BLD AUTO: 4.47 MILLION/UL (ref 3.81–5.12)
SODIUM SERPL-SCNC: 140 MMOL/L (ref 136–145)
TRIGL SERPL-MCNC: 127 MG/DL
TSH SERPL DL<=0.05 MIU/L-ACNC: 0.68 UIU/ML (ref 0.36–3.74)
WBC # BLD AUTO: 3.99 THOUSAND/UL (ref 4.31–10.16)

## 2020-09-30 PROCEDURE — 80061 LIPID PANEL: CPT

## 2020-09-30 PROCEDURE — 85025 COMPLETE CBC W/AUTO DIFF WBC: CPT

## 2020-09-30 PROCEDURE — 83036 HEMOGLOBIN GLYCOSYLATED A1C: CPT

## 2020-09-30 PROCEDURE — 80053 COMPREHEN METABOLIC PANEL: CPT

## 2020-09-30 PROCEDURE — 82306 VITAMIN D 25 HYDROXY: CPT

## 2020-09-30 PROCEDURE — 36415 COLL VENOUS BLD VENIPUNCTURE: CPT

## 2020-09-30 PROCEDURE — 84443 ASSAY THYROID STIM HORMONE: CPT

## 2020-10-04 DIAGNOSIS — F41.9 ANXIETY: ICD-10-CM

## 2020-10-04 RX ORDER — ESCITALOPRAM OXALATE 10 MG/1
TABLET ORAL
Qty: 30 TABLET | Refills: 5 | Status: SHIPPED | OUTPATIENT
Start: 2020-10-04 | End: 2021-04-02

## 2020-10-30 ENCOUNTER — OFFICE VISIT (OUTPATIENT)
Dept: INTERNAL MEDICINE CLINIC | Facility: CLINIC | Age: 65
End: 2020-10-30
Payer: COMMERCIAL

## 2020-10-30 VITALS
WEIGHT: 153 LBS | DIASTOLIC BLOOD PRESSURE: 62 MMHG | HEIGHT: 65 IN | HEART RATE: 74 BPM | TEMPERATURE: 96.7 F | BODY MASS INDEX: 25.49 KG/M2 | SYSTOLIC BLOOD PRESSURE: 92 MMHG | RESPIRATION RATE: 14 BRPM

## 2020-10-30 DIAGNOSIS — R73.01 IMPAIRED FASTING BLOOD SUGAR: Primary | ICD-10-CM

## 2020-10-30 DIAGNOSIS — E78.00 PURE HYPERCHOLESTEROLEMIA: ICD-10-CM

## 2020-10-30 DIAGNOSIS — F41.9 ANXIETY: ICD-10-CM

## 2020-10-30 PROCEDURE — 99214 OFFICE O/P EST MOD 30 MIN: CPT | Performed by: NURSE PRACTITIONER

## 2020-10-30 PROCEDURE — 3008F BODY MASS INDEX DOCD: CPT | Performed by: NURSE PRACTITIONER

## 2020-10-30 PROCEDURE — 3725F SCREEN DEPRESSION PERFORMED: CPT | Performed by: NURSE PRACTITIONER

## 2020-12-03 DIAGNOSIS — M25.50 ARTHRALGIA, UNSPECIFIED JOINT: ICD-10-CM

## 2020-12-03 DIAGNOSIS — F41.9 ANXIETY: ICD-10-CM

## 2020-12-03 DIAGNOSIS — F10.20 ALCOHOLISM (HCC): ICD-10-CM

## 2020-12-03 RX ORDER — GABAPENTIN 300 MG/1
CAPSULE ORAL
Qty: 120 CAPSULE | Refills: 5 | Status: SHIPPED | OUTPATIENT
Start: 2020-12-03 | End: 2021-12-20

## 2021-03-18 ENCOUNTER — TELEPHONE (OUTPATIENT)
Dept: OBGYN CLINIC | Facility: CLINIC | Age: 66
End: 2021-03-18

## 2021-03-18 ENCOUNTER — OFFICE VISIT (OUTPATIENT)
Dept: OBGYN CLINIC | Facility: CLINIC | Age: 66
End: 2021-03-18
Payer: COMMERCIAL

## 2021-03-18 VITALS
HEART RATE: 76 BPM | WEIGHT: 153 LBS | SYSTOLIC BLOOD PRESSURE: 103 MMHG | HEIGHT: 65 IN | BODY MASS INDEX: 25.49 KG/M2 | DIASTOLIC BLOOD PRESSURE: 68 MMHG

## 2021-03-18 DIAGNOSIS — M18.12 PRIMARY OSTEOARTHRITIS OF FIRST CARPOMETACARPAL JOINT OF LEFT HAND: Primary | ICD-10-CM

## 2021-03-18 DIAGNOSIS — M18.11 ARTHRITIS OF CARPOMETACARPAL (CMC) JOINT OF RIGHT THUMB: ICD-10-CM

## 2021-03-18 DIAGNOSIS — M18.12 ARTHRITIS OF CARPOMETACARPAL (CMC) JOINT OF LEFT THUMB: ICD-10-CM

## 2021-03-18 PROCEDURE — 3008F BODY MASS INDEX DOCD: CPT | Performed by: ORTHOPAEDIC SURGERY

## 2021-03-18 PROCEDURE — 99213 OFFICE O/P EST LOW 20 MIN: CPT | Performed by: ORTHOPAEDIC SURGERY

## 2021-03-18 PROCEDURE — 1036F TOBACCO NON-USER: CPT | Performed by: ORTHOPAEDIC SURGERY

## 2021-03-18 PROCEDURE — 20600 DRAIN/INJ JOINT/BURSA W/O US: CPT | Performed by: ORTHOPAEDIC SURGERY

## 2021-03-18 RX ORDER — LIDOCAINE HYDROCHLORIDE 10 MG/ML
2.5 INJECTION, SOLUTION INFILTRATION; PERINEURAL
Status: COMPLETED | OUTPATIENT
Start: 2021-03-18 | End: 2021-03-18

## 2021-03-18 RX ORDER — TRIAMCINOLONE ACETONIDE 40 MG/ML
20 INJECTION, SUSPENSION INTRA-ARTICULAR; INTRAMUSCULAR
Status: COMPLETED | OUTPATIENT
Start: 2021-03-18 | End: 2021-03-18

## 2021-03-18 RX ADMIN — TRIAMCINOLONE ACETONIDE 20 MG: 40 INJECTION, SUSPENSION INTRA-ARTICULAR; INTRAMUSCULAR at 10:36

## 2021-03-18 RX ADMIN — LIDOCAINE HYDROCHLORIDE 2.5 ML: 10 INJECTION, SOLUTION INFILTRATION; PERINEURAL at 10:36

## 2021-03-18 RX ADMIN — Medication 0.05 MEQ: at 10:36

## 2021-03-18 NOTE — TELEPHONE ENCOUNTER
Patient given above information via phone mail and on phone    Patient will call back with any further issues

## 2021-03-18 NOTE — TELEPHONE ENCOUNTER
Neris Martinez is a 37 y.o. female who presents ambulatory with significant other and daughter to the ED with a c/o left low back pain and cold symptoms x1 week. Patient notes that her significant other and his son have both had a cold. She states that they have shared the cold with her. She reports nasal congestion and cough. She denies any fever, chills, nausea, vomiting, diarrhea or urinary symptoms. She states that she does have this back pain history. She notes the pain is worse with movement over the last week and is now spreading to her right side. She denies any abdominal pain or urinary symptoms. She states that she has been taking some Tylenol  and over-the-counter cold medicine without relief. She has not been to see her PCP for her symptoms. She denies any changes in bladder bowel function, saddle anesthesia, or inability to ambulate. Pt notes she has fibromyalgia    PCP: Mckenna Berkowitz MD  PMHx significant for: Past Medical History:  No date: Anemia NEC  No date: Depression  07/2011: Fibromyalgia  No date: Herniated disc  3/27/2012: HSV-2 infection  No date: Irregular menses  8/1/2017: Migraine without aura and without status migrainosus, not   intractable  PSHx significant for: Past Surgical History:  No date: HX GYN  No date: HX LAP CHOLECYSTECTOMY  No date: HX ORTHOPAEDIC  No date: HX TONSILLECTOMY  Social Hx: Tobacco: denies  EtOH: denies  Illicit drug use: denies     There are no further complaints or symptoms at this time.               Past Medical History:   Diagnosis Date    Anemia NEC     Depression     Fibromyalgia 07/2011    Herniated disc     HSV-2 infection 3/27/2012    Irregular menses     Migraine without aura and without status migrainosus, not intractable 8/1/2017       Past Surgical History:   Procedure Laterality Date    HX GYN      HX LAP CHOLECYSTECTOMY      HX ORTHOPAEDIC      HX TONSILLECTOMY           Family History:   Problem Relation Age of Onset    Heart Patient called back, call transferred to nurse  Attack Mother     Stroke Mother     Hypertension Sister     High Cholesterol Sister     Diabetes Sister     Migraines Sister     Cancer Paternal Grandfather     HIV/AIDS Neg Hx        Social History     Socioeconomic History    Marital status: SINGLE     Spouse name: Not on file    Number of children: Not on file    Years of education: Not on file    Highest education level: Not on file   Occupational History    Not on file   Social Needs    Financial resource strain: Not on file    Food insecurity:     Worry: Not on file     Inability: Not on file    Transportation needs:     Medical: Not on file     Non-medical: Not on file   Tobacco Use    Smoking status: Former Smoker     Years: 2.00     Last attempt to quit: 2007     Years since quittin.6    Smokeless tobacco: Never Used   Substance and Sexual Activity    Alcohol use: No    Drug use: No    Sexual activity: Yes     Partners: Male   Lifestyle    Physical activity:     Days per week: Not on file     Minutes per session: Not on file    Stress: Not on file   Relationships    Social connections:     Talks on phone: Not on file     Gets together: Not on file     Attends Druze service: Not on file     Active member of club or organization: Not on file     Attends meetings of clubs or organizations: Not on file     Relationship status: Not on file    Intimate partner violence:     Fear of current or ex partner: Not on file     Emotionally abused: Not on file     Physically abused: Not on file     Forced sexual activity: Not on file   Other Topics Concern    Not on file   Social History Narrative    Not on file         ALLERGIES: Patient has no known allergies. Review of Systems   Constitutional: Negative for chills and fever. HENT: Positive for congestion and rhinorrhea. Negative for sneezing and sore throat. Eyes: Negative for redness and visual disturbance. Respiratory: Positive for cough.  Negative for shortness of breath. Cardiovascular: Negative for chest pain and leg swelling. Gastrointestinal: Negative for abdominal pain, nausea and vomiting. Genitourinary: Negative for difficulty urinating and frequency. Musculoskeletal: Positive for back pain. Negative for myalgias and neck stiffness. Skin: Negative for rash. Neurological: Negative for dizziness, syncope, weakness and headaches. Hematological: Negative for adenopathy. Vitals:    10/22/19 1804 10/22/19 1925   BP: 137/73 132/71   Pulse: 74 74   Resp: 18 16   Temp: 98.3 °F (36.8 °C)    SpO2: 98% 98%   Weight: 128.2 kg (282 lb 10.1 oz)    Height: 5' (1.524 m)             Physical Exam   Constitutional: She is oriented to person, place, and time. She appears well-developed and well-nourished. No distress. Markedly above average bmi female    HENT:   Head: Normocephalic and atraumatic. Right Ear: External ear normal.   Left Ear: External ear normal.   Mouth/Throat: Oropharynx is clear and moist. No oropharyngeal exudate. Pale boggy nares with clear rhinorrhea. + PND Oropharynx without erythema, exudate or swelling. Uvula midline. No trismus. No difficulty handling secretions or speaking. No sublingual swelling   Eyes: Pupils are equal, round, and reactive to light. EOM are normal.   Neck: Neck supple. No JVD present. No tracheal deviation present. Cardiovascular: Normal rate, regular rhythm, normal heart sounds and intact distal pulses. Exam reveals no gallop and no friction rub. No murmur heard. Pulmonary/Chest: Effort normal and breath sounds normal. No stridor. No respiratory distress. She has no wheezes. She has no rales. She exhibits no tenderness. Abdominal: Soft. Bowel sounds are normal. She exhibits no distension and no mass. There is no tenderness. There is no rebound and no guarding. No hernia. Musculoskeletal: Normal range of motion. She exhibits no edema, tenderness or deformity.    Back:diffuse lumbar TTP L>R without swelling or step off. No discoloration. No deformity or lesions. Neg SLR neg EHL neg MARYLU. Ambulates without assistance. Distal n/v intact. Cap refill brisk   Lymphadenopathy:     She has no cervical adenopathy. Neurological: She is alert and oriented to person, place, and time. No cranial nerve deficit. Coordination normal.   Skin: Skin is warm and dry. Capillary refill takes less than 2 seconds. No rash noted. No erythema. No pallor. Psychiatric: She has a normal mood and affect. Her behavior is normal.   Nursing note and vitals reviewed. MDM  Number of Diagnoses or Management Options  Acute left-sided low back pain without sciatica:   Acute URI:      Amount and/or Complexity of Data Reviewed  Clinical lab tests: ordered and reviewed  Tests in the medicine section of CPT®: reviewed and ordered  Obtain history from someone other than the patient: yes (Significant other and daughter)  Review and summarize past medical records: yes  Independent visualization of images, tracings, or specimens: yes    Patient Progress  Patient progress: stable         Procedures  6:00 PM  Discussed pt, sx, hx and current findings with Justus Ramirez MD. He is in agreement with plan. Will check urine and urine pregnancy. Will continue to monitor pt. Sharad Gutierrez. JC Silva    6:54 PM   Urine neg. No midline vertebral ttp. No injury. No need for xrays or other imaging. Will tx for msk pain. Will tx for viral cold sx. rx for lidocaine patches, lodine, robaxin and flonase given. Sharad Gutierrez.  Shira PAAgnesC    LABORATORY TESTS:  Recent Results (from the past 12 hour(s))   URINALYSIS W/MICROSCOPIC    Collection Time: 10/22/19  6:11 PM   Result Value Ref Range    Color YELLOW/STRAW      Appearance HAZY (A) CLEAR      Specific gravity 1.020 1.003 - 1.030      pH (UA) 6.5 5.0 - 8.0      Protein NEGATIVE  NEG mg/dL    Glucose NEGATIVE  NEG mg/dL    Ketone NEGATIVE  NEG mg/dL    Bilirubin NEGATIVE  NEG      Blood NEGATIVE  NEG      Urobilinogen 0.2 0.2 - 1.0 EU/dL    Nitrites NEGATIVE  NEG      Leukocyte Esterase TRACE (A) NEG      WBC 0-4 0 - 4 /hpf    RBC 0-5 0 - 5 /hpf    Epithelial cells MODERATE (A) FEW /lpf    Bacteria 1+ (A) NEG /hpf    Mucus TRACE (A) NEG /lpf   URINE CULTURE HOLD SAMPLE    Collection Time: 10/22/19  6:11 PM   Result Value Ref Range    Urine culture hold        URINE ON HOLD IN MICROBIOLOGY DEPT FOR 3 DAYS. IF UNPRESERVED URINE IS SUBMITTED, IT CANNOT BE USED FOR ADDITIONAL TESTING AFTER 24 HRS, RECOLLECTION WILL BE REQUIRED. HCG URINE, QL. - POC    Collection Time: 10/22/19  6:14 PM   Result Value Ref Range    Pregnancy test,urine (POC) NEGATIVE  NEG         IMAGING RESULTS:    No results found. MEDICATIONS GIVEN:  Medications   ketorolac tromethamine (TORADOL) 60 mg/2 mL injection 60 mg (60 mg IntraMUSCular Given 10/22/19 1828)       IMPRESSION:  1. Acute left-sided low back pain without sciatica    2. Acute URI        PLAN:  1. Discharge Medication List as of 10/22/2019  7:19 PM      START taking these medications    Details   etodolac (LODINE) 400 mg tablet Take 400 mg by mouth two (2) times daily (with meals). , Print, Disp-20 Tab, R-0      lidocaine 4 % patch Wear up to 3 patches for 12 hours. Then, remove all patches for 12 hours. , Print, Disp-30 Patch, R-0         CONTINUE these medications which have CHANGED    Details   methocarbamol (ROBAXIN-750) 750 mg tablet Take 1 Tab by mouth four (4) times daily. , Print, Disp-20 Tab, R-0         CONTINUE these medications which have NOT CHANGED    Details   DULoxetine (CYMBALTA) 30 mg capsule One PO every day for 7 days, then 2 PO QD, Normal, Disp-60 Cap, R-0         STOP taking these medications       naproxen (NAPROSYN) 500 mg tablet Comments:   Reason for Stoppin.   Follow-up Information     Follow up With Specialties Details Why Contact Info    Rakesh Price MD Family Practice  2-4 days for recheck  [place] [id number]  [phone]          Return to ED if worse         7:00 PM  Pt has been reexamined. Pt has no new complaints, changes or physical findings. Care plan outlined and precautions discussed. All available results were reviewed with pt. All medications were reviewed with pt. All of pt's questions and concerns were addressed. Pt agrees to F/U as instructed and agrees to return to ED upon further deterioration. Pt is ready to go home.   ISAIAS Gonzalez

## 2021-03-18 NOTE — TELEPHONE ENCOUNTER
Patient returned nurses call, tried nurse but on the other line  Please return call at [phone]

## 2021-03-18 NOTE — TELEPHONE ENCOUNTER
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 Is this normal?  She uses [facility] in [place]  Please call with any suggestions or if something is ordered    [phone]  Thank you

## 2021-03-18 NOTE — TELEPHONE ENCOUNTER
Left msg for patient to call office  Patient will be advised that this is common after steriod injections  Tylenol 1000mg TID alternating with NSAIDS with food if able to take them, ice 20 min on 20 min off and elevation  Call if no improvement by tomorrow

## 2021-03-18 NOTE — PROGRESS NOTES
CHIEF COMPLAINT:  Chief Complaint   Patient presents with    Right Hand - Follow-up, Pain, Swelling       SUBJECTIVE:  Raúl Edouard is a 60y o  year old female who presents with bilateral thumb pain  Patient last received a corticosteroid injection about her right thumb CMC on 6/11/2020 and her left thumb CMC on 10/31/2019  Patient stated that she noticed the pain beginning to throb in January 2021  She denied any new injuries as this time  Patient stated that her right thumb is worse than her left  She denied any numbness or tingling  PAST MEDICAL HISTORY:  Past Medical History:   Diagnosis Date    Anemia     IRON  INFUSIONS    Anxiety     Cancer ([facility] [address] )     breast/ovarian had preventative masectomy/ovary removal    Colon polyp     Depression     Last Assessed 8/27/2015    H/O total mastectomy     Hyperlipidemia     Last Assessed 8/7/2015       PAST SURGICAL HISTORY:  Past Surgical History:   Procedure Laterality Date    APPENDECTOMY      BILATERAL OOPHORECTOMY      Last Assessed 8/27/2015    BREAST RECONSTRUCTION      Last Assessed 8/27/2015    CHOLECYSTECTOMY      COLONOSCOPY      MASTECTOMY Bilateral     Radical; Last Assessed 8/27/2015    NE COLONOSCOPY FLX DX W/COLLJ SPEC WHEN PFRMD N/A 4/3/2018    Procedure: COLONOSCOPY;  Surgeon: Ian Perea MD;  Location: [facility];   Service: Gastroenterology    NE LAP,CHOLECYSTECTOMY N/A 10/5/2017    Procedure: Jessicaney Copper;  Surgeon: Milena Collins MD;  [facility];  Service: General    TONSILLECTOMY         FAMILY HISTORY:  Family History   Problem Relation Age of Onset    Breast cancer Mother     Ovarian cancer Mother         unspecified laterality    Heart failure Father     Breast cancer Maternal Aunt     Colon cancer Neg Hx     Cervical cancer Neg Hx     Uterine cancer Neg Hx        SOCIAL HISTORY:  Social History     Tobacco Use    Smoking status: Never Smoker    Smokeless tobacco: Never Used   Substance Use Topics    Alcohol use: Not Currently     Comment: recovering    Drug use: No       MEDICATIONS:    Current Outpatient Medications:     busPIRone (BUSPAR) 5 mg tablet, Take 1 tablet (5 mg total) by mouth 2 (two) times a day, Disp: 60 tablet, Rfl: 5    calcium carbonate-vitamin D (OSCAL-D) 500 mg-200 units per tablet, Take 1 tablet by mouth 2 (two) times a day with meals, Disp: , Rfl:     escitalopram (LEXAPRO) 10 mg tablet, TAKE 1 TABLET BY MOUTH EVERY DAY, Disp: 30 tablet, Rfl: 5    gabapentin (NEURONTIN) 300 mg capsule, TAKE 1 CAPSULE IN THE AM AND 1 CAPSULE IN THE AFTERNOON AND 2 CAPSULES AT BEDTIME, Disp: 120 capsule, Rfl: 5    glucosamine-chondroitin 500-400 MG tablet, Take 1 tablet by mouth 3 (three) times a day, Disp: , Rfl:     multivitamin (THERAGRAN) TABS, Take 1 tablet by mouth daily, Disp: , Rfl:     TURMERIC PO, Take by mouth 2 (two) times a day , Disp: , Rfl:     ALLERGIES:  No Known Allergies    REVIEW OF SYSTEMS:  Review of Systems   Constitutional: Negative for chills and fever  HENT: Negative for ear pain and sore throat  Eyes: Negative for pain and visual disturbance  Respiratory: Negative for cough and shortness of breath  Cardiovascular: Negative for chest pain and palpitations  Gastrointestinal: Negative for abdominal pain and vomiting  Genitourinary: Negative for dysuria and hematuria  Musculoskeletal: Positive for arthralgias  Negative for back pain  Skin: Negative for color change and rash  Neurological: Negative for seizures and syncope  All other systems reviewed and are negative        VITALS:  Vitals:    03/18/21 0958   BP: 103/68   Pulse: 76       LABS:  HgA1c:   Lab Results   Component Value Date    HGBA1C 5 4 09/30/2020     BMP:   Lab Results   Component Value Date    GLUCOSE 84 11/04/2015    CALCIUM 9 8 09/30/2020     11/04/2015    K 4 3 09/30/2020    CO2 28 09/30/2020     09/30/2020    BUN 20 09/30/2020    CREATININE 0 54 (L) 09/30/2020       _____________________________________________________  PHYSICAL EXAMINATION:  General: well developed and well nourished, alert, oriented times 3 and appears comfortable  Psychiatric: Normal  HEENT: Trachea Midline, No torticollis  Pulmonary: No audible wheezing or strider  Cardiovascular: No discernable arrhythmia   Skin: No masses, erythema, lacerations, fluctation, ulcerations  Neurovascular: Sensation Intact to the Median, Ulnar, Radial Nerve, Motor Intact to the Median, Ulnar, Radial Nerve and Pulses Intact    MUSCULOSKELETAL EXAMINATION:  Right Hand:  Skin intact   No obvious swelling   No erythema or ecchymosis   Tender over thumb CMC   Full thumb strength  Sensation intact to the radial, ulnar, and median nerve distribution  Brisk capillary refill  Palpable distal radial pulse     ___________________________________________________  STUDIES REVIEWED:  No studies reviewed  PROCEDURES PERFORMED:  Small joint arthrocentesis: L thumb CMC  [place] Protocol:  Consent: Verbal consent obtained  Risks and benefits: risks, benefits and alternatives were discussed  Consent given by: patient  Time out: Immediately prior to procedure a "time out" was called to verify the correct patient, procedure, equipment, support staff and site/side marked as required    Timeout called at: 3/18/2021 10:28 AM   Patient understanding: patient states understanding of the procedure being performed  Site marked: the operative site was marked  Patient identity confirmed: verbally with patient    Supporting Documentation  Indications: pain   Procedure Details  Location: thumb - L thumb CMC  Needle size: 25 G  Ultrasound guidance: no  Medications administered: 0 05 mEq sodium bicarbonate 8 4 %; 2 5 mL lidocaine 1 %; 20 mg triamcinolone acetonide 40 mg/mL    Patient tolerance: patient tolerated the procedure well with no immediate complications  Dressing:  Sterile dressing applied    Small joint arthrocentesis: R thumb CMC  Universal Protocol:  Consent: Verbal consent obtained  Risks and benefits: risks, benefits and alternatives were discussed  Consent given by: patient  Time out: Immediately prior to procedure a "time out" was called to verify the correct patient, procedure, equipment, support staff and site/side marked as required  Timeout called at: 3/18/2021 10:28 AM   Patient understanding: patient states understanding of the procedure being performed  Site marked: the operative site was marked  Patient identity confirmed: verbally with patient    Supporting Documentation  Indications: pain   Procedure Details  Location: thumb - R thumb CMC  Needle size: 25 G  Ultrasound guidance: no  Medications administered: 0 05 mEq sodium bicarbonate 8 4 %; 2 5 mL lidocaine 1 %; 20 mg triamcinolone acetonide 40 mg/mL    Patient tolerance: patient tolerated the procedure well with no immediate complications  Dressing:  Sterile dressing applied             _____________________________________________________  ASSESSMENT/PLAN:    60 y o  female with bilateral thumb CMC arthritis    -Patient was offered a corticosteroid injection today about the bilateral thumbs  -Risks and benefits of the injection were discussed at length in office today   -Patient tolerated the injections well  -I will see her back in office on an as needed basis if symptoms worsen or fail to improve  Diagnoses and all orders for this visit:    Primary osteoarthritis of first carpometacarpal joint of left hand  -     Ambulatory referral to Hand Surgery    Arthritis of carpometacarpal [place]) joint of left thumb    Arthritis of carpometacarpal (CMC) joint of right thumb    Other orders  -     Small joint arthrocentesis  -     Small joint arthrocentesis  -     Small joint arthrocentesis        Follow Up:  Return if symptoms worsen or fail to improve      Work/school status:  No restrictions     To Do Next Visit:  PRN        General Discussions:  CMC Arthritis: The anatomy and physiology of carpometacarpal joint arthritis was discussed with the patient today in the office  Deterioration of the articular cartilage eventually leads to hypermobility at the thumb [address] joint, resulting in joint subluxation, osteophyte formation, cystic changes within the trapezium and base of the first metacarpal, as well as subchondral sclerosis  Eventually, pain, limited mobility, and compensatory hyperextension at the metacarpophalangeal joint may develop  While normal activity and usage of the thumb joint may provide a painful experience to the patient, this typically does not result in damage to the thumb or hand  Treatment options include resting thumb spica splints to decreased joint edema, pain, and inflammation  Therapy exercises to strengthen the thenar musculature may relieve pain, but do not alter the overall continued development of osteoarthritis  Oral medications, topical medications, corticosteroid injections may decrease pain and increase overall function  Eventually, approximately 5% of patients may require surgical intervention  Scribe Attestation    I,:  Ranjan Downey am acting as a scribe while in the presence of the attending physician :       I,:  Sandy Hill MD personally performed the services described in this documentation    as scribed in my presence :           Portions of the record may have been created with voice recognition software  Occasional wrong word or "sound a like" substitutions may have occurred due to the inherent limitations of voice recognition software  Read the chart carefully and recognize, using context, where substitutions have occurred

## 2021-03-31 DIAGNOSIS — Z23 ENCOUNTER FOR IMMUNIZATION: ICD-10-CM

## 2021-04-02 DIAGNOSIS — F41.9 ANXIETY: ICD-10-CM

## 2021-04-02 RX ORDER — ESCITALOPRAM OXALATE 10 MG/1
TABLET ORAL
Qty: 30 TABLET | Refills: 5 | Status: SHIPPED | OUTPATIENT
Start: 2021-04-02 | End: 2021-11-22

## 2021-04-02 RX ORDER — BUSPIRONE HYDROCHLORIDE 5 MG/1
TABLET ORAL
Qty: 60 TABLET | Refills: 5 | Status: SHIPPED | OUTPATIENT
Start: 2021-04-02 | End: 2021-12-17

## 2021-04-15 ENCOUNTER — IMMUNIZATIONS (OUTPATIENT)
Dept: FAMILY MEDICINE CLINIC | Facility: HOSPITAL | Age: 66
End: 2021-04-15

## 2021-04-15 DIAGNOSIS — Z23 ENCOUNTER FOR IMMUNIZATION: Primary | ICD-10-CM

## 2021-04-15 PROCEDURE — 91300 SARS-COV-2 / COVID-19 MRNA VACCINE (PFIZER-BIONTECH) 30 MCG: CPT

## 2021-04-15 PROCEDURE — 0001A SARS-COV-2 / COVID-19 MRNA VACCINE (PFIZER-BIONTECH) 30 MCG: CPT

## 2021-05-07 ENCOUNTER — IMMUNIZATIONS (OUTPATIENT)
Dept: FAMILY MEDICINE CLINIC | Facility: HOSPITAL | Age: 66
End: 2021-05-07

## 2021-05-07 DIAGNOSIS — Z23 ENCOUNTER FOR IMMUNIZATION: Primary | ICD-10-CM

## 2021-05-07 PROCEDURE — 0002A SARS-COV-2 / COVID-19 MRNA VACCINE (PFIZER-BIONTECH) 30 MCG: CPT

## 2021-05-07 PROCEDURE — 91300 SARS-COV-2 / COVID-19 MRNA VACCINE (PFIZER-BIONTECH) 30 MCG: CPT

## 2021-08-10 ENCOUNTER — APPOINTMENT (OUTPATIENT)
Dept: LAB | Facility: CLINIC | Age: 66
End: 2021-08-10
Payer: MEDICARE

## 2021-08-10 DIAGNOSIS — R73.01 IMPAIRED FASTING BLOOD SUGAR: ICD-10-CM

## 2021-08-10 DIAGNOSIS — E78.00 PURE HYPERCHOLESTEROLEMIA: ICD-10-CM

## 2021-08-10 LAB
ALBUMIN SERPL BCP-MCNC: 3.5 G/DL (ref 3.5–5)
ALP SERPL-CCNC: 57 U/L (ref 46–116)
ALT SERPL W P-5'-P-CCNC: 34 U/L (ref 12–78)
ANION GAP SERPL CALCULATED.3IONS-SCNC: 1 MMOL/L (ref 4–13)
AST SERPL W P-5'-P-CCNC: 18 U/L (ref 5–45)
BASOPHILS # BLD AUTO: 0.07 THOUSANDS/ΜL (ref 0–0.1)
BASOPHILS NFR BLD AUTO: 1 % (ref 0–1)
BILIRUB SERPL-MCNC: 0.47 MG/DL (ref 0.2–1)
BUN SERPL-MCNC: 16 MG/DL (ref 5–25)
CALCIUM SERPL-MCNC: 9.8 MG/DL (ref 8.3–10.1)
CHLORIDE SERPL-SCNC: 106 MMOL/L (ref 100–108)
CHOLEST SERPL-MCNC: 249 MG/DL (ref 50–200)
CO2 SERPL-SCNC: 29 MMOL/L (ref 21–32)
CREAT SERPL-MCNC: 0.51 MG/DL (ref 0.6–1.3)
EOSINOPHIL # BLD AUTO: 0.08 THOUSAND/ΜL (ref 0–0.61)
EOSINOPHIL NFR BLD AUTO: 2 % (ref 0–6)
ERYTHROCYTE [DISTWIDTH] IN BLOOD BY AUTOMATED COUNT: 12.3 % (ref 11.6–15.1)
EST. AVERAGE GLUCOSE BLD GHB EST-MCNC: 105 MG/DL
GFR SERPL CREATININE-BSD FRML MDRD: 101 ML/MIN/1.73SQ M
GLUCOSE P FAST SERPL-MCNC: 79 MG/DL (ref 65–99)
HBA1C MFR BLD: 5.3 %
HCT VFR BLD AUTO: 40.9 % (ref 34.8–46.1)
HDLC SERPL-MCNC: 80 MG/DL
HGB BLD-MCNC: 13.3 G/DL (ref 11.5–15.4)
IMM GRANULOCYTES # BLD AUTO: 0.01 THOUSAND/UL (ref 0–0.2)
IMM GRANULOCYTES NFR BLD AUTO: 0 % (ref 0–2)
LDLC SERPL CALC-MCNC: 144 MG/DL (ref 0–100)
LYMPHOCYTES # BLD AUTO: 1.23 THOUSANDS/ΜL (ref 0.6–4.47)
LYMPHOCYTES NFR BLD AUTO: 23 % (ref 14–44)
MCH RBC QN AUTO: 31.2 PG (ref 26.8–34.3)
MCHC RBC AUTO-ENTMCNC: 32.5 G/DL (ref 31.4–37.4)
MCV RBC AUTO: 96 FL (ref 82–98)
MONOCYTES # BLD AUTO: 0.35 THOUSAND/ΜL (ref 0.17–1.22)
MONOCYTES NFR BLD AUTO: 7 % (ref 4–12)
NEUTROPHILS # BLD AUTO: 3.6 THOUSANDS/ΜL (ref 1.85–7.62)
NEUTS SEG NFR BLD AUTO: 67 % (ref 43–75)
NONHDLC SERPL-MCNC: 169 MG/DL
NRBC BLD AUTO-RTO: 0 /100 WBCS
PLATELET # BLD AUTO: 243 THOUSANDS/UL (ref 149–390)
PMV BLD AUTO: 9.5 FL (ref 8.9–12.7)
POTASSIUM SERPL-SCNC: 4.3 MMOL/L (ref 3.5–5.3)
PROT SERPL-MCNC: 6.8 G/DL (ref 6.4–8.2)
RBC # BLD AUTO: 4.26 MILLION/UL (ref 3.81–5.12)
SODIUM SERPL-SCNC: 136 MMOL/L (ref 136–145)
TRIGL SERPL-MCNC: 123 MG/DL
TSH SERPL DL<=0.05 MIU/L-ACNC: 1.73 UIU/ML (ref 0.36–3.74)
WBC # BLD AUTO: 5.34 THOUSAND/UL (ref 4.31–10.16)

## 2021-08-10 PROCEDURE — 84443 ASSAY THYROID STIM HORMONE: CPT

## 2021-08-10 PROCEDURE — 85025 COMPLETE CBC W/AUTO DIFF WBC: CPT

## 2021-08-10 PROCEDURE — 80061 LIPID PANEL: CPT

## 2021-08-10 PROCEDURE — 80053 COMPREHEN METABOLIC PANEL: CPT

## 2021-08-10 PROCEDURE — 83036 HEMOGLOBIN GLYCOSYLATED A1C: CPT

## 2021-08-10 PROCEDURE — 36415 COLL VENOUS BLD VENIPUNCTURE: CPT

## 2021-08-11 ENCOUNTER — OFFICE VISIT (OUTPATIENT)
Dept: INTERNAL MEDICINE CLINIC | Facility: CLINIC | Age: 66
End: 2021-08-11
Payer: MEDICARE

## 2021-08-11 VITALS
WEIGHT: 151 LBS | DIASTOLIC BLOOD PRESSURE: 62 MMHG | HEART RATE: 74 BPM | OXYGEN SATURATION: 99 % | HEIGHT: 65 IN | BODY MASS INDEX: 25.16 KG/M2 | SYSTOLIC BLOOD PRESSURE: 94 MMHG

## 2021-08-11 DIAGNOSIS — F10.20 ALCOHOLISM (HCC): ICD-10-CM

## 2021-08-11 DIAGNOSIS — F41.9 ANXIETY: ICD-10-CM

## 2021-08-11 DIAGNOSIS — R73.01 IMPAIRED FASTING BLOOD SUGAR: ICD-10-CM

## 2021-08-11 DIAGNOSIS — Z23 NEED FOR PNEUMOCOCCAL VACCINATION: ICD-10-CM

## 2021-08-11 DIAGNOSIS — F33.9 RECURRENT MAJOR DEPRESSIVE DISORDER, REMISSION STATUS UNSPECIFIED (HCC): ICD-10-CM

## 2021-08-11 DIAGNOSIS — E78.5 HYPERLIPIDEMIA, UNSPECIFIED HYPERLIPIDEMIA TYPE: Primary | ICD-10-CM

## 2021-08-11 PROCEDURE — G0009 ADMIN PNEUMOCOCCAL VACCINE: HCPCS

## 2021-08-11 PROCEDURE — 90670 PCV13 VACCINE IM: CPT

## 2021-08-11 PROCEDURE — 99214 OFFICE O/P EST MOD 30 MIN: CPT | Performed by: INTERNAL MEDICINE

## 2021-08-11 RX ORDER — CEPHALEXIN 250 MG/1
250 CAPSULE ORAL 4 TIMES DAILY
COMMUNITY
Start: 2021-08-09 | End: 2022-03-04 | Stop reason: CLARIF

## 2021-08-11 NOTE — PATIENT INSTRUCTIONS
Lab data reviewed in detail and compared prior    Hyperlipidemia-LDL stable and excellent ratio with 10 year atherosclerotic risk calculated only 3 5%  No indication for statin  Patient will continue with excellent exercise regimen, weight loss efforts and low saturated fat diet, recheck in 6 months  Anxiety and depression-stable on Lexapro, we discussed tapering off but opted to continue for now  Will periodically readdress  No contraindication to continuing long-term  Alcoholism in remission-congratulations on 2 and half years sober  Keep up the good work  Impaired fasting glucose stable with A1c 5 4  Health maintenance-Prevnar 13 today, otherwise up-to-date, bone density was normal less than 2 years ago, is colonoscopy due in 2024, Pap smear negative in February 2020, Shingrix and COVID vaccines up-to-date

## 2021-08-11 NOTE — PROGRESS NOTES
Assessment/Plan:    Diagnoses and all orders for this visit:    Hyperlipidemia, unspecified hyperlipidemia type  -     CBC and differential; Future  -     Comprehensive metabolic panel; Future  -     Lipid panel; Future  -     TSH, 3rd generation with Free T4 reflex; Future    Recurrent major depressive disorder, remission status unspecified ([facility] [address] )    Anxiety    Alcoholism ([facility] )    Impaired fasting blood sugar  -     Hemoglobin A1C; Future    Need for pneumococcal vaccination  -     PNEUMOCOCCAL CONJUGATE VACCINE 13-VALENT GREATER THAN 6 MONTHS    Other orders  -     cephalexin (KEFLEX) 250 mg capsule; Take 250 mg by mouth 4 (four) times a day         BMI Counseling: Body mass index is 25 13 kg/m²  The BMI is above normal  Nutrition recommendations include decreasing portion sizes, decreasing fast food intake, consuming healthier snacks, limiting drinks that contain sugar, moderation in carbohydrate intake and reducing intake of saturated and trans fat  Exercise recommendations include moderate physical activity 150 minutes/week  No pharmacotherapy was ordered  Patient Instructions   Lab data reviewed in detail and compared prior    Hyperlipidemia-LDL stable and excellent ratio with 10 year atherosclerotic risk calculated only 3 5%  No indication for statin  Patient will continue with excellent exercise regimen, weight loss efforts and low saturated fat diet, recheck in 6 months  Anxiety and depression-stable on Lexapro, we discussed tapering off but opted to continue for now  Will periodically readdress  No contraindication to continuing long-term  Alcoholism in remission-congratulations on 2 and half years sober  Keep up the good work  Impaired fasting glucose stable with A1c 5 4      Health maintenance-Prevnar 13 today, otherwise up-to-date, bone density was normal less than 2 years ago, is colonoscopy due in 2024, Pap smear negative in February 2020, Shingrix and COVID vaccines up-to-date  Subjective:      Patient ID: Andrea Vega is a 73 y o  female    F/u mmp and review labs  Generally feeling well, but getting over a cold, no f/c  Happy working as  at [employer]  Recovering from skin bx 8/9, pre cancerous, will be out of work for another few weeks  ETOH- has not had a drink since February '19  Obesity-lost 45 lbs since '16  Anxiety and sleep stable w/ lexapro and gabapentin, ran out for 2 days and felt "wonky"  LBP-still w/ some issues, occasionally doing at home PT for lower back   Walking dog daily, American bulldog/francois            Current Outpatient Medications:     busPIRone (BUSPAR) 5 mg tablet, TAKE 1 TABLET BY MOUTH TWICE A DAY (Patient taking differently: Take 5 mg by mouth as needed ), Disp: 60 tablet, Rfl: 5    calcium carbonate-vitamin D (OSCAL-D) 500 mg-200 units per tablet, Take 1 tablet by mouth 2 (two) times a day with meals, Disp: , Rfl:     cephalexin (KEFLEX) 250 mg capsule, Take 250 mg by mouth 4 (four) times a day, Disp: , Rfl:     escitalopram (LEXAPRO) 10 mg tablet, TAKE 1 TABLET BY MOUTH EVERY DAY, Disp: 30 tablet, Rfl: 5    gabapentin (NEURONTIN) 300 mg capsule, TAKE 1 CAPSULE IN THE AM AND 1 CAPSULE IN THE AFTERNOON AND 2 CAPSULES AT BEDTIME (Patient taking differently: Take by mouth as needed TAKE 1 CAPSULE IN THE AM AND 1 CAPSULE IN THE AFTERNOON AND 2 CAPSULES AT BEDTIME), Disp: 120 capsule, Rfl: 5    glucosamine-chondroitin 500-400 MG tablet, Take 1 tablet by mouth 3 (three) times a day, Disp: , Rfl:     multivitamin (THERAGRAN) TABS, Take 1 tablet by mouth daily, Disp: , Rfl:     TURMERIC PO, Take by mouth 2 (two) times a day  (Patient not taking: Reported on 8/11/2021), Disp: , Rfl:     Recent Results (from the past 1008 hour(s))   CBC and differential    Collection Time: 08/10/21  8:05 AM   Result Value Ref Range    WBC 5 34 4 31 - 10 16 Thousand/uL    RBC 4 26 3 81 - 5 12 Million/uL    Hemoglobin 13 3 11 5 - 15 4 g/dL Hematocrit 40 9 34 8 - 46 1 %    MCV 96 82 - 98 fL    MCH 31 2 26 8 - 34 3 pg    MCHC 32 5 31 4 - 37 4 g/dL    RDW 12 3 11 6 - 15 1 %    MPV 9 5 8 9 - 12 7 fL    Platelets 321 983 - 541 Thousands/uL    nRBC 0 /100 WBCs    Neutrophils Relative 67 43 - 75 %    Immat GRANS % 0 0 - 2 %    Lymphocytes Relative 23 14 - 44 %    Monocytes Relative 7 4 - 12 %    Eosinophils Relative 2 0 - 6 %    Basophils Relative 1 0 - 1 %    Neutrophils Absolute 3 60 1 85 - 7 62 Thousands/µL    Immature Grans Absolute 0 01 0 00 - 0 20 Thousand/uL    Lymphocytes Absolute 1 23 0 60 - 4 47 Thousands/µL    Monocytes Absolute 0 35 0 17 - 1 22 Thousand/µL    Eosinophils Absolute 0 08 0 00 - 0 61 Thousand/µL    Basophils Absolute 0 07 0 00 - 0 10 Thousands/µL   Comprehensive metabolic panel    Collection Time: 08/10/21  8:05 AM   Result Value Ref Range    Sodium 136 136 - 145 mmol/L    Potassium 4 3 3 5 - 5 3 mmol/L    Chloride 106 100 - 108 mmol/L    CO2 29 21 - 32 mmol/L    ANION GAP 1 (L) 4 - 13 mmol/L    BUN 16 5 - 25 mg/dL    Creatinine 0 51 (L) 0 60 - 1 30 mg/dL    Glucose, Fasting 79 65 - 99 mg/dL    Calcium 9 8 8 3 - 10 1 mg/dL    AST 18 5 - 45 U/L    ALT 34 12 - 78 U/L    Alkaline Phosphatase 57 46 - 116 U/L    Total Protein 6 8 6 4 - 8 2 g/dL    Albumin 3 5 3 5 - 5 0 g/dL    Total Bilirubin 0 47 0 20 - 1 00 mg/dL    eGFR 101 ml/min/1 73sq m   Lipid panel    Collection Time: 08/10/21  8:05 AM   Result Value Ref Range    Cholesterol 249 (H) 50 - 200 mg/dL    Triglycerides 123 <=150 mg/dL    HDL, Direct 80 >=40 mg/dL    LDL Calculated 144 (H) 0 - 100 mg/dL    Non-HDL-Chol (CHOL-HDL) 169 mg/dl   Hemoglobin A1C    Collection Time: 08/10/21  8:05 AM   Result Value Ref Range    Hemoglobin A1C 5 3 Normal 3 8-5 6%; PreDiabetic 5 7-6 4%;  Diabetic >=6 5%; Glycemic control for adults with diabetes <7 0% %     mg/dl   TSH, 3rd generation with Free T4 reflex    Collection Time: 08/10/21  8:05 AM   Result Value Ref Range    TSH 3RD GENERATON 1 730 0 358 - 3 740 uIU/mL       The following portions of the patient's history were reviewed and updated as appropriate: allergies, current medications, past family history, past medical history, past social history, past surgical history and problem list      Review of Systems   Constitutional: Negative for appetite change, chills, diaphoresis, fatigue, fever and unexpected weight change  HENT: Negative for congestion, hearing loss and rhinorrhea  Eyes: Negative for visual disturbance  Respiratory: Negative for cough, chest tightness, shortness of breath and wheezing  Cardiovascular: Negative for chest pain, palpitations and leg swelling  Gastrointestinal: Negative for abdominal pain and blood in stool  Endocrine: Negative for cold intolerance, heat intolerance, polydipsia and polyuria  Genitourinary: Negative for difficulty urinating, dysuria, frequency and urgency  Musculoskeletal: Negative for arthralgias and myalgias  Skin: Negative for rash  Neurological: Negative for dizziness, weakness, light-headedness and headaches  Hematological: Does not bruise/bleed easily  Psychiatric/Behavioral: Negative for dysphoric mood and sleep disturbance  Objective:      Vitals:    08/11/21 1621   BP: 94/62   Pulse: 74   SpO2: 99%          Physical Exam  Constitutional:       Appearance: She is well-developed  HENT:      Head: Normocephalic and atraumatic  Nose: Nose normal    Eyes:      General: No scleral icterus  Conjunctiva/sclera: Conjunctivae normal       Pupils: Pupils are equal, round, and reactive to light  Neck:      Thyroid: No thyromegaly  Vascular: No JVD  Trachea: No tracheal deviation  Cardiovascular:      Rate and Rhythm: Normal rate and regular rhythm  Heart sounds: No murmur heard  No friction rub  No gallop  Pulmonary:      Effort: Pulmonary effort is normal  No respiratory distress  Breath sounds: Normal breath sounds   No wheezing or rales    Musculoskeletal:         General: No deformity  Cervical back: Normal range of motion and neck supple  Lymphadenopathy:      Cervical: No cervical adenopathy  Skin:     General: Skin is warm and dry  Coloration: Skin is not pale  Findings: No erythema or rash  Neurological:      Mental Status: She is alert and oriented to person, place, and time  Cranial Nerves: No cranial nerve deficit  Psychiatric:         Behavior: Behavior normal          Thought Content:  Thought content normal          Judgment: Judgment normal

## 2021-11-22 DIAGNOSIS — F41.9 ANXIETY: ICD-10-CM

## 2021-11-22 RX ORDER — ESCITALOPRAM OXALATE 10 MG/1
TABLET ORAL
Qty: 30 TABLET | Refills: 5 | Status: SHIPPED | OUTPATIENT
Start: 2021-11-22 | End: 2022-05-10

## 2021-12-03 ENCOUNTER — CLINICAL SUPPORT (OUTPATIENT)
Dept: INTERNAL MEDICINE CLINIC | Facility: CLINIC | Age: 66
End: 2021-12-03
Payer: MEDICARE

## 2021-12-03 DIAGNOSIS — Z23 ENCOUNTER FOR IMMUNIZATION: Primary | ICD-10-CM

## 2021-12-03 PROCEDURE — G0008 ADMIN INFLUENZA VIRUS VAC: HCPCS

## 2021-12-03 PROCEDURE — 90662 IIV NO PRSV INCREASED AG IM: CPT

## 2021-12-17 DIAGNOSIS — F41.9 ANXIETY: ICD-10-CM

## 2021-12-17 RX ORDER — BUSPIRONE HYDROCHLORIDE 5 MG/1
TABLET ORAL
Qty: 60 TABLET | Refills: 5 | Status: SHIPPED | OUTPATIENT
Start: 2021-12-17

## 2021-12-18 DIAGNOSIS — M25.50 ARTHRALGIA, UNSPECIFIED JOINT: ICD-10-CM

## 2021-12-18 DIAGNOSIS — F10.20 ALCOHOLISM (HCC): ICD-10-CM

## 2021-12-18 DIAGNOSIS — F41.9 ANXIETY: ICD-10-CM

## 2021-12-20 RX ORDER — GABAPENTIN 300 MG/1
CAPSULE ORAL
Qty: 120 CAPSULE | Refills: 5 | Status: SHIPPED | OUTPATIENT
Start: 2021-12-20

## 2022-02-03 ENCOUNTER — TELEPHONE (OUTPATIENT)
Dept: INTERNAL MEDICINE CLINIC | Facility: CLINIC | Age: 67
End: 2022-02-03

## 2022-02-03 NOTE — TELEPHONE ENCOUNTER
Pt trying to get a medical marijuana card with Dr Joanie Potter    On an anxiety med- need current med list and an office note- stating she's being treated for anxiety    F# for Dr Joanie Potter is: [phone]

## 2022-02-04 ENCOUNTER — TELEPHONE (OUTPATIENT)
Dept: INTERNAL MEDICINE CLINIC | Facility: CLINIC | Age: 67
End: 2022-02-04

## 2022-03-02 ENCOUNTER — APPOINTMENT (OUTPATIENT)
Dept: LAB | Facility: CLINIC | Age: 67
End: 2022-03-02
Payer: MEDICARE

## 2022-03-02 DIAGNOSIS — E78.5 HYPERLIPIDEMIA, UNSPECIFIED HYPERLIPIDEMIA TYPE: ICD-10-CM

## 2022-03-02 DIAGNOSIS — R73.01 IMPAIRED FASTING BLOOD SUGAR: ICD-10-CM

## 2022-03-02 LAB
ALBUMIN SERPL BCP-MCNC: 3.7 G/DL (ref 3.5–5)
ALP SERPL-CCNC: 44 U/L (ref 46–116)
ALT SERPL W P-5'-P-CCNC: 25 U/L (ref 12–78)
ANION GAP SERPL CALCULATED.3IONS-SCNC: 5 MMOL/L (ref 4–13)
AST SERPL W P-5'-P-CCNC: 20 U/L (ref 5–45)
BASOPHILS # BLD AUTO: 0.07 THOUSANDS/ΜL (ref 0–0.1)
BASOPHILS NFR BLD AUTO: 2 % (ref 0–1)
BILIRUB SERPL-MCNC: 0.53 MG/DL (ref 0.2–1)
BUN SERPL-MCNC: 28 MG/DL (ref 5–25)
CALCIUM SERPL-MCNC: 9.6 MG/DL (ref 8.3–10.1)
CHLORIDE SERPL-SCNC: 108 MMOL/L (ref 100–108)
CHOLEST SERPL-MCNC: 219 MG/DL
CO2 SERPL-SCNC: 26 MMOL/L (ref 21–32)
CREAT SERPL-MCNC: 0.56 MG/DL (ref 0.6–1.3)
EOSINOPHIL # BLD AUTO: 0.09 THOUSAND/ΜL (ref 0–0.61)
EOSINOPHIL NFR BLD AUTO: 3 % (ref 0–6)
ERYTHROCYTE [DISTWIDTH] IN BLOOD BY AUTOMATED COUNT: 12.7 % (ref 11.6–15.1)
GFR SERPL CREATININE-BSD FRML MDRD: 98 ML/MIN/1.73SQ M
GLUCOSE P FAST SERPL-MCNC: 91 MG/DL (ref 65–99)
HCT VFR BLD AUTO: 37.7 % (ref 34.8–46.1)
HDLC SERPL-MCNC: 82 MG/DL
HGB BLD-MCNC: 12.3 G/DL (ref 11.5–15.4)
IMM GRANULOCYTES # BLD AUTO: 0 THOUSAND/UL (ref 0–0.2)
IMM GRANULOCYTES NFR BLD AUTO: 0 % (ref 0–2)
LDLC SERPL CALC-MCNC: 125 MG/DL (ref 0–100)
LYMPHOCYTES # BLD AUTO: 1 THOUSANDS/ΜL (ref 0.6–4.47)
LYMPHOCYTES NFR BLD AUTO: 28 % (ref 14–44)
MCH RBC QN AUTO: 30.8 PG (ref 26.8–34.3)
MCHC RBC AUTO-ENTMCNC: 32.6 G/DL (ref 31.4–37.4)
MCV RBC AUTO: 94 FL (ref 82–98)
MONOCYTES # BLD AUTO: 0.32 THOUSAND/ΜL (ref 0.17–1.22)
MONOCYTES NFR BLD AUTO: 9 % (ref 4–12)
NEUTROPHILS # BLD AUTO: 2.08 THOUSANDS/ΜL (ref 1.85–7.62)
NEUTS SEG NFR BLD AUTO: 58 % (ref 43–75)
NONHDLC SERPL-MCNC: 137 MG/DL
NRBC BLD AUTO-RTO: 0 /100 WBCS
PLATELET # BLD AUTO: 198 THOUSANDS/UL (ref 149–390)
PMV BLD AUTO: 9.4 FL (ref 8.9–12.7)
POTASSIUM SERPL-SCNC: 4.4 MMOL/L (ref 3.5–5.3)
PROT SERPL-MCNC: 6.7 G/DL (ref 6.4–8.2)
RBC # BLD AUTO: 4 MILLION/UL (ref 3.81–5.12)
SODIUM SERPL-SCNC: 139 MMOL/L (ref 136–145)
TRIGL SERPL-MCNC: 61 MG/DL
TSH SERPL DL<=0.05 MIU/L-ACNC: 0.69 UIU/ML (ref 0.36–3.74)
WBC # BLD AUTO: 3.56 THOUSAND/UL (ref 4.31–10.16)

## 2022-03-02 PROCEDURE — 80061 LIPID PANEL: CPT

## 2022-03-02 PROCEDURE — 85025 COMPLETE CBC W/AUTO DIFF WBC: CPT

## 2022-03-02 PROCEDURE — 84443 ASSAY THYROID STIM HORMONE: CPT

## 2022-03-02 PROCEDURE — 83036 HEMOGLOBIN GLYCOSYLATED A1C: CPT

## 2022-03-02 PROCEDURE — 80053 COMPREHEN METABOLIC PANEL: CPT

## 2022-03-02 PROCEDURE — 36415 COLL VENOUS BLD VENIPUNCTURE: CPT

## 2022-03-03 LAB
EST. AVERAGE GLUCOSE BLD GHB EST-MCNC: 103 MG/DL
HBA1C MFR BLD: 5.2 %

## 2022-03-04 ENCOUNTER — OFFICE VISIT (OUTPATIENT)
Dept: INTERNAL MEDICINE CLINIC | Facility: CLINIC | Age: 67
End: 2022-03-04
Payer: MEDICARE

## 2022-03-04 VITALS
WEIGHT: 156.4 LBS | DIASTOLIC BLOOD PRESSURE: 78 MMHG | HEART RATE: 78 BPM | RESPIRATION RATE: 16 BRPM | HEIGHT: 65 IN | SYSTOLIC BLOOD PRESSURE: 116 MMHG | BODY MASS INDEX: 26.06 KG/M2

## 2022-03-04 DIAGNOSIS — E55.9 VITAMIN D DEFICIENCY: ICD-10-CM

## 2022-03-04 DIAGNOSIS — E78.5 HYPERLIPIDEMIA, UNSPECIFIED HYPERLIPIDEMIA TYPE: Primary | ICD-10-CM

## 2022-03-04 DIAGNOSIS — Z23 ENCOUNTER FOR IMMUNIZATION: ICD-10-CM

## 2022-03-04 DIAGNOSIS — F10.20 ALCOHOLISM (HCC): ICD-10-CM

## 2022-03-04 DIAGNOSIS — F33.9 RECURRENT MAJOR DEPRESSIVE DISORDER, REMISSION STATUS UNSPECIFIED (HCC): ICD-10-CM

## 2022-03-04 DIAGNOSIS — R73.01 IMPAIRED FASTING BLOOD SUGAR: ICD-10-CM

## 2022-03-04 DIAGNOSIS — Z78.0 POSTMENOPAUSAL: ICD-10-CM

## 2022-03-04 PROCEDURE — 1123F ACP DISCUSS/DSCN MKR DOCD: CPT | Performed by: NURSE PRACTITIONER

## 2022-03-04 PROCEDURE — 90670 PCV13 VACCINE IM: CPT

## 2022-03-04 PROCEDURE — G0009 ADMIN PNEUMOCOCCAL VACCINE: HCPCS

## 2022-03-04 PROCEDURE — G0439 PPPS, SUBSEQ VISIT: HCPCS | Performed by: NURSE PRACTITIONER

## 2022-03-04 PROCEDURE — 99214 OFFICE O/P EST MOD 30 MIN: CPT | Performed by: NURSE PRACTITIONER

## 2022-03-04 NOTE — PROGRESS NOTES
Assessment and Plan:     Problem List Items Addressed This Visit     None        BMI Counseling: Body mass index is 26 03 kg/m²  The BMI is above normal  Nutrition recommendations include decreasing portion sizes and consuming healthier snacks  Exercise recommendations include exercising 3-5 times per week  Rationale for BMI follow-up plan is due to patient being overweight or obese  Falls Plan of Care: balance, strength, and gait training instructions were provided  Home safety education provided  Preventive health issues were discussed with patient, and age appropriate screening tests were ordered as noted in patient's After Visit Summary  Personalized health advice and appropriate referrals for health education or preventive services given if needed, as noted in patient's After Visit Summary       History of Present Illness:     Patient presents for Medicare Annual Wellness visit    Patient Care Team:  Kash Moffett MD as PCP - General (Internal Medicine)  MD Jersey Garner MD as Endoscopist     Problem List:     Patient Active Problem List   Diagnosis    Screen for colon cancer    [facility]    Anxiety    Depression    Hyperlipidemia, unspecified    Mild vitamin D deficiency    Hx of colonic polyp    Acute pharyngitis    Pure hypercholesterolemia    Hypokalemia    Absolute anemia    Anemia    Iron deficiency anemia, unspecified    Encounter for gynecological examination without abnormal finding    H/O bilateral oophorectomy    H/O bilateral mastectomy    Impaired fasting blood sugar      Past Medical and Surgical History:     Past Medical History:   Diagnosis Date    Anemia     IRON  INFUSIONS    Anxiety     Cancer ([facility] )     breast/ovarian had preventative masectomy/ovary removal    Colon polyp     Depression     Last Assessed 8/27/2015    H/O total mastectomy     Hyperlipidemia     Last Assessed 8/7/2015     Past Surgical History: Procedure Laterality Date    APPENDECTOMY      BILATERAL OOPHORECTOMY      Last Assessed 8/27/2015    BREAST RECONSTRUCTION      Last Assessed 8/27/2015    CHOLECYSTECTOMY      COLONOSCOPY      MASTECTOMY Bilateral     Radical; Last Assessed 8/27/2015    RI COLONOSCOPY FLX DX W/COLLJ SPEC WHEN PFRMD N/A 4/3/2018    Procedure: COLONOSCOPY;  Surgeon: Bethanie Patricia MD;  Location: [facility];   Service: Gastroenterology    RI LAP,CHOLECYSTECTOMY N/A 10/5/2017    Procedure: Morales Garcia;  Surgeon: Td Parra MD;  [facility];  Service: General    TONSILLECTOMY        Family History:     Family History   Problem Relation Age of Onset    Breast cancer Mother     Ovarian cancer Mother         unspecified laterality    Heart failure Father     Breast cancer Maternal Aunt     Colon cancer Neg Hx     Cervical cancer Neg Hx     Uterine cancer Neg Hx       Social History:     Social History     Socioeconomic History    Marital status: /Civil Union     Spouse name: None    Number of children: None    Years of education: None    Highest education level: None   Occupational History    None   Tobacco Use    Smoking status: Never Smoker    Smokeless tobacco: Never Used   Vaping Use    Vaping Use: Never used   Substance and Sexual Activity    Alcohol use: Not Currently     Comment: recovering    Drug use: No    Sexual activity: Yes     Partners: Male   Other Topics Concern    None   Social History Narrative    None     Social Determinants of Health     Financial Resource Strain: Not on file   Food Insecurity: Not on file   Transportation Needs: Not on file   Physical Activity: Not on file   Stress: Not on file   Social Connections: Not on file   Intimate Partner Violence: Not on file   Housing Stability: Not on file      Medications and Allergies:     Current Outpatient Medications   Medication Sig Dispense Refill    busPIRone (BUSPAR) 5 mg tablet TAKE 1 TABLET BY MOUTH TWICE A DAY 60 tablet 5    calcium carbonate-vitamin D (OSCAL-D) 500 mg-200 units per tablet Take 1 tablet by mouth 2 (two) times a day with meals      escitalopram (LEXAPRO) 10 mg tablet TAKE 1 TABLET BY MOUTH EVERY DAY 30 tablet 5    gabapentin (NEURONTIN) 300 mg capsule TAKE 1 CAPSULE IN THE AM AND 1 CAPSULE IN THE AFTERNOON AND 2 CAPSULES AT BEDTIME (Patient taking differently: if needed TAKE 1 CAPSULE IN THE AM AND 1 CAPSULE IN THE AFTERNOON AND 2 CAPSULES AT BEDTIME ) 120 capsule 5    multivitamin (THERAGRAN) TABS Take 1 tablet by mouth daily      cephalexin (KEFLEX) 250 mg capsule Take 250 mg by mouth 4 (four) times a day (Patient not taking: Reported on 3/4/2022 )      glucosamine-chondroitin 500-400 MG tablet Take 1 tablet by mouth 3 (three) times a day (Patient not taking: Reported on 3/4/2022 )      TURMERIC PO Take by mouth 2 (two) times a day  (Patient not taking: Reported on 8/11/2021)       No current facility-administered medications for this visit       No Known Allergies   Immunizations:     Immunization History   Administered Date(s) Administered    COVID-19 PFIZER VACCINE 0 3 ML IM 04/15/2021, 05/07/2021    INFLUENZA 11/10/2015    Influenza Quadrivalent, 6-35 Months IM 11/10/2015    Influenza, high dose seasonal 0 7 mL 12/03/2021    Influenza, injectable, quadrivalent, preservative free 0 5 mL 09/08/2020    Influenza, recombinant, quadrivalent,injectable, preservative free 02/04/2019, 09/20/2019    Influenza, seasonal, injectable 1955    Pneumococcal Conjugate 13-Valent 08/11/2021    Pneumococcal Polysaccharide PPV23 1955    Tdap 1955, 11/10/2015, 12/21/2016    Zoster 1955    Zoster Vaccine Recombinant 03/15/2020, 09/08/2020      Health Maintenance:         Topic Date Due    Colorectal Cancer Screening  05/20/2024    HIV Screening  Completed    Hepatitis C Screening  Completed         Topic Date Due    Pneumococcal Vaccine: 65+ Years (1 of 2 - PPSV23) 10/06/2021    COVID-19 Vaccine (3 - Booster for Pfizer series) 10/07/2021      Medicare Health Risk Assessment:     Ht 5' 5" (1 651 m)   Wt 70 9 kg (156 lb 6 4 oz)   BMI 26 03 kg/m²      Traci Madden is here for her Welcome to Medicare visit  Health Risk Assessment:   Patient rates overall health as good  Patient feels that their physical health rating is same  Patient is very satisfied with their life  Eyesight was rated as slightly worse  Hearing was rated as same  Patient feels that their emotional and mental health rating is same  Patients states they are sometimes angry  Patient states they are never, rarely unusually tired/fatigued  Pain experienced in the last 7 days has been none  Patient states that she has experienced no weight loss or gain in last 6 months  Depression Screening:   PHQ-9 Score: 0      Fall Risk Screening: In the past year, patient has experienced: history of falling in past year    Number of falls: 1  Injured during fall?: Yes    Feels unsteady when standing or walking?: No    Worried about falling?: No      Urinary Incontinence Screening:   Patient has not leaked urine accidently in the last six months  Home Safety:  Patient does not have trouble with stairs inside or outside of their home  Patient has working smoke alarms and has working carbon monoxide detector  Home safety hazards include: none  Nutrition:   Current diet is Regular  Medications:   Patient is currently taking over-the-counter supplements  OTC medications include: see medication list  Patient is able to manage medications  Activities of Daily Living (ADLs)/Instrumental Activities of Daily Living (IADLs):   Walk and transfer into and out of bed and chair?: Yes  Dress and groom yourself?: Yes    Bathe or shower yourself?: Yes    Feed yourself?  Yes  Do your laundry/housekeeping?: Yes  Manage your money, pay your bills and track your expenses?: Yes  Make your own meals?: Yes    Do your own shopping?: Yes    Previous Hospitalizations:   Any hospitalizations or ED visits within the last 12 months?: No      Advance Care Planning:   Living will: Yes    Advanced directive: Yes    Five wishes given: Yes      Cognitive Screening:   Provider or family/friend/caregiver concerned regarding cognition?: No    PREVENTIVE SCREENINGS      Cardiovascular Screening:    General: Screening Not Indicated and History Lipid Disorder      Diabetes Screening:     General: Screening Current      Colorectal Cancer Screening:     General: Screening Current      Breast Cancer Screening:     General: Screening Not Indicated      Cervical Cancer Screening:    General: Screening Not Indicated      Osteoporosis Screening:    General: Risks and Benefits Discussed      Abdominal Aortic Aneurysm (AAA) Screening:        General: Screening Not Indicated      Lung Cancer Screening:     General: Screening Not Indicated      Hepatitis C Screening:    General: Screening Current    Screening, Brief Intervention, and Referral to Treatment (SBIRT)    Screening  Typical number of drinks in a day: 0    Single Item Drug Screening:  How often have you used an illegal drug (including marijuana) or a prescription medication for non-medical reasons in the past year? never    Single Item Drug Screen Score: 0  Interpretation: Negative screen for possible drug use disorder      SHERLY Lopez

## 2022-03-04 NOTE — PROGRESS NOTES
Assessment/Plan:    Patient Instructions   Hyperlipidemia-LDL stable and excellent ratio with 10 year atherosclerotic risk calculated only 3 5%  No indication for statin  Patient will continue with excellent exercise regimen, weight loss efforts and low saturated fat diet, recheck in 6 months  Anxiety and depression-stable on Lexapro,  Rarely using BuSpar now using CBD tinc sugar    Alcoholism in remission-congratulations 3 years  Impaired fasting glucose stable with A1c 5 4  Health maintenance- up to date         Diagnoses and all orders for this visit:    Hyperlipidemia, unspecified hyperlipidemia type  -     CBC and differential; Future  -     Comprehensive metabolic panel; Future  -     Lipid panel; Future  -     TSH, 3rd generation with Free T4 reflex; Future    Impaired fasting blood sugar  -     Hemoglobin A1C; Future    Postmenopausal  -     DXA bone density spine hip and pelvis; Future    Vitamin D deficiency  -     Vitamin D 25 hydroxy; Future    Encounter for immunization  -     PNEUMOCOCCAL CONJUGATE VACCINE 13-VALENT GREATER THAN 6 MONTHS    Recurrent major depressive disorder, remission status unspecified (HCC)    Alcoholism (HCC)         Subjective:      Patient ID: Twyla Marie is a 73 y o  female      Patient is here for routine follow-up review labs and welcome to Medicare, she is generally feeling well  She is working part-time at [employer] she in [place]  She has no physical complaints  Her depression and is relatively stable  She did acquire her medical marijuana card, she is using CBD tinc sure for her anxiety seems to be working well  She has not had a drink in about 2 years now          Current Outpatient Medications:     busPIRone (BUSPAR) 5 mg tablet, TAKE 1 TABLET BY MOUTH TWICE A DAY, Disp: 60 tablet, Rfl: 5    calcium carbonate-vitamin D (OSCAL-D) 500 mg-200 units per tablet, Take 1 tablet by mouth 2 (two) times a day with meals, Disp: , Rfl:    escitalopram (LEXAPRO) 10 mg tablet, TAKE 1 TABLET BY MOUTH EVERY DAY, Disp: 30 tablet, Rfl: 5    gabapentin (NEURONTIN) 300 mg capsule, TAKE 1 CAPSULE IN THE AM AND 1 CAPSULE IN THE AFTERNOON AND 2 CAPSULES AT BEDTIME (Patient taking differently: if needed TAKE 1 CAPSULE IN THE AM AND 1 CAPSULE IN THE AFTERNOON AND 2 CAPSULES AT BEDTIME ), Disp: 120 capsule, Rfl: 5    multivitamin (THERAGRAN) TABS, Take 1 tablet by mouth daily, Disp: , Rfl:     Recent Results (from the past 1008 hour(s))   CBC and differential    Collection Time: 03/02/22 11:12 AM   Result Value Ref Range    WBC 3 56 (L) 4 31 - 10 16 Thousand/uL    RBC 4 00 3 81 - 5 12 Million/uL    Hemoglobin 12 3 11 5 - 15 4 g/dL    Hematocrit 37 7 34 8 - 46 1 %    MCV 94 82 - 98 fL    MCH 30 8 26 8 - 34 3 pg    MCHC 32 6 31 4 - 37 4 g/dL    RDW 12 7 11 6 - 15 1 %    MPV 9 4 8 9 - 12 7 fL    Platelets 645 596 - 189 Thousands/uL    nRBC 0 /100 WBCs    Neutrophils Relative 58 43 - 75 %    Immat GRANS % 0 0 - 2 %    Lymphocytes Relative 28 14 - 44 %    Monocytes Relative 9 4 - 12 %    Eosinophils Relative 3 0 - 6 %    Basophils Relative 2 (H) 0 - 1 %    Neutrophils Absolute 2 08 1 85 - 7 62 Thousands/µL    Immature Grans Absolute 0 00 0 00 - 0 20 Thousand/uL    Lymphocytes Absolute 1 00 0 60 - 4 47 Thousands/µL    Monocytes Absolute 0 32 0 17 - 1 22 Thousand/µL    Eosinophils Absolute 0 09 0 00 - 0 61 Thousand/µL    Basophils Absolute 0 07 0 00 - 0 10 Thousands/µL   Comprehensive metabolic panel    Collection Time: 03/02/22 11:12 AM   Result Value Ref Range    Sodium 139 136 - 145 mmol/L    Potassium 4 4 3 5 - 5 3 mmol/L    Chloride 108 100 - 108 mmol/L    CO2 26 21 - 32 mmol/L    ANION GAP 5 4 - 13 mmol/L    BUN 28 (H) 5 - 25 mg/dL    Creatinine 0 56 (L) 0 60 - 1 30 mg/dL    Glucose, Fasting 91 65 - 99 mg/dL    Calcium 9 6 8 3 - 10 1 mg/dL    AST 20 5 - 45 U/L    ALT 25 12 - 78 U/L    Alkaline Phosphatase 44 (L) 46 - 116 U/L    Total Protein 6 7 6 4 - 8 2 g/dL Albumin 3 7 3 5 - 5 0 g/dL    Total Bilirubin 0 53 0 20 - 1 00 mg/dL    eGFR 98 ml/min/1 73sq m   Lipid panel    Collection Time: 03/02/22 11:12 AM   Result Value Ref Range    Cholesterol 219 (H) See Comment mg/dL    Triglycerides 61 See Comment mg/dL    HDL, Direct 82 >=50 mg/dL    LDL Calculated 125 (H) 0 - 100 mg/dL    Non-HDL-Chol (CHOL-HDL) 137 mg/dl   Hemoglobin A1C    Collection Time: 03/02/22 11:12 AM   Result Value Ref Range    Hemoglobin A1C 5 2 Normal 3 8-5 6%; PreDiabetic 5 7-6 4%; Diabetic >=6 5%; Glycemic control for adults with diabetes <7 0% %     mg/dl   TSH, 3rd generation with Free T4 reflex    Collection Time: 03/02/22 11:12 AM   Result Value Ref Range    TSH 3RD GENERATON 0 689 0 358 - 3 740 uIU/mL       The following portions of the patient's history were reviewed and updated as appropriate: allergies, current medications, past family history, past medical history, past social history, past surgical history and problem list      Review of Systems   Constitutional: Negative for appetite change, chills, diaphoresis, fatigue, fever and unexpected weight change  HENT: Negative for postnasal drip and sneezing  Eyes: Negative for visual disturbance  Respiratory: Negative for chest tightness and shortness of breath  Cardiovascular: Negative for chest pain, palpitations and leg swelling  Gastrointestinal: Negative for abdominal pain and blood in stool  Endocrine: Negative for cold intolerance, heat intolerance, polydipsia, polyphagia and polyuria  Genitourinary: Negative for difficulty urinating, dysuria, frequency and urgency  Musculoskeletal: Negative for arthralgias and myalgias  Skin: Negative for rash and wound  Neurological: Negative for dizziness, weakness, light-headedness and headaches  Hematological: Negative for adenopathy  Psychiatric/Behavioral: Negative for confusion, dysphoric mood and sleep disturbance  The patient is not nervous/anxious  Objective:      /78 (BP Location: Left arm, Patient Position: Sitting, Cuff Size: Standard)   Pulse 78   Resp 16   Ht 5' 5" (1 651 m)   Wt 70 9 kg (156 lb 6 4 oz)   BMI 26 03 kg/m²        Physical Exam  Constitutional:       General: She is not in acute distress  Appearance: She is well-developed  She is not diaphoretic  HENT:      Head: Normocephalic and atraumatic  Nose: Nose normal    Eyes:      Conjunctiva/sclera: Conjunctivae normal       Pupils: Pupils are equal, round, and reactive to light  Neck:      Thyroid: No thyromegaly  Vascular: No JVD  Trachea: No tracheal deviation  Cardiovascular:      Rate and Rhythm: Normal rate and regular rhythm  Heart sounds: Normal heart sounds  No murmur heard  No friction rub  No gallop  Pulmonary:      Effort: Pulmonary effort is normal  No respiratory distress  Breath sounds: Normal breath sounds  No wheezing or rales  Abdominal:      General: Bowel sounds are normal  There is no distension  Palpations: Abdomen is soft  Tenderness: There is no abdominal tenderness  Musculoskeletal:         General: Normal range of motion  Cervical back: Normal range of motion and neck supple  Lymphadenopathy:      Cervical: No cervical adenopathy  Skin:     General: Skin is warm and dry  Findings: No rash  Neurological:      Mental Status: She is alert and oriented to person, place, and time  Cranial Nerves: No cranial nerve deficit  Psychiatric:         Behavior: Behavior normal          Thought Content:  Thought content normal          Judgment: Judgment normal

## 2022-03-04 NOTE — PATIENT INSTRUCTIONS
Hyperlipidemia-LDL stable and excellent ratio with 10 year atherosclerotic risk calculated only 3 5%  No indication for statin  Patient will continue with excellent exercise regimen, weight loss efforts and low saturated fat diet, recheck in 6 months  Anxiety and depression-stable on Lexapro,  Rarely using BuSpar now using CBD tinc sugar    Alcoholism in remission-congratulations 3 years  Impaired fasting glucose stable with A1c 5 4      Health maintenance- up to date

## 2022-05-10 DIAGNOSIS — F41.9 ANXIETY: ICD-10-CM

## 2022-05-10 RX ORDER — ESCITALOPRAM OXALATE 10 MG/1
TABLET ORAL
Qty: 30 TABLET | Refills: 5 | Status: SHIPPED | OUTPATIENT
Start: 2022-05-10

## 2022-09-14 ENCOUNTER — RA CDI HCC (OUTPATIENT)
Dept: OTHER | Facility: HOSPITAL | Age: 67
End: 2022-09-14

## 2022-09-14 NOTE — PROGRESS NOTES
[facility]  coding opportunities       Chart reviewed, no opportunity found: CHART REVIEWED, NO OPPORTUNITY FOUND        Patients Insurance     Medicare Insurance: Medicare

## 2022-09-19 ENCOUNTER — APPOINTMENT (OUTPATIENT)
Dept: LAB | Facility: CLINIC | Age: 67
End: 2022-09-19
Payer: MEDICARE

## 2022-09-19 DIAGNOSIS — E55.9 VITAMIN D DEFICIENCY: ICD-10-CM

## 2022-09-19 DIAGNOSIS — E78.5 HYPERLIPIDEMIA, UNSPECIFIED HYPERLIPIDEMIA TYPE: ICD-10-CM

## 2022-09-19 DIAGNOSIS — R73.01 IMPAIRED FASTING BLOOD SUGAR: ICD-10-CM

## 2022-09-24 ENCOUNTER — APPOINTMENT (OUTPATIENT)
Dept: LAB | Facility: CLINIC | Age: 67
End: 2022-09-24
Payer: MEDICARE

## 2022-09-24 LAB
25(OH)D3 SERPL-MCNC: 34.7 NG/ML (ref 30–100)
ALBUMIN SERPL BCP-MCNC: 3.6 G/DL (ref 3.5–5)
ALP SERPL-CCNC: 54 U/L (ref 46–116)
ALT SERPL W P-5'-P-CCNC: 55 U/L (ref 12–78)
ANION GAP SERPL CALCULATED.3IONS-SCNC: 10 MMOL/L (ref 4–13)
AST SERPL W P-5'-P-CCNC: 31 U/L (ref 5–45)
BASOPHILS # BLD AUTO: 0.06 THOUSANDS/ΜL (ref 0–0.1)
BASOPHILS NFR BLD AUTO: 2 % (ref 0–1)
BILIRUB SERPL-MCNC: 0.42 MG/DL (ref 0.2–1)
BUN SERPL-MCNC: 18 MG/DL (ref 5–25)
CALCIUM SERPL-MCNC: 9.5 MG/DL (ref 8.3–10.1)
CHLORIDE SERPL-SCNC: 109 MMOL/L (ref 96–108)
CHOLEST SERPL-MCNC: 228 MG/DL
CO2 SERPL-SCNC: 23 MMOL/L (ref 21–32)
CREAT SERPL-MCNC: 0.53 MG/DL (ref 0.6–1.3)
EOSINOPHIL # BLD AUTO: 0.07 THOUSAND/ΜL (ref 0–0.61)
EOSINOPHIL NFR BLD AUTO: 2 % (ref 0–6)
ERYTHROCYTE [DISTWIDTH] IN BLOOD BY AUTOMATED COUNT: 13.1 % (ref 11.6–15.1)
EST. AVERAGE GLUCOSE BLD GHB EST-MCNC: 105 MG/DL
GFR SERPL CREATININE-BSD FRML MDRD: 99 ML/MIN/1.73SQ M
GLUCOSE P FAST SERPL-MCNC: 90 MG/DL (ref 65–99)
HBA1C MFR BLD: 5.3 %
HCT VFR BLD AUTO: 41.2 % (ref 34.8–46.1)
HDLC SERPL-MCNC: 90 MG/DL
HGB BLD-MCNC: 12.9 G/DL (ref 11.5–15.4)
IMM GRANULOCYTES # BLD AUTO: 0.01 THOUSAND/UL (ref 0–0.2)
IMM GRANULOCYTES NFR BLD AUTO: 0 % (ref 0–2)
LDLC SERPL CALC-MCNC: 127 MG/DL (ref 0–100)
LYMPHOCYTES # BLD AUTO: 0.98 THOUSANDS/ΜL (ref 0.6–4.47)
LYMPHOCYTES NFR BLD AUTO: 30 % (ref 14–44)
MCH RBC QN AUTO: 30.6 PG (ref 26.8–34.3)
MCHC RBC AUTO-ENTMCNC: 31.3 G/DL (ref 31.4–37.4)
MCV RBC AUTO: 98 FL (ref 82–98)
MONOCYTES # BLD AUTO: 0.39 THOUSAND/ΜL (ref 0.17–1.22)
MONOCYTES NFR BLD AUTO: 12 % (ref 4–12)
NEUTROPHILS # BLD AUTO: 1.8 THOUSANDS/ΜL (ref 1.85–7.62)
NEUTS SEG NFR BLD AUTO: 54 % (ref 43–75)
NONHDLC SERPL-MCNC: 138 MG/DL
NRBC BLD AUTO-RTO: 0 /100 WBCS
PLATELET # BLD AUTO: 189 THOUSANDS/UL (ref 149–390)
PMV BLD AUTO: 10.1 FL (ref 8.9–12.7)
POTASSIUM SERPL-SCNC: 4.3 MMOL/L (ref 3.5–5.3)
PROT SERPL-MCNC: 6.8 G/DL (ref 6.4–8.4)
RBC # BLD AUTO: 4.22 MILLION/UL (ref 3.81–5.12)
SODIUM SERPL-SCNC: 142 MMOL/L (ref 135–147)
TRIGL SERPL-MCNC: 57 MG/DL
TSH SERPL DL<=0.05 MIU/L-ACNC: 0.73 UIU/ML (ref 0.45–4.5)
WBC # BLD AUTO: 3.31 THOUSAND/UL (ref 4.31–10.16)

## 2022-09-24 PROCEDURE — 83036 HEMOGLOBIN GLYCOSYLATED A1C: CPT

## 2022-09-24 PROCEDURE — 85025 COMPLETE CBC W/AUTO DIFF WBC: CPT

## 2022-09-24 PROCEDURE — 82306 VITAMIN D 25 HYDROXY: CPT

## 2022-09-24 PROCEDURE — 84443 ASSAY THYROID STIM HORMONE: CPT

## 2022-09-24 PROCEDURE — 80053 COMPREHEN METABOLIC PANEL: CPT

## 2022-09-24 PROCEDURE — 80061 LIPID PANEL: CPT

## 2022-09-24 PROCEDURE — 36415 COLL VENOUS BLD VENIPUNCTURE: CPT

## 2022-09-26 ENCOUNTER — OFFICE VISIT (OUTPATIENT)
Dept: INTERNAL MEDICINE CLINIC | Facility: CLINIC | Age: 67
End: 2022-09-26
Payer: MEDICARE

## 2022-09-26 VITALS
BODY MASS INDEX: 25.49 KG/M2 | HEIGHT: 65 IN | DIASTOLIC BLOOD PRESSURE: 68 MMHG | RESPIRATION RATE: 16 BRPM | SYSTOLIC BLOOD PRESSURE: 106 MMHG | HEART RATE: 92 BPM | WEIGHT: 153 LBS

## 2022-09-26 DIAGNOSIS — F10.20 ALCOHOLISM (HCC): ICD-10-CM

## 2022-09-26 DIAGNOSIS — Z23 ENCOUNTER FOR IMMUNIZATION: Primary | ICD-10-CM

## 2022-09-26 DIAGNOSIS — F33.9 RECURRENT MAJOR DEPRESSIVE DISORDER, REMISSION STATUS UNSPECIFIED (HCC): ICD-10-CM

## 2022-09-26 DIAGNOSIS — E78.5 HYPERLIPIDEMIA, UNSPECIFIED HYPERLIPIDEMIA TYPE: ICD-10-CM

## 2022-09-26 DIAGNOSIS — R73.01 IMPAIRED FASTING BLOOD SUGAR: ICD-10-CM

## 2022-09-26 DIAGNOSIS — R01.1 MURMUR: ICD-10-CM

## 2022-09-26 PROCEDURE — 99214 OFFICE O/P EST MOD 30 MIN: CPT | Performed by: NURSE PRACTITIONER

## 2022-09-26 PROCEDURE — 90662 IIV NO PRSV INCREASED AG IM: CPT | Performed by: NURSE PRACTITIONER

## 2022-09-26 PROCEDURE — G0008 ADMIN INFLUENZA VIRUS VAC: HCPCS | Performed by: NURSE PRACTITIONER

## 2022-09-26 NOTE — PROGRESS NOTES
INTERNAL MEDICINE FOLLOW-UP VISIT  [facility] - [facility]    NAME: Tanya Cruz  AGE: 78 y o  SEX: female  : 1955     DATE: 2022     Assessment and Plan:   1  Encounter for immunization  - influenza vaccine, high-dose, PF 0 7 mL (FLUZONE HIGH-DOSE)    2  Impaired fasting blood sugar  aic excellent at 5 3  - Hemoglobin A1C; Future    3  Alcoholism ([facility] )  Drink free for 3 1/2 years congrats!!!!    4  Recurrent major depressive disorder, remission status unspecified ([facility] )  Stable on lexapro    5  Hyperlipidemia, unspecified hyperlipidemia type  Excellent ratio   - CBC and differential; Future  - Comprehensive metabolic panel; Future  - Lipid panel; Future  - TSH, 3rd generation with Free T4 reflex; Future    6  Murmur  - Echo complete w/ contrast if indicated; Future        No follow-ups on file  Chief Complaint:     Chief Complaint   Patient presents with    Follow-up      History of Present Illness:     Feeling well  No new complaints  Was doing well w/ exercise on the [employer] but got out of the habit recently  No drink in over 3 years just quit no AA  Moods stable    The following portions of the patient's history were reviewed and updated as appropriate: allergies, current medications, past family history, past medical history, past social history, past surgical history and problem list      Review of Systems:     Review of Systems   Constitutional: Negative for appetite change, chills, diaphoresis, fatigue, fever and unexpected weight change  HENT: Negative for postnasal drip and sneezing  Eyes: Negative for visual disturbance  Respiratory: Negative for chest tightness and shortness of breath  Cardiovascular: Negative for chest pain, palpitations and leg swelling  Gastrointestinal: Negative for abdominal pain and blood in stool  Endocrine: Negative for cold intolerance, heat intolerance, polydipsia, polyphagia and polyuria  Genitourinary: Negative for difficulty urinating, dysuria, frequency and urgency  Musculoskeletal: Negative for arthralgias and myalgias  Skin: Negative for rash and wound  Neurological: Negative for dizziness, weakness, light-headedness and headaches  Hematological: Negative for adenopathy  Psychiatric/Behavioral: Negative for confusion, dysphoric mood and sleep disturbance  The patient is not nervous/anxious  Past Medical History:     Past Medical History:   Diagnosis Date    Anemia     IRON  INFUSIONS    Anxiety     Cancer ([facility] )     breast/ovarian had preventative masectomy/ovary removal    Colon polyp     Depression     Last Assessed 8/27/2015    H/O total mastectomy     Hyperlipidemia     Last Assessed 8/7/2015        Current Medications:     Current Outpatient Medications:     calcium carbonate-vitamin D (OSCAL-D) 500 mg-200 units per tablet, Take 1 tablet by mouth 2 (two) times a day with meals, Disp: , Rfl:     escitalopram (LEXAPRO) 10 mg tablet, TAKE 1 TABLET BY MOUTH EVERY DAY, Disp: 30 tablet, Rfl: 5    gabapentin (NEURONTIN) 300 mg capsule, TAKE 1 CAPSULE IN THE AM AND 1 CAPSULE IN THE AFTERNOON AND 2 CAPSULES AT BEDTIME (Patient taking differently: if needed TAKE 1 CAPSULE IN THE AM AND 1 CAPSULE IN THE AFTERNOON AND 2 CAPSULES AT BEDTIME), Disp: 120 capsule, Rfl: 5    multivitamin (THERAGRAN) TABS, Take 1 tablet by mouth daily, Disp: , Rfl:      Allergies:   No Known Allergies     Physical Exam:     /68 (BP Location: Left arm, Patient Position: Sitting, Cuff Size: Standard)   Pulse 92   Resp 16   Ht 5' 5" (1 651 m)   Wt 69 4 kg (153 lb)   BMI 25 46 kg/m²     Physical Exam  Constitutional:       Appearance: She is well-developed  HENT:      Head: Normocephalic and atraumatic  Eyes:      Pupils: Pupils are equal, round, and reactive to light  Neck:      Thyroid: No thyromegaly  Cardiovascular:      Rate and Rhythm: Normal rate and regular rhythm  Heart sounds: No murmur heard  Pulmonary:      Effort: Pulmonary effort is normal       Breath sounds: Normal breath sounds  Abdominal:      General: Bowel sounds are normal       Palpations: Abdomen is soft  Musculoskeletal:         General: Normal range of motion  Cervical back: Normal range of motion and neck supple  Lymphadenopathy:      Cervical: No cervical adenopathy  Skin:     General: Skin is warm and dry  Neurological:      Mental Status: She is alert and oriented to person, place, and time  Data:     Laboratory Results: I have personally reviewed the pertinent laboratory results/reports   Radiology/Other Diagnostic Testing Results: I have personally reviewed pertinent reports        SHERLY Parsons  [facility]

## 2022-12-14 ENCOUNTER — HOSPITAL ENCOUNTER (OUTPATIENT)
Dept: NON INVASIVE DIAGNOSTICS | Facility: HOSPITAL | Age: 67
Discharge: HOME | End: 2022-12-14

## 2022-12-14 ENCOUNTER — TELEPHONE (OUTPATIENT)
Dept: INTERNAL MEDICINE CLINIC | Facility: CLINIC | Age: 67
End: 2022-12-14

## 2022-12-14 VITALS
BODY MASS INDEX: 25.49 KG/M2 | DIASTOLIC BLOOD PRESSURE: 68 MMHG | HEART RATE: 65 BPM | SYSTOLIC BLOOD PRESSURE: 106 MMHG | HEIGHT: 65 IN | WEIGHT: 153 LBS

## 2022-12-14 DIAGNOSIS — R01.1 MURMUR: ICD-10-CM

## 2022-12-14 LAB
AORTIC ROOT: 3.1 CM
APICAL FOUR CHAMBER EJECTION FRACTION: 55 %
ASCENDING AORTA: 3 CM
E WAVE DECELERATION TIME: 270 MS
FRACTIONAL SHORTENING: 30 % (ref 28–44)
INTERVENTRICULAR SEPTUM IN DIASTOLE (PARASTERNAL SHORT AXIS VIEW): 1 CM
INTERVENTRICULAR SEPTUM: 1 CM (ref 0.6–1.1)
LAAS-AP2: 6.4 CM2
LAAS-AP4: 15.3 CM2
LEFT ATRIUM AREA SYSTOLE SINGLE PLANE A4C: 15.2 CM2
LEFT ATRIUM SIZE: 3.3 CM
LEFT INTERNAL DIMENSION IN SYSTOLE: 3.3 CM (ref 2.1–4)
LEFT VENTRICULAR INTERNAL DIMENSION IN DIASTOLE: 4.7 CM (ref 3.5–6)
LEFT VENTRICULAR POSTERIOR WALL IN END DIASTOLE: 0.8 CM
LEFT VENTRICULAR STROKE VOLUME: 57 ML
LVSV (TEICH): 57 ML
MV E'TISSUE VEL-SEP: 12 CM/S
MV PEAK A VEL: 0.68 M/S
MV PEAK E VEL: 80 CM/S
MV STENOSIS PRESSURE HALF TIME: 78 MS
MV VALVE AREA P 1/2 METHOD: 2.82 CM2
RIGHT ATRIUM AREA SYSTOLE A4C: 13.2 CM2
RIGHT VENTRICLE ID DIMENSION: 3.9 CM
SL CV LEFT ATRIUM LENGTH A2C: 3.6 CM
SL CV LV EF: 60
SL CV PED ECHO LEFT VENTRICLE DIASTOLIC VOLUME (MOD BIPLANE) 2D: 100 ML
SL CV PED ECHO LEFT VENTRICLE SYSTOLIC VOLUME (MOD BIPLANE) 2D: 43 ML
TR MAX PG: 19 MMHG
TR PEAK VELOCITY: 2.2 M/S
TRICUSPID VALVE PEAK REGURGITATION VELOCITY: 2.17 M/S

## 2022-12-14 NOTE — TELEPHONE ENCOUNTER
----- Message from Jenifer Owens, [address] sent at 12/14/2022  2:06 PM EST -----  Echo was normal  just showed some very mild backflow of blood at 2 of her valves   Not very alarming we will continue to monitor every 1-2 years

## 2022-12-14 NOTE — TELEPHONE ENCOUNTER
----- Message from Susan Hubbard, [address] sent at 12/14/2022  2:06 PM EST -----  Echo was normal  just showed some very mild backflow of blood at 2 of her valves   Not very alarming we will continue to monitor every 1-2 years

## 2022-12-30 ENCOUNTER — TELEPHONE (OUTPATIENT)
Dept: INTERNAL MEDICINE CLINIC | Facility: CLINIC | Age: 67
End: 2022-12-30

## 2022-12-30 DIAGNOSIS — F10.20 ALCOHOLISM (HCC): ICD-10-CM

## 2022-12-30 DIAGNOSIS — M25.50 ARTHRALGIA, UNSPECIFIED JOINT: ICD-10-CM

## 2022-12-30 DIAGNOSIS — F41.9 ANXIETY: ICD-10-CM

## 2022-12-30 RX ORDER — GABAPENTIN 300 MG/1
CAPSULE ORAL
Qty: 120 CAPSULE | Refills: 5 | Status: SHIPPED | OUTPATIENT
Start: 2022-12-30

## 2023-01-09 DIAGNOSIS — F41.9 ANXIETY: ICD-10-CM

## 2023-01-09 RX ORDER — ESCITALOPRAM OXALATE 10 MG/1
TABLET ORAL
Qty: 30 TABLET | Refills: 5 | Status: SHIPPED | OUTPATIENT
Start: 2023-01-09

## 2023-04-21 ENCOUNTER — TELEPHONE (OUTPATIENT)
Dept: OBGYN CLINIC | Facility: CLINIC | Age: 68
End: 2023-04-21

## 2023-04-21 PROBLEM — M17.12 PRIMARY OSTEOARTHRITIS OF LEFT KNEE: Status: ACTIVE | Noted: 2023-04-21

## 2023-06-29 ENCOUNTER — OFFICE VISIT (OUTPATIENT)
Age: 68
End: 2023-06-29
Payer: MEDICARE

## 2023-06-29 VITALS
WEIGHT: 153 LBS | TEMPERATURE: 97.5 F | HEART RATE: 85 BPM | SYSTOLIC BLOOD PRESSURE: 102 MMHG | DIASTOLIC BLOOD PRESSURE: 72 MMHG | OXYGEN SATURATION: 99 % | HEIGHT: 65 IN | BODY MASS INDEX: 25.49 KG/M2

## 2023-06-29 DIAGNOSIS — H57.89 PERIORBITAL SWELLING: Primary | ICD-10-CM

## 2023-06-29 RX ORDER — PREDNISONE 10 MG/1
TABLET ORAL
Qty: 28 TABLET | Refills: 0 | Status: SHIPPED | OUTPATIENT
Start: 2023-06-29

## 2023-06-29 NOTE — PROGRESS NOTES
INTERNAL MEDICINE FOLLOW-UP VISIT  [facility] [place]    NAME: Severino Pantoja  AGE: 80 y o  SEX: female  : 1955     DATE: 2023     Assessment and Plan:   1  Periorbital swelling  Stop eyelash serum  May use cool compresses as needed  Will start Prednisone taper for anti-inflammatory to reduce swelling and irritation   If symptoms continue contact the office  3 week follow up as well as medicare wellness        No follow-ups on file  Chief Complaint:     Chief Complaint   Patient presents with   • Eye Problem     Red , rash puffy  itchy      History of Present Illness:     Lash serum used about a month ago  Since has had periorbital swelling  Eye lids are swelling and itch  She did see her opthalmologist as well who did not provide any treatment at that time    The following portions of the patient's history were reviewed and updated as appropriate: allergies, current medications, past family history, past medical history, past social history, past surgical history and problem list      Review of Systems:     Review of Systems   Constitutional: Negative for appetite change, chills, diaphoresis, fatigue, fever and unexpected weight change  HENT: Negative for postnasal drip and sneezing  Eyes: Positive for itching  Negative for visual disturbance  Respiratory: Negative for chest tightness and shortness of breath  Cardiovascular: Negative for chest pain, palpitations and leg swelling  Gastrointestinal: Negative for abdominal pain and blood in stool  Endocrine: Negative for cold intolerance, heat intolerance, polydipsia, polyphagia and polyuria  Genitourinary: Negative for difficulty urinating, dysuria, frequency and urgency  Musculoskeletal: Negative for arthralgias and myalgias  Skin: Negative for rash and wound  Neurological: Negative for dizziness, weakness, light-headedness and headaches     Hematological: Negative for "adenopathy  Psychiatric/Behavioral: Negative for confusion, dysphoric mood and sleep disturbance  The patient is not nervous/anxious  Past Medical History:     Past Medical History:   Diagnosis Date   • Anemia     IRON  INFUSIONS   • Anxiety    • Cancer (HCC)     breast/ovarian had preventative masectomy/ovary removal   • Colon polyp    • Depression     Last Assessed 8/27/2015   • H/O total mastectomy    • Hyperlipidemia     Last Assessed 8/7/2015        Current Medications:     Current Outpatient Medications:   •  calcium carbonate-vitamin D (OSCAL-D) 500 mg-200 units per tablet, Take 1 tablet by mouth 2 (two) times a day with meals, Disp: , Rfl:   •  escitalopram (LEXAPRO) 10 mg tablet, TAKE 1 TABLET BY MOUTH EVERY DAY, Disp: 30 tablet, Rfl: 5  •  multivitamin (THERAGRAN) TABS, Take 1 tablet by mouth daily, Disp: , Rfl:   •  predniSONE 10 mg tablet, Take 4 tablets for 3 days then 3 tablets for 3 days then 2 tablet for 3 days 1 for 1 day, Disp: 28 tablet, Rfl: 0     Allergies:   No Known Allergies     Physical Exam:     /72   Pulse 85   Temp 97 5 °F (36 4 °C)   Ht 5' 5\" (1 651 m)   Wt 69 4 kg (153 lb)   SpO2 99%   BMI 25 46 kg/m²     Physical Exam  Constitutional:       Appearance: She is well-developed  HENT:      Head: Normocephalic and atraumatic  Eyes:      Pupils: Pupils are equal, round, and reactive to light  Neck:      Thyroid: No thyromegaly  Cardiovascular:      Rate and Rhythm: Normal rate and regular rhythm  Heart sounds: No murmur heard  Pulmonary:      Effort: Pulmonary effort is normal       Breath sounds: Normal breath sounds  Abdominal:      General: Bowel sounds are normal       Palpations: Abdomen is soft  Musculoskeletal:         General: Normal range of motion  Cervical back: Normal range of motion and neck supple  Lymphadenopathy:      Cervical: No cervical adenopathy  Skin:     General: Skin is warm and dry     Neurological:      Mental Status: " She is alert and oriented to person, place, and time  Data:     Laboratory Results: I have personally reviewed the pertinent laboratory results/reports   Radiology/Other Diagnostic Testing Results: I have personally reviewed pertinent reports        SHERLY Reagan  [facility]

## 2023-07-12 ENCOUNTER — TELEPHONE (OUTPATIENT)
Age: 68
End: 2023-07-12

## 2023-07-12 NOTE — TELEPHONE ENCOUNTER
Saw Brooke Brock 10 days ago- had issue with eye; itchy and so forth    Was given prednisone- now the issue with the eye is back    What would you like to do?   She's seeing you for her wellness next wk

## 2023-07-13 ENCOUNTER — TELEPHONE (OUTPATIENT)
Age: 68
End: 2023-07-13

## 2023-07-13 NOTE — TELEPHONE ENCOUNTER
Is the swelling back fully or is it better just not completely resolved. It may take awhile until that eye lash serum is out of her system.  She can use aquaphor to eyelid until approve

## 2023-07-13 NOTE — TELEPHONE ENCOUNTER
She should discontinue all products she is using on her face including soap and lotions and make up. I think she developed some sort of allergy. also stop any eye drops she is using. Only use mild baby shampoo.  Lets see how she does by next week when we see her

## 2023-08-15 DIAGNOSIS — F41.9 ANXIETY: ICD-10-CM

## 2023-08-15 RX ORDER — ESCITALOPRAM OXALATE 10 MG/1
10 TABLET ORAL DAILY
Qty: 30 TABLET | Refills: 5 | Status: SHIPPED | OUTPATIENT
Start: 2023-08-15

## 2023-08-15 NOTE — TELEPHONE ENCOUNTER
Patient only has one more tablet of Escitalopram left  Would like this to be refilled. Please send to:   [address]  [address] [place] [address],  [facility], [address] [place] [address]

## 2023-10-27 ENCOUNTER — TELEPHONE (OUTPATIENT)
Age: 68
End: 2023-10-27

## 2023-10-27 DIAGNOSIS — F41.9 ANXIETY: ICD-10-CM

## 2023-10-27 RX ORDER — ESCITALOPRAM OXALATE 10 MG/1
10 TABLET ORAL DAILY
Qty: 30 TABLET | Refills: 5 | Status: SHIPPED | OUTPATIENT
Start: 2023-10-27

## 2023-10-27 NOTE — TELEPHONE ENCOUNTER
Reason for call:   [x] Refill   [] Prior Auth  [] Other:     Office:   [] PCP/Provider -   [x] Specialty/Provider - Dr Melquiades Cifuentes    Medication: meloxicam 15 mg, 1 qd, 30    Pharmacy: [address] [place] [address]    Does the patient have enough for 3 days?    [] Yes   [x] No - Send as HP to POD

## 2023-10-27 NOTE — TELEPHONE ENCOUNTER
PT KASSANDRA at [facility] :    Hello, my name is Hudson Shone. I'm a patient of doctor danglers. I need a prescription filled. I had missed an appointment but I need this prescription filled until I can get back in and make an appointment. So again Narendra Landrum [phone] Date of birth is [birth year]. My primary physician is Doctor Martin. I need my prescription filled for the escitalopram. I the last time they sent it to the wrong pharmacy, so if they could please call me so we could straighten that out, I would appreciate it. Thank you.

## 2023-10-31 ENCOUNTER — TELEPHONE (OUTPATIENT)
Dept: OBGYN CLINIC | Facility: HOSPITAL | Age: 68
End: 2023-10-31

## 2023-10-31 NOTE — TELEPHONE ENCOUNTER
Caller: Patient    Doctor: Kareem Tuttle    Reason for call: Patient would like an update in regards to meloxicam. Please advise    Call back#: [phone]

## 2023-11-01 DIAGNOSIS — M17.12 PRIMARY OSTEOARTHRITIS OF LEFT KNEE: Primary | ICD-10-CM

## 2023-11-01 RX ORDER — MELOXICAM 15 MG/1
15 TABLET ORAL DAILY
Qty: 30 TABLET | Refills: 3 | Status: SHIPPED | OUTPATIENT
Start: 2023-11-01

## 2023-11-03 ENCOUNTER — APPOINTMENT (OUTPATIENT)
Dept: LAB | Facility: CLINIC | Age: 68
End: 2023-11-03
Payer: MEDICARE

## 2023-11-03 ENCOUNTER — OFFICE VISIT (OUTPATIENT)
Age: 68
End: 2023-11-03
Payer: MEDICARE

## 2023-11-03 VITALS
WEIGHT: 156 LBS | BODY MASS INDEX: 25.99 KG/M2 | HEIGHT: 65 IN | OXYGEN SATURATION: 99 % | DIASTOLIC BLOOD PRESSURE: 74 MMHG | HEART RATE: 86 BPM | TEMPERATURE: 98 F | SYSTOLIC BLOOD PRESSURE: 124 MMHG

## 2023-11-03 DIAGNOSIS — Z13.29 SCREENING FOR THYROID DISORDER: ICD-10-CM

## 2023-11-03 DIAGNOSIS — M79.2 NERVE PAIN: ICD-10-CM

## 2023-11-03 DIAGNOSIS — R73.01 IMPAIRED FASTING BLOOD SUGAR: ICD-10-CM

## 2023-11-03 DIAGNOSIS — F10.20 ALCOHOLISM (HCC): Primary | ICD-10-CM

## 2023-11-03 DIAGNOSIS — D64.9 ANEMIA, UNSPECIFIED TYPE: ICD-10-CM

## 2023-11-03 DIAGNOSIS — Z23 ENCOUNTER FOR IMMUNIZATION: ICD-10-CM

## 2023-11-03 DIAGNOSIS — E78.5 HYPERLIPIDEMIA, UNSPECIFIED HYPERLIPIDEMIA TYPE: ICD-10-CM

## 2023-11-03 DIAGNOSIS — F33.9 RECURRENT MAJOR DEPRESSIVE DISORDER, REMISSION STATUS UNSPECIFIED (HCC): ICD-10-CM

## 2023-11-03 DIAGNOSIS — F41.9 ANXIETY: ICD-10-CM

## 2023-11-03 DIAGNOSIS — Z12.11 ENCOUNTER FOR SCREENING FOR MALIGNANT NEOPLASM OF COLON: ICD-10-CM

## 2023-11-03 PROBLEM — J02.9 ACUTE PHARYNGITIS: Status: RESOLVED | Noted: 2019-01-24 | Resolved: 2023-11-03

## 2023-11-03 LAB
ALBUMIN SERPL BCP-MCNC: 4.2 G/DL (ref 3.5–5)
ALP SERPL-CCNC: 39 U/L (ref 34–104)
ALT SERPL W P-5'-P-CCNC: 14 U/L (ref 7–52)
ANION GAP SERPL CALCULATED.3IONS-SCNC: 8 MMOL/L
AST SERPL W P-5'-P-CCNC: 18 U/L (ref 13–39)
BASOPHILS # BLD AUTO: 0.1 THOUSANDS/ÂΜL (ref 0–0.1)
BASOPHILS NFR BLD AUTO: 2 % (ref 0–1)
BILIRUB SERPL-MCNC: 0.42 MG/DL (ref 0.2–1)
BUN SERPL-MCNC: 18 MG/DL (ref 5–25)
CALCIUM SERPL-MCNC: 9.5 MG/DL (ref 8.4–10.2)
CHLORIDE SERPL-SCNC: 105 MMOL/L (ref 96–108)
CHOLEST SERPL-MCNC: 246 MG/DL
CO2 SERPL-SCNC: 28 MMOL/L (ref 21–32)
CREAT SERPL-MCNC: 0.48 MG/DL (ref 0.6–1.3)
EOSINOPHIL # BLD AUTO: 0.07 THOUSAND/ÂΜL (ref 0–0.61)
EOSINOPHIL NFR BLD AUTO: 2 % (ref 0–6)
ERYTHROCYTE [DISTWIDTH] IN BLOOD BY AUTOMATED COUNT: 14.8 % (ref 11.6–15.1)
EST. AVERAGE GLUCOSE BLD GHB EST-MCNC: 123 MG/DL
GFR SERPL CREATININE-BSD FRML MDRD: 101 ML/MIN/1.73SQ M
GLUCOSE SERPL-MCNC: 81 MG/DL (ref 65–140)
HBA1C MFR BLD: 5.9 %
HCT VFR BLD AUTO: 37.1 % (ref 34.8–46.1)
HDLC SERPL-MCNC: 80 MG/DL
HGB BLD-MCNC: 11.6 G/DL (ref 11.5–15.4)
IMM GRANULOCYTES # BLD AUTO: 0.01 THOUSAND/UL (ref 0–0.2)
IMM GRANULOCYTES NFR BLD AUTO: 0 % (ref 0–2)
LDLC SERPL CALC-MCNC: 138 MG/DL (ref 0–100)
LYMPHOCYTES # BLD AUTO: 1.02 THOUSANDS/ÂΜL (ref 0.6–4.47)
LYMPHOCYTES NFR BLD AUTO: 23 % (ref 14–44)
MCH RBC QN AUTO: 27.1 PG (ref 26.8–34.3)
MCHC RBC AUTO-ENTMCNC: 31.3 G/DL (ref 31.4–37.4)
MCV RBC AUTO: 87 FL (ref 82–98)
MONOCYTES # BLD AUTO: 0.43 THOUSAND/ÂΜL (ref 0.17–1.22)
MONOCYTES NFR BLD AUTO: 10 % (ref 4–12)
NEUTROPHILS # BLD AUTO: 2.82 THOUSANDS/ÂΜL (ref 1.85–7.62)
NEUTS SEG NFR BLD AUTO: 63 % (ref 43–75)
NONHDLC SERPL-MCNC: 166 MG/DL
NRBC BLD AUTO-RTO: 0 /100 WBCS
PLATELET # BLD AUTO: 258 THOUSANDS/UL (ref 149–390)
PMV BLD AUTO: 10.1 FL (ref 8.9–12.7)
POTASSIUM SERPL-SCNC: 4.6 MMOL/L (ref 3.5–5.3)
PROT SERPL-MCNC: 6.5 G/DL (ref 6.4–8.4)
RBC # BLD AUTO: 4.28 MILLION/UL (ref 3.81–5.12)
SODIUM SERPL-SCNC: 141 MMOL/L (ref 135–147)
TRIGL SERPL-MCNC: 141 MG/DL
TSH SERPL DL<=0.05 MIU/L-ACNC: 0.99 UIU/ML (ref 0.45–4.5)
WBC # BLD AUTO: 4.45 THOUSAND/UL (ref 4.31–10.16)

## 2023-11-03 PROCEDURE — G0438 PPPS, INITIAL VISIT: HCPCS

## 2023-11-03 PROCEDURE — 83036 HEMOGLOBIN GLYCOSYLATED A1C: CPT

## 2023-11-03 PROCEDURE — 84443 ASSAY THYROID STIM HORMONE: CPT

## 2023-11-03 PROCEDURE — 80061 LIPID PANEL: CPT

## 2023-11-03 PROCEDURE — 80053 COMPREHEN METABOLIC PANEL: CPT

## 2023-11-03 PROCEDURE — G0008 ADMIN INFLUENZA VIRUS VAC: HCPCS

## 2023-11-03 PROCEDURE — 36415 COLL VENOUS BLD VENIPUNCTURE: CPT

## 2023-11-03 PROCEDURE — 99214 OFFICE O/P EST MOD 30 MIN: CPT

## 2023-11-03 PROCEDURE — 85025 COMPLETE CBC W/AUTO DIFF WBC: CPT

## 2023-11-03 PROCEDURE — 90662 IIV NO PRSV INCREASED AG IM: CPT

## 2023-11-03 RX ORDER — GABAPENTIN 300 MG/1
300 CAPSULE ORAL
Qty: 90 CAPSULE | Refills: 5 | Status: SHIPPED | OUTPATIENT
Start: 2023-11-03

## 2023-11-03 NOTE — PROGRESS NOTES
Assessment and Plan:   1. Anemia, unspecified type  Last CBC was stable, will do CBC today. Had issues with position change lightheadedness but now resolved  - CBC and differential; Future  - CBC and differential; Future    2. Alcoholism ([address])  5 years sobriety. GREAT JOB! 3. Anxiety  Esciltalpram use at 10 mg daily. She states that this has been effective and she would like to stay at this dose. 4. Hyperlipidemia, unspecified hyperlipidemia type  Labs due today. In the past she has had a slightly elevated LDL however her HDL has always been significantly higher. Will decide on medication when reviewed. Reviewed ASCVD risk score with patient being 5.8% which gives her that score of an overall risk for a cardiovascular event over the next 10 years. - Lipid panel; Future  - Lipid panel; Future    5. Recurrent major depressive disorder, remission status unspecified (HCC)  Stable at this time with Lexapro 10 mg daily      Problem List Items Addressed This Visit     Alcoholism ([address]) - Primary    Anxiety    Hyperlipidemia, unspecified    Relevant Orders    Lipid panel    Lipid panel    Anemia    Relevant Orders    CBC and differential    CBC and differential    Impaired fasting blood sugar    Relevant Orders    Comprehensive metabolic panel    Hemoglobin A1C    Comprehensive metabolic panel    Hemoglobin A1C    Recurrent major depressive disorder, remission status unspecified ([address])   Other Visit Diagnoses     Screening for thyroid disorder        Relevant Orders    TSH, 3rd generation with Free T4 reflex    TSH, 3rd generation with Free T4 reflex    Nerve pain        Relevant Medications    gabapentin (NEURONTIN) 300 mg capsule    Encounter for screening for malignant neoplasm of colon        Relevant Orders    Ambulatory Referral to Gastroenterology    Encounter for immunization        Relevant Orders    influenza vaccine, high-dose, PF 0.7 mL (FLUZONE HIGH-DOSE) (Completed)          BMI Counseling:  Body mass index is 25.96 kg/m². The BMI is above normal. Nutrition recommendations include decreasing portion sizes, encouraging healthy choices of fruits and vegetables, consuming healthier snacks, limiting drinks that contain sugar, moderation in carbohydrate intake, reducing intake of saturated and trans fat and reducing intake of cholesterol. Exercise recommendations include exercising 3-5 times per week. No pharmacotherapy was ordered. Rationale for BMI follow-up plan is due to patient being overweight or obese. Preventive health issues were discussed with patient, and age appropriate screening tests were ordered as noted in patient's After Visit Summary. Personalized health advice and appropriate referrals for health education or preventive services given if needed, as noted in patient's After Visit Summary. History of Present Illness:     Patient presents for a Medicare Wellness Visit    Barbara Nguyen is a 60-year-old female who presents today for a Medicare wellness exam.  She comes in today without any complaints and is feeling generally well at this time. She has a past medical history significant for hyperlipidemia, anxiety and depression, anemia, and a past medical history of alcoholism. She also follows with orthopedic for osteoarthritis to her left knee. She has had her last injection 6 months ago which is still effective. She did not get her labs prior to this appointment. She is up-to-date with colonoscopy and mammograms. Patient Care Team:  Angela Cardoza MD as PCP - General (Internal Medicine)  MD Harris Holloway MD as Endoscopist     Review of Systems:     Review of Systems   Constitutional:  Negative for appetite change, chills, diaphoresis, fatigue, fever and unexpected weight change. HENT:  Negative for postnasal drip and sneezing. Eyes:  Negative for visual disturbance. Respiratory:  Negative for chest tightness and shortness of breath. Cardiovascular:  Negative for chest pain, palpitations and leg swelling. Gastrointestinal:  Negative for abdominal pain and blood in stool. Endocrine: Negative for cold intolerance, heat intolerance, polydipsia, polyphagia and polyuria. Genitourinary:  Negative for difficulty urinating, dysuria, frequency and urgency. Musculoskeletal:  Negative for arthralgias and myalgias. Skin:  Negative for rash and wound. Neurological:  Negative for dizziness, weakness, light-headedness and headaches. Hematological:  Negative for adenopathy. Psychiatric/Behavioral:  Negative for confusion, dysphoric mood and sleep disturbance. The patient is not nervous/anxious.          Problem List:     Patient Active Problem List   Diagnosis   • Screen for colon cancer   • Alcoholism ([address])   • Anxiety   • Hyperlipidemia, unspecified   • Mild vitamin D deficiency   • Hx of colonic polyp   • Pure hypercholesterolemia   • Hypokalemia   • Absolute anemia   • Anemia   • Iron deficiency anemia, unspecified   • Encounter for gynecological examination without abnormal finding   • H/O bilateral oophorectomy   • H/O bilateral mastectomy   • Impaired fasting blood sugar   • Recurrent major depressive disorder, remission status unspecified ([address])   • Primary osteoarthritis of left knee      Past Medical and Surgical History:     Past Medical History:   Diagnosis Date   • Anemia     IRON  INFUSIONS   • Anxiety    • Cancer ([address])     breast/ovarian had preventative masectomy/ovary removal   • Colon polyp    • Depression     Last Assessed 8/27/2015   • H/O total mastectomy    • Hyperlipidemia     Last Assessed 8/7/2015     Past Surgical History:   Procedure Laterality Date   • APPENDECTOMY     • BILATERAL OOPHORECTOMY      Last Assessed 8/27/2015   • BREAST RECONSTRUCTION      Last Assessed 8/27/2015   • CHOLECYSTECTOMY     • COLONOSCOPY     • MASTECTOMY Bilateral     Radical; Last Assessed 8/27/2015   • AZ COLONOSCOPY FLX DX W/COLLJ SPEC WHEN PFRMD N/A 4/3/2018    Procedure: COLONOSCOPY;  Surgeon: Allyson Minor MD;  Location: [facility]; Service: Gastroenterology   • AR LAPAROSCOPY SURG CHOLECYSTECTOMY N/A 10/5/2017    Procedure: LAPAROSCOPIC CHOLECYSTECTOMY;  Surgeon: Ken Roach MD;  Location: [facility];  Service: General   • TONSILLECTOMY        Family History:     Family History   Problem Relation Age of Onset   • Breast cancer Mother    • Ovarian cancer Mother         unspecified laterality   • Heart failure Father    • Breast cancer Maternal Aunt    • Colon cancer Neg Hx    • Cervical cancer Neg Hx    • Uterine cancer Neg Hx       Social History:     Social History     Socioeconomic History   • Marital status: /Civil Union     Spouse name: None   • Number of children: None   • Years of education: None   • Highest education level: None   Occupational History   • None   Tobacco Use   • Smoking status: Never   • Smokeless tobacco: Never   • Tobacco comments:     Has medical marijuana card does vape   Vaping Use   • Vaping Use: Never used   Substance and Sexual Activity   • Alcohol use: Not Currently     Comment: recovering   • Drug use: No   • Sexual activity: Yes     Partners: Male   Other Topics Concern   • None   Social History Narrative   • None     Social Determinants of Health     Financial Resource Strain: Low Risk  (11/3/2023)    Overall Financial Resource Strain (CARDIA)    • Difficulty of Paying Living Expenses: Not hard at all   Food Insecurity: Not on file   Transportation Needs: No Transportation Needs (11/3/2023)    PRAPARE - Transportation    • Lack of Transportation (Medical): No    • Lack of Transportation (Non-Medical):  No   Physical Activity: Sufficiently Active (10/30/2020)    Exercise Vital Sign    • Days of Exercise per Week: 3 days    • Minutes of Exercise per Session: 60 min   Stress: No Stress Concern Present (10/30/2020)    [address] [place] [address]    • Feeling of Stress : Not at all   Social Connections: Not on file   Intimate Partner Violence: Not on file   Housing Stability: Not on file      Medications and Allergies:     Current Outpatient Medications   Medication Sig Dispense Refill   • calcium carbonate-vitamin D (OSCAL-D) 500 mg-200 units per tablet Take 1 tablet by mouth 2 (two) times a day with meals     • escitalopram (LEXAPRO) 10 mg tablet Take 1 tablet (10 mg total) by mouth daily 30 tablet 5   • gabapentin (NEURONTIN) 300 mg capsule Take 1 capsule (300 mg total) by mouth daily at bedtime 90 capsule 5   • meloxicam (Mobic) 15 mg tablet Take 1 tablet (15 mg total) by mouth daily 30 tablet 3   • multivitamin (THERAGRAN) TABS Take 1 tablet by mouth daily       No current facility-administered medications for this visit.      No Known Allergies   Immunizations:     Immunization History   Administered Date(s) Administered   • COVID-19 PFIZER VACCINE 0.3 ML IM 04/15/2021, 05/07/2021   • INFLUENZA 11/10/2015, 09/26/2022   • Influenza Quadrivalent, 6-35 Months IM 11/10/2015   • Influenza, high dose seasonal 0.7 mL 12/03/2021, 09/26/2022, 11/03/2023   • Influenza, injectable, quadrivalent, preservative free 0.5 mL 09/08/2020   • Influenza, recombinant, quadrivalent,injectable, preservative free 02/04/2019, 09/20/2019   • Influenza, seasonal, injectable 1955   • Pneumococcal Conjugate 13-Valent 08/11/2021, 03/04/2022   • Pneumococcal Polysaccharide PPV23 1955   • Tdap 1955, 11/10/2015, 12/21/2016   • Zoster 1955   • Zoster Vaccine Recombinant 03/15/2020, 09/08/2020      Health Maintenance:         Topic Date Due   • Colorectal Cancer Screening  05/20/2024   • Hepatitis C Screening  Completed         Topic Date Due   • COVID-19 Vaccine (3 - Pfizer series) 07/02/2021   • Pneumococcal Vaccine: 65+ Years (2 - PPSV23 if available, else PCV20) 04/29/2022      Medicare Screening Tests and Risk Assessments:     Prachi Garcia is here for her Subsequent Wellness visit. Last Medicare Wellness visit information reviewed, patient interviewed, no change since last AWV. Health Risk Assessment:   Patient rates overall health as excellent. Patient feels that their physical health rating is same. Patient is very satisfied with their life. Eyesight was rated as slightly worse. Hearing was rated as same. Patient feels that their emotional and mental health rating is same. Patients states they are never, rarely angry. Patient states they are sometimes unusually tired/fatigued. Pain experienced in the last 7 days has been none. Patient states that she has experienced no weight loss or gain in last 6 months. Depression Screening:   PHQ-9 Score: 0      Fall Risk Screening: In the past year, patient has experienced: no history of falling in past year      Urinary Incontinence Screening:   Patient has not leaked urine accidently in the last six months. Home Safety:  Patient does not have trouble with stairs inside or outside of their home. Patient has working smoke alarms and has no working carbon monoxide detector. Home safety hazards include: none. Nutrition:   Current diet is Regular. Medications:   Patient is currently taking over-the-counter supplements. OTC medications include: see medication list. Patient is able to manage medications. Activities of Daily Living (ADLs)/Instrumental Activities of Daily Living (IADLs):   Walk and transfer into and out of bed and chair?: Yes  Dress and groom yourself?: Yes    Bathe or shower yourself?: Yes    Feed yourself? Yes  Do your laundry/housekeeping?: Yes  Manage your money, pay your bills and track your expenses?: Yes  Make your own meals?: Yes    Do your own shopping?: Yes    Previous Hospitalizations:   Any hospitalizations or ED visits within the last 12 months?: No      Advance Care Planning:   Living will: Yes    Durable POA for healthcare:  Yes    Advanced directive: Yes    End of Life Decisions reviewed with patient: Yes    Provider agrees with end of life decisions: Yes      PREVENTIVE SCREENINGS      Cardiovascular Screening:    General: Screening Not Indicated and History Lipid Disorder      Colorectal Cancer Screening:     General: Screening Current      Breast Cancer Screening:     General: Screening Not Indicated      Cervical Cancer Screening:    General: Screening Not Indicated      Lung Cancer Screening:     General: Screening Not Indicated      Hepatitis C Screening:    General: Screening Current    Screening, Brief Intervention, and Referral to Treatment (SBIRT)    Screening  Typical number of drinks in a day: 0  Typical number of drinks in a week: 0  Interpretation: Low risk drinking behavior. Single Item Drug Screening:  How often have you used an illegal drug (including marijuana) or a prescription medication for non-medical reasons in the past year? never    Single Item Drug Screen Score: 0  Interpretation: Negative screen for possible drug use disorder    No results found. Physical Exam:     /74 (BP Location: Left arm, Patient Position: Sitting, Cuff Size: Adult)   Pulse 86   Temp 98 °F (36.7 °C) (Tympanic)   Ht 5' 5" (1.651 m)   Wt 70.8 kg (156 lb)   SpO2 99%   BMI 25.96 kg/m²     Physical Exam  Constitutional:       Appearance: She is well-developed. HENT:      Head: Normocephalic and atraumatic. Eyes:      Pupils: Pupils are equal, round, and reactive to light. Neck:      Thyroid: No thyromegaly. Cardiovascular:      Rate and Rhythm: Normal rate and regular rhythm. Heart sounds: No murmur heard. Pulmonary:      Effort: Pulmonary effort is normal.      Breath sounds: Normal breath sounds. Abdominal:      General: Bowel sounds are normal.      Palpations: Abdomen is soft. Musculoskeletal:         General: Normal range of motion. Cervical back: Normal range of motion and neck supple. Lymphadenopathy:      Cervical: No cervical adenopathy.    Skin: General: Skin is warm and dry. Neurological:      Mental Status: She is alert and oriented to person, place, and time.           Megan Watson

## 2023-11-03 NOTE — PATIENT INSTRUCTIONS
Medicare Preventive Visit Patient Instructions  Thank you for completing your Welcome to Medicare Visit or Medicare Annual Wellness Visit today. Your next wellness visit will be due in one year (11/3/2024). The screening/preventive services that you may require over the next 5-10 years are detailed below. Some tests may not apply to you based off risk factors and/or age. Screening tests ordered at today's visit but not completed yet may show as past due. Also, please note that scanned in results may not display below. Preventive Screenings:  Service Recommendations Previous Testing/Comments   Colorectal Cancer Screening  * Colonoscopy    * Fecal Occult Blood Test (FOBT)/Fecal Immunochemical Test (FIT)  * Fecal DNA/Cologuard Test  * Flexible Sigmoidoscopy Age: 43-73 years old   Colonoscopy: every 10 years (may be performed more frequently if at higher risk)  OR  FOBT/FIT: every 1 year  OR  Cologuard: every 3 years  OR  Sigmoidoscopy: every 5 years  Screening may be recommended earlier than age 39 if at higher risk for colorectal cancer. Also, an individualized decision between you and your healthcare provider will decide whether screening between the ages of 77-80 would be appropriate. Colonoscopy: 05/20/2019  FOBT/FIT: Not on file  Cologuard: Not on file  Sigmoidoscopy: Not on file    Screening Current     Breast Cancer Screening Age: 36 years old  Frequency: every 1-2 years  Not required if history of left and right mastectomy Mammogram: 12/09/2005    Screening Not Indicated   Cervical Cancer Screening Between the ages of 21-29, pap smear recommended once every 3 years. Between the ages of 32-69, can perform pap smear with HPV co-testing every 5 years.    Recommendations may differ for women with a history of total hysterectomy, cervical cancer, or abnormal pap smears in past. Pap Smear: 02/07/2020    Screening Not Indicated   Hepatitis C Screening Once for adults born between 1945 and 1965  More frequently in patients at high risk for Hepatitis C Hep C Antibody: 02/03/2019    Screening Current   Diabetes Screening 1-2 times per year if you're at risk for diabetes or have pre-diabetes Fasting glucose: 90 mg/dL (9/24/2022)  A1C: 5.3 % (9/24/2022)      Cholesterol Screening Once every 5 years if you don't have a lipid disorder. May order more often based on risk factors. Lipid panel: 09/24/2022    Screening Not Indicated  History Lipid Disorder     Other Preventive Screenings Covered by Medicare:  Abdominal Aortic Aneurysm (AAA) Screening: covered once if your at risk. You're considered to be at risk if you have a family history of AAA. Lung Cancer Screening: covers low dose CT scan once per year if you meet all of the following conditions: (1) Age 48-67; (2) No signs or symptoms of lung cancer; (3) Current smoker or have quit smoking within the last 15 years; (4) You have a tobacco smoking history of at least 20 pack years (packs per day multiplied by number of years you smoked); (5) You get a written order from a healthcare provider. Glaucoma Screening: covered annually if you're considered high risk: (1) You have diabetes OR (2) Family history of glaucoma OR (3)  aged 48 and older OR (3)  American aged 72 and older  Osteoporosis Screening: covered every 2 years if you meet one of the following conditions: (1) You're estrogen deficient and at risk for osteoporosis based off medical history and other findings; (2) Have a vertebral abnormality; (3) On glucocorticoid therapy for more than 3 months; (4) Have primary hyperparathyroidism; (5) On osteoporosis medications and need to assess response to drug therapy. Last bone density test (DXA Scan): 12/26/2019. HIV Screening: covered annually if you're between the age of 14-79. Also covered annually if you are younger than 13 and older than 72 with risk factors for HIV infection.  For pregnant patients, it is covered up to 3 times per pregnancy. Immunizations:  Immunization Recommendations   Influenza Vaccine Annual influenza vaccination during flu season is recommended for all persons aged >= 6 months who do not have contraindications   Pneumococcal Vaccine   * Pneumococcal conjugate vaccine = PCV13 (Prevnar 13), PCV15 (Vaxneuvance), PCV20 (Prevnar 20)  * Pneumococcal polysaccharide vaccine = PPSV23 (Pneumovax) Adults 49-24 yo with certain risk factors or if 69+ yo  If never received any pneumonia vaccine: recommend Prevnar 20 (PCV20)  Give PCV20 if previously received 1 dose of PCV13 or PPSV23   Hepatitis B Vaccine 3 dose series if at intermediate or high risk (ex: diabetes, end stage renal disease, liver disease)   Respiratory syncytial virus (RSV) Vaccine - COVERED BY MEDICARE PART D  * RSVPreF3 (Arexvy) CDC recommends that adults 61years of age and older may receive a single dose of RSV vaccine using shared clinical decision-making (SCDM)   Tetanus (Td) Vaccine - COST NOT COVERED BY MEDICARE PART B Following completion of primary series, a booster dose should be given every 10 years to maintain immunity against tetanus. Td may also be given as tetanus wound prophylaxis. Tdap Vaccine - COST NOT COVERED BY MEDICARE PART B Recommended at least once for all adults. For pregnant patients, recommended with each pregnancy. Shingles Vaccine (Shingrix) - COST NOT COVERED BY MEDICARE PART B  2 shot series recommended in those 19 years and older who have or will have weakened immune systems or those 50 years and older     Health Maintenance Due:      Topic Date Due   • Colorectal Cancer Screening  05/20/2024   • Hepatitis C Screening  Completed     Immunizations Due:      Topic Date Due   • COVID-19 Vaccine (3 - Pfizer series) 07/02/2021   • Pneumococcal Vaccine: 65+ Years (2 - PPSV23 if available, else PCV20) 04/29/2022   • Influenza Vaccine (1) 09/01/2023     Advance Directives   What are advance directives?   Advance directives are legal documents that state your wishes and plans for medical care. These plans are made ahead of time in case you lose your ability to make decisions for yourself. Advance directives can apply to any medical decision, such as the treatments you want, and if you want to donate organs. What are the types of advance directives? There are many types of advance directives, and each state has rules about how to use them. You may choose a combination of any of the following:  Living will: This is a written record of the treatment you want. You can also choose which treatments you do not want, which to limit, and which to stop at a certain time. This includes surgery, medicine, IV fluid, and tube feedings. Durable power of  for [facility]): This is a written record that states who you want to make healthcare choices for you when you are unable to make them for yourself. This person, called a proxy, is usually a family member or a friend. You may choose more than 1 proxy. Do not resuscitate (DNR) order:  A DNR order is used in case your heart stops beating or you stop breathing. It is a request not to have certain forms of treatment, such as CPR. A DNR order may be included in other types of advance directives. Medical directive: This covers the care that you want if you are in a coma, near death, or unable to make decisions for yourself. You can list the treatments you want for each condition. Treatment may include pain medicine, surgery, blood transfusions, dialysis, IV or tube feedings, and a ventilator (breathing machine). Values history: This document has questions about your views, beliefs, and how you feel and think about life. This information can help others choose the care that you would choose. Why are advance directives important? An advance directive helps you control your care. Although spoken wishes may be used, it is better to have your wishes written down.  Spoken wishes can be misunderstood, or not followed. Treatments may be given even if you do not want them. An advance directive may make it easier for your family to make difficult choices about your care. Weight Management   Why it is important to manage your weight:  Being overweight increases your risk of health conditions such as heart disease, high blood pressure, type 2 diabetes, and certain types of cancer. It can also increase your risk for osteoarthritis, sleep apnea, and other respiratory problems. Aim for a slow, steady weight loss. Even a small amount of weight loss can lower your risk of health problems. How to lose weight safely:  A safe and healthy way to lose weight is to eat fewer calories and get regular exercise. You can lose up about 1 pound a week by decreasing the number of calories you eat by 500 calories each day. Healthy meal plan for weight management:  A healthy meal plan includes a variety of foods, contains fewer calories, and helps you stay healthy. A healthy meal plan includes the following:  Eat whole-grain foods more often. A healthy meal plan should contain fiber. Fiber is the part of grains, fruits, and vegetables that is not broken down by your body. Whole-grain foods are healthy and provide extra fiber in your diet. Some examples of whole-grain foods are whole-wheat breads and pastas, oatmeal, brown rice, and bulgur. Eat a variety of vegetables every day. Include dark, leafy greens such as spinach, kale, cristhian greens, and mustard greens. Eat yellow and orange vegetables such as carrots, sweet potatoes, and winter squash. Eat a variety of fruits every day. Choose fresh or canned fruit (canned in its own juice or light syrup) instead of juice. Fruit juice has very little or no fiber. Eat low-fat dairy foods. Drink fat-free (skim) milk or 1% milk. Eat fat-free yogurt and low-fat cottage cheese. Try low-fat cheeses such as mozzarella and other reduced-fat cheeses.   Choose meat and other protein foods that are low in fat. Choose beans or other legumes such as split peas or lentils. Choose fish, skinless poultry (chicken or turkey), or lean cuts of red meat (beef or pork). Before you cook meat or poultry, cut off any visible fat. Use less fat and oil. Try baking foods instead of frying them. Add less fat, such as margarine, sour cream, regular salad dressing and mayonnaise to foods. Eat fewer high-fat foods. Some examples of high-fat foods include french fries, doughnuts, ice cream, and cakes. Eat fewer sweets. Limit foods and drinks that are high in sugar. This includes candy, cookies, regular soda, and sweetened drinks. Exercise:  Exercise at least 30 minutes per day on most days of the week. Some examples of exercise include walking, biking, dancing, and swimming. You can also fit in more physical activity by taking the stairs instead of the elevator or parking farther away from stores. Ask your healthcare provider about the best exercise plan for you. © Copyright [employer] 2018 Information is for End User's use only and may not be sold, redistributed or otherwise used for commercial purposes.  All illustrations and images included in CareNotes® are the copyrighted property of [employer] or [address] [place] [address]

## 2023-11-06 ENCOUNTER — TELEPHONE (OUTPATIENT)
Age: 68
End: 2023-11-06

## 2023-11-06 NOTE — TELEPHONE ENCOUNTER
----- Message from Gilberto Cabrera, [address] [facility] sent at 11/6/2023 12:38 PM EST -----  White blood cells and red blood cells are stable, sodium and potassium are good, liver and kidneys look good, cholesterol LDL which is your bad cholesterol is slightly elevated however your good cholesterol is up pretty high so this can cancel each o  ther out, your hemoglobin A1c is in prediabetic range so keep an eye on your carbohydrate and sugar intake.   Thyroid is normal

## 2023-11-06 NOTE — TELEPHONE ENCOUNTER
----- Message from Werner Jeffrey, [address] [facility] sent at 11/6/2023 12:38 PM EST -----  White blood cells and red blood cells are stable, sodium and potassium are good, liver and kidneys look good, cholesterol LDL which is your bad cholesterol is slightly elevated however your good cholesterol is up pretty high so this can cancel each o  ther out, your hemoglobin A1c is in prediabetic range so keep an eye on your carbohydrate and sugar intake.   Thyroid is normal

## 2023-12-01 ENCOUNTER — TELEPHONE (OUTPATIENT)
Age: 68
End: 2023-12-01

## 2023-12-01 ENCOUNTER — PREP FOR PROCEDURE (OUTPATIENT)
Age: 68
End: 2023-12-01

## 2023-12-01 DIAGNOSIS — Z12.11 SCREENING FOR COLON CANCER: Primary | ICD-10-CM

## 2023-12-01 NOTE — TELEPHONE ENCOUNTER
Scheduled date of colonoscopy (as of today):1/24/2024  Physician performing colonoscopy:Dr Antunez  Location of colonoscopy:[facility]  Bowel prep reviewed with patient:Miralax/Dulcolax  Instructions reviewed with patient by:sent via [email or web address]  Clearances: N/A

## 2023-12-01 NOTE — TELEPHONE ENCOUNTER
12/01/23  Screened by: Andrew Valdivia    Referring Provider     Pre- Screening: There is no height or weight on file to calculate BMI.25.96  Has patient been referred for a routine screening Colonoscopy? yes  Is the patient between 43-73 years old? yes      Previous Colonoscopy yes   If yes:    Date:     Facility:     Reason:       SCHEDULING STAFF: If the patient is between 39yrs-51yrs, please advise patient to confirm benefits/coverage with their insurance company for a routine screening colonoscopy, some insurance carriers will only cover at [address] [place] [id number] or older. If the patient is over 66years old, please schedule an office visit. Does the patient want to see a Gastroenterologist prior to their procedure OR are they having any GI symptoms? no    Has the patient been hospitalized or had abdominal surgery in the past 6 months? no    Does the patient use supplemental oxygen? no    Does the patient take Coumadin, Lovenox, Plavix, Elliquis, Xarelto, or other blood thinning medication? no    Has the patient had a stroke, cardiac event, or stent placed in the past year? no  PT PASSED OA    SCHEDULING STAFF: If patient answers NO to above questions, then schedule procedure. If patient answers YES to above questions, then schedule office appointment. If patient is between 45yrs - 49yrs, please advise patient that we will have to confirm benefits & coverage with their insurance company for a routine screening colonoscopy.

## 2024-02-21 ENCOUNTER — TELEPHONE (OUTPATIENT)
Age: 69
End: 2024-02-21

## 2024-02-21 DIAGNOSIS — M17.12 PRIMARY OSTEOARTHRITIS OF LEFT KNEE: ICD-10-CM

## 2024-02-21 PROBLEM — Z01.419 ENCOUNTER FOR GYNECOLOGICAL EXAMINATION WITHOUT ABNORMAL FINDING: Status: RESOLVED | Noted: 2020-02-07 | Resolved: 2024-02-21

## 2024-02-21 PROBLEM — Z12.11 SCREEN FOR COLON CANCER: Status: RESOLVED | Noted: 2018-01-25 | Resolved: 2024-02-21

## 2024-02-21 RX ORDER — MELOXICAM 15 MG/1
15 TABLET ORAL DAILY
Qty: 30 TABLET | Refills: 3 | Status: SHIPPED | OUTPATIENT
Start: 2024-02-21

## 2024-02-21 NOTE — TELEPHONE ENCOUNTER
Caller: Bridgett    Doctor: Emigdio    Reason for call: patient would like to know if an RX of melocicam could be called in    Call back#: [phone]

## 2024-03-05 ENCOUNTER — OFFICE VISIT (OUTPATIENT)
Dept: OBGYN CLINIC | Facility: CLINIC | Age: 69
End: 2024-03-05
Payer: MEDICARE

## 2024-03-05 VITALS
WEIGHT: 154 LBS | HEART RATE: 96 BPM | SYSTOLIC BLOOD PRESSURE: 93 MMHG | DIASTOLIC BLOOD PRESSURE: 63 MMHG | HEIGHT: 65 IN | BODY MASS INDEX: 25.66 KG/M2

## 2024-03-05 DIAGNOSIS — M17.12 PRIMARY OSTEOARTHRITIS OF LEFT KNEE: Primary | ICD-10-CM

## 2024-03-05 PROCEDURE — 99213 OFFICE O/P EST LOW 20 MIN: CPT | Performed by: FAMILY MEDICINE

## 2024-03-05 PROCEDURE — 20610 DRAIN/INJ JOINT/BURSA W/O US: CPT | Performed by: FAMILY MEDICINE

## 2024-03-05 RX ORDER — BUPIVACAINE HYDROCHLORIDE 2.5 MG/ML
4 INJECTION, SOLUTION INFILTRATION; PERINEURAL
Status: COMPLETED | OUTPATIENT
Start: 2024-03-05 | End: 2024-03-05

## 2024-03-05 RX ORDER — BUPIVACAINE HYDROCHLORIDE 2.5 MG/ML
1 INJECTION, SOLUTION INFILTRATION; PERINEURAL
Status: COMPLETED | OUTPATIENT
Start: 2024-03-05 | End: 2024-03-05

## 2024-03-05 RX ORDER — METHYLPREDNISOLONE ACETATE 40 MG/ML
2 INJECTION, SUSPENSION INTRA-ARTICULAR; INTRALESIONAL; INTRAMUSCULAR; SOFT TISSUE
Status: COMPLETED | OUTPATIENT
Start: 2024-03-05 | End: 2024-03-05

## 2024-03-05 RX ADMIN — BUPIVACAINE HYDROCHLORIDE 4 ML: 2.5 INJECTION, SOLUTION INFILTRATION; PERINEURAL at 16:15

## 2024-03-05 RX ADMIN — BUPIVACAINE HYDROCHLORIDE 1 ML: 2.5 INJECTION, SOLUTION INFILTRATION; PERINEURAL at 16:15

## 2024-03-05 RX ADMIN — METHYLPREDNISOLONE ACETATE 2 ML: 40 INJECTION, SUSPENSION INTRA-ARTICULAR; INTRALESIONAL; INTRAMUSCULAR; SOFT TISSUE at 16:15

## 2024-03-05 NOTE — PROGRESS NOTES
Assessment/Plan:  Assessment/Plan   Diagnoses and all orders for this visit:    Primary osteoarthritis of left knee  -     Large joint arthrocentesis: L knee      68-year-old female with osteoarthritis of the left knee with left knee pain more than 6 years duration.  Clinical impression is that she has symptoms from degenerative changes.  She has significant relief from previous steroid injection so I offered repeat steroid injection to which she agreed.  I administered mixture 3 cc 0.25% bupivacaine and 2 cc Depo-Medrol to the left knee without complication.  She will follow-up as needed.        Subjective:   Patient ID: Radha Pantoja is a 68 y.o. female.  Chief Complaint   Patient presents with    Left Knee - Follow-up        68-year-old female with osteoarthritis of the left knee following for left knee pain more than 6 years duration.  She was last seen by me nearly 11 months ago at which point she was given steroid injection and prescribed meloxicam.  She reports having had significant relief of symptoms lasting more than 6 months.  She was experiencing mild and did not pain.  Pain began to rapidly worsen about 1 month ago.  She has been experiencing pain described generalized to the knee but worse at the medial aspect, worse with bearing weight and ambulating, associated with swelling, and improved with resting.    Knee Pain  This is a chronic problem. The current episode started more than 1 year ago. The problem occurs daily. The problem has been gradually worsening. Associated symptoms include arthralgias and joint swelling. Pertinent negatives include no numbness or weakness. The symptoms are aggravated by standing and walking. She has tried NSAIDs for the symptoms. The treatment provided mild relief.               Review of Systems   Musculoskeletal:  Positive for arthralgias and joint swelling.   Neurological:  Negative for weakness and numbness.       Objective:  Vitals:    03/05/24 1558   BP:  "93/63   Pulse: 96   Weight: 69.9 kg (154 lb)   Height: 5' 5\" (1.651 m)      Left Knee Exam     Muscle Strength   The patient has normal left knee strength.    Range of Motion   Extension:  normal   Flexion:  120     Other   Swelling: mild            Physical Exam  Vitals and nursing note reviewed.   Constitutional:       Appearance: Normal appearance. She is well-developed. She is not ill-appearing or diaphoretic.   HENT:      Head: Normocephalic and atraumatic.      Right Ear: External ear normal.      Left Ear: External ear normal.   Eyes:      Conjunctiva/sclera: Conjunctivae normal.   Neck:      Trachea: No tracheal deviation.   Cardiovascular:      Rate and Rhythm: Normal rate.   Pulmonary:      Effort: Pulmonary effort is normal. No respiratory distress.   Abdominal:      General: There is no distension.   Musculoskeletal:         General: Swelling and tenderness present.   Skin:     General: Skin is warm and dry.      Coloration: Skin is not jaundiced or pale.   Neurological:      Mental Status: She is alert and oriented to person, place, and time.   Psychiatric:         Mood and Affect: Mood normal.         Behavior: Behavior normal.         Thought Content: Thought content normal.         Judgment: Judgment normal.           Large joint arthrocentesis: L knee  Universal Protocol:  Consent: Verbal consent obtained.  Risks and benefits: risks, benefits and alternatives were discussed  Consent given by: patient  Time out: Immediately prior to procedure a \"time out\" was called to verify the correct patient, procedure, equipment, support staff and site/side marked as required.  Patient understanding: patient states understanding of the procedure being performed  Patient consent: the patient's understanding of the procedure matches consent given  Procedure consent: procedure consent matches procedure scheduled  Relevant documents: relevant documents present and verified  Test results: test results available and " "properly labeled  Site marked: the operative site was marked  Radiology Images displayed and confirmed. If images not available, report reviewed: imaging studies available  Required items: required blood products, implants, devices, and special equipment available  Patient identity confirmed: verbally with patient  Supporting Documentation  Indications: pain   Procedure Details  Location: knee - L knee  Preparation: Patient was prepped and draped in the usual sterile fashion  Needle gauge: 21G 2\"  Approach: anteromedial  Medications administered: 4 mL bupivacaine 0.25 %; 1 mL bupivacaine 0.25 %; 2 mL methylPREDNISolone acetate 40 mg/mL    Patient tolerance: patient tolerated the procedure well with no immediate complications  Dressing:  Sterile dressing applied                "

## 2024-03-29 ENCOUNTER — TELEPHONE (OUTPATIENT)
Age: 69
End: 2024-03-29

## 2024-04-26 ENCOUNTER — RA CDI HCC (OUTPATIENT)
Dept: OTHER | Facility: HOSPITAL | Age: 69
End: 2024-04-26

## 2024-05-02 ENCOUNTER — APPOINTMENT (OUTPATIENT)
Dept: LAB | Facility: CLINIC | Age: 69
End: 2024-05-02
Payer: MEDICARE

## 2024-05-02 DIAGNOSIS — D64.9 ANEMIA, UNSPECIFIED TYPE: ICD-10-CM

## 2024-05-02 DIAGNOSIS — E78.5 HYPERLIPIDEMIA, UNSPECIFIED HYPERLIPIDEMIA TYPE: ICD-10-CM

## 2024-05-02 DIAGNOSIS — R73.01 IMPAIRED FASTING BLOOD SUGAR: ICD-10-CM

## 2024-05-02 DIAGNOSIS — Z13.29 SCREENING FOR THYROID DISORDER: ICD-10-CM

## 2024-05-02 LAB
ALBUMIN SERPL BCP-MCNC: 4.2 G/DL (ref 3.5–5)
ALP SERPL-CCNC: 37 U/L (ref 34–104)
ALT SERPL W P-5'-P-CCNC: 15 U/L (ref 7–52)
ANION GAP SERPL CALCULATED.3IONS-SCNC: 9 MMOL/L (ref 4–13)
AST SERPL W P-5'-P-CCNC: 20 U/L (ref 13–39)
BASOPHILS # BLD AUTO: 0.07 THOUSANDS/ÂΜL (ref 0–0.1)
BASOPHILS NFR BLD AUTO: 2 % (ref 0–1)
BILIRUB SERPL-MCNC: 0.62 MG/DL (ref 0.2–1)
BUN SERPL-MCNC: 23 MG/DL (ref 5–25)
CALCIUM SERPL-MCNC: 9.2 MG/DL (ref 8.4–10.2)
CHLORIDE SERPL-SCNC: 106 MMOL/L (ref 96–108)
CHOLEST SERPL-MCNC: 241 MG/DL
CO2 SERPL-SCNC: 28 MMOL/L (ref 21–32)
CREAT SERPL-MCNC: 0.59 MG/DL (ref 0.6–1.3)
EOSINOPHIL # BLD AUTO: 0.09 THOUSAND/ÂΜL (ref 0–0.61)
EOSINOPHIL NFR BLD AUTO: 3 % (ref 0–6)
ERYTHROCYTE [DISTWIDTH] IN BLOOD BY AUTOMATED COUNT: 15.3 % (ref 11.6–15.1)
GFR SERPL CREATININE-BSD FRML MDRD: 95 ML/MIN/1.73SQ M
GLUCOSE P FAST SERPL-MCNC: 80 MG/DL (ref 65–99)
HCT VFR BLD AUTO: 36.1 % (ref 34.8–46.1)
HDLC SERPL-MCNC: 87 MG/DL
HGB BLD-MCNC: 11.5 G/DL (ref 11.5–15.4)
IMM GRANULOCYTES # BLD AUTO: 0.01 THOUSAND/UL (ref 0–0.2)
IMM GRANULOCYTES NFR BLD AUTO: 0 % (ref 0–2)
LDLC SERPL CALC-MCNC: 142 MG/DL (ref 0–100)
LYMPHOCYTES # BLD AUTO: 0.86 THOUSANDS/ÂΜL (ref 0.6–4.47)
LYMPHOCYTES NFR BLD AUTO: 24 % (ref 14–44)
MCH RBC QN AUTO: 28.3 PG (ref 26.8–34.3)
MCHC RBC AUTO-ENTMCNC: 31.9 G/DL (ref 31.4–37.4)
MCV RBC AUTO: 89 FL (ref 82–98)
MONOCYTES # BLD AUTO: 0.31 THOUSAND/ÂΜL (ref 0.17–1.22)
MONOCYTES NFR BLD AUTO: 9 % (ref 4–12)
NEUTROPHILS # BLD AUTO: 2.22 THOUSANDS/ÂΜL (ref 1.85–7.62)
NEUTS SEG NFR BLD AUTO: 62 % (ref 43–75)
NONHDLC SERPL-MCNC: 154 MG/DL
NRBC BLD AUTO-RTO: 0 /100 WBCS
PLATELET # BLD AUTO: 182 THOUSANDS/UL (ref 149–390)
PMV BLD AUTO: 9.7 FL (ref 8.9–12.7)
POTASSIUM SERPL-SCNC: 4.1 MMOL/L (ref 3.5–5.3)
PROT SERPL-MCNC: 6.4 G/DL (ref 6.4–8.4)
RBC # BLD AUTO: 4.07 MILLION/UL (ref 3.81–5.12)
SODIUM SERPL-SCNC: 143 MMOL/L (ref 135–147)
TRIGL SERPL-MCNC: 62 MG/DL
TSH SERPL DL<=0.05 MIU/L-ACNC: 2.2 UIU/ML (ref 0.45–4.5)
WBC # BLD AUTO: 3.56 THOUSAND/UL (ref 4.31–10.16)

## 2024-05-02 PROCEDURE — 80053 COMPREHEN METABOLIC PANEL: CPT

## 2024-05-02 PROCEDURE — 85025 COMPLETE CBC W/AUTO DIFF WBC: CPT

## 2024-05-02 PROCEDURE — 36415 COLL VENOUS BLD VENIPUNCTURE: CPT

## 2024-05-02 PROCEDURE — 83036 HEMOGLOBIN GLYCOSYLATED A1C: CPT

## 2024-05-02 PROCEDURE — 80061 LIPID PANEL: CPT

## 2024-05-02 PROCEDURE — 84443 ASSAY THYROID STIM HORMONE: CPT

## 2024-05-03 ENCOUNTER — OFFICE VISIT (OUTPATIENT)
Age: 69
End: 2024-05-03
Payer: MEDICARE

## 2024-05-03 VITALS
HEIGHT: 65 IN | HEART RATE: 64 BPM | SYSTOLIC BLOOD PRESSURE: 122 MMHG | RESPIRATION RATE: 16 BRPM | BODY MASS INDEX: 24.66 KG/M2 | DIASTOLIC BLOOD PRESSURE: 84 MMHG | WEIGHT: 148 LBS | OXYGEN SATURATION: 98 %

## 2024-05-03 DIAGNOSIS — D50.9 IRON DEFICIENCY ANEMIA, UNSPECIFIED IRON DEFICIENCY ANEMIA TYPE: ICD-10-CM

## 2024-05-03 DIAGNOSIS — R73.03 PREDIABETES: ICD-10-CM

## 2024-05-03 DIAGNOSIS — F10.20 ALCOHOLISM (HCC): ICD-10-CM

## 2024-05-03 DIAGNOSIS — F33.9 RECURRENT MAJOR DEPRESSIVE DISORDER, REMISSION STATUS UNSPECIFIED (HCC): ICD-10-CM

## 2024-05-03 DIAGNOSIS — R53.83 OTHER FATIGUE: ICD-10-CM

## 2024-05-03 DIAGNOSIS — E78.2 MIXED HYPERLIPIDEMIA: ICD-10-CM

## 2024-05-03 DIAGNOSIS — Z78.0 POSTMENOPAUSAL: ICD-10-CM

## 2024-05-03 DIAGNOSIS — Z00.00 WELL ADULT EXAM: Primary | ICD-10-CM

## 2024-05-03 DIAGNOSIS — Z12.4 SCREENING FOR CERVICAL CANCER: ICD-10-CM

## 2024-05-03 DIAGNOSIS — F41.9 ANXIETY: ICD-10-CM

## 2024-05-03 PROBLEM — E87.6 HYPOKALEMIA: Status: RESOLVED | Noted: 2019-02-02 | Resolved: 2024-05-03

## 2024-05-03 PROBLEM — E78.00 PURE HYPERCHOLESTEROLEMIA: Status: RESOLVED | Noted: 2019-02-02 | Resolved: 2024-05-03

## 2024-05-03 LAB
EST. AVERAGE GLUCOSE BLD GHB EST-MCNC: 120 MG/DL
HBA1C MFR BLD: 5.8 %

## 2024-05-03 PROCEDURE — G2211 COMPLEX E/M VISIT ADD ON: HCPCS

## 2024-05-03 PROCEDURE — G0439 PPPS, SUBSEQ VISIT: HCPCS

## 2024-05-03 NOTE — PROGRESS NOTES
INTERNAL MEDICINE FOLLOW-UP VISIT  [facility] - [facility] INTERNAL MEDICINE [address]    NAME: Radha Pantoja  AGE: 68 y.o. SEX: female  : 1955     DATE: 5/3/2024     Assessment and Plan:   1. Well adult exam  She tries to eat a well-balanced diet of fruits, veggies, and lean meats.  She does not drink much water, more so club soda.  She is active at work, but does no formal exercise, recommended 30 mins 5x a week. She sleeps well with gabapentin. She denies any alcohol, tobacco. She has medical marijuana card. She is UTD with dentist and eye doctor. She is  to her  [place]. She was a  for [employer], but then retired.  She has a part-time job at [employer].  She has a pitty. She likes to garden and read.     2. Prediabetes  A1c is 5.8.  Discussed the importance of limiting carbs like bread, rice, and pasta as well as sugars in diet.    3. Anxiety  She is currently stable on lexapro 10 mg daily.     4. Alcoholism (HCC)  She is 5 years sober.    5. Iron deficiency anemia, unspecified iron deficiency anemia type  CBC stable, will order iron panel with next set of labs.    6. Mixed hyperlipidemia  LDL is 142. ASCVD risk score is 3.5%.  She is not sure if she has a family history of high cholesterol.  Discussed CT coronary calcium score versus statin, she would like to proceed with the imaging.  Will follow-up with results.    7. Recurrent major depressive disorder, remission status unspecified (HCC)  Stable.    8.  Postmenopausal  Due for DEXA scan.    9.  Screening for cervical cancer  S/p bilateral oophorectomy due to BRCA1 positive.  Discussed referral to OB/GYN in which she agreed.    Depression Screening and Follow-up Plan: Patient was screened for depression during today's encounter. They screened negative with a PHQ-9 score of 0.       No follow-ups on file.       Chief Complaint:     Chief Complaint   Patient presents with    Follow-up     6 months       History of Present Illness:   Patient is a 68-year-old female that presents today for 6-month follow-up.  She has no new complaints today.    Health maintenance:  Up-to-date on labs and screenings.  Family history of DM, breast cancer, ovarian cancer, heart failure.    The following portions of the patient's history were reviewed and updated as appropriate: allergies, current medications, past family history, past medical history, past social history, past surgical history and problem list.     Review of Systems:     Review of Systems   Constitutional:  Negative for chills, fatigue and fever.   HENT:  Negative for ear discharge, ear pain, postnasal drip, rhinorrhea, sinus pressure, sinus pain, sore throat, tinnitus and trouble swallowing.    Eyes:  Negative for pain, discharge and itching.   Respiratory:  Negative for cough, shortness of breath and wheezing.    Cardiovascular:  Negative for chest pain, palpitations and leg swelling.   Gastrointestinal:  Negative for abdominal pain, constipation, diarrhea, nausea and vomiting.   Endocrine: Negative for polydipsia, polyphagia and polyuria.   Genitourinary:  Negative for difficulty urinating, frequency, hematuria and urgency.   Musculoskeletal:  Negative for arthralgias, joint swelling and myalgias.   Skin:  Negative for color change.   Allergic/Immunologic: Negative for environmental allergies.   Neurological:  Negative for dizziness, weakness, light-headedness, numbness and headaches.   Hematological:  Negative for adenopathy.   Psychiatric/Behavioral:  Negative for decreased concentration and sleep disturbance. The patient is not nervous/anxious.         Past Medical History:     Past Medical History:   Diagnosis Date    Anemia     IRON  INFUSIONS    Anxiety     Cancer (HCC)     breast/ovarian had preventative masectomy/ovary removal    Colon polyp     Depression     Last Assessed 8/27/2015    H/O total mastectomy     Hyperlipidemia     Last Assessed 8/7/2015  "   Hypokalemia 02/02/2019        Current Medications:     Current Outpatient Medications:     calcium carbonate-vitamin D (OSCAL-D) 500 mg-200 units per tablet, Take 1 tablet by mouth 2 (two) times a day with meals, Disp: , Rfl:     escitalopram (LEXAPRO) 10 mg tablet, Take 1 tablet (10 mg total) by mouth daily, Disp: 30 tablet, Rfl: 5    gabapentin (NEURONTIN) 300 mg capsule, Take 1 capsule (300 mg total) by mouth daily at bedtime, Disp: 90 capsule, Rfl: 5    meloxicam (Mobic) 15 mg tablet, Take 1 tablet (15 mg total) by mouth daily, Disp: 30 tablet, Rfl: 3    multivitamin (THERAGRAN) TABS, Take 1 tablet by mouth daily, Disp: , Rfl:      Allergies:   No Known Allergies     Physical Exam:     Resp 16   Ht 5' 5\" (1.651 m)   Wt 67.1 kg (148 lb)   BMI 24.63 kg/m²     Physical Exam  Vitals and nursing note reviewed.   Constitutional:       General: She is awake. She is not in acute distress.     Appearance: Normal appearance. She is well-developed, well-groomed and normal weight.   HENT:      Head: Normocephalic and atraumatic.      Right Ear: Hearing and external ear normal.      Left Ear: Hearing and external ear normal.      Nose: Nose normal.      Mouth/Throat:      Lips: Pink.      Mouth: Mucous membranes are moist.   Eyes:      General: Lids are normal. Vision grossly intact. Gaze aligned appropriately.      Conjunctiva/sclera: Conjunctivae normal.   Neck:      Vascular: No carotid bruit.      Trachea: Trachea and phonation normal.   Cardiovascular:      Rate and Rhythm: Normal rate and regular rhythm.      Heart sounds: Normal heart sounds, S1 normal and S2 normal. No murmur heard.     No friction rub. No gallop.   Pulmonary:      Effort: Pulmonary effort is normal. No respiratory distress.      Breath sounds: Normal breath sounds and air entry. No decreased breath sounds, wheezing, rhonchi or rales.   Abdominal:      General: Abdomen is flat.   Musculoskeletal:         General: No swelling.      Cervical " back: Neck supple.      Right lower leg: No edema.      Left lower leg: No edema.   Skin:     General: Skin is warm.      Capillary Refill: Capillary refill takes less than 2 seconds.   Neurological:      Mental Status: She is alert.   Psychiatric:         Attention and Perception: Attention and perception normal.         Mood and Affect: Mood and affect normal.         Speech: Speech normal.         Behavior: Behavior normal. Behavior is cooperative.         Thought Content: Thought content normal.         Cognition and Memory: Cognition and memory normal.         Judgment: Judgment normal.           Data:     Laboratory Results: I have personally reviewed the pertinent laboratory results/reports   Radiology/Other Diagnostic Testing Results: I have personally reviewed pertinent reports.      Radha Gaines PA-C  [facility] INTERNAL MEDICINE [address]

## 2024-07-01 DIAGNOSIS — F41.9 ANXIETY: ICD-10-CM

## 2024-07-01 RX ORDER — ESCITALOPRAM OXALATE 10 MG/1
10 TABLET ORAL DAILY
Qty: 30 TABLET | Refills: 5 | Status: SHIPPED | OUTPATIENT
Start: 2024-07-01

## 2024-07-01 NOTE — TELEPHONE ENCOUNTER
Reason for call:   [x] Refill   [] Prior Auth  [] Other:     Office:   [x] PCP/Provider -   [] Specialty/Provider -     Medication:     Does the patient have enough for 3 days?   [] Yes   [x] No - Send as HP to POD

## 2024-07-18 ENCOUNTER — APPOINTMENT (OUTPATIENT)
Dept: RADIOLOGY | Facility: CLINIC | Age: 69
End: 2024-07-18
Payer: MEDICARE

## 2024-07-18 ENCOUNTER — OFFICE VISIT (OUTPATIENT)
Dept: OBGYN CLINIC | Facility: CLINIC | Age: 69
End: 2024-07-18
Payer: MEDICARE

## 2024-07-18 VITALS
HEIGHT: 65 IN | HEART RATE: 64 BPM | WEIGHT: 149.4 LBS | SYSTOLIC BLOOD PRESSURE: 132 MMHG | BODY MASS INDEX: 24.89 KG/M2 | DIASTOLIC BLOOD PRESSURE: 74 MMHG

## 2024-07-18 DIAGNOSIS — M17.12 PRIMARY OSTEOARTHRITIS OF LEFT KNEE: ICD-10-CM

## 2024-07-18 DIAGNOSIS — M17.12 PRIMARY OSTEOARTHRITIS OF LEFT KNEE: Primary | ICD-10-CM

## 2024-07-18 PROCEDURE — 73562 X-RAY EXAM OF KNEE 3: CPT

## 2024-07-18 PROCEDURE — 99214 OFFICE O/P EST MOD 30 MIN: CPT | Performed by: FAMILY MEDICINE

## 2024-07-18 NOTE — PROGRESS NOTES
Assessment/Plan:  Assessment & Plan   Diagnoses and all orders for this visit:    Primary osteoarthritis of left knee  -     XR knee 3 vw left non injury; Future  -     Injection Procedure Prior Authorization; Future        69-year-old female with osteoarthritis of the left knee with left knee pain more than 6 years duration.  Discussed with patient physical exam, radiographs, impression, and plan.  X-rays left knee noted for severe medial space narrowing with bone-on-bone and genu varum.  Imaging studies and physical exam suggest that symptoms are due to progressively worsening degenerative changes of the knee.  Symptoms persist despite steroid injections 10/29/2019, 4/21/2023, 3/5/2023,  bracing since 4/21/2023, prescription NSAIDs for more than 2 years.  I discussed treatment option visco injection to which she agreed.  Will request for 1 shot visco injection of the left knee.  She will return for Visco injection once approved.            Subjective:   Patient ID: Radha Pantoja is a 69 y.o. female.  Chief Complaint   Patient presents with    Left Knee - Follow-up        69-year-old female with osteoarthritis of the left knee following for left knee pain more than 6 years duration.  She was last seen by me 4.5 months ago at which point she was given steroid injection to the left knee.  She states that following steroid injection she did not experience any significant improvement in symptoms.  Pain in the left knee overall has worsened since her last visit.  She has pain described as localized made to the medial aspect of the knee, constant, aching burning and sometimes sharp, worse with prolonged standing and ambulation, rated as 7-8 out of 10 at worst, associated with swelling, associated with cramping, and improved with resting or changing position.  She manages symptoms with NSAIDs.      Knee Pain  This is a chronic problem. The current episode started more than 1 year ago. The problem occurs  "daily. The problem has been gradually worsening. Associated symptoms include arthralgias and joint swelling. Pertinent negatives include no numbness or weakness. The symptoms are aggravated by standing, walking, twisting and bending. She has tried rest, position changes and NSAIDs (Steroid injection) for the symptoms. The treatment provided mild relief.               Review of Systems   Musculoskeletal:  Positive for arthralgias and joint swelling.   Neurological:  Negative for weakness and numbness.       Objective:  Vitals:    07/18/24 1019   BP: 132/74   Pulse: 64   Weight: 67.8 kg (149 lb 6.4 oz)   Height: 5' 5\" (1.651 m)      Left Knee Exam     Muscle Strength   The patient has normal left knee strength.    Tenderness   The patient is experiencing tenderness in the medial joint line.    Range of Motion   Extension:  -5   Flexion:  120     Tests   Varus: negative Valgus: positive    Other   Swelling: mild            Physical Exam  Vitals and nursing note reviewed.   Constitutional:       Appearance: Normal appearance. She is well-developed. She is not ill-appearing or diaphoretic.   HENT:      Head: Normocephalic and atraumatic.      Right Ear: External ear normal.      Left Ear: External ear normal.   Eyes:      Conjunctiva/sclera: Conjunctivae normal.   Neck:      Trachea: No tracheal deviation.   Cardiovascular:      Rate and Rhythm: Normal rate.   Pulmonary:      Effort: Pulmonary effort is normal. No respiratory distress.   Abdominal:      General: There is no distension.   Musculoskeletal:         General: Swelling and tenderness present.   Skin:     General: Skin is warm and dry.      Coloration: Skin is not jaundiced or pale.   Neurological:      Mental Status: She is alert and oriented to person, place, and time.   Psychiatric:         Mood and Affect: Mood normal.         Behavior: Behavior normal.         Thought Content: Thought content normal.         Judgment: Judgment normal.         I have " personally reviewed pertinent films in PACS and my interpretation is  .  X-rays left knee noted for severe medial space narrowing with bone-on-bone and genu varum.    More than 31 minutes were spent with reviewing patient chart, reviewing and interpreting imaging studies, obtaining history from patient, examining patient, discussing and implementing treatment plan

## 2024-08-16 ENCOUNTER — PROCEDURE VISIT (OUTPATIENT)
Dept: OBGYN CLINIC | Facility: CLINIC | Age: 69
End: 2024-08-16
Payer: MEDICARE

## 2024-08-16 VITALS
BODY MASS INDEX: 24.83 KG/M2 | SYSTOLIC BLOOD PRESSURE: 139 MMHG | HEIGHT: 65 IN | WEIGHT: 149 LBS | DIASTOLIC BLOOD PRESSURE: 83 MMHG | HEART RATE: 81 BPM

## 2024-08-16 DIAGNOSIS — M17.12 PRIMARY OSTEOARTHRITIS OF LEFT KNEE: Primary | ICD-10-CM

## 2024-08-16 PROCEDURE — 20610 DRAIN/INJ JOINT/BURSA W/O US: CPT | Performed by: FAMILY MEDICINE

## 2024-08-16 RX ORDER — BUPIVACAINE HYDROCHLORIDE 2.5 MG/ML
1 INJECTION, SOLUTION INFILTRATION; PERINEURAL
Status: COMPLETED | OUTPATIENT
Start: 2024-08-16 | End: 2024-08-16

## 2024-08-16 RX ORDER — BUPIVACAINE HYDROCHLORIDE 2.5 MG/ML
2 INJECTION, SOLUTION INFILTRATION; PERINEURAL
Status: COMPLETED | OUTPATIENT
Start: 2024-08-16 | End: 2024-08-16

## 2024-08-16 RX ADMIN — BUPIVACAINE HYDROCHLORIDE 1 ML: 2.5 INJECTION, SOLUTION INFILTRATION; PERINEURAL at 10:00

## 2024-08-16 RX ADMIN — BUPIVACAINE HYDROCHLORIDE 2 ML: 2.5 INJECTION, SOLUTION INFILTRATION; PERINEURAL at 10:00

## 2024-08-16 NOTE — LETTER
August 16, 2024     Patient: Radha Pantoja  YOB: 1955  Date of Visit: 8/16/2024      To Whom it May Concern:    Radha Pantoja is under my professional care. Radha was seen in my office on 8/16/2024. Radha underwent procedure today.    Please excuse from work 8/16/2024.    If you have any questions or concerns, please don't hesitate to call.         Sincerely,          Lawrence Beal,         CC: No Recipients

## 2024-08-16 NOTE — PROGRESS NOTES
"Assessment/Plan:  Assessment & Plan   Diagnoses and all orders for this visit:    Primary osteoarthritis of left knee  -     Large joint arthrocentesis: L knee        69-year-old female with osteoarthritis of the left knee with left knee pain more than 6 years duration.  Clinical impression is that she has symptoms from degenerative changes.  She agreed to proceed with Visco injection.  I administered Synvisc 1 to the left knee without complication.  She was advised to allow 3 to 4 weeks before she may notice full effects of visco injection.  If visco injection is to be repeated we must wait at least 6 months.  She will follow-up as needed.      Subjective:   Patient ID: Radha Pantoja is a 69 y.o. female.  Chief Complaint   Patient presents with    Left Knee - Follow-up        69-year-old female with osteoarthritis of left knee with left knee pain more than 6 years duration.  She was last seen by me 4 weeks ago at which point we requested for visco injection.  She presents today for Visco injection of the left knee.    Knee Pain  This is a chronic problem. The current episode started more than 1 year ago. The problem occurs daily. The problem has been gradually worsening. Associated symptoms include arthralgias and joint swelling. Pertinent negatives include no numbness or weakness. The symptoms are aggravated by standing and walking. She has tried rest, position changes and NSAIDs for the symptoms. The treatment provided mild relief.               Review of Systems   Musculoskeletal:  Positive for arthralgias and joint swelling.   Neurological:  Negative for weakness and numbness.       Objective:  Vitals:    08/16/24 0956   BP: 139/83   Pulse: 81   Weight: 67.6 kg (149 lb)   Height: 5' 5\" (1.651 m)      Ortho Exam    Physical Exam  Vitals and nursing note reviewed.   Constitutional:       Appearance: Normal appearance. She is well-developed. She is not ill-appearing or diaphoretic.   HENT:      Head: " "Normocephalic and atraumatic.      Right Ear: External ear normal.      Left Ear: External ear normal.   Eyes:      Conjunctiva/sclera: Conjunctivae normal.   Neck:      Trachea: No tracheal deviation.   Cardiovascular:      Rate and Rhythm: Normal rate.   Pulmonary:      Effort: Pulmonary effort is normal. No respiratory distress.   Abdominal:      General: There is no distension.   Skin:     General: Skin is warm and dry.      Coloration: Skin is not jaundiced or pale.   Neurological:      Mental Status: She is alert and oriented to person, place, and time.   Psychiatric:         Mood and Affect: Mood normal.         Behavior: Behavior normal.         Thought Content: Thought content normal.         Judgment: Judgment normal.               Large joint arthrocentesis: L knee  Universal Protocol:  procedure performed by consultantConsent: Verbal consent obtained.  Risks and benefits: risks, benefits and alternatives were discussed  Consent given by: patient  Time out: Immediately prior to procedure a \"time out\" was called to verify the correct patient, procedure, equipment, support staff and site/side marked as required.  Patient understanding: patient states understanding of the procedure being performed  Patient consent: the patient's understanding of the procedure matches consent given  Procedure consent: procedure consent matches procedure scheduled  Relevant documents: relevant documents present and verified  Test results: test results available and properly labeled  Site marked: the operative site was marked  Radiology Images displayed and confirmed. If images not available, report reviewed: imaging studies available  Required items: required blood products, implants, devices, and special equipment available  Patient identity confirmed: verbally with patient  Supporting Documentation  Indications: pain   Procedure Details  Location: knee - L knee  Preparation: Patient was prepped and draped in the usual " "sterile fashion  Needle gauge: 21G 2\"  Ultrasound guidance: no  Approach: anteromedial  Medications administered: 2 mL bupivacaine 0.25 %; 1 mL bupivacaine 0.25 %; 48 mg hylan 48 MG/6ML    Patient tolerance: patient tolerated the procedure well with no immediate complications  Dressing:  Sterile dressing applied            "

## 2024-08-28 ENCOUNTER — TELEPHONE (OUTPATIENT)
Age: 69
End: 2024-08-28

## 2024-08-28 DIAGNOSIS — M79.673 PAIN OF FOOT, UNSPECIFIED LATERALITY: Primary | ICD-10-CM

## 2024-08-28 NOTE — TELEPHONE ENCOUNTER
Patient needs a referral for a podiatrist   Please advise when in the chart so patient can make an appt. She is having a lot of pain

## 2024-09-03 ENCOUNTER — TELEPHONE (OUTPATIENT)
Dept: OBGYN CLINIC | Facility: HOSPITAL | Age: 69
End: 2024-09-03

## 2024-09-03 NOTE — TELEPHONE ENCOUNTER
Caller: Patient    Doctor: Emigdio    Reason for call: Patient had Lt knee Synvisc injection 8/16/24 and it has not helped at all. Her pain level is at a 9 and it is a shooting, throbbing pain. Would like to know what her next steps are since shot did not help. Please call patient. Thank you.    Call back#: [phone]

## 2024-09-04 NOTE — TELEPHONE ENCOUNTER
Caller: Patient     Doctor: Emigdio     Reason for call: Patient is following up     Call back#: [phone]

## 2024-09-05 NOTE — TELEPHONE ENCOUNTER
Called and spoke with pt and relayed Dr Beal message, she stated understanding. Scheduled with Dr Carter on 9/9/24 at 11:30AM

## 2024-09-09 ENCOUNTER — TELEPHONE (OUTPATIENT)
Dept: OBGYN CLINIC | Facility: CLINIC | Age: 69
End: 2024-09-09

## 2024-09-09 ENCOUNTER — OFFICE VISIT (OUTPATIENT)
Dept: OBGYN CLINIC | Facility: CLINIC | Age: 69
End: 2024-09-09
Payer: MEDICARE

## 2024-09-09 VITALS
WEIGHT: 150.2 LBS | BODY MASS INDEX: 25.02 KG/M2 | DIASTOLIC BLOOD PRESSURE: 80 MMHG | HEIGHT: 65 IN | SYSTOLIC BLOOD PRESSURE: 118 MMHG | HEART RATE: 66 BPM

## 2024-09-09 DIAGNOSIS — G89.29 CHRONIC PAIN OF LEFT KNEE: ICD-10-CM

## 2024-09-09 DIAGNOSIS — Z01.818 PREOP TESTING: Primary | ICD-10-CM

## 2024-09-09 DIAGNOSIS — M25.562 CHRONIC PAIN OF LEFT KNEE: ICD-10-CM

## 2024-09-09 DIAGNOSIS — M17.12 PRIMARY OSTEOARTHRITIS OF LEFT KNEE: Primary | ICD-10-CM

## 2024-09-09 PROCEDURE — 99214 OFFICE O/P EST MOD 30 MIN: CPT | Performed by: ORTHOPAEDIC SURGERY

## 2024-09-09 NOTE — PROGRESS NOTES
Patient Name:  Radha Pantoja  MRN:  [id number]    Assessment & Plan     1. Primary osteoarthritis of left knee  -     Medial  Knee Brace  -     Ambulatory Referral to Physical Therapy; Future  2. Chronic pain of left knee    Left knee osteoarthritis with persistent pain effecting activities of daily living  X-rays reviewed in office today with patient  Treatment options were discussed including operative and nonoperative management  Nonoperative management includes oral and topical medications, bracing, physical therapy, corticosteroid injections, viscosupplementation  Due to failure of non operative treatment thus far, operative management was discussed in the form of left total knee arthroplasty. Risks of surgery, including but not limited to, infection, nerve and blood vessel injury, postoperative stiffness, persistent pain, fracture, DVT/PE, anesthesia complications, and need for subsequent surgery were discussed in detail. The patient understands and would like to proceed with surgical intervention,  Home exercises focused on knee extension were demonstrated in the office today  Medial  brace was ordered today to help with pain in the interim.   The patient was referred to outpatient physical therapy  The patient would like to move forward with same day left total knee arthroplasty on 11/26/2024. She will have preoperative clearance from PCP. She will have labs, CXR, EKG  She will follow up early November for consent    Chief Complaint     left knee pain    History of the Present Illness     Radha Pantoja is a 69 y.o. female with left knee pain secondary to osteoarthritis. Pain ongoing for at least 3 years. She was previously treated by Dr. Beal with corticosteroid injections and recent visco injection. She admits her pain is worse since the Durolane injection on 8/16/2024. She is having more shooting pain. She does not wear a brace for her left knee. Pain is managed with  "meloxicam.    Review of Systems     Review of Systems   Constitutional:  Negative for chills and fever.   HENT:  Negative for ear pain and sore throat.    Eyes:  Negative for pain and visual disturbance.   Respiratory:  Negative for cough and shortness of breath.    Cardiovascular:  Negative for chest pain and palpitations.   Gastrointestinal:  Negative for abdominal pain and vomiting.   Genitourinary:  Negative for dysuria and hematuria.   Musculoskeletal:  Negative for arthralgias and back pain.   Skin:  Negative for color change and rash.   Neurological:  Negative for seizures and syncope.   All other systems reviewed and are negative.      Physical Exam     /80   Pulse 66   Ht 5' 5\" (1.651 m)   Wt 68.1 kg (150 lb 3.2 oz)   BMI 24.99 kg/m²     Left Knee  Range of motion from 7 to 120 with pain.    There is mild crepitus with range of motion.   There is no effusion.    There is mild tenderness over the medial joint line.    There is 4+/5 quadriceps strength and equal tone.    The patient is able to perform a straight leg raise.    Positive patellar grind  Varus alignment partially correctable with valgus stress at 30 degrees  The patient is neurovascular intact distally.      Eyes:  Anicteric sclerae.  Neck:  Supple.  Lungs:  Normal respiratory effort.  Cardiovascular:  Capillary refill is less than 2 seconds.  Skin:  Intact without erythema.  Neurologic:  Sensation grossly intact to light touch.  Psychiatric:  Mood and affect are appropriate.    Data Review     I have personally reviewed pertinent films in PACS, and my interpretation follows:    X-rays taken 7/18/2024 of left knee independently reviewed and demonstrate mild lateral and patellofemoral degenerative changes. Severe medial joint space narrowing. Osteophyte formation. No acute fracture or dislocation.     Past Medical History:   Diagnosis Date    Anemia     IRON  INFUSIONS    Anxiety     Cancer (HCC)     breast/ovarian had preventative " masectomy/ovary removal    Colon polyp     Depression     Last Assessed 8/27/2015    H/O total mastectomy     Hyperlipidemia     Last Assessed 8/7/2015    Hypokalemia 02/02/2019    Recurrent major depressive disorder, remission status unspecified ([facility]) 03/04/2022       Past Surgical History:   Procedure Laterality Date    APPENDECTOMY      BILATERAL OOPHORECTOMY      Last Assessed 8/27/2015    BREAST RECONSTRUCTION      Last Assessed 8/27/2015    CHOLECYSTECTOMY      COLONOSCOPY      MASTECTOMY Bilateral     Radical; Last Assessed 8/27/2015    MI COLONOSCOPY FLX DX W/COLLJ SPEC WHEN PFRMD N/A 4/3/2018    Procedure: COLONOSCOPY;  Surgeon: Fausto Frazier MD;  Location: [facility];  Service: Gastroenterology    MI LAPAROSCOPY SURG CHOLECYSTECTOMY N/A 10/5/2017    Procedure: LAPAROSCOPIC CHOLECYSTECTOMY;  Surgeon: Chava Blancas MD;  Location: [facility];  Service: General    TONSILLECTOMY         No Known Allergies    Current Outpatient Medications on File Prior to Visit   Medication Sig Dispense Refill    calcium carbonate-vitamin D (OSCAL-D) 500 mg-200 units per tablet Take 1 tablet by mouth 2 (two) times a day with meals      escitalopram (LEXAPRO) 10 mg tablet Take 1 tablet (10 mg total) by mouth daily 30 tablet 5    gabapentin (NEURONTIN) 300 mg capsule Take 1 capsule (300 mg total) by mouth daily at bedtime 90 capsule 5    multivitamin (THERAGRAN) TABS Take 1 tablet by mouth daily      meloxicam (Mobic) 15 mg tablet Take 1 tablet (15 mg total) by mouth daily (Patient not taking: Reported on 9/9/2024) 30 tablet 3     No current facility-administered medications on file prior to visit.       Social History     Tobacco Use    Smoking status: Never    Smokeless tobacco: Never    Tobacco comments:     Has medical marijuana card does vape   Vaping Use    Vaping status: Never Used   Substance Use Topics    Alcohol use: Not Currently     Comment: recovering    Drug use: No       Family History   Problem Relation Age of  Onset    Breast cancer Mother     Ovarian cancer Mother         unspecified laterality    Heart failure Father     Breast cancer Maternal Aunt     Colon cancer Neg Hx     Cervical cancer Neg Hx     Uterine cancer Neg Hx              Procedures Performed     Procedures  None.      Laron Carter DO  Scribe Attestation      I,:  Lacey Catalan am acting as a scribe while in the presence of the attending physician.:       I,:  Laron Carter DO personally performed the services described in this documentation    as scribed in my presence.:

## 2024-09-09 NOTE — TELEPHONE ENCOUNTER
Scheduled pt for surgery with Dr. Carter on 11/26/24.     All instructions were reviewed with the patient.     She was given the surgical soap and wipes and instructed in the use.     Pt was given my direct number to call with questions.

## 2024-09-13 ENCOUNTER — OFFICE VISIT (OUTPATIENT)
Dept: PODIATRY | Facility: CLINIC | Age: 69
End: 2024-09-13
Payer: MEDICARE

## 2024-09-13 ENCOUNTER — APPOINTMENT (OUTPATIENT)
Dept: RADIOLOGY | Facility: CLINIC | Age: 69
End: 2024-09-13
Payer: MEDICARE

## 2024-09-13 VITALS
DIASTOLIC BLOOD PRESSURE: 71 MMHG | BODY MASS INDEX: 24.96 KG/M2 | WEIGHT: 150 LBS | HEART RATE: 74 BPM | SYSTOLIC BLOOD PRESSURE: 106 MMHG

## 2024-09-13 DIAGNOSIS — L84 SKIN CALLUS: Primary | ICD-10-CM

## 2024-09-13 DIAGNOSIS — M79.673 PAIN OF FOOT, UNSPECIFIED LATERALITY: ICD-10-CM

## 2024-09-13 DIAGNOSIS — Q66.229 METATARSUS ADDUCTUS: ICD-10-CM

## 2024-09-13 DIAGNOSIS — M20.42 HAMMER TOE OF LEFT FOOT: ICD-10-CM

## 2024-09-13 DIAGNOSIS — M21.619 BUNION: ICD-10-CM

## 2024-09-13 PROCEDURE — 73630 X-RAY EXAM OF FOOT: CPT

## 2024-09-13 PROCEDURE — 99203 OFFICE O/P NEW LOW 30 MIN: CPT | Performed by: PODIATRIST

## 2024-09-13 NOTE — PROGRESS NOTES
Ambulatory Visit  Name: Radha Pantoja      : 1955      MRN: [id number]  Encounter Provider: Crow Thrasher DPM  Encounter Date: 2024   Encounter department: [facility] [place]    Assessment & Plan  Pain of foot, unspecified laterality    Orders:    Ambulatory Referral to Podiatry    XR foot 3+ vw left; Future    Skin callus         Metatarsus adductus         Bunion         Hammer toe of left foot           -X-rays 3 views taken reviewed of the left foot and do show substantial metatarsus adductus with minimal space in the first and second digit.  - At this point she did purchase [employer] shoes and I think that that flexible toebox with a toe spacer that has a neck to prevent movement of this area would likely resolve her pain and discomfort  - She has substantial metatarsus adductus and clinically does appear that she does have a bunion we would not be able to give her a truly proper surgical fix here without addressing her underlying metatarsus adductus  - If she is unhappy with the resolution of her pain with maintenance of the callus means of the deformity she is to return here for repeat assessment of her pain and consideration of surgical intervention      History of Present Illness     Radha Pantoja is a 69 y.o. female who presents evaluation and management of her left foot.  Doing overall very well she works at [employer] part-time on her feet all the time.  She has had worsening pain issues in between her left first and second toe there is been going on for the past year.  Wondering versus cyst versus callus.  She is apprehensive to trim this as she has been trimming the 1 on her big toe that has caused her more pain she has tried a toe spacer but they fall out      Review of Systems   Constitutional:  Negative for chills and fever.   HENT:  Negative for ear pain and sore throat.    Eyes:  Negative for pain and visual disturbance.   Respiratory:  Negative  for cough and shortness of breath.    Cardiovascular:  Negative for chest pain and palpitations.   Gastrointestinal:  Negative for abdominal pain and vomiting.   Genitourinary:  Negative for dysuria and hematuria.   Musculoskeletal:  Negative for arthralgias and back pain.   Skin:  Negative for color change and rash.   Neurological:  Negative for seizures and syncope.   All other systems reviewed and are negative.          Objective     /71 (BP Location: Left arm, Patient Position: Sitting, Cuff Size: Standard)   Pulse 74   Wt 68 kg (150 lb)   BMI 24.96 kg/m²     Physical Exam  Vitals reviewed.   Constitutional:       Appearance: Normal appearance.   HENT:      Head: Normocephalic and atraumatic.      Nose: Nose normal.      Mouth/Throat:      Mouth: Mucous membranes are moist.   Eyes:      Pupils: Pupils are equal, round, and reactive to light.   Pulmonary:      Effort: Pulmonary effort is normal.   Musculoskeletal:         General: Tenderness and deformity present.      Comments: Skin plasty interdigitally left.  Significant bunion metatarsus adductus pain with palpation along the left PIPJ is not touching the ground.  Slight dorsally dislocated hammertoe   Skin:     Capillary Refill: Capillary refill takes less than 2 seconds.   Neurological:      General: No focal deficit present.      Mental Status: She is alert and oriented to person, place, and time. Mental status is at baseline.

## 2024-09-16 ENCOUNTER — EVALUATION (OUTPATIENT)
Dept: PHYSICAL THERAPY | Facility: CLINIC | Age: 69
End: 2024-09-16
Payer: MEDICARE

## 2024-09-16 DIAGNOSIS — G89.29 CHRONIC PAIN OF LEFT KNEE: Primary | ICD-10-CM

## 2024-09-16 DIAGNOSIS — R26.89 ANTALGIC GAIT: ICD-10-CM

## 2024-09-16 DIAGNOSIS — M17.12 PRIMARY OSTEOARTHRITIS OF LEFT KNEE: ICD-10-CM

## 2024-09-16 DIAGNOSIS — M25.562 CHRONIC PAIN OF LEFT KNEE: Primary | ICD-10-CM

## 2024-09-16 PROCEDURE — 97110 THERAPEUTIC EXERCISES: CPT

## 2024-09-16 PROCEDURE — 97161 PT EVAL LOW COMPLEX 20 MIN: CPT

## 2024-09-16 NOTE — PROGRESS NOTES
PT Evaluation     Today's date: 2024  Patient name: Radha Pantoja  : 1955  MRN: [id number]  Referring provider: Laron Carter DO  Dx:   Encounter Diagnosis     ICD-10-CM    1. Chronic pain of left knee  M25.562     G89.29       2. Primary osteoarthritis of left knee  M17.12 Ambulatory Referral to Physical Therapy      3. Antalgic gait  R26.89                      Assessment  Impairments: abnormal gait, abnormal or restricted ROM, activity intolerance, impaired physical strength, lacks appropriate home exercise program, pain with function, weight-bearing intolerance and participation limitations  Symptom irritability: moderate    Assessment details: Patient is a pleasant 68 y/o female presenting to outpatient physical therapy with left knee pain.  Upon examination notable weakness present at bilateral hip flexors and left hip extensors, left ankle dorsiflexors, and right hip abductors.  Considerable limitations in active and passive ranges of left knee flexion and extension in comparison to right.  TTP at medial aspect of left knee, noted tightness of left hip flexors, extensors, and abductors in comparison to right.  Pt further presents with antalgic gait w/ lack of terminal knee extension during stance and decreased stance time on left leg.  No red flags noted at this time.  Pt signs and symptoms consistent with referring diagnosis.  Patient will benefit from skilled physical therapy, including therapeutic exercise, stretching, manual therapy, and modalities prn to improve their level of function, to increase overall quality of life, and to address her impairments.    Dispensed home exercise program and educated patient on proper technique, frequency, and the possibility of DOMS for the next 24 to 48 hours. Patient demonstrated understanding and was advised to call us if she has any further questions.   Understanding of Dx/Px/POC: good     Prognosis: good    Goals  STG to be achieved by 4  weeks  -Pt will be independent with basic HEP  -Pt will improve MMT scores by 1/2 grade in all deficient planes in order to facilitate ease of functional activity  -Pt will improve ROM by 25% in all deficient planes in order to improve functional mobility  -Pt will report 5/10 at worst in order to facilitate return to PLOF    LTG to be achieved by Discharge  -Pt will be independent with comprehensive HEP  -Pt will improve MMT scores to WFL in all deficient planes in order to facilitate ease of functional activity  -Pt will improve ROM to WFL in all deficient planes in order to improve functional mobility  -Pt will report 2/10 at worst in order to demonstrate return to PLOF  -Pt will report no pain with usual ADLs       Plan  Patient would benefit from: skilled physical therapy  Referral necessary: No    Planned therapy interventions: balance, balance/weight bearing training, functional ROM exercises, gait training, home exercise program, joint mobilization, manual therapy, massage, neuromuscular re-education, strengthening, stretching, therapeutic activities and therapeutic exercise    Frequency: 2x week  Duration in weeks: 10  Plan of Care beginning date: 9/16/2024  Plan of Care expiration date: 11/25/2024  Treatment plan discussed with: patient        Subjective Evaluation    History of Present Illness  Date of onset: 8/26/2024  Mechanism of injury: Radha is a 69 y.o. female presenting to physical therapy on 09/16/24 with referral from MD for left knee pain, pre-operative management.  Pt has knee scheduled for 11/26/24 d/t to knee pain that has been ongoing for 3 years now.  In the past she had received injections and they had helped, however over the years they became less and less effective.  Now at the point where a [place] her pain worsening, pt states it has a constant throbbing pain located at the medial aspect of her knee.  She states that walking does make it  "worse and sitting does alleviate.  She went to her orthopedic doctor who recommended surgery at this point along with a trial of conservative.  Pt states she had has tried wearing a knee brace, states she has not really give it enough time to see if it worked as she does not like the appearance of it.  Pt is currently working at [employer] as a  and states she \"quintero through\" the pain/                 Recurrent probem    Quality of life: good    Pain  Current pain ratin  At best pain ratin  At worst pain ratin  Location: Medial aspect of knee  Quality: throbbing  Relieving factors: ice, change in position and rest  Aggravating factors: standing, walking and stair climbing  Progression: worsening    Social Support  10  Lives in: multiple-level home    Employment status: working  Hand dominance: left      Diagnostic Tests  X-ray: normal  Treatments  Previous treatment: injection treatment        Objective     Active Range of Motion   Left Hip   Normal active range of motion  Extension: 1 degrees     Right Hip   Normal active range of motion  Extension: 10 degrees   Left Knee   Flexion: 110 degrees   Extension: -21 degrees     Right Knee   Flexion: 122 degrees   Left Ankle/Foot   Normal active range of motion    Right Ankle/Foot   Normal active range of motion    Passive Range of Motion   Left Knee   Flexion: 128 degrees with pain  Extension: -18 degrees with pain    Right Knee   Flexion: 130 degrees     Strength/Myotome Testing     Left Hip   Planes of Motion   Flexion: 4-  Abduction: 4+    Right Hip   Planes of Motion   Flexion: 4-  Abduction: 4    Left Knee   Flexion: 4+  Extension: 4    Right Knee   Flexion: 4+  Extension: 4+    Left Ankle/Foot   Dorsiflexion: 4    Right Ankle/Foot   Dorsiflexion: 4+           Precautions: Standard precautions    POC expires Unit limit Auth  expiration date PT/OT + Visit Limit?   24 BOMN  BOMN      Sec - BOMN no auth           Visit/Unit Tracking  AUTH " Status:  Date 9/16              N/A Used 1               Remaining                       Manuals 9/16            Knee distraction             STM L quad                                       Neuro Re-Ed             LAQs             SAQs             Quad set             Seated marches HEP            Prone hip ext HEP            Clamshells             Bridges             Matrix: Flex/Ext                                       Ther Ex             Pt education             Nustep             Hamstring stretch w/ stool HEP            Prone quad stretch             Supine hip ABD stretch HEP                                                   Ther Activity                                       Gait Training                                       Modalities

## 2024-09-18 ENCOUNTER — TELEPHONE (OUTPATIENT)
Age: 69
End: 2024-09-18

## 2024-09-18 NOTE — TELEPHONE ENCOUNTER
Radha called in to confirm she has an upcoming pre-op made with felicia WINSLOW Prior to TKA on 11/26/24.    Confirmed pre op appointment with her.    She did inquire about getting the Covid Booster.  She would like your recommendation if she should get it and if so when since she is getting ready to have surgery.    Please follow up accordingly about Covid Booster.  Thank you

## 2024-09-20 ENCOUNTER — OFFICE VISIT (OUTPATIENT)
Dept: PHYSICAL THERAPY | Facility: CLINIC | Age: 69
End: 2024-09-20
Payer: MEDICARE

## 2024-09-20 ENCOUNTER — TELEPHONE (OUTPATIENT)
Age: 69
End: 2024-09-20

## 2024-09-20 DIAGNOSIS — M25.562 CHRONIC PAIN OF LEFT KNEE: ICD-10-CM

## 2024-09-20 DIAGNOSIS — R26.89 ANTALGIC GAIT: ICD-10-CM

## 2024-09-20 DIAGNOSIS — G89.29 CHRONIC PAIN OF LEFT KNEE: ICD-10-CM

## 2024-09-20 DIAGNOSIS — M17.12 PRIMARY OSTEOARTHRITIS OF LEFT KNEE: Primary | ICD-10-CM

## 2024-09-20 PROCEDURE — 97110 THERAPEUTIC EXERCISES: CPT

## 2024-09-20 PROCEDURE — 97140 MANUAL THERAPY 1/> REGIONS: CPT

## 2024-09-20 PROCEDURE — 97112 NEUROMUSCULAR REEDUCATION: CPT

## 2024-09-20 NOTE — TELEPHONE ENCOUNTER
Pt called with questions about some recent prescriptions her  had picked up for her.  Pt had asked who had ordered her the Vitamin C and multivitamin and why.    Advised pt that Ortho had ordered both of those vitamins for the osteoarthritis of her left knee.  Pt verbalized understanding.  No further questions or concerns at this time.

## 2024-09-20 NOTE — PROGRESS NOTES
Daily Note     Today's date: 2024  Patient name: Radha Pantoja  : 1955  MRN: [id number]  Referring provider: Laron Carter DO  Dx:   Encounter Diagnosis     ICD-10-CM    1. Primary osteoarthritis of left knee  M17.12       2. Chronic pain of left knee  M25.562     G89.29       3. Antalgic gait  R26.89                      Subjective: Pt states that knee was painful this morning when she had to wake up in the middle of the night to use the restroom, states it has calmed down a bit since then.  States home exercises have been going well.      Objective: See treatment diary below      Assessment: Tolerated treatment well. Initiated gentle range of motion and strength exercises for hip and knee joint, pt did have instance of increased pain during SAQs.  Pt currently demonstrating minimal contraction of left quadricep, updated HEP to include quad sets and educated on frequency and technique.  PT will continue to progress as tolerated.  Patient would benefit from continued PT      Plan: Continue per plan of care.      Precautions: Standard precautions    POC expires Unit limit Auth  expiration date PT/OT + Visit Limit?   24 BOMN  BOMN      Sec - BOMN no auth           Visit/Unit Tracking  AUTH Status:  Date              N/A Used 1 2              Remaining                   Scan     Or     Visit  [email or web address]  Access Code: [email or web address]    Manuals            Knee distraction  JK           STM L quad  JK                                     Neuro Re-Ed             LAQs             SAQs  5''x10 P!           Quad set  5''x10           Seated marches HEP            Prone hip ext HEP            Clamshells  3''x20 B/L           Bridges  2x10           Matrix: Flex/Ext             TKE                          Ther Ex             Pt education             Nustep  10' L1 for LE ROM           Hamstring stretch w/ stool HEP            Prone quad stretch  3x30''            Supine hip ABD stretch  3x30'' B/L                                                  Ther Activity             FSU             LSD             Gait Training                                       Modalities

## 2024-09-20 NOTE — TELEPHONE ENCOUNTER
Relayed provider message to pt.  Pt inquiring though if she should also get RSV vaccination and if that would be also available to get in the office.  Please review and advise.  Please call back pt as she is having issues with her MyChart at this time.

## 2024-09-23 ENCOUNTER — OFFICE VISIT (OUTPATIENT)
Dept: PHYSICAL THERAPY | Facility: CLINIC | Age: 69
End: 2024-09-23
Payer: MEDICARE

## 2024-09-23 DIAGNOSIS — M25.562 CHRONIC PAIN OF LEFT KNEE: ICD-10-CM

## 2024-09-23 DIAGNOSIS — R26.89 ANTALGIC GAIT: ICD-10-CM

## 2024-09-23 DIAGNOSIS — G89.29 CHRONIC PAIN OF LEFT KNEE: ICD-10-CM

## 2024-09-23 DIAGNOSIS — M17.12 PRIMARY OSTEOARTHRITIS OF LEFT KNEE: Primary | ICD-10-CM

## 2024-09-23 PROCEDURE — 97110 THERAPEUTIC EXERCISES: CPT

## 2024-09-23 PROCEDURE — 97140 MANUAL THERAPY 1/> REGIONS: CPT

## 2024-09-23 PROCEDURE — 97112 NEUROMUSCULAR REEDUCATION: CPT

## 2024-09-23 NOTE — PROGRESS NOTES
Daily Note     Today's date: 2024  Patient name: Radha Pantoja  : 1955  MRN: [id number]  Referring provider: Laron Carter DO  Dx:   Encounter Diagnosis     ICD-10-CM    1. Primary osteoarthritis of left knee  M17.12       2. Chronic pain of left knee  M25.562     G89.29       3. Antalgic gait  R26.89                      Subjective: Pt reports knee is feeling better today, although continuing to report sharp pain at night.      Objective: See treatment diary below      Assessment: Tolerated treatment well. Implemented additional interventions today to further facilitate knee joint strength, pt had slight exacerbation of pain at medial joint line of left knee during SAQs, deferred for time being.  PT will continue to progress as tolerated. Patient would benefit from continued PT      Plan: Continue per plan of care.      Precautions: Standard precautions    POC expires Unit limit Auth  expiration date PT/OT + Visit Limit?   24 BOMN  BOMN      Sec - BOMN no auth           Visit/Unit Tracking  AUTH Status:  Date             N/A Used 1 2 3             Remaining                   Scan     Or     Visit  [email or web address]  Access Code: [email or web address]    Manuals           Knee distraction  JK JK          STM L quad  JK                                     Neuro Re-Ed             LAQs             SAQs  5''x10 P! 5''x10  P!          Quad set  5''x10 5''x20          Seated marches HEP            Prone hip ext HEP            Clamshells  3''x20 B/L 3''x20 B/L          Bridges  2x10           Hamstring curls   Prone 2x10 2# add # NV          Matrix: Flex/Ext             TKE                          Ther Ex             Pt education             Nustep  10' L1 for LE ROM 10' L1 for LE ROM          Hamstring stretch w/ stool HEP            Prone quad stretch  3x30'' 3x30''  R          Supine hip ABD stretch  3x30'' B/L                                                   Ther Activity             FSU             LSD             Gait Training                                       Modalities

## 2024-09-26 ENCOUNTER — OFFICE VISIT (OUTPATIENT)
Dept: PHYSICAL THERAPY | Facility: CLINIC | Age: 69
End: 2024-09-26
Payer: MEDICARE

## 2024-09-26 DIAGNOSIS — G89.29 CHRONIC PAIN OF LEFT KNEE: ICD-10-CM

## 2024-09-26 DIAGNOSIS — R26.89 ANTALGIC GAIT: ICD-10-CM

## 2024-09-26 DIAGNOSIS — M17.12 PRIMARY OSTEOARTHRITIS OF LEFT KNEE: Primary | ICD-10-CM

## 2024-09-26 DIAGNOSIS — M25.562 CHRONIC PAIN OF LEFT KNEE: ICD-10-CM

## 2024-09-26 PROCEDURE — 97112 NEUROMUSCULAR REEDUCATION: CPT

## 2024-09-26 PROCEDURE — 97110 THERAPEUTIC EXERCISES: CPT

## 2024-09-26 PROCEDURE — 97140 MANUAL THERAPY 1/> REGIONS: CPT

## 2024-09-26 NOTE — PROGRESS NOTES
Daily Note     Today's date: 2024  Patient name: Radha Pantoja  : 1955  MRN: [id number]  Referring provider: Laron Carter DO  Dx:   Encounter Diagnosis     ICD-10-CM    1. Primary osteoarthritis of left knee  M17.12       2. Chronic pain of left knee  M25.562     G89.29       3. Antalgic gait  R26.89                      Subjective: Pt reports no significant changes in symptoms since last visit.      Objective: See treatment diary below      Assessment: Tolerated treatment well. Trialed NMES to quad tendon to facilitate greater activation, pt able to tolerate for 3' before onset of sharp pain in knee.  Reattempt at a later date with higher pad placement, PT will continue to progress as tolerated.  Patient would benefit from continued PT      Plan: Continue per plan of care.      Precautions: Standard precautions    POC expires Unit limit Auth  expiration date PT/OT + Visit Limit?   24 BOMN  BOMN      Sec - BOMN no auth           Visit/Unit Tracking  AUTH Status:  Date            N/A Used 1 2 3 4            Remaining                   Scan     Or     Visit  [email or web address]  Access Code: [email or web address]    Manuals          Knee distraction  JK JK JK         STM L quad  JK                                     Neuro Re-Ed             LAQs             SAQs  5''x10 P! 5''x10  P!          Quad set  5''x10 5''x20 NMES 3' P!         Seated marches HEP            Prone hip ext HEP            Clamshells  3''x20 B/L 3''x20 B/L 3''x20 B/L  RTB         Bridges  2x10  20x w/ ABD RTB         Hamstring curls   Prone 2x10 2# add # NV Prone 2x10 3#         Matrix: Flex/Ext             TKE                          Ther Ex             Pt education             Nustep  10' L1 for LE ROM 10' L1 for LE ROM 10' L3  for LE ROM         Hamstring stretch w/ stool HEP            Prone quad stretch  3x30'' 3x30''  L 3x30'' L         Supine hip ABD stretch  3x30''  B/L  3x30'' B/L                                                Ther Activity             FSU             LSD             Gait Training                                       Modalities

## 2024-09-30 ENCOUNTER — OFFICE VISIT (OUTPATIENT)
Dept: PHYSICAL THERAPY | Facility: CLINIC | Age: 69
End: 2024-09-30
Payer: MEDICARE

## 2024-09-30 DIAGNOSIS — M25.562 CHRONIC PAIN OF LEFT KNEE: ICD-10-CM

## 2024-09-30 DIAGNOSIS — G89.29 CHRONIC PAIN OF LEFT KNEE: ICD-10-CM

## 2024-09-30 DIAGNOSIS — M17.12 PRIMARY OSTEOARTHRITIS OF LEFT KNEE: Primary | ICD-10-CM

## 2024-09-30 PROCEDURE — 97110 THERAPEUTIC EXERCISES: CPT

## 2024-09-30 PROCEDURE — 97112 NEUROMUSCULAR REEDUCATION: CPT

## 2024-09-30 NOTE — PROGRESS NOTES
Daily Note     Today's date: 2024  Patient name: Radha Pantoja  : 1955  MRN: [id number]  Referring provider: Laron Carter DO  Dx:   Encounter Diagnosis     ICD-10-CM    1. Primary osteoarthritis of left knee  M17.12       2. Chronic pain of left knee  M25.562     G89.29                      Subjective: Pt reports no significant changes in symptoms since last visit.      Objective: See treatment diary below      Assessment: Tolerated treatment well. Implemented additional interventions to focus on hip and knee strengthening, pt had increased pain during attempt of standing hip abduction, defer until later date.  Updated HEP and educated on technique, frequency, pt verbalized understanding.  PT will continue to progress as tolerated.  Patient would benefit from continued PT      Plan: Continue per plan of care.      Precautions: Standard precautions    POC expires Unit limit Auth  expiration date PT/OT + Visit Limit?   24 BOMN  BOMN      Sec - BOMN no auth           Visit/Unit Tracking  AUTH Status:  Date           N/A Used 1 2 3 4 5           Remaining                   Scan     Or     Visit  [email or web address]  Access Code: [email or web address]    Manuals         Knee distraction  JK JK JK JK        STM L quad  JK                                     Neuro Re-Ed             LAQs     5''x10        SAQs  5''x10 P! 5''x10  P!  W/ NMES 5'        Quad set  5''x10 5''x20 NMES 3' P!         Seated marches HEP            Prone hip ext HEP            Clamshells  3''x20 B/L 3''x20 B/L 3''x20 B/L  RTB 3''x20 B/L  RTB        Bridges  2x10  20x w/ ABD RTB 20x w/ ABD RTB        Hamstring curls   Prone 2x10 2# add # NV Prone 2x10 3# Prone 2x10 3#        3 way hip     P!        Matrix: Flex/Ext             TKE                          Ther Ex             Pt education HEP    HEP        Nustep  10' L1 for LE ROM 10' L1 for LE ROM 10' L3  for LE ROM  RB 10'        Hamstring stretch w/ stool HEP            Prone quad stretch  3x30'' 3x30''  L 3x30'' L         Supine hip ABD stretch  3x30'' B/L  3x30'' B/L                                                Ther Activity             FSU             LSD             Gait Training                                       Modalities

## 2024-10-03 ENCOUNTER — OFFICE VISIT (OUTPATIENT)
Dept: PHYSICAL THERAPY | Facility: CLINIC | Age: 69
End: 2024-10-03
Payer: MEDICARE

## 2024-10-03 DIAGNOSIS — R26.89 ANTALGIC GAIT: ICD-10-CM

## 2024-10-03 DIAGNOSIS — M25.562 CHRONIC PAIN OF LEFT KNEE: ICD-10-CM

## 2024-10-03 DIAGNOSIS — M17.12 PRIMARY OSTEOARTHRITIS OF LEFT KNEE: Primary | ICD-10-CM

## 2024-10-03 DIAGNOSIS — G89.29 CHRONIC PAIN OF LEFT KNEE: ICD-10-CM

## 2024-10-03 PROCEDURE — 97110 THERAPEUTIC EXERCISES: CPT

## 2024-10-03 PROCEDURE — 97112 NEUROMUSCULAR REEDUCATION: CPT

## 2024-10-03 NOTE — PROGRESS NOTES
Daily Note     Today's date: 10/3/2024  Patient name: Radha Pantoja  : 1955  MRN: [id number]  Referring provider: Laron Carter DO  Dx:   Encounter Diagnosis     ICD-10-CM    1. Primary osteoarthritis of left knee  M17.12       2. Chronic pain of left knee  M25.562     G89.29       3. Antalgic gait  R26.89                      Subjective: Pt reports that she was working a long shift prior to today and noticed the knee felt better, felt like she had more movement available at the joint.  Pt does state she has to leave early today.      Objective: See treatment diary below      Assessment: Tolerated treatment well. Continued with hip/knee strengthening and stretching, no new interventions were provided today as pt remains sufficiently challenged by current program.  Patient would benefit from continued PT      Plan: Continue per plan of care.      Precautions: Standard precautions    POC expires Unit limit Auth  expiration date PT/OT + Visit Limit?   24 BOMN  BOMN      Sec - BOMN no auth           Visit/Unit Tracking  AUTH Status:  Date  10/3         N/A Used 1 2 3 4 5 6          Remaining                   Scan     Or     Visit  [email or web address]  Access Code: [email or web address]    Manuals 9/16 9/20 9/23 9/26 9/30 10/3       Knee distraction  JK JK JK JK JK       STM L quad  JK                                     Neuro Re-Ed             LAQs     5''x10 5''x20       SAQs  5''x10 P! 5''x10  P!  W/ NMES 5'        Quad set  5''x10 5''x20 NMES 3' P!         Seated marches HEP            Prone hip ext HEP            Clamshells  3''x20 B/L 3''x20 B/L 3''x20 B/L  RTB 3''x20 B/L  RTB 3''x20 B/L  RTB       Bridges  2x10  20x w/ ABD RTB 20x w/ ABD RTB 20x w/ ABD RTB       Hamstring curls   Prone 2x10 2# add # NV Prone 2x10 3# Prone 2x10 3# Prone 2x10 3#       3 way hip     P!        Matrix: Flex/Ext             TKE                          Ther Ex             Pt  education HEP    HEP        Nustep  10' L1 for LE ROM 10' L1 for LE ROM 10' L3  for LE ROM RB 10' 8' L3  for LE ROM       Hamstring stretch w/ stool HEP            Prone quad stretch  3x30'' 3x30''  L 3x30'' L  3x30'' L       Supine hip ABD stretch  3x30'' B/L  3x30'' B/L  3x30'' B/L                                              Ther Activity             FSU             LSD             Gait Training                                       Modalities

## 2024-10-07 ENCOUNTER — OFFICE VISIT (OUTPATIENT)
Dept: PHYSICAL THERAPY | Facility: CLINIC | Age: 69
End: 2024-10-07
Payer: MEDICARE

## 2024-10-07 DIAGNOSIS — G89.29 CHRONIC PAIN OF LEFT KNEE: ICD-10-CM

## 2024-10-07 DIAGNOSIS — R26.89 ANTALGIC GAIT: ICD-10-CM

## 2024-10-07 DIAGNOSIS — M17.12 PRIMARY OSTEOARTHRITIS OF LEFT KNEE: Primary | ICD-10-CM

## 2024-10-07 DIAGNOSIS — M25.562 CHRONIC PAIN OF LEFT KNEE: ICD-10-CM

## 2024-10-07 PROCEDURE — 97110 THERAPEUTIC EXERCISES: CPT

## 2024-10-07 PROCEDURE — 97112 NEUROMUSCULAR REEDUCATION: CPT

## 2024-10-07 PROCEDURE — 97140 MANUAL THERAPY 1/> REGIONS: CPT

## 2024-10-07 NOTE — PROGRESS NOTES
"Daily Note     Today's date: 10/7/2024  Patient name: Radha Pantoja  : 1955  MRN: [id number]  Referring provider: Laron Carter DO  Dx:   Encounter Diagnosis     ICD-10-CM    1. Primary osteoarthritis of left knee  M17.12       2. Chronic pain of left knee  M25.562     G89.29       3. Antalgic gait  R26.89           Start Time: 845  Stop Time: 929  Total time in clinic (min): 44 minutes    Subjective: Pt noted that she did work yesterday and was sore after but noted that she does feel like she is getting better and becoming more tolerable.       Objective: See treatment diary below      Assessment: Continued with treatment session pre op for L knee planning on having TKA on 24. Tolerated treatment well.  Introduced prone knee extension stretch with prone knee hangs for a LLLD stretch. Noted feeling good but a little sore at the end of the 2 minutes. Patient exhibited good technique with therapeutic exercises and would benefit from continued PT.     S/p treatment session, overall no significant changes.       Plan: Continue per plan of care.  Progress as she is able.      Precautions: Standard precautions    POC expires Unit limit Auth  expiration date PT/OT + Visit Limit?   24 BOMN  BOMN      Sec - BOMN no auth           Visit/Unit Tracking  AUTH Status:  Date 9/16 9/20 9/23 9/26 9/30 10/3 10/7        N/A Used 1 2 3 4 5 6 7          Remaining                   Scan     Or     Visit  [email or web address]  Access Code: [email or web address]    Manuals 9/16 9/20 9/23 9/26 9/30 10/3 10/7      Knee distraction  JK JK JK JK JK JK  beginning of session       STM L quad  JK                                     Neuro Re-Ed             LAQs     5''x10 5''x20 5\" 2x 10       SAQs  5''x10 P! 5''x10  P!  W/ NMES 5'  2x 10       Quad set  5''x10 5''x20 NMES 3' P!   5\" 1x10   1x5       Seated marches HEP            Prone hip ext HEP            Clamshells  3''x20 B/L 3''x20 B/L 3''x20 B/L  RTB " "3''x20 B/L  RTB 3''x20 B/L  RTB 3''x20 B/L  RTB      Bridges  2x10  20x w/ ABD RTB 20x w/ ABD RTB 20x w/ ABD RTB 20x w/ ABD RTB      Hamstring curls   Prone 2x10 2# add # NV Prone 2x10 3# Prone 2x10 3# Prone 2x10 3#       3 way hip     P!        Matrix: Flex/Ext             TKE       NV      Prone knee hangs.        2 min no OP                                 Ther Ex             Pt education HEP    HEP        Nustep - for ROM   10' L1 for LE ROM 10' L1 for LE ROM 10' L3  for LE ROM RB 10' 8' L3  for LE ROM 10min L3 LE only       Hamstring stretch w/ stool HEP            Prone quad stretch  3x30'' 3x30''  L 3x30'' L  3x30'' L 3x 30\" L only       Supine hip ABD stretch  3x30'' B/L  3x30'' B/L  3x30'' B/L                                              Ther Activity             FSU             LSD             Gait Training                                       Modalities                                                      "

## 2024-10-10 ENCOUNTER — OFFICE VISIT (OUTPATIENT)
Dept: PHYSICAL THERAPY | Facility: CLINIC | Age: 69
End: 2024-10-10
Payer: MEDICARE

## 2024-10-10 DIAGNOSIS — G89.29 CHRONIC PAIN OF LEFT KNEE: ICD-10-CM

## 2024-10-10 DIAGNOSIS — M17.12 PRIMARY OSTEOARTHRITIS OF LEFT KNEE: Primary | ICD-10-CM

## 2024-10-10 DIAGNOSIS — R26.89 ANTALGIC GAIT: ICD-10-CM

## 2024-10-10 DIAGNOSIS — M25.562 CHRONIC PAIN OF LEFT KNEE: ICD-10-CM

## 2024-10-10 PROCEDURE — 97112 NEUROMUSCULAR REEDUCATION: CPT

## 2024-10-10 PROCEDURE — 97110 THERAPEUTIC EXERCISES: CPT

## 2024-10-10 NOTE — PROGRESS NOTES
"Daily Note     Today's date: 10/10/2024  Patient name: Radha Pantoja  : 1955  MRN: [id number]  Referring provider: Laron Carter DO  Dx:   Encounter Diagnosis     ICD-10-CM    1. Primary osteoarthritis of left knee  M17.12       2. Chronic pain of left knee  M25.562     G89.29       3. Antalgic gait  R26.89                      Subjective: Pt reports that her knee is feeling good throughout the day and only throbs at night now.      Objective: See treatment diary below      Assessment: Tolerated treatment well. Able to progress with resistance with current interventions, implemented additional exercises to further facilitate improve terminal knee extension.  Pt able to complete all exercises today without reproduction of pain.  PT will continue to progress as tolerated.  Patient would benefit from continued PT      Plan: Continue per plan of care.      Precautions: Standard precautions    POC expires Unit limit Auth  expiration date PT/OT + Visit Limit?   24 BOMN  BOMN      Sec - BOMN no auth           Visit/Unit Tracking  AUTH Status:  Date 9/16 9/20 9/23 9/26 9/30 10/3 10/7 10/10       N/A Used 1 2 3 4 5 6 7  8        Remaining                   Scan     Or     Visit  [email or web address]  Access Code: [email or web address]    Manuals 9/16 9/20 9/23 9/26 9/30 10/3 10/7 10/10     Knee distraction  JK JK JK JK JK JK  beginning of session  JK     STM L quad  JK                                     Neuro Re-Ed             LAQs     5''x10 5''x20 5\" 2x 10  5\" 2x 10      SAQs  5''x10 P! 5''x10  P!  W/ NMES 5'  2x 10       Quad set  5''x10 5''x20 NMES 3' P!   5\" 1x10   1x5       Seated marches HEP            Prone hip ext HEP            Clamshells  3''x20 B/L 3''x20 B/L 3''x20 B/L  RTB 3''x20 B/L  RTB 3''x20 B/L  RTB 3''x20 B/L  RTB 3''x20 B/L  GTB     Bridges  2x10  20x w/ ABD RTB 20x w/ ABD RTB 20x w/ ABD RTB 20x w/ ABD RTB 20x w/ ABD GTB     Hamstring curls   Prone 2x10 2# add # NV Prone " "2x10 3# Prone 2x10 3# Prone 2x10 3#  Prone 2x10 4#     3 way hip     P!        Matrix: Flex/Ext             TKE       NV 20x 10#     Prone knee hangs.        2 min no OP  1.5 min                               Ther Ex             Pt education HEP    HEP        Nustep - for ROM   10' L1 for LE ROM 10' L1 for LE ROM 10' L3  for LE ROM RB 10' 8' L3  for LE ROM 10min L3 LE only  RB L1 10  ' For LE ROM     Hamstring stretch w/ stool HEP            Prone quad stretch  3x30'' 3x30''  L 3x30'' L  3x30'' L 3x 30\" L only  3x 30\" L only      Supine hip ABD stretch  3x30'' B/L  3x30'' B/L  3x30'' B/L                                              Ther Activity             FSU             LSD             Gait Training                                       Modalities                                                        "

## 2024-10-14 ENCOUNTER — EVALUATION (OUTPATIENT)
Dept: PHYSICAL THERAPY | Facility: CLINIC | Age: 69
End: 2024-10-14
Payer: MEDICARE

## 2024-10-14 DIAGNOSIS — G89.29 CHRONIC PAIN OF LEFT KNEE: ICD-10-CM

## 2024-10-14 DIAGNOSIS — R26.89 ANTALGIC GAIT: ICD-10-CM

## 2024-10-14 DIAGNOSIS — M25.562 CHRONIC PAIN OF LEFT KNEE: ICD-10-CM

## 2024-10-14 DIAGNOSIS — M17.12 PRIMARY OSTEOARTHRITIS OF LEFT KNEE: Primary | ICD-10-CM

## 2024-10-14 PROCEDURE — 97112 NEUROMUSCULAR REEDUCATION: CPT

## 2024-10-14 PROCEDURE — 97110 THERAPEUTIC EXERCISES: CPT

## 2024-10-14 PROCEDURE — 97140 MANUAL THERAPY 1/> REGIONS: CPT

## 2024-10-14 NOTE — PROGRESS NOTES
PT Re-Evaluation     Today's date: 10/14/2024  Patient name: Radha Pantoja  : 1955  MRN: [id number]  Referring provider: Laron Carter DO  Dx:   Encounter Diagnosis     ICD-10-CM    1. Primary osteoarthritis of left knee  M17.12       2. Chronic pain of left knee  M25.562     G89.29       3. Antalgic gait  R26.89                      Assessment    Assessment details: Pt presents today for reassessment.  Upon examination, manual muscle testing reveals notable improvements in lower extremity strength across all previously tested muscle groups.  Improvements noted in L AROM knee flexion from to 114 degrees and PROM to 135 degrees.  Pt continues to demonstrate limitations in achieving terminal knee extension at left side secondary to stiffness and pain.  At this time pt has demonstrated acquisition of 3/4 short term goals, 1/5 long term with progress made towards all remaining.  Pt would benefit from continued intervention in order to further improve lower extremity strength and ROM to further decrease pain and facilitate improved functionality with daily activity.    Goals  STG to be achieved by 4 weeks  -Pt will be independent with basic HEP.  MET  -Pt will improve MMT scores by 1/2 grade in all deficient planes in order to facilitate ease of functional activity.  MET  -Pt will improve ROM by 25% in all deficient planes in order to improve functional mobility.  PROGRESS  -Pt will report 5/10 at worst in order to facilitate return to PLOF.  MET    LTG to be achieved by Discharge  -Pt will be independent with comprehensive HEP.  MET  -Pt will improve MMT scores to WFL in all deficient planes in order to facilitate ease of functional activity.  PROGRESS  -Pt will improve ROM to WFL in all deficient planes in order to improve functional mobility.  PROGRESS  -Pt will report 2/10 at worst in order to demonstrate return to PLOF.  PROGRESS  -Pt will report no pain with usual ADLs.   "PROGRESS    Plan    Duration in weeks: 8  Plan of Care beginning date: 10/14/2024  Plan of Care expiration date: 12/9/2024  Treatment plan discussed with: patient        Subjective    Objective     Active Range of Motion   Left Hip   Normal active range of motion  Extension: 1 degrees     Right Hip   Normal active range of motion  Extension: 10 degrees   Left Knee   Flexion: 114 degrees   Extension: -21 degrees     Right Knee   Normal active range of motion  Left Ankle/Foot   Normal active range of motion    Right Ankle/Foot   Normal active range of motion    Passive Range of Motion   Left Knee   Flexion: 135 degrees with pain  Extension: -18 degrees with pain    Right Knee   Normal passive range of motion    Strength/Myotome Testing     Left Hip   Planes of Motion   Flexion: 4+  Abduction: 4+    Right Hip   Planes of Motion   Flexion: 4+  Abduction: 4+    Left Knee   Flexion: 4+  Extension: 4+    Right Knee   Flexion: 4+  Extension: 4+    Left Ankle/Foot   Dorsiflexion: 5    Right Ankle/Foot   Dorsiflexion: 5             Precautions: Standard precautions    POC expires Unit limit Auth  expiration date PT/OT + Visit Limit?   11/25/24 BOMN  BOMN      Sec - BOMN no auth           Visit/Unit Tracking  AUTH Status:  Date 9/16 9/20 9/23 9/26 9/30 10/3 10/7 10/10       N/A Used 1 2 3 4 5 6 7  8        Remaining                   Scan     Or     Visit  [email or web address]  Access Code: [email or web address]    Manuals 9/16 9/20 9/23 9/26 9/30 10/3 10/7 10/10 10/14    Knee distraction  JK JK JK JK JK JK  beginning of session  JK JK    STM L quad  JK           Reassessment         JK                 Neuro Re-Ed             LAQs     5''x10 5''x20 5\" 2x 10  5\" 2x 10  5\" 2x 10     SAQs  5''x10 P! 5''x10  P!  W/ NMES 5'  2x 10       Quad set  5''x10 5''x20 NMES 3' P!   5\" 1x10   1x5       Seated marches HEP            Prone hip ext HEP            Clamshells  3''x20 B/L 3''x20 B/L 3''x20 B/L  RTB 3''x20 B/L  RTB 3''x20 B/L  RTB " "3''x20 B/L  RTB 3''x20 B/L  GTB 3''x20 B/L  GTB    Bridges  2x10  20x w/ ABD RTB 20x w/ ABD RTB 20x w/ ABD RTB 20x w/ ABD RTB 20x w/ ABD GTB 20x w/ ABD GTB    Hamstring curls   Prone 2x10 2# add # NV Prone 2x10 3# Prone 2x10 3# Prone 2x10 3#  Prone 2x10 4#     3 way hip     P!        Matrix: Flex/Ext             TKE       NV 20x 10# 5'' x20 10# [place]    Prone knee hangs.        2 min no OP  1.5 min                               Ther Ex             Pt education HEP    HEP        Nustep - for ROM   10' L1 for LE ROM 10' L1 for LE ROM 10' L3  for LE ROM RB 10' 8' L3  for LE ROM 10min L3 LE only  RB L1 10  ' For LE ROM RB L1 10  ' For LE ROM    Hamstring stretch w/ stool HEP            Prone quad stretch  3x30'' 3x30''  L 3x30'' L  3x30'' L 3x 30\" L only  3x 30\" L only      Supine hip ABD stretch  3x30'' B/L  3x30'' B/L  3x30'' B/L                                              Ther Activity             FSU             LSD             Gait Training                                       Modalities                                           "

## 2024-10-17 ENCOUNTER — OFFICE VISIT (OUTPATIENT)
Dept: PHYSICAL THERAPY | Facility: CLINIC | Age: 69
End: 2024-10-17
Payer: MEDICARE

## 2024-10-17 DIAGNOSIS — M17.12 PRIMARY OSTEOARTHRITIS OF LEFT KNEE: Primary | ICD-10-CM

## 2024-10-17 DIAGNOSIS — M25.562 CHRONIC PAIN OF LEFT KNEE: ICD-10-CM

## 2024-10-17 DIAGNOSIS — R26.89 ANTALGIC GAIT: ICD-10-CM

## 2024-10-17 DIAGNOSIS — G89.29 CHRONIC PAIN OF LEFT KNEE: ICD-10-CM

## 2024-10-17 PROCEDURE — 97112 NEUROMUSCULAR REEDUCATION: CPT

## 2024-10-17 PROCEDURE — 97140 MANUAL THERAPY 1/> REGIONS: CPT

## 2024-10-17 PROCEDURE — 97110 THERAPEUTIC EXERCISES: CPT

## 2024-10-17 NOTE — PROGRESS NOTES
"Daily Note     Today's date: 10/17/2024  Patient name: Radha Pantoja  : 1955  MRN: [id number]  Referring provider: Laron Carter DO  Dx:   Encounter Diagnosis     ICD-10-CM    1. Primary osteoarthritis of left knee  M17.12       2. Chronic pain of left knee  M25.562     G89.29       3. Antalgic gait  R26.89           Start Time: 845  Stop Time: 923  Total time in clinic (min): 38 minutes      Subjective: Patient reports no new complaints or changes since last session despite some mild tightness this morning. Patient notes that she is looking forward to her surgery since she has been experiencing an ongoing \"sharp\" pain mainly at night located at her distal patella ever since she received her last injection.      Objective: See treatment diary below      Assessment: Tolerated treatment well. Good tolerance and an appropriate level of challenge demonstrated with all L knee strengthening exercises performed today, with no adverse response noted throughout session. TKE was tolerable overall but began to cause initially noted L knee \"sharp pain\" with the last few reps of exercise. Patient continues to respond favorably to manual knee traction and noted some relief and improvements in tightness post treatment. Muscle fatigue present at the conclusion of session. Continue to progress patient as able. Patient would benefit from continued PT to address remaining L knee deficits in order to maximize overall functional ability.      Plan: Continue per plan of care.      Precautions: Standard precautions    POC expires Unit limit Auth  expiration date PT/OT + Visit Limit?   24 BOMN  BOMN      Sec - BOMN no auth           Visit/Unit Tracking  AUTH Status:  Date 9/16 9/20 9/23 9/26 9/30 10/3 10/7 10/10 10/14 10/17     N/A Used 1 2 3 4 5 6 7  8 9 10      Remaining                   Scan     Or     Visit  [email or web address]  Access Code: [email or web address]    Manuals 9/16 9/20 9/23 9/26 9/30 10/3 " "10/7 10/10 10/14 10/17   Knee distraction  JK JK JK JK JK JK  beginning of session  JK JK KR   STM L quad  JK           Reassessment         JK                 Neuro Re-Ed             LAQs     5''x10 5''x20 5\" 2x 10  5\" 2x 10  5\" 2x 10  2# 5\" 2x10   SAQs  5''x10 P! 5''x10  P!  W/ NMES 5'  2x 10       Quad set  5''x10 5''x20 NMES 3' P!   5\" 1x10   1x5       Seated marches HEP            Prone hip ext HEP            Clamshells  3''x20 B/L 3''x20 B/L 3''x20 B/L  RTB 3''x20 B/L  RTB 3''x20 B/L  RTB 3''x20 B/L  RTB 3''x20 B/L  GTB 3''x20 B/L  GTB 3\"x20 B/L GTB   Bridges  2x10  20x w/ ABD RTB 20x w/ ABD RTB 20x w/ ABD RTB 20x w/ ABD RTB 20x w/ ABD GTB 20x w/ ABD GTB 20x w/ abd GTB   Hamstring curls   Prone 2x10 2# add # NV Prone 2x10 3# Prone 2x10 3# Prone 2x10 3#  Prone 2x10 4#  Prone 2x10 4#   3 way hip     P!        Matrix: Flex/Ext             TKE       NV 20x 10# 5'' x20 10# [place] 5\" x20 10# [place]   Prone knee hangs.        2 min no OP  1.5 min                               Ther Ex             Pt education HEP    HEP        Nustep - for ROM   10' L1 for LE ROM 10' L1 for LE ROM 10' L3  for LE ROM RB 10' 8' L3  for LE ROM 10min L3 LE only  RB L1 10  ' For LE ROM RB L1 10  ' For LE ROM RB L1 10  ' For LE ROM   Hamstring stretch w/ stool HEP            Prone quad stretch  3x30'' 3x30''  L 3x30'' L  3x30'' L 3x 30\" L only  3x 30\" L only   3x 30\" L only   Supine hip ABD stretch  3x30'' B/L  3x30'' B/L  3x30'' B/L                                              Ther Activity             FSU             LSD             Gait Training                                       Modalities                                                "

## 2024-10-21 ENCOUNTER — TELEPHONE (OUTPATIENT)
Dept: OBGYN CLINIC | Facility: HOSPITAL | Age: 69
End: 2024-10-21

## 2024-10-22 ENCOUNTER — OFFICE VISIT (OUTPATIENT)
Dept: PHYSICAL THERAPY | Facility: CLINIC | Age: 69
End: 2024-10-22
Payer: MEDICARE

## 2024-10-22 DIAGNOSIS — M17.12 PRIMARY OSTEOARTHRITIS OF LEFT KNEE: Primary | ICD-10-CM

## 2024-10-22 DIAGNOSIS — G89.29 CHRONIC PAIN OF LEFT KNEE: ICD-10-CM

## 2024-10-22 DIAGNOSIS — M17.12 PRIMARY OSTEOARTHRITIS OF LEFT KNEE: ICD-10-CM

## 2024-10-22 DIAGNOSIS — M25.562 CHRONIC PAIN OF LEFT KNEE: ICD-10-CM

## 2024-10-22 DIAGNOSIS — R26.89 ANTALGIC GAIT: ICD-10-CM

## 2024-10-22 PROCEDURE — 97112 NEUROMUSCULAR REEDUCATION: CPT

## 2024-10-22 PROCEDURE — 97110 THERAPEUTIC EXERCISES: CPT

## 2024-10-22 RX ORDER — MELOXICAM 15 MG/1
15 TABLET ORAL DAILY
Qty: 30 TABLET | Refills: 0 | Status: SHIPPED | OUTPATIENT
Start: 2024-10-22

## 2024-10-22 NOTE — PROGRESS NOTES
"Daily Note     Today's date: 10/22/2024  Patient name: Radha Pantoja  : 1955  MRN: [id number]  Referring provider: Laron Carter DO  Dx:   Encounter Diagnosis     ICD-10-CM    1. Primary osteoarthritis of left knee  M17.12       2. Chronic pain of left knee  M25.562     G89.29       3. Antalgic gait  R26.89                      Subjective: Pt reports that knee is feeling much better overall, states that       Objective: See treatment diary below      Assessment: Tolerated treatment well. Implemented additional exercises to further improve hip/knee strength, pt does continue to demonstrate inability to withstand SLS on left side secondary to onset of pain.  Improvements in LE strength noted however continuing to demonstrate limitations in achieving full TKE on left side.  Pt was able to complete all other exercises today without difficulty or pain.  Patient would benefit from continued PT      Plan: Continue per plan of care.      Precautions: Standard precautions    POC expires Unit limit Auth  expiration date PT/OT + Visit Limit?   24 BOMN  BOMN      Sec - BOMN no auth           Visit/Unit Tracking  AUTH Status:  Date 9/16 9/20 9/23 9/26 9/30 10/3 10/7 10/10 10/14 10/17 10/22    N/A Used 1 2 3 4 5 6 7  8 9 10 11     Remaining                   Scan     Or     Visit  [email or web address]  Access Code: [email or web address]    Manuals 10/22 9/20 9/23 9/26 9/30 10/3 10/7 10/10 10/14 10/17   Knee distraction  JK JK JK JK JK JK  beginning of session  JK JK KR   STM L quad  JK           Reassessment         JK                 Neuro Re-Ed             LAQs     5''x10 5''x20 5\" 2x 10  5\" 2x 10  5\" 2x 10  2# 5\" 2x10   SAQs  5''x10 P! 5''x10  P!  W/ NMES 5'  2x 10       Quad set  5''x10 5''x20 NMES 3' P!   5\" 1x10   1x5       Seated marches             Prone hip ext             Clamshells 3''x20 GTB 3''x20 B/L 3''x20 B/L 3''x20 B/L  RTB 3''x20 B/L  RTB 3''x20 B/L  RTB 3''x20 B/L  RTB 3''x20 " "B/L  GTB 3''x20 B/L  GTB 3\"x20 B/L GTB   Bridges 20x w/ abd GTB 2x10  20x w/ ABD RTB 20x w/ ABD RTB 20x w/ ABD RTB 20x w/ ABD RTB 20x w/ ABD GTB 20x w/ ABD GTB 20x w/ abd GTB   Hamstring curls   Prone 2x10 2# add # NV Prone 2x10 3# Prone 2x10 3# Prone 2x10 3#  Prone 2x10 4#  Prone 2x10 4#   3 way hip P!    P!        Matrix: Flex/Ext 10# EXT, 20# FLEX w/ 5' hold after            TKE 5\" x20 10# [place]      NV 20x 10# 5'' x20 10# [place] 5\" x20 10# [place]   Prone knee hangs.  2' no OP      2 min no OP  1.5 min                               Ther Ex             Pt education     HEP        Nustep - for ROM  RB L1 10  ' For LE ROM 10' L1 for LE ROM 10' L1 for LE ROM 10' L3  for LE ROM RB 10' 8' L3  for LE ROM 10min L3 LE only  RB L1 10  ' For LE ROM RB L1 10  ' For LE ROM RB L1 10  ' For LE ROM   Hamstring stretch w/ stool             Prone quad stretch 3x30'' L 3x30'' 3x30''  L 3x30'' L  3x30'' L 3x 30\" L only  3x 30\" L only   3x 30\" L only   Supine hip ABD stretch  3x30'' B/L  3x30'' B/L  3x30'' B/L                                              Ther Activity             FSU 4'' 2x10 L            LSD 4'' 2x10 L            Gait Training                                       Modalities                                                  "

## 2024-10-28 NOTE — PROGRESS NOTES
"Daily Note    Today's date: 10/29/24  Patient name: Radha Pantoja  : 1955  MRN: [id number]  Referring provider: Laron Carter DO  Dx:   Encounter Diagnosis     ICD-10-CM    1. Primary osteoarthritis of left knee  M17.12       2. Chronic pain of left knee  M25.562     G89.29       3. Antalgic gait  R26.89           Start Time: 0730  Stop Time: 0815  Total time in clinic (min): 45 minutes      Subjective: Radha reports no changes today. She started doing some stretching at home for her knee.     Objective: See treatment diary below.    Assessment: Radha tolerated treatment well with consistent cuing throughout. TE's were performed with increased reps. No new TE's were performed today. Following treatment, the patient demonstrated fatigue and would benefit from continued physical therapy.    Plan: Continue per plan of care.  Progress treatment as tolerated.         Precautions: Standard precautions    POC expires Unit limit Auth  expiration date PT/OT + Visit Limit?   24 BOMN  BOMN      Sec - BOMN no auth           Visit/Unit Tracking  AUTH Status:  Date 9/16 9/20 9/23 9/26 9/30 10/3 10/7 10/10 10/14 10/17 10/22 10/29   N/A Used 1 2 3 4 5 6 7  8 9 10 11 12    Remaining                   Scan     Or     Visit  [email or web address]  Access Code: [email or web address]    Manuals 10/22 10/29  9/26 9/30 10/3 10/7 10/10 10/14 10/17   Knee distraction  TS  JBRANDON JK JK JK  beginning of session  JK JK KR   STM L quad                          Reassessment         JK                 Neuro Re-Ed             LAQs     5''x10 5''x20 5\" 2x 10  5\" 2x 10  5\" 2x 10  2# 5\" 2x10   SAQs     W/ NMES 5'  2x 10       Quad set    NMES 3' P!   5\" 1x10   1x5       Seated marches             Prone hip ext             Clamshells 3''x20 GTB 3\"x30 GTB  3''x20 B/L  RTB 3''x20 B/L  RTB 3''x20 B/L  RTB 3''x20 B/L  RTB 3''x20 B/L  GTB 3''x20 B/L  GTB 3\"x20 B/L GTB   Bridges 20x w/ abd GTB 3x10 GTB  20x w/ ABD RTB 20x w/ ABD " "RTB 20x w/ ABD RTB 20x w/ ABD RTB 20x w/ ABD GTB 20x w/ ABD GTB 20x w/ abd GTB   Hamstring curls    Prone 2x10 3# Prone 2x10 3# Prone 2x10 3#  Prone 2x10 4#  Prone 2x10 4#   3 way hip P!    P!        Matrix: Flex/Ext 10# EXT, 20# FLEX w/ 5' hold after 25# 3x10     35# 3x10            TKE 5\" x20 10# [place]      NV 20x 10# 5'' x20 10# [place] 5\" x20 10# [place]   Prone knee hangs.  2' no OP      2 min no OP  1.5 min     Repeated knee ext str  20x5\"                         Ther Ex             Pt education     HEP        Nustep - for ROM  RB L1 10  ' For LE ROM RB L1 10'  10' L3  for LE ROM RB 10' 8' L3  for LE ROM 10min L3 LE only  RB L1 10  ' For LE ROM RB L1 10  ' For LE ROM RB L1 10  ' For LE ROM   Hamstring stretch w/ stool             Prone quad stretch 3x30'' L   3x30'' L  3x30'' L 3x 30\" L only  3x 30\" L only   3x 30\" L only   Supine hip ABD stretch    3x30'' B/L  3x30'' B/L                                              Ther Activity             FSU 4'' 2x10 L            LSD 4'' 2x10 L            Gait Training                                       Modalities                                                    Nestor Jimenez, PT  10/29/2024,9:07 AM  "

## 2024-10-29 ENCOUNTER — APPOINTMENT (OUTPATIENT)
Dept: LAB | Facility: CLINIC | Age: 69
End: 2024-10-29
Payer: MEDICARE

## 2024-10-29 ENCOUNTER — OFFICE VISIT (OUTPATIENT)
Dept: PHYSICAL THERAPY | Facility: CLINIC | Age: 69
End: 2024-10-29
Payer: MEDICARE

## 2024-10-29 DIAGNOSIS — G89.29 CHRONIC PAIN OF LEFT KNEE: ICD-10-CM

## 2024-10-29 DIAGNOSIS — M17.12 PRIMARY OSTEOARTHRITIS OF LEFT KNEE: Primary | ICD-10-CM

## 2024-10-29 DIAGNOSIS — R53.83 OTHER FATIGUE: ICD-10-CM

## 2024-10-29 DIAGNOSIS — D50.9 IRON DEFICIENCY ANEMIA, UNSPECIFIED IRON DEFICIENCY ANEMIA TYPE: ICD-10-CM

## 2024-10-29 DIAGNOSIS — R73.03 PREDIABETES: ICD-10-CM

## 2024-10-29 DIAGNOSIS — E78.2 MIXED HYPERLIPIDEMIA: ICD-10-CM

## 2024-10-29 DIAGNOSIS — Z01.818 OTHER SPECIFIED PRE-OPERATIVE EXAMINATION: ICD-10-CM

## 2024-10-29 DIAGNOSIS — Z01.818 PREOP TESTING: ICD-10-CM

## 2024-10-29 DIAGNOSIS — R26.89 ANTALGIC GAIT: ICD-10-CM

## 2024-10-29 DIAGNOSIS — M25.562 CHRONIC PAIN OF LEFT KNEE: ICD-10-CM

## 2024-10-29 DIAGNOSIS — M25.562 ARTHRALGIA OF LEFT KNEE: ICD-10-CM

## 2024-10-29 DIAGNOSIS — M79.606 GENERALIZED PAIN OF KNEE REGION: ICD-10-CM

## 2024-10-29 DIAGNOSIS — M17.12 PRIMARY OSTEOARTHRITIS OF LEFT KNEE: ICD-10-CM

## 2024-10-29 LAB
ALBUMIN SERPL BCG-MCNC: 4 G/DL (ref 3.5–5)
ALP SERPL-CCNC: 34 U/L (ref 34–104)
ALT SERPL W P-5'-P-CCNC: 19 U/L (ref 7–52)
ANION GAP SERPL CALCULATED.3IONS-SCNC: 7 MMOL/L (ref 4–13)
APTT PPP: 27 SECONDS (ref 23–34)
AST SERPL W P-5'-P-CCNC: 23 U/L (ref 13–39)
BASOPHILS # BLD AUTO: 0.04 THOUSANDS/ΜL (ref 0–0.1)
BASOPHILS NFR BLD AUTO: 1 % (ref 0–1)
BILIRUB SERPL-MCNC: 0.42 MG/DL (ref 0.2–1)
BUN SERPL-MCNC: 25 MG/DL (ref 5–25)
CALCIUM SERPL-MCNC: 9.3 MG/DL (ref 8.4–10.2)
CHLORIDE SERPL-SCNC: 108 MMOL/L (ref 96–108)
CHOLEST SERPL-MCNC: 215 MG/DL
CO2 SERPL-SCNC: 28 MMOL/L (ref 21–32)
CREAT SERPL-MCNC: 0.51 MG/DL (ref 0.6–1.3)
CRP SERPL QL: <1 MG/L
EOSINOPHIL # BLD AUTO: 0.1 THOUSAND/ΜL (ref 0–0.61)
EOSINOPHIL NFR BLD AUTO: 3 % (ref 0–6)
ERYTHROCYTE [DISTWIDTH] IN BLOOD BY AUTOMATED COUNT: 12.4 % (ref 11.6–15.1)
EST. AVERAGE GLUCOSE BLD GHB EST-MCNC: 97 MG/DL
FERRITIN SERPL-MCNC: 15 NG/ML (ref 11–307)
FERRITIN SERPL-MCNC: 16 NG/ML (ref 11–307)
GFR SERPL CREATININE-BSD FRML MDRD: 99 ML/MIN/1.73SQ M
GLUCOSE P FAST SERPL-MCNC: 82 MG/DL (ref 65–99)
HBA1C MFR BLD: 5 %
HCT VFR BLD AUTO: 38.8 % (ref 34.8–46.1)
HDLC SERPL-MCNC: 79 MG/DL
HGB BLD-MCNC: 12.5 G/DL (ref 11.5–15.4)
IMM GRANULOCYTES # BLD AUTO: 0.01 THOUSAND/UL (ref 0–0.2)
IMM GRANULOCYTES NFR BLD AUTO: 0 % (ref 0–2)
INR PPP: 0.89 (ref 0.85–1.19)
IRON SATN MFR SERPL: 25 % (ref 15–50)
IRON SATN MFR SERPL: 26 % (ref 15–50)
IRON SERPL-MCNC: 82 UG/DL (ref 50–212)
IRON SERPL-MCNC: 85 UG/DL (ref 50–212)
LDLC SERPL CALC-MCNC: 120 MG/DL (ref 0–100)
LYMPHOCYTES # BLD AUTO: 0.87 THOUSANDS/ΜL (ref 0.6–4.47)
LYMPHOCYTES NFR BLD AUTO: 30 % (ref 14–44)
MCH RBC QN AUTO: 31.8 PG (ref 26.8–34.3)
MCHC RBC AUTO-ENTMCNC: 32.2 G/DL (ref 31.4–37.4)
MCV RBC AUTO: 99 FL (ref 82–98)
MONOCYTES # BLD AUTO: 0.27 THOUSAND/ΜL (ref 0.17–1.22)
MONOCYTES NFR BLD AUTO: 9 % (ref 4–12)
NEUTROPHILS # BLD AUTO: 1.66 THOUSANDS/ΜL (ref 1.85–7.62)
NEUTS SEG NFR BLD AUTO: 57 % (ref 43–75)
NONHDLC SERPL-MCNC: 136 MG/DL
NRBC BLD AUTO-RTO: 0 /100 WBCS
PLATELET # BLD AUTO: 199 THOUSANDS/UL (ref 149–390)
PMV BLD AUTO: 9.7 FL (ref 8.9–12.7)
POTASSIUM SERPL-SCNC: 4.5 MMOL/L (ref 3.5–5.3)
PROT SERPL-MCNC: 6.1 G/DL (ref 6.4–8.4)
PROTHROMBIN TIME: 12.4 SECONDS (ref 12.3–15)
RBC # BLD AUTO: 3.93 MILLION/UL (ref 3.81–5.12)
RETICS # AUTO: ABNORMAL 10*3/UL (ref 14097–95744)
RETICS # CALC: 2.06 % (ref 0.37–1.87)
SODIUM SERPL-SCNC: 143 MMOL/L (ref 135–147)
TIBC SERPL-MCNC: 327 UG/DL (ref 250–450)
TIBC SERPL-MCNC: 329 UG/DL (ref 250–450)
TRIGL SERPL-MCNC: 78 MG/DL
TSH SERPL DL<=0.05 MIU/L-ACNC: 1.65 UIU/ML (ref 0.45–4.5)
UIBC SERPL-MCNC: 244 UG/DL (ref 155–355)
UIBC SERPL-MCNC: 245 UG/DL (ref 155–355)
WBC # BLD AUTO: 2.95 THOUSAND/UL (ref 4.31–10.16)

## 2024-10-29 PROCEDURE — 80053 COMPREHEN METABOLIC PANEL: CPT

## 2024-10-29 PROCEDURE — 36415 COLL VENOUS BLD VENIPUNCTURE: CPT

## 2024-10-29 PROCEDURE — 82728 ASSAY OF FERRITIN: CPT

## 2024-10-29 PROCEDURE — 85610 PROTHROMBIN TIME: CPT

## 2024-10-29 PROCEDURE — 97110 THERAPEUTIC EXERCISES: CPT

## 2024-10-29 PROCEDURE — 97140 MANUAL THERAPY 1/> REGIONS: CPT

## 2024-10-29 PROCEDURE — 83540 ASSAY OF IRON: CPT

## 2024-10-29 PROCEDURE — 83550 IRON BINDING TEST: CPT

## 2024-10-29 PROCEDURE — 83036 HEMOGLOBIN GLYCOSYLATED A1C: CPT

## 2024-10-29 PROCEDURE — 84443 ASSAY THYROID STIM HORMONE: CPT

## 2024-10-29 PROCEDURE — 86140 C-REACTIVE PROTEIN: CPT

## 2024-10-29 PROCEDURE — 85730 THROMBOPLASTIN TIME PARTIAL: CPT

## 2024-10-29 PROCEDURE — 85045 AUTOMATED RETICULOCYTE COUNT: CPT

## 2024-10-29 PROCEDURE — 85025 COMPLETE CBC W/AUTO DIFF WBC: CPT

## 2024-10-29 PROCEDURE — 80061 LIPID PANEL: CPT

## 2024-11-01 ENCOUNTER — OFFICE VISIT (OUTPATIENT)
Dept: PHYSICAL THERAPY | Facility: CLINIC | Age: 69
End: 2024-11-01
Payer: MEDICARE

## 2024-11-01 DIAGNOSIS — G89.29 CHRONIC PAIN OF LEFT KNEE: ICD-10-CM

## 2024-11-01 DIAGNOSIS — M17.12 PRIMARY OSTEOARTHRITIS OF LEFT KNEE: Primary | ICD-10-CM

## 2024-11-01 DIAGNOSIS — R26.89 ANTALGIC GAIT: ICD-10-CM

## 2024-11-01 DIAGNOSIS — M25.562 CHRONIC PAIN OF LEFT KNEE: ICD-10-CM

## 2024-11-01 PROCEDURE — 97112 NEUROMUSCULAR REEDUCATION: CPT

## 2024-11-01 PROCEDURE — 97110 THERAPEUTIC EXERCISES: CPT

## 2024-11-01 PROCEDURE — 97140 MANUAL THERAPY 1/> REGIONS: CPT

## 2024-11-01 NOTE — PROGRESS NOTES
"Daily Note     Today's date: 2024  Patient name: Radha Pantoja  : 1955  MRN: [id number]  Referring provider: Laron Carter DO  Dx:   Encounter Diagnosis     ICD-10-CM    1. Primary osteoarthritis of left knee  M17.12       2. Chronic pain of left knee  M25.562     G89.29       3. Antalgic gait  R26.89                      Subjective: Pt reports that knee has been feeling better overall, states she is getting less pain at night.      Objective: See treatment diary below      Assessment: Tolerated treatment well. Able to progress in resistance with current interventions with no significant exacerbations in pain.  PT will progress as tolerated.  Patient would benefit from continued PT      Plan: Continue per plan of care.      Precautions: Standard precautions    POC expires Unit limit Auth  expiration date PT/OT + Visit Limit?   24 BOMN  BOMN      Sec - BOMN no auth           Visit/Unit Tracking  AUTH Status:  Date 11/1 9/20 9/23 9/26 9/30 10/3 10/7 10/10 10/14 10/17 10/22 10/29   N/A Used 13 2 3 4 5 6 7  8 9 10 11 12    Remaining                   Scan     Or     Visit  [email or web address]  Access Code: [email or web address]    Manuals 10/22 10/29 11/1 9/26 9/30 10/3 10/7 10/10 10/14 10/17   Knee distraction  TS JK JK JK JK JK  beginning of session  JK JK KR   STM L quad                          Reassessment         JK                 Neuro Re-Ed             LAQs     5''x10 5''x20 5\" 2x 10  5\" 2x 10  5\" 2x 10  2# 5\" 2x10   SAQs     W/ NMES 5'  2x 10       Quad set    NMES 3' P!   5\" 1x10   1x5       Seated marches             Prone hip ext             Clamshells 3''x20 GTB 3\"x30 GTB 3''x30 BTB 3''x20 B/L  RTB 3''x20 B/L  RTB 3''x20 B/L  RTB 3''x20 B/L  RTB 3''x20 B/L  GTB 3''x20 B/L  GTB 3\"x20 B/L GTB   Bridges 20x w/ abd GTB 3x10 GTB 3x10 BTB 20x w/ ABD RTB 20x w/ ABD RTB 20x w/ ABD RTB 20x w/ ABD RTB 20x w/ ABD GTB 20x w/ ABD GTB 20x w/ abd GTB   Hamstring curls    Prone 2x10 3# " "Prone 2x10 3# Prone 2x10 3#  Prone 2x10 4#  Prone 2x10 4#   3 way hip P!    P!        Matrix: Flex/Ext 10# EXT, 20# FLEX w/ 5' hold after in EXT 25# 3x10     35# 3x10  25#  3x10    35#  3x10 w/ 5' hold after          TKE 5\" x20 10# [place]      NV 20x 10# 5'' x20 10# [place] 5\" x20 10# [place]   Prone knee hangs.  2' no OP  2' 2# AW    2 min no OP  1.5 min     Repeated knee ext str  20x5\"                         Ther Ex             Pt education     HEP        Nustep - for ROM  RB L1 10  ' For LE ROM RB L1 10' RB L2 10' 10' L3  for LE ROM RB 10' 8' L3  for LE ROM 10min L3 LE only  RB L1 10  ' For LE ROM RB L1 10  ' For LE ROM RB L1 10  ' For LE ROM   Hamstring stretch w/ stool             Prone quad stretch 3x30'' L   3x30'' L  3x30'' L 3x 30\" L only  3x 30\" L only   3x 30\" L only   Supine hip ABD stretch    3x30'' B/L  3x30'' B/L                                              Ther Activity             [employer]SU 4'' 2x10 L            LSD 4'' 2x10 L            Gait Training                                       Modalities                                                      "

## 2024-11-04 ENCOUNTER — APPOINTMENT (OUTPATIENT)
Dept: PHYSICAL THERAPY | Facility: CLINIC | Age: 69
End: 2024-11-04
Payer: MEDICARE

## 2024-11-04 NOTE — PROGRESS NOTES
"Daily Note     Today's date: 2024  Patient name: Radha Pantoja  : 1955  MRN: [id number]  Referring provider: Laron Carter DO  Dx: No diagnosis found.               Subjective: ***      Objective: See treatment diary below      Assessment: Tolerated treatment {Tolerated treatment :[id number]}. Patient {assessment:}      Plan: {PLAN:[id number]}     Precautions: Standard precautions    POC expires Unit limit Auth  expiration date PT/OT + Visit Limit?   24 BOMN  BOMN      Sec - BOMN no auth           Visit/Unit Tracking  AUTH Status:  Date 11/1 9/20 9/23 9/26 9/30 10/3 10/7 10/10 10/14 10/17 10/22 10/29   N/A Used 13 2 3 4 5 6 7  8 9 10 11 12    Remaining                   Scan     Or     Visit  [email or web address]  Access Code: [email or web address]    Manuals 10/22 10/29 11/1 9/26 9/30 10/3 10/7 10/10 10/14 10/17   Knee distraction  TS JK JK JK JK JK  beginning of session  JK JK KR   STM L quad                          Reassessment         JK                 Neuro Re-Ed             LAQs     5''x10 5''x20 5\" 2x 10  5\" 2x 10  5\" 2x 10  2# 5\" 2x10   SAQs     W/ NMES 5'  2x 10       Quad set    NMES 3' P!   5\" 1x10   1x5       Seated marches             Prone hip ext             Clamshells 3''x20 GTB 3\"x30 GTB 3''x30 BTB 3''x20 B/L  RTB 3''x20 B/L  RTB 3''x20 B/L  RTB 3''x20 B/L  RTB 3''x20 B/L  GTB 3''x20 B/L  GTB 3\"x20 B/L GTB   Bridges 20x w/ abd GTB 3x10 GTB 3x10 BTB 20x w/ ABD RTB 20x w/ ABD RTB 20x w/ ABD RTB 20x w/ ABD RTB 20x w/ ABD GTB 20x w/ ABD GTB 20x w/ abd GTB   Hamstring curls    Prone 2x10 3# Prone 2x10 3# Prone 2x10 3#  Prone 2x10 4#  Prone 2x10 4#   3 way hip P!    P!        Matrix: Flex/Ext 10# EXT, 20# FLEX w/ 5' hold after in EXT 25# 3x10     35# 3x10  25#  3x10    35#  3x10 w/ 5' hold after          TKE 5\" x20 10# [place]      [place] 20x 10# 5'' x20 10# [place] 5\" x20 10# [place]   Prone knee hangs.  2' no OP  2' 2# AW    2 min no OP  1.5 min     Repeated knee " "ext str  20x5\"                         Ther Ex             Pt education     HEP        Nustep - for ROM  RB L1 10  ' For LE ROM RB L1 10' RB L2 10' 10' L3  for LE ROM RB 10' 8' L3  for LE ROM 10min L3 LE only  RB L1 10  ' For LE ROM RB L1 10  ' For LE ROM RB L1 10  ' For LE ROM   Hamstring stretch w/ stool             Prone quad stretch 3x30'' L   3x30'' L  3x30'' L 3x 30\" L only  3x 30\" L only   3x 30\" L only   Supine hip ABD stretch    3x30'' B/L  3x30'' B/L                                              Ther Activity             FSU 4'' 2x10 L            LSD 4'' 2x10 L            Gait Training                                       Modalities                                                        "

## 2024-11-05 ENCOUNTER — TELEPHONE (OUTPATIENT)
Dept: OBGYN CLINIC | Facility: CLINIC | Age: 69
End: 2024-11-05

## 2024-11-05 ENCOUNTER — OFFICE VISIT (OUTPATIENT)
Dept: PHYSICAL THERAPY | Facility: CLINIC | Age: 69
End: 2024-11-05
Payer: MEDICARE

## 2024-11-05 DIAGNOSIS — G89.29 CHRONIC PAIN OF LEFT KNEE: ICD-10-CM

## 2024-11-05 DIAGNOSIS — M17.12 PRIMARY OSTEOARTHRITIS OF LEFT KNEE: Primary | ICD-10-CM

## 2024-11-05 DIAGNOSIS — R26.89 ANTALGIC GAIT: ICD-10-CM

## 2024-11-05 DIAGNOSIS — M25.562 CHRONIC PAIN OF LEFT KNEE: ICD-10-CM

## 2024-11-05 PROCEDURE — 97110 THERAPEUTIC EXERCISES: CPT

## 2024-11-05 PROCEDURE — 97140 MANUAL THERAPY 1/> REGIONS: CPT

## 2024-11-05 PROCEDURE — 97112 NEUROMUSCULAR REEDUCATION: CPT

## 2024-11-05 NOTE — TELEPHONE ENCOUNTER
I called pt to remind her to get her CXR and EKG done prior to her Medical Clearance appointment with Radha Gaines on 11/8/24. Pt will go to [facility] today to get these studies done.

## 2024-11-05 NOTE — PROGRESS NOTES
"Daily Note     Today's date: 2024  Patient name: Radha Pantoja  : 1955  MRN: [id number]  Referring provider: Laron Carter DO  Dx:   Encounter Diagnosis     ICD-10-CM    1. Primary osteoarthritis of left knee  M17.12       2. Chronic pain of left knee  M25.562     G89.29       3. Antalgic gait  R26.89                      Subjective: Pt reports no significant changes in status since last visit.      Objective: See treatment diary below      Assessment: Tolerated treatment well. Continued with NMR and stretching for knee joint, no new exercises were provided today as pt remains sufficiently challenged by current program.  PT will continue to Patient would benefit from continued PT      Plan: Continue per plan of care.      Precautions: Standard precautions    POC expires Unit limit Auth  expiration date PT/OT + Visit Limit?   24 BOMN  BOMN      Sec - BOMN no auth           Visit/Unit Tracking  AUTH Status:  Date 11/1 9/20 9/23 9/26 9/30 10/3 10/7 10/10 10/14 10/17 10/22 10/29   N/A Used 13 2 3 4 5 6 7  8 9 10 11 12    Remaining                   Scan     Or     Visit  [email or web address]  Access Code: [email or web address]    Manuals 10/22 10/29 11/1 11/5 9/30 10/3 10/7 10/10 10/14 10/17   Knee distraction  TS JK JK JK JK JK  beginning of session  JK JK KR   STM L quad                          Reassessment         JK                 Neuro Re-Ed             LAQs     5''x10 5''x20 5\" 2x 10  5\" 2x 10  5\" 2x 10  2# 5\" 2x10   SAQs     W/ NMES 5'  2x 10       Quad set       5\" 1x10   1x5       Seated marches             Prone hip ext             Clamshells 3''x20 GTB 3\"x30 GTB 3''x30 BTB 20x B/L BTB 3''x20 B/L  RTB 3''x20 B/L  RTB 3''x20 B/L  RTB 3''x20 B/L  GTB 3''x20 B/L  GTB 3\"x20 B/L GTB   Bridges 20x w/ abd GTB 3x10 GTB 3x10 BTB 3x10 w/ hip ABD BTB 20x w/ ABD RTB 20x w/ ABD RTB 20x w/ ABD RTB 20x w/ ABD GTB 20x w/ ABD GTB 20x w/ abd GTB   Hamstring curls     Prone 2x10 3# Prone 2x10 " "3#  Prone 2x10 4#  Prone 2x10 4#   3 way hip P!    P!        Matrix: Flex/Ext 10# EXT, 20# FLEX w/ 5' hold after in EXT 25# 3x10     35# 3x10  25#  3x10    35#  3x10 w/ 5' hold after 35# 2x10 each with 5' hold in EXT         TKE 5\" x20 10# [place]   5\" x20 10# [place]   [place] 20x 10# 5'' x20 10# [place] 5\" x20 10# [place]   Prone knee hangs.  2' no OP  2' 2# AW    2 min no OP  1.5 min     Repeated knee ext str  20x5\"                         Ther Ex             Pt education     HEP        Nustep - for ROM  RB L1 10  ' For LE ROM RB L1 10' RB L2 10' RB L2 10' RB 10' 8' L3  for LE ROM 10min L3 LE only  RB L1 10  ' For LE ROM RB L1 10  ' For LE ROM RB L1 10  ' For LE ROM   Hamstring stretch w/ stool             Prone quad stretch 3x30'' L   3x30'' L  3x30'' L 3x 30\" L only  3x 30\" L only   3x 30\" L only   Supine hip ABD stretch      3x30'' B/L                                              Ther Activity             FSU 4'' 2x10 L            LSD 4'' 2x10 L            Gait Training                                       Modalities                                                          "

## 2024-11-07 PROBLEM — D64.9 ANEMIA: Status: RESOLVED | Noted: 2019-05-02 | Resolved: 2024-11-07

## 2024-11-07 PROBLEM — D64.9 ABSOLUTE ANEMIA: Status: RESOLVED | Noted: 2019-05-02 | Resolved: 2024-11-07

## 2024-11-07 NOTE — PRE-PROCEDURE INSTRUCTIONS
"Pre-Surgery Instructions:   Medication Instructions    ascorbic acid (VITAMIN C) 500 MG tablet Hold day of surgery.    escitalopram (LEXAPRO) 10 mg tablet Take day of surgery.    gabapentin (NEURONTIN) 300 mg capsule Take day of surgery.    meloxicam (MOBIC) 15 mg tablet Stop taking 7 days prior to surgery.    multivitamin (THERAGRAN) TABS Hold day of surgery.      Reviewed with patient, in detail, instructions from \"My Surgical Experience\". Verified with patient that he received [facility] patient education from surgeon's office. Advised to call ortho office/surgery coordinator with any questions and/or concerns.   Confirmed that patient has hibiclens soap and CHG wipes. Incentive spirometer received.   Patient verbalized understanding of current visitor restrictions due to Covid and will clarify with nurse DOS. Instructed to avoid all ASA and OTC Vit/Supp 1 week prior to surgery and to avoid NSAIDs 7 days prior to surgery per anesthesia guidelines.Tylenol ok to take prn.   No alcohol 24 hours prior to surgery. Patient aware Lovenox or other Blood Thinner prescribed is for POST OP ONLY. Instructed to call surgeon's office in meantime with any new illness.  Patient verbalized an understanding of all instructions reviewed and offers no concerns at this time.    "

## 2024-11-08 ENCOUNTER — EVALUATION (OUTPATIENT)
Dept: PHYSICAL THERAPY | Facility: CLINIC | Age: 69
End: 2024-11-08
Payer: MEDICARE

## 2024-11-08 ENCOUNTER — HOSPITAL ENCOUNTER (OUTPATIENT)
Dept: RADIOLOGY | Facility: HOSPITAL | Age: 69
End: 2024-11-08
Payer: MEDICARE

## 2024-11-08 ENCOUNTER — CONSULT (OUTPATIENT)
Age: 69
End: 2024-11-08
Payer: MEDICARE

## 2024-11-08 VITALS
SYSTOLIC BLOOD PRESSURE: 100 MMHG | WEIGHT: 151 LBS | RESPIRATION RATE: 16 BRPM | OXYGEN SATURATION: 98 % | DIASTOLIC BLOOD PRESSURE: 60 MMHG | HEIGHT: 65 IN | BODY MASS INDEX: 25.16 KG/M2 | HEART RATE: 68 BPM

## 2024-11-08 DIAGNOSIS — R73.03 PREDIABETES: ICD-10-CM

## 2024-11-08 DIAGNOSIS — M17.12 PRIMARY OSTEOARTHRITIS OF LEFT KNEE: ICD-10-CM

## 2024-11-08 DIAGNOSIS — R26.89 ANTALGIC GAIT: ICD-10-CM

## 2024-11-08 DIAGNOSIS — Z23 ENCOUNTER FOR IMMUNIZATION: ICD-10-CM

## 2024-11-08 DIAGNOSIS — M17.12 PRIMARY OSTEOARTHRITIS OF LEFT KNEE: Primary | ICD-10-CM

## 2024-11-08 DIAGNOSIS — Z12.11 ENCOUNTER FOR SCREENING FOR MALIGNANT NEOPLASM OF COLON: ICD-10-CM

## 2024-11-08 DIAGNOSIS — Z01.818 PRE-OP EXAMINATION: Primary | ICD-10-CM

## 2024-11-08 DIAGNOSIS — G89.29 CHRONIC PAIN OF LEFT KNEE: ICD-10-CM

## 2024-11-08 DIAGNOSIS — D50.9 IRON DEFICIENCY ANEMIA, UNSPECIFIED IRON DEFICIENCY ANEMIA TYPE: ICD-10-CM

## 2024-11-08 DIAGNOSIS — D72.819 LEUKOPENIA, UNSPECIFIED TYPE: ICD-10-CM

## 2024-11-08 DIAGNOSIS — M25.562 CHRONIC PAIN OF LEFT KNEE: ICD-10-CM

## 2024-11-08 DIAGNOSIS — G62.9 NEUROPATHY: ICD-10-CM

## 2024-11-08 DIAGNOSIS — R53.83 OTHER FATIGUE: ICD-10-CM

## 2024-11-08 DIAGNOSIS — E78.2 MIXED HYPERLIPIDEMIA: ICD-10-CM

## 2024-11-08 DIAGNOSIS — F41.9 ANXIETY: ICD-10-CM

## 2024-11-08 PROCEDURE — 90662 IIV NO PRSV INCREASED AG IM: CPT

## 2024-11-08 PROCEDURE — 97140 MANUAL THERAPY 1/> REGIONS: CPT

## 2024-11-08 PROCEDURE — 93000 ELECTROCARDIOGRAM COMPLETE: CPT

## 2024-11-08 PROCEDURE — 71046 X-RAY EXAM CHEST 2 VIEWS: CPT

## 2024-11-08 PROCEDURE — 99214 OFFICE O/P EST MOD 30 MIN: CPT

## 2024-11-08 PROCEDURE — G0008 ADMIN INFLUENZA VIRUS VAC: HCPCS

## 2024-11-08 PROCEDURE — 97112 NEUROMUSCULAR REEDUCATION: CPT

## 2024-11-08 PROCEDURE — 97110 THERAPEUTIC EXERCISES: CPT

## 2024-11-08 NOTE — ASSESSMENT & PLAN NOTE
LDL is elevated at 120. Current ASCVD risk score is 5%.  She is not currently on a statin medication. Continue with a low cholesterol diet.   Orders:    Lipid panel; Future

## 2024-11-08 NOTE — PROGRESS NOTES
Pre-operative Clearance  Name: Radha Pantoja      : 1955      MRN: [id number]  Encounter Provider: Radha Gaines PA-C  Encounter Date: 2024   Encounter department: [facility] INTERNAL MEDICINE [facility] [address]    Assessment & Plan  Pre-op examination  She is scheduled for a left knee total arthroplasty 2024 with Dr. Carter. Blood work and EKG were reviewed in office and are stable.  She will be getting a chest x-ray today.  Current RCI risk class I, risk of major cardiac complication being 0.5%.  She is pending clearance at this time once chest x-ray is completed.  Orders:    POCT ECG    Anxiety  Currently stable on Lexapro 10 mg daily.       Iron deficiency anemia, unspecified iron deficiency anemia type  Ferritin of 16.  Normal iron panel. CBC is stable. She is not currently on an iron supplement. She is not currently on an iron supplement.  She is due for a colonoscopy, referral placed.  Orders:    CBC and differential; Future    Iron Panel (Includes Ferritin, Iron Sat%, Iron, and TIBC); Future    Mixed hyperlipidemia  LDL is elevated at 120. Current ASCVD risk score is 5%.  She is not currently on a statin medication. Continue with a low cholesterol diet.   Orders:    Lipid panel; Future    Prediabetes  A1c is 5, down from 5.8.  Continue with a low sugar and carb diet.  Orders:    Hemoglobin A1C; Future    Comprehensive metabolic panel; Future    Neuropathy  Currently stable on gabapentin 300 mg prior to bed.       Encounter for screening for malignant neoplasm of colon  Due for colonoscopy.  Orders:    Ambulatory Referral to Gastroenterology; Future    Encounter for immunization  Flu shot given in office today.  Orders:    influenza vaccine, high-dose, PF 0.5 mL (Fluzone High Dose)    Leukopenia, unspecified type  White blood cells 2.95. History of leukopenia dating back to .       Pre-operative Clearance:     Revised Cardiac Risk Index:  RCI RISK CLASS I (0 risk  factors, risk of major cardiac complications approximately 0.5%)    Clearance:  Patient is not medically optimized for proposed surgery.  They are not cleared for surgery at this time.      Medication Instructions:   - Avoid herbs or non-directed vitamins one week prior to surgery    - Avoid aspirin containing medications or non-steroidal anti-inflammatory drugs one week preceding surgery    - May take tylenol for pain up until the night before surgery    - Antidepressants: Continue to take this medication on your normal schedule.  - Antiepileptic meds: Continue to take this medication on your normal schedule.       History of Present Illness     Patient is a 69-year-old female that presents today for a preop clearance.      Review of Systems   Constitutional:  Negative for chills, fatigue and fever.   HENT:  Negative for ear discharge, ear pain, postnasal drip, rhinorrhea, sinus pressure, sinus pain, sore throat, tinnitus and trouble swallowing.    Eyes:  Negative for pain, discharge and itching.   Respiratory:  Negative for cough, shortness of breath and wheezing.    Cardiovascular:  Negative for chest pain, palpitations and leg swelling.   Gastrointestinal:  Negative for abdominal pain, constipation, diarrhea, nausea and vomiting.   Endocrine: Negative for polydipsia, polyphagia and polyuria.   Genitourinary:  Negative for difficulty urinating, frequency, hematuria and urgency.   Musculoskeletal:  Positive for arthralgias. Negative for joint swelling and myalgias.   Skin:  Negative for color change.   Allergic/Immunologic: Negative for environmental allergies.   Neurological:  Negative for dizziness, weakness, light-headedness, numbness and headaches.   Hematological:  Negative for adenopathy.   Psychiatric/Behavioral:  Negative for decreased concentration and sleep disturbance. The patient is not nervous/anxious.      Past Medical History   Past Medical History:   Diagnosis Date    Absolute anemia 05/02/2019     Anemia     IRON  INFUSIONS    Anxiety     Cancer (HCC)     breast/ovarian had preventative masectomy/ovary removal    Colon polyp     Depression     Last Assessed 8/27/2015    H/O total mastectomy     Hyperlipidemia     Last Assessed 8/7/2015    Hypokalemia 02/02/2019    PONV (postoperative nausea and vomiting)     gets nauseas    Recurrent major depressive disorder, remission status unspecified (HCC) 03/04/2022     Past Surgical History:   Procedure Laterality Date    APPENDECTOMY      BILATERAL OOPHORECTOMY      Last Assessed 8/27/2015    BREAST RECONSTRUCTION      Last Assessed 8/27/2015    CHOLECYSTECTOMY      COLONOSCOPY      MASTECTOMY Bilateral     Radical; Last Assessed 8/27/2015    KS COLONOSCOPY FLX DX W/COLLJ SPEC WHEN PFRMD N/A 4/3/2018    Procedure: COLONOSCOPY;  Surgeon: Fausto Frazier MD;  Location: [facility];  Service: Gastroenterology    KS LAPAROSCOPY SURG CHOLECYSTECTOMY N/A 10/5/2017    Procedure: LAPAROSCOPIC CHOLECYSTECTOMY;  Surgeon: Chava Blancas MD;  Location: [facility];  Service: General    TONSILLECTOMY       Family History   Problem Relation Age of Onset    Breast cancer Mother     Ovarian cancer Mother         unspecified laterality    Heart failure Father     Breast cancer Maternal Aunt     Colon cancer Neg Hx     Cervical cancer Neg Hx     Uterine cancer Neg Hx      Social History     Tobacco Use    Smoking status: Never    Smokeless tobacco: Never    Tobacco comments:     Has medical marijuana card does vape   Vaping Use    Vaping status: Every Day    Substances: THC   Substance and Sexual Activity    Alcohol use: Not Currently     Comment: recovering    Drug use: Yes     Types: Marijuana     Comment: medical marijuana    Sexual activity: Yes     Partners: Male     Current Outpatient Medications on File Prior to Visit   Medication Sig    escitalopram (LEXAPRO) 10 mg tablet Take 1 tablet (10 mg total) by mouth daily    gabapentin (NEURONTIN) 300 mg capsule Take 1 capsule (300 mg  "total) by mouth daily at bedtime    meloxicam (MOBIC) 15 mg tablet TAKE ONE TABLET BY MOUTH ONE TIME DAILY    [DISCONTINUED] ascorbic acid (VITAMIN C) 500 MG tablet Take 1 tablet (500 mg total) by mouth 2 (two) times a day (Patient not taking: Reported on 11/8/2024)    [DISCONTINUED] calcium carbonate-vitamin D (OSCAL-D) 500 mg-200 units per tablet Take 1 tablet by mouth 2 (two) times a day with meals (Patient not taking: Reported on 11/8/2024)    [DISCONTINUED] Multiple Vitamins-Minerals (multivitamin with minerals) tablet Take 1 tablet by mouth daily (Patient not taking: Reported on 11/8/2024)    [DISCONTINUED] multivitamin (THERAGRAN) TABS Take 1 tablet by mouth daily (Patient not taking: Reported on 11/8/2024)     No Known Allergies  Objective     /60 (BP Location: Left arm, Patient Position: Sitting, Cuff Size: Standard)   Pulse 68   Resp 16   Ht 5' 5\" (1.651 m)   Wt 68.5 kg (151 lb)   SpO2 98%   BMI 25.13 kg/m²     Physical Exam  Vitals and nursing note reviewed.   Constitutional:       General: She is awake. She is not in acute distress.     Appearance: Normal appearance. She is well-developed, well-groomed and normal weight.   HENT:      Head: Normocephalic and atraumatic.      Right Ear: Hearing and external ear normal.      Left Ear: Hearing and external ear normal.      Nose: Nose normal.      Mouth/Throat:      Lips: Pink.      Mouth: Mucous membranes are moist.   Eyes:      General: Lids are normal. Vision grossly intact. Gaze aligned appropriately.      Conjunctiva/sclera: Conjunctivae normal.   Neck:      Vascular: No carotid bruit.      Trachea: Trachea and phonation normal.   Cardiovascular:      Rate and Rhythm: Normal rate and regular rhythm.      Heart sounds: Normal heart sounds, S1 normal and S2 normal. No murmur heard.     No friction rub. No gallop.   Pulmonary:      Effort: Pulmonary effort is normal. No respiratory distress.      Breath sounds: Normal breath sounds and air " entry. No decreased breath sounds, wheezing, rhonchi or rales.   Abdominal:      General: Abdomen is flat.   Musculoskeletal:         General: No swelling.      Cervical back: Neck supple.      Right lower leg: No edema.      Left lower leg: No edema.   Skin:     General: Skin is warm.      Capillary Refill: Capillary refill takes less than 2 seconds.   Neurological:      Mental Status: She is alert.   Psychiatric:         Attention and Perception: Attention and perception normal.         Mood and Affect: Mood and affect normal.         Speech: Speech normal.         Behavior: Behavior normal. Behavior is cooperative.         Thought Content: Thought content normal.         Cognition and Memory: Cognition and memory normal.         Judgment: Judgment normal.           Radha Gaines PA-C

## 2024-11-08 NOTE — PROGRESS NOTES
PT Re-Evaluation     Today's date: 2024  Patient name: Radha Pantoja  : 1955  MRN: [id number]  Referring provider: Laron Carter DO  Dx:   Encounter Diagnosis     ICD-10-CM    1. Primary osteoarthritis of left knee  M17.12       2. Chronic pain of left knee  M25.562     G89.29       3. Antalgic gait  R26.89                      Assessment    Assessment details: Pt presents today for reassessment.  Upon examination, persistent limitations noted in active and passive ROM of left knee, minimal improvements noted in left hip extension AROM.  Manual muscle testing reveals notable strength gains in left knee extensors and flexors with manual resistance no longer provocative of pain.  Pt currently reporting a subjective pain score of 3/10 at worst localized to medial aspect of knee.  Pt reports 70% improvement since initiation of physical therapy services.  Discussed with pt continuation of POC in order to maximize functional gains and further decrease symptom severity prior to post-operative intervention, pt agreeable.    Understanding of Dx/Px/POC: good     Prognosis: good    Goals  Goals  STG to be achieved by 4 weeks  -Pt will be independent with basic HEP.  MET  -Pt will improve MMT scores by 1/2 grade in all deficient planes in order to facilitate ease of functional activity.  MET  -Pt will improve ROM by 25% in all deficient planes in order to improve functional mobility.  ONGOING  -Pt will report 5/10 at worst in order to facilitate return to PLOF.  MET    LTG to be achieved by Discharge  -Pt will be independent with comprehensive HEP.  MET  -Pt will improve MMT scores to WFL in all deficient planes in order to facilitate ease of functional activity.  PROGRESS  -Pt will improve ROM to WFL in all deficient planes in order to improve functional mobility.  PROGRESS  -Pt will report 2/10 at worst in order to demonstrate return to PLOF.  PROGRESS  -Pt will report no pain with usual ADLs.   PROGRESS      Plan  Patient would benefit from: skilled physical therapy  Referral necessary: No    Planned therapy interventions: balance, balance/weight bearing training, functional ROM exercises, gait training, home exercise program, joint mobilization, manual therapy, massage, neuromuscular re-education, strengthening, stretching, therapeutic activities and therapeutic exercise    Frequency: 2x week  Plan of Care beginning date: 2024  Plan of Care expiration date: 2025  Treatment plan discussed with: patient        Subjective Evaluation    History of Present Illness  Date of onset: 2024  Pain  Current pain ratin  At best pain ratin  At worst pain rating: 3    Social Support  10          Objective     Active Range of Motion   Left Hip   Normal active range of motion  Extension: 5 degrees     Right Hip   Normal active range of motion  Extension: 10 degrees   Left Knee   Flexion: 117 degrees   Extension: -21 degrees     Right Knee   Normal active range of motion  Left Ankle/Foot   Normal active range of motion    Right Ankle/Foot   Normal active range of motion    Passive Range of Motion   Left Knee   Flexion: 135 degrees   Extension: -18 degrees with pain    Right Knee   Normal passive range of motion    Strength/Myotome Testing     Left Hip   Planes of Motion   Flexion: 4+  Abduction: 4+    Right Hip   Planes of Motion   Flexion: 4+  Abduction: 4+    Left Knee   Flexion: 4+  Extension: 4+    Right Knee   Flexion: 5  Extension: 5    Left Ankle/Foot   Dorsiflexion: 5    Right Ankle/Foot   Dorsiflexion: 5             Precautions: Standard precautions    POC expires Unit limit Auth  expiration date PT/OT + Visit Limit?   24 BOMN  BOMN      Sec - BOMN no auth           Visit/Unit Tracking  AUTH Status:  Date 11/1 9/20 9/23 9/26 9/30 10/3 10/7 10/10 10/14 10/17 10/22 10/29   N/A Used 13 2 3 4 5 6 7  8 9 10 11 12    Remaining                   Scan     Or    "  Visit  [email or web address]  Access Code: [email or web address]    Manuals 10/22 10/29 11/1 11/5 11/7 10/3 10/7 10/10 10/14 10/17   Knee distraction  TS MUNA TORO  JBRANDON JK  beginning of session  JBRANDON JK KR   STM L quad                          Reassessment     JBRANDON    JK                 Neuro Re-Ed             LAQs      5''x20 5\" 2x 10  5\" 2x 10  5\" 2x 10  2# 5\" 2x10   SAQs       2x 10       Quad set       5\" 1x10   1x5       Seated marches             Prone hip ext             Clamshells 3''x20 GTB 3\"x30 GTB 3''x30 BTB 20x B/L BTB  3''x20 B/L  RTB 3''x20 B/L  RTB 3''x20 B/L  GTB 3''x20 B/L  GTB 3\"x20 B/L GTB   Bridges 20x w/ abd GTB 3x10 GTB 3x10 BTB 3x10 w/ hip ABD BTB  20x w/ ABD RTB 20x w/ ABD RTB 20x w/ ABD GTB 20x w/ ABD GTB 20x w/ abd GTB   Hamstring curls      Prone 2x10 3#  Prone 2x10 4#  Prone 2x10 4#   3 way hip P!            Matrix: Flex/Ext 10# EXT, 20# FLEX w/ 5' hold after in EXT 25# 3x10     35# 3x10  25#  3x10    35#  3x10 w/ 5' hold after 35# 2x10 each with 5' hold in EXT 35# 2x10 each with 5' hold in EXT        TKE 5\" x20 10# [place]   5\" x20 10# [place]   [place] 20x 10# 5'' x20 10# [place] 5\" x20 10# [place]   Prone knee hangs.  2' no OP  2' 2# AW    2 min no OP  1.5 min     Repeated knee ext str  20x5\"            TG squats     2x10 L22        Ther Ex             Pt education     HEP        Nustep - for ROM  RB L1 10  ' For LE ROM RB L1 10' RB L2 10' RB L2 10' RB L1 10   8' L3  for LE ROM 10min L3 LE only  RB L1 10  ' For LE ROM RB L1 10  ' For LE ROM RB L1 10  ' For LE ROM   Hamstring stretch w/ stool             Prone quad stretch 3x30'' L   3x30'' L  3x30'' L 3x 30\" L only  3x 30\" L only   3x 30\" L only   Supine hip ABD stretch      3x30'' B/L                                              Ther Activity             FSU 4'' 2x10 L            LSD 4'' 2x10 L            Gait Training                                       Modalities                                              "

## 2024-11-08 NOTE — ASSESSMENT & PLAN NOTE
Ferritin of 16.  Normal iron panel. CBC is stable. She is not currently on an iron supplement. She is not currently on an iron supplement.  She is due for a colonoscopy, referral placed.  Orders:    CBC and differential; Future    Iron Panel (Includes Ferritin, Iron Sat%, Iron, and TIBC); Future

## 2024-11-08 NOTE — PATIENT INSTRUCTIONS
Pre-operative Medication Instructions    Avoid herbs or non-directed vitamins one week prior to surgery  Avoid aspirin containing medications or non-steroidal anti-inflammatory drugs one week preceding surgery  May take tylenol for pain up until the night before surgery    Antidepressant Meds     Medication Name     escitalopram (LEXAPRO) 10 mg tablet      Continue to take this medication on your normal schedule.  If this is an oral medication and you take it in the morning, then you may take this medicine with a sip of water.    Seizure Medication     Medication Name     gabapentin (NEURONTIN) 300 mg capsule      Continue to take this medication on your normal schedule.  If this is an oral medication and you take it in the morning, then you may take this medicine with a sip of water.

## 2024-11-11 ENCOUNTER — OFFICE VISIT (OUTPATIENT)
Dept: OBGYN CLINIC | Facility: CLINIC | Age: 69
End: 2024-11-11
Payer: MEDICARE

## 2024-11-11 ENCOUNTER — PREP FOR PROCEDURE (OUTPATIENT)
Dept: OBGYN CLINIC | Facility: CLINIC | Age: 69
End: 2024-11-11

## 2024-11-11 VITALS
HEART RATE: 76 BPM | SYSTOLIC BLOOD PRESSURE: 113 MMHG | WEIGHT: 151 LBS | HEIGHT: 65 IN | DIASTOLIC BLOOD PRESSURE: 73 MMHG | BODY MASS INDEX: 25.16 KG/M2

## 2024-11-11 DIAGNOSIS — G89.29 CHRONIC PAIN OF LEFT KNEE: ICD-10-CM

## 2024-11-11 DIAGNOSIS — M17.12 PRIMARY OSTEOARTHRITIS OF LEFT KNEE: Primary | ICD-10-CM

## 2024-11-11 DIAGNOSIS — M25.562 CHRONIC PAIN OF LEFT KNEE: ICD-10-CM

## 2024-11-11 PROCEDURE — 99214 OFFICE O/P EST MOD 30 MIN: CPT | Performed by: ORTHOPAEDIC SURGERY

## 2024-11-11 NOTE — LETTER
November 11, 2024     Patient: Radha Pantoja  YOB: 1955  Date of Visit: 11/11/2024      To Whom it May Concern:    Radha Pantoja is under my professional care. Radha was seen in my office on 11/11/2024. Radha is scheduled for surgery on 11/26/24. She is to remain out of work for 6 weeks following this.     If you have any questions or concerns, please don't hesitate to call.         Sincerely,          Laron Carter, DO        CC: No Recipients

## 2024-11-11 NOTE — H&P (VIEW-ONLY)
Patient Name:  Radha Pantoja  MRN:  [id number]    Assessment & Plan     1. Primary osteoarthritis of left knee  2. Chronic pain of left knee      Left knee osteoarthritis with persistent worsening pain effecting activities of daily living  X-rays of left knee reviewed and discussed   In depth conversation had with patient regarding nonoperative and surgical management  Continued nonoperative treatment by means of OTC topical and oral analgesics, activity modification, outpatient physical therapy, injection therapy, and bracing. The patient has thus far failed nonoperative treatment and has persistent pain affecting activities of daily living.  Surgical management was discussed by means of LEFT total knee arthroplasty.  Risks of surgery, including but not limited to, infection, iatrogenic fracture, periprosthetic fracture, aseptic loosening, persistent pain, wound healing complications, gait dysfunction, nerve injury, blood vessel injury, DVT/PE, loss of life or limb, postoperative stiffness, need for subsequent surgery including revision surgery, numbness/tingling in lower extremity and/or over surgical incision, bleeding complications requiring blood transfusion, and anesthesia complications   Discussed procedure, anesthesia, DVT prophylaxis, same day discharge and working with PT/OT while inpatient, pain management, outpatient PT, return to unrestricted activity  Patient understands and has elected to proceed forward with LEFT total knee arthroplasty.   Patient has post-operative nausea.   She would like a work note. One was provided stating her surgical date is 11/26/24 and she will be out of work for approximately 6 weeks.  We discussed that if she needs additional time we will delay the return to work date.  Patient will require PCP, chest x-ray, EKG and labs pre operatively  Patient met with surgery coordinator  Follow up 2 weeks post-operatively        History of the Present Illness     Radha HENNESSY  "Kit is a 69 y.o. female with left osteoarthritis. The patient reports to the office for discussion regarding total knee arthroplasty. The patient works part time as a  at [employer]. She reports she feels better with movement. She does have sharp pain in the anteromedial knee. She uses gabapentin at nighttime to help her sleep.      Review of Systems     Review of Systems   Constitutional:  Negative for chills and fever.   HENT:  Negative for ear pain and sore throat.    Eyes:  Negative for pain and visual disturbance.   Respiratory:  Negative for cough and shortness of breath.    Cardiovascular:  Negative for chest pain and palpitations.   Gastrointestinal:  Negative for abdominal pain and vomiting.   Genitourinary:  Negative for dysuria and hematuria.   Musculoskeletal:  Positive for arthralgias (Left knee). Negative for back pain.   Skin:  Negative for color change and rash.   Neurological:  Negative for seizures and syncope.   All other systems reviewed and are negative.      Physical Exam     /73   Pulse 76   Ht 5' 5\" (1.651 m)   Wt 68.5 kg (151 lb)   BMI 25.13 kg/m²     Left Knee   Range of motion from 10 to 115 with pain.    There is mild crepitus with range of motion.   There is trace effusion.    There is mild tenderness over the medial joint line.    There is 4+/5 quadriceps strength and equal tone.    The patient is able to perform a straight leg raise.    Positive patellar grind  Varus alignment partially correctable with valgus stress at 30 degrees  Stable to varus stress at 0 and 30 degrees  The patient is neurovascular intact distally.    Heart: normal rhythm and rate  Lungs: clear to auscultation    Data Review     I have personally reviewed pertinent films in PACS, and my interpretation follows:    X-rays taken 7/18/2024 of left knee independently reviewed and demonstrate mild lateral and patellofemoral degenerative changes. Severe medial joint space narrowing. Osteophyte " formation. No acute fracture or dislocation.     Past Medical History:   Diagnosis Date    Absolute anemia 05/02/2019    Anemia     IRON  INFUSIONS    Anxiety     Cancer (HCC)     breast/ovarian had preventative masectomy/ovary removal    Colon polyp     Depression     Last Assessed 8/27/2015    H/O total mastectomy     Hyperlipidemia     Last Assessed 8/7/2015    Hypokalemia 02/02/2019    PONV (postoperative nausea and vomiting)     gets nauseas    Recurrent major depressive disorder, remission status unspecified (HCC) 03/04/2022       Past Surgical History:   Procedure Laterality Date    APPENDECTOMY      BILATERAL OOPHORECTOMY      Last Assessed 8/27/2015    BREAST RECONSTRUCTION      Last Assessed 8/27/2015    CHOLECYSTECTOMY      COLONOSCOPY      MASTECTOMY Bilateral     Radical; Last Assessed 8/27/2015    [place] COLONOSCOPY FLX DX W/COLLJ SPEC WHEN PFRMD N/A 4/3/2018    Procedure: COLONOSCOPY;  Surgeon: Fausto Frazier MD;  Location: [facility];  Service: Gastroenterology    NY LAPAROSCOPY SURG CHOLECYSTECTOMY N/A 10/5/2017    Procedure: LAPAROSCOPIC CHOLECYSTECTOMY;  Surgeon: Chava Blancas MD;  Location: [facility];  Service: General    TONSILLECTOMY         No Known Allergies    Current Outpatient Medications on File Prior to Visit   Medication Sig Dispense Refill    escitalopram (LEXAPRO) 10 mg tablet Take 1 tablet (10 mg total) by mouth daily 30 tablet 5    gabapentin (NEURONTIN) 300 mg capsule Take 1 capsule (300 mg total) by mouth daily at bedtime 90 capsule 5    meloxicam (MOBIC) 15 mg tablet TAKE ONE TABLET BY MOUTH ONE TIME DAILY 30 tablet 0     No current facility-administered medications on file prior to visit.       Social History     Tobacco Use    Smoking status: Never    Smokeless tobacco: Never    Tobacco comments:     Has medical marijuana card does vape   Vaping Use    Vaping status: Every Day    Substances: THC   Substance Use Topics    Alcohol use: Not Currently     Comment: recovering    Drug  use: Yes     Types: Marijuana     Comment: medical marijuana       Family History   Problem Relation Age of Onset    Breast cancer Mother     Ovarian cancer Mother         unspecified laterality    Heart failure Father     Breast cancer Maternal Aunt     Colon cancer Neg Hx     Cervical cancer Neg Hx     Uterine cancer Neg Hx              Procedures Performed     Procedures  None performed.       Laron Carter DO  Scribe Attestation      I,:  Sachi Juarez am acting as a scribe while in the presence of the attending physician.:       I,:  Laron Carter DO personally performed the services described in this documentation    as scribed in my presence.:

## 2024-11-11 NOTE — PATIENT INSTRUCTIONS
Total Knee Replacement    If your knee is severely damaged by arthritis or injury, it may be hard for you to perform simple activities, such as walking or climbing stairs. You may even begin to feel pain while you are sitting or lying down.  If nonsurgical treatments like medications and using walking supports are no longer helpful, you may want to consider total knee replacement surgery. Joint replacement surgery is a safe and effective procedure to relieve pain, correct leg deformity, and help you resume normal activities.    Knee replacement surgery was first performed in 1968. Since then, improvements in surgical materials and techniques have greatly increased its effectiveness. Total knee replacements are one of the most successful procedures in all of medicine. According to the [employer], in 2017, more than 754,000 knee replacements were performed in the [place].    Whether you have just begun exploring treatment options or have already decided to have total knee replacement surgery, this article will help you understand more about this valuable procedure.    Anatomy  The knee is the largest joint in the body and having healthy knees is required to perform most everyday activities.    Normal knee anatomy. In a healthy knee, these structures work together to ensure smooth, natural function and movement.      The knee is made up of the lower end of the thighbone (femur), the upper end of the shinbone (tibia), and the kneecap (patella). The ends of these three bones are covered with articular cartilage, a smooth substance that protects the bones and enables them to move easily within the joint.    The menisci are located between the femur and tibia. These C-shaped wedges act as shock absorbers that cushion the joint.    Large ligaments hold the femur and tibia together and provide stability. The long thigh muscles give the knee strength.    All remaining surfaces of the knee  "are covered by a thin lining called the synovial membrane. This membrane releases a fluid that lubricates the cartilage, reducing friction to nearly zero in a healthy knee.  Normally, all of these components work in harmony. But disease or injury can disrupt this harmony, resulting in pain, muscle weakness, and reduced function.    Cause  The most common cause of chronic knee pain and disability is arthritis. Although there are many types of arthritis, most knee pain is caused by just three types: osteoarthritis, rheumatoid arthritis, and posttraumatic arthritis.  Osteoarthritis. This is an age-related wear and tear type of arthritis. It usually occurs in people 50 years of age and older, but may occur in younger people, too. The cartilage that cushions the bones of the knee softens and wears away. The bones then rub against one another, causing knee pain and stiffness.    Osteoarthritis often results in bone rubbing on bone. Bone spurs are a common feature of this form of arthritis.     Rheumatoid arthritis. This is a disease in which the synovial membrane that surrounds the joint becomes inflamed and thickened. This chronic inflammation can damage the cartilage and eventually cause cartilage loss, pain, and stiffness. Rheumatoid arthritis is the most common form of a group of disorders termed \"inflammatory arthritis.\"  Posttraumatic arthritis. This can follow a serious knee injury. Fractures of the bones surrounding the knee or tears of the knee ligaments may damage the articular cartilage over time, causing knee pain and limiting knee function.    Description  A knee replacement (also called knee arthroplasty) might be more accurately termed a knee \"resurfacing\" because only the surface of the bones are replaced.  There are four basic steps to a knee replacement procedure:  Prepare the bone. The damaged cartilage surfaces at the ends of the femur and tibia are removed along with a small amount of underlying " "bone.  Position the metal implants. The removed cartilage and bone is replaced with metal components that recreate the surface of the joint. These metal parts may be cemented or \"press-fit\" into the bone.  Resurface the patella. The undersurface of the patella (kneecap) is cut and resurfaced with a plastic button. Some surgeons do not resurface the patella, depending upon the case.  Insert a spacer. A medical-grade plastic spacer is inserted between the metal components to create a smooth gliding surface.    (Left) Severe osteoarthritis. (Right) The arthritic cartilage and underlying bone has been removed and resurfaced with metal implants on the femur and tibia. A plastic spacer has been placed in between the implants. The patellar component is not shown for clarity.      Watch: Total Knee Replacement Animation    Is Total Knee Replacement for You?  The decision to have total knee replacement surgery should be a cooperative one between you, your family, your primary care doctor, and your orthopaedic surgeon. Your doctor may refer you to an orthopaedic surgeon for a thorough evaluation to determine if you might benefit from this surgery.    When Surgery Is Recommended  There are several reasons why your doctor may recommend knee replacement surgery. People who benefit from total knee replacement often have:  Severe knee pain or stiffness that limits everyday activities, including walking, climbing stairs, and getting in and out of chairs.  It may be hard to walk more than a few blocks without significant pain and it may be necessary to use a cane or walker  Moderate or severe knee pain while resting, either day or night  Chronic knee inflammation and swelling that does not improve with rest or medications  Knee deformity -- a bowing in or out of the knee  Failure to substantially improve with other treatments such as anti-inflammatory medications, cortisone injections, lubricating injections, physical therapy, or " other surgeries    Total knee replacement may be recommended for patients with bowed knee deformity, like that shown in this clinical photo.     Candidates for Surgery  There are no absolute age or weight restrictions for total knee replacement surgery. [facility] requires a BMI (body mass index) of 40 or below.     Recommendations for surgery are based on a patient's pain and disability, not age. Most patients who undergo total knee replacement are aged 50 to 80, but orthopaedic surgeons evaluate patients individually. Total knee replacements have been performed successfully at all ages, from the young teenager with juvenile arthritis to the elderly patient with degenerative arthritis.    The Orthopaedic Evaluation  An evaluation with an orthopaedic surgeon consists of several components:  Medical history. Your orthopaedic surgeon will gather information about your general health and ask you about the extent of your knee pain and your ability to function.  Physical examination. This will assess knee motion, stability, strength, and overall leg alignment.  X-rays. These images help to determine the extent of damage and deformity in your knee.  Other tests. Occasionally blood tests or advanced imaging, such as a magnetic resonance imaging (MRI) scan, may be needed to determine the condition of the bone and soft tissues of your knee.    (Left) In this X-ray of a normal knee, the space between the bones indicates healthy cartilage (arrows). (Right) This X-ray of a knee that has become bowed from arthritis shows severe loss of joint space (arrows).     Your orthopaedic surgeon will review the results of your evaluation with you and discuss whether total knee replacement is the best method to relieve your pain and improve your function. Other treatment options -- including medications, injections, physical therapy, or other types of surgery -- will also be considered and discussed.    In addition, your orthopaedic  surgeon will explain the potential risks and complications of total knee replacement, including those related to the surgery itself and those that can occur over time after your surgery.    Deciding to Have Knee Replacement Surgery  Realistic Expectations  An important factor in deciding whether to have total knee replacement surgery is understanding what the procedure can and cannot do.  Most people who have total knee replacement surgery experience a dramatic reduction of knee pain and a significant improvement in the ability to perform common activities of daily living. But total knee replacement will not allow you to do more than you could before you developed arthritis.    With normal use and activity, every knee replacement implant begins to wear in its plastic spacer. Excessive activity or weight may speed up this normal wear and may cause the knee replacement to loosen and become painful. Therefore, most surgeons advise against high-impact activities such as running, jogging, jumping, or other high-impact sports for the rest of your life after surgery.  Realistic activities following total knee replacement include unlimited walking, swimming, golf, driving, light hiking, biking, ballroom dancing, and other low-impact sports.    With appropriate activity modification, knee replacements can last for many years.    Possible Complications of Surgery  The complication rate following total knee replacement is low. Serious complications, such as a knee joint infection, occur in fewer than 2% of patients. Major medical complications such as heart attack or stroke occur even less frequently. Chronic illnesses may increase the potential for complications. Although uncommon, when these complications occur, they can prolong or limit full recovery.  Discuss your concerns thoroughly with your orthopaedic surgeon prior to surgery.    Infection. Infection may occur in the wound or deep around the prosthesis. It may happen  within days or weeks of your surgery. It may even occur years later. Minor infections in the wound area are generally treated with antibiotics. Major or deep infections may require more surgery and removal of the prosthesis. Any infection in your body can spread to your joint replacement.    Blood clots. Blood clots in the leg veins are one of the most common complications of knee replacement surgery. These clots can be life-threatening if they break free and travel to your lungs. Your orthopaedic surgeon will outline a prevention program, which may include periodic elevation of your legs, lower leg exercises to increase circulation, support stockings, and medication to thin your blood.      Blood clots may form in one of the deep veins of the body.   While blood clots can occur in any deep vein, they most commonly form in the veins of the pelvis, calf, or thigh.    Implant problems. Although implant designs and materials, as well as surgical techniques, continue to advance, implant surfaces may wear down and the components may loosen. Additionally, although an average of 115° of motion is generally anticipated after surgery, scarring of the knee can occasionally occur, and motion may be more limited, particularly in patients with limited motion before surgery.    Continued pain. A small number of patients continue to have pain after a knee replacement. This complication is rare, however, and most patients experience excellent pain relief following knee replacement.    Neurovascular injury. While rare, injury to the nerves or blood vessels around the knee can occur during surgery.    Preparing for Surgery  Medical Evaluation  If you decide to have total knee replacement surgery, your orthopaedic surgeon may ask you to schedule a complete physical examination with your doctor several weeks before the operation. This is needed to make sure you are healthy enough to have the surgery and complete the recovery process.  Many patients with chronic medical conditions, like heart disease, may also be evaluated by a specialist, such as a cardiologist, before the surgery.    Tests  Several tests, such as blood and urine samples, and an electrocardiogram, may be needed to help your orthopaedic surgeon plan your surgery.    Medications  Tell your orthopaedic surgeon about the medications you are taking. He or she will tell you which medications you should stop taking and which you should continue to take before surgery.    Dental Evaluation  Although the incidence of infection after knee replacement is very low, an infection can occur if bacteria enter your bloodstream. To reduce the risk of infection, major dental procedures (such as tooth extractions and periodontal work) should be completed before your total knee replacement surgery.    Urinary Evaluations  People with a history of recent or frequent urinary infections should have a urological evaluation before surgery. Older men with prostate disease should consider completing required treatment before undertaking knee replacement surgery.    Social Planning  Although you will be able to walk with a cane, crutches, or a walker soon after surgery, you will need help for several weeks with such tasks as cooking, shopping, bathing, and doing laundry.  If you live alone, a  or a discharge planner at the hospital can help you make advance arrangements to have someone assist you at home. They also can help you arrange for a short stay in an extended care facility during your recovery if this option works best for you.    Home Planning  Several modifications can make your home easier to navigate during your recovery. The following items may help with daily activities:  Safety bars or a secure handrail in your shower or bath  Secure handrails along your stairways  A stable chair for your early recovery with a firm seat cushion (and a height of 18 to 20 inches), a firm back, two  arms, and a footstool for intermittent leg elevation  A toilet seat riser with arms, if you have a low toilet  A stable shower bench or chair for bathing  Removing all loose carpets and cords  A temporary living space on the same floor because walking up or down stairs will be more difficult during your early recovery  Get more tips on preparing your home for your total knee replacement in this infographic (click on image for full infographic).        Your Surgery  You will either be admitted to the hospital on the day of your surgery, or you will go home the same day. The plan to either be admitted or to go home should be discussed with your surgeon prior to your operation.    Anesthesia  Upon arrival at the hospital or surgery center, you will be evaluated by a member of the anesthesia team. The most common types of anesthesia are general anesthesia (you are put to sleep) or spinal, epidural, or regional nerve block anesthesia (you are awake but your body is numb from the waist down). The anesthesia team, with your input, will determine which type of anesthesia will be best for you.    Procedure  The surgical procedure usually takes from 1 to 2 hours. Your orthopaedic surgeon will remove the damaged cartilage and bone, and then position the new metal and plastic implants to restore the alignment and function of your knee.      Different types of knee implants are used to meet each patient's individual needs.       (Left) An X-ray of a severely arthritic knee. (Right) The X-ray appearance of a total knee replacement. Note that the plastic spacer inserted between the components does not show up in an X-ray.       After surgery, you will be moved to the recovery room, where you will remain for several hours while your recovery from anesthesia is monitored. After you wake up, you will be taken to your hospital room or discharged to home.     Your Hospital Stay  If you are admitted to the hospital, you will most likely  stay from one to three days.    Pain Management  After surgery, you will feel some pain. This is a natural part of the healing process. Your doctor and nurses will work to reduce your pain, which can help you recover from surgery faster.  Medications are often prescribed for short-term pain relief after surgery. Many types of medicines are available to help manage pain, including opioids, nonsteroidal anti-inflammatory drugs (NSAID's), acetaminophen, and local anesthetics. Your doctor may use a combination of these medications to improve pain relief, as well as minimize the need for opioids.    Be aware that although opioids help relieve pain after surgery, they are a narcotic and can be addictive. Opioid dependency and overdose have become  critical public health issues in the [place] It is important to use opioids only as directed by your doctor. As soon as your pain begins to improve, stop taking opioids. Talk to your doctor if your pain has not begun to improve within a few days of your surgery.    Blood Clot Prevention  Your orthopaedic surgeon may prescribe one or more measures to prevent blood clots and decrease leg swelling. These may include special support hose, inflatable leg coverings (compression boots), and blood thinners.    Foot and ankle movement is also encouraged immediately following surgery to increase blood flow in your leg muscles to help prevent leg swelling and blood clots.    Physical Therapy  Most patients can begin exercising their knee hours after surgery. A physical therapist will teach you specific exercises to strengthen your leg and restore knee movement to allow walking and other normal daily activities soon after your surgery.    To restore movement in your knee and leg, your surgeon may use a knee support that slowly moves your knee while you are in bed. The device is called a continuous passive motion (CPM) exercise machine. Some surgeons believe that a CPM machine decreases leg  "swelling by elevating your leg and improves your blood circulation by moving the muscles of your leg, but there is no evidence that these machines improve outcomes.      A continuous passive motion (CPM) machine.     Preventing Pneumonia  It is common for patients to have shallow breathing in the early postoperative period. This is usually due to the effects of anesthesia, pain medications, and increased time spent in bed. This shallow breathing can lead to a partial collapse of the lungs (termed \"atelectasis\"), which can make patients susceptible to pneumonia. To help prevent this, it is important to take frequent deep breaths. Your nurse may provide a simple breathing apparatus called a spirometer to encourage you to take deep breaths.    Your Recovery at Home  The success of your surgery will depend largely on how well you follow your orthopaedic surgeon's instructions at home during the first few weeks after surgery.    Wound Care  You will have stitches or staples running along your wound or a suture beneath your skin on the front of your knee. The stitches or staples will be removed several weeks after surgery. A suture beneath your skin will not require removal.    Avoid soaking the wound in water until it has thoroughly sealed and dried. You may continue to bandage the wound to prevent irritation from clothing or support stockings.    Diet  Some loss of appetite is common for several weeks after surgery. A balanced diet, often with an iron supplement, is important to help your wound heal and to restore muscle strength.    Activity  Exercise is a critical component of home care, particularly during the first few weeks after surgery. You should be able to resume most normal activities of daily living within 3 to 6 weeks following surgery. Some pain with activity and at night is common for several weeks after surgery.    Your activity program should include:  A graduated walking program -- initially in your " home and later outside -- to slowly increase your mobility  Resuming other normal household activities, such as sitting, standing, and climbing stairs  Specific exercises several times a day to restore movement and strengthen your knee. You probably will be able to perform the exercises without help, but you may have a physical therapist help you at home or in a therapy center the first few weeks after surgery.      Physical therapy will help restore movement and function.  [employer] © 2011     You will most likely be able to resume driving when your knee bends enough that you can enter and sit comfortably in your car, and when your muscle control provides adequate reaction time for braking and acceleration. Most people resume driving approximately 4 to 6 weeks after surgery.    Avoiding Problems After Surgery  Recognizing the Signs of a Blood Clot  Follow your orthopaedic surgeon's instructions carefully to reduce the risk of blood clots developing during the first several weeks of your recovery. They may recommend that you continue taking the blood thinning medication you started in the hospital. Notify your doctor immediately if you develop any of the following warning signs.    Warning signs of blood clots. The warning signs of possible blood clots in your leg include:  Increasing pain in your calf  Tenderness or redness above or below your knee  New or increasing swelling in your calf, ankle, and foot  Warning signs of pulmonary embolism. The warning signs that a blood clot has traveled to your lung include:  Sudden shortness of breath  Sudden onset of chest pain  Localized chest pain with coughing    Preventing Infection  A common cause of infection following total knee replacement surgery is from bacteria that enter the bloodstream during dental procedures, urinary tract infections, or skin infections. These bacteria can lodge around your knee replacement and cause an infection.    After knee replacement,  patients with certain risk factors may need to take antibiotics prior to dental work, including dental cleanings, or before any surgical procedure that could allow bacteria to enter the bloodstream. Your orthopaedic surgeon will discuss with you whether you need to take preventive antibiotics before dental procedures.    Warning signs of infection. Notify your doctor immediately if you develop any of the following signs of a possible knee replacement infection:  Persistent fever (higher than 100°F orally)  Chills  Increasing redness, tenderness, or swelling of the knee wound  Drainage from the knee wound  Increasing knee pain with both activity and rest    Avoiding Falls  A fall during the first few weeks after surgery can damage your new knee and may result in a need for further surgery. Stairs are a particular hazard until your knee is strong and mobile. You should use a cane, crutches, a walker, or handrails, or have someone to help you until you have improved your balance, flexibility, and strength.    Your surgeon and physical therapist will help you decide what assistive aides will be required following surgery and when those aides can safely be discontinued.    Outcomes  How Your New Knee Is Different  Improvement of knee motion is a goal of total knee replacement, but restoration of full motion is uncommon. The motion of your knee replacement after surgery can be predicted by the range of motion you have in your knee before surgery. Most patients can expect to be able to almost fully straighten the replaced knee and to bend the knee sufficiently to climb stairs and get in and out of a car. Kneeling is sometimes uncomfortable, but it is not harmful.    Most people feel some numbness in the skin around their incisions. You also may feel some stiffness, particularly with excessive bending activities.    Most people also feel or hear some clicking of the metal and plastic with knee bending or walking. This is  normal. These differences often diminish with time and most patients find them to be tolerable when compared with the pain and limited function they experienced prior to surgery.    Your new knee may activate metal detectors required for security in airports and some buildings. Tell the  about your knee replacement if the alarm is activated.    Protecting Your Knee Replacement  After surgery, make sure you also do the following:  Participate in regular light exercise programs to maintain proper strength and mobility of your new knee.  Take special precautions to avoid falls and injuries. If you break a bone in your leg, you may require more surgery.  Let your dentist know that you have a knee replacement. Talk with your orthopaedic surgeon about whether you need to take antibiotics prior to dental procedures.  See your orthopaedic surgeon periodically for routine follow-up examinations and X-rays. Your surgeon will talk with you about the frequency and timing of these visits.    Extending the Life of Your Knee Implant  Currently, more than 90% of modern total knee replacements are still functioning well 15 years after the surgery. Following your orthopaedic surgeon's instructions after surgery and taking care to protect your knee replacement and your general health are important ways you can contribute to the final success of your surgery.    To assist doctors in the surgical management of osteoarthritis of the knee, the American Academy of Orthopaedic Surgeons has conducted research to provide some useful guidelines. These are recommendations only and may not apply to every case. For more information: Surgical Management of Osteoarthritis of the Knee - Clinical Practice Guideline (CPG)  American Academy of Orthopaedic Surgeons (aaos.org)

## 2024-11-11 NOTE — PROGRESS NOTES
Patient Name:  Radha Pantoja  MRN:  [id number]    Assessment & Plan     1. Primary osteoarthritis of left knee  2. Chronic pain of left knee      Left knee osteoarthritis with persistent worsening pain effecting activities of daily living  X-rays of left knee reviewed and discussed   In depth conversation had with patient regarding nonoperative and surgical management  Continued nonoperative treatment by means of OTC topical and oral analgesics, activity modification, outpatient physical therapy, injection therapy, and bracing. The patient has thus far failed nonoperative treatment and has persistent pain affecting activities of daily living.  Surgical management was discussed by means of LEFT total knee arthroplasty.  Risks of surgery, including but not limited to, infection, iatrogenic fracture, periprosthetic fracture, aseptic loosening, persistent pain, wound healing complications, gait dysfunction, nerve injury, blood vessel injury, DVT/PE, loss of life or limb, postoperative stiffness, need for subsequent surgery including revision surgery, numbness/tingling in lower extremity and/or over surgical incision, bleeding complications requiring blood transfusion, and anesthesia complications   Discussed procedure, anesthesia, DVT prophylaxis, same day discharge and working with PT/OT while inpatient, pain management, outpatient PT, return to unrestricted activity  Patient understands and has elected to proceed forward with LEFT total knee arthroplasty.   Patient has post-operative nausea.   She would like a work note. One was provided stating her surgical date is 11/26/24 and she will be out of work for approximately 6 weeks.  We discussed that if she needs additional time we will delay the return to work date.  Patient will require PCP, chest x-ray, EKG and labs pre operatively  Patient met with surgery coordinator  Follow up 2 weeks post-operatively        History of the Present Illness     Radah HENNESSY  "Kit is a 69 y.o. female with left osteoarthritis. The patient reports to the office for discussion regarding total knee arthroplasty. The patient works part time as a  at [employer]. She reports she feels better with movement. She does have sharp pain in the anteromedial knee. She uses gabapentin at nighttime to help her sleep.      Review of Systems     Review of Systems   Constitutional:  Negative for chills and fever.   HENT:  Negative for ear pain and sore throat.    Eyes:  Negative for pain and visual disturbance.   Respiratory:  Negative for cough and shortness of breath.    Cardiovascular:  Negative for chest pain and palpitations.   Gastrointestinal:  Negative for abdominal pain and vomiting.   Genitourinary:  Negative for dysuria and hematuria.   Musculoskeletal:  Positive for arthralgias (Left knee). Negative for back pain.   Skin:  Negative for color change and rash.   Neurological:  Negative for seizures and syncope.   All other systems reviewed and are negative.      Physical Exam     /73   Pulse 76   Ht 5' 5\" (1.651 m)   Wt 68.5 kg (151 lb)   BMI 25.13 kg/m²     Left Knee   Range of motion from 10 to 115 with pain.    There is mild crepitus with range of motion.   There is trace effusion.    There is mild tenderness over the medial joint line.    There is 4+/5 quadriceps strength and equal tone.    The patient is able to perform a straight leg raise.    Positive patellar grind  Varus alignment partially correctable with valgus stress at 30 degrees  Stable to varus stress at 0 and 30 degrees  The patient is neurovascular intact distally.    Heart: normal rhythm and rate  Lungs: clear to auscultation    Data Review     I have personally reviewed pertinent films in PACS, and my interpretation follows:    X-rays taken 7/18/2024 of left knee independently reviewed and demonstrate mild lateral and patellofemoral degenerative changes. Severe medial joint space narrowing. Osteophyte " formation. No acute fracture or dislocation.     Past Medical History:   Diagnosis Date    Absolute anemia 05/02/2019    Anemia     IRON  INFUSIONS    Anxiety     Cancer (HCC)     breast/ovarian had preventative masectomy/ovary removal    Colon polyp     Depression     Last Assessed 8/27/2015    H/O total mastectomy     Hyperlipidemia     Last Assessed 8/7/2015    Hypokalemia 02/02/2019    PONV (postoperative nausea and vomiting)     gets nauseas    Recurrent major depressive disorder, remission status unspecified (HCC) 03/04/2022       Past Surgical History:   Procedure Laterality Date    APPENDECTOMY      BILATERAL OOPHORECTOMY      Last Assessed 8/27/2015    BREAST RECONSTRUCTION      Last Assessed 8/27/2015    CHOLECYSTECTOMY      COLONOSCOPY      MASTECTOMY Bilateral     Radical; Last Assessed 8/27/2015    NJ COLONOSCOPY FLX DX W/COLLJ SPEC WHEN PFRMD N/A 4/3/2018    Procedure: COLONOSCOPY;  Surgeon: Fausto Frazier MD;  Location: [facility];  Service: Gastroenterology    NJ LAPAROSCOPY SURG CHOLECYSTECTOMY N/A 10/5/2017    Procedure: LAPAROSCOPIC CHOLECYSTECTOMY;  Surgeon: Chava Blancas MD;  Location: [facility];  Service: General    TONSILLECTOMY         No Known Allergies    Current Outpatient Medications on File Prior to Visit   Medication Sig Dispense Refill    escitalopram (LEXAPRO) 10 mg tablet Take 1 tablet (10 mg total) by mouth daily 30 tablet 5    gabapentin (NEURONTIN) 300 mg capsule Take 1 capsule (300 mg total) by mouth daily at bedtime 90 capsule 5    meloxicam (MOBIC) 15 mg tablet TAKE ONE TABLET BY MOUTH ONE TIME DAILY 30 tablet 0     No current facility-administered medications on file prior to visit.       Social History     Tobacco Use    Smoking status: Never    Smokeless tobacco: Never    Tobacco comments:     Has medical marijuana card does vape   Vaping Use    Vaping status: Every Day    Substances: THC   Substance Use Topics    Alcohol use: Not Currently     Comment: recovering    Drug  use: Yes     Types: Marijuana     Comment: medical marijuana       Family History   Problem Relation Age of Onset    Breast cancer Mother     Ovarian cancer Mother         unspecified laterality    Heart failure Father     Breast cancer Maternal Aunt     Colon cancer Neg Hx     Cervical cancer Neg Hx     Uterine cancer Neg Hx              Procedures Performed     Procedures  None performed.       Laron Carter DO  Scribe Attestation      I,:  Sachi Juarez am acting as a scribe while in the presence of the attending physician.:       I,:  Laron Carter DO personally performed the services described in this documentation    as scribed in my presence.:

## 2024-11-12 ENCOUNTER — APPOINTMENT (OUTPATIENT)
Dept: PHYSICAL THERAPY | Facility: CLINIC | Age: 69
End: 2024-11-12
Payer: MEDICARE

## 2024-11-12 ENCOUNTER — TELEPHONE (OUTPATIENT)
Age: 69
End: 2024-11-12

## 2024-11-12 NOTE — TELEPHONE ENCOUNTER
Ivania from [facility] called to advise chest xray is complete and she needs PREOP notes updated for the surgery on 11/26  Thank you

## 2024-11-12 NOTE — TELEPHONE ENCOUNTER
Called pts PCP office to get the clearance note from 11/8/24 updated as it was pending pt getting her CXR done. Pt had the CXR on 11/8/24. Kathy was sending a note to the provider regarding the same.

## 2024-11-12 NOTE — PROGRESS NOTES
"Daily Note     Today's date: 2024  Patient name: Radha Pantoja  : 1955  MRN: [id number]  Referring provider: Laron Carter DO  Dx: No diagnosis found.               Subjective: ***      Objective: See treatment diary below      Assessment: Tolerated treatment {Tolerated treatment :2129875849}. Patient {assessment:}      Plan: {PLAN:1836353035}     Precautions: Standard precautions    POC expires Unit limit Auth  expiration date PT/OT + Visit Limit?   24 BOMN  BOMN      Sec - BOMN no auth           Visit/Unit Tracking  AUTH Status:  Date 11/1 9/20 9/23 9/26 9/30 10/3 10/7 10/10 10/14 10/17 10/22 10/29   N/A Used 13 2 3 4 5 6 7  8 9 10 11 12    Remaining                   Scan     Or     Visit  [email or web address]  Access Code: [email or web address]    Manuals 10/22 10/29 11/1 11/5 11/7 11/12 10/7 10/10 10/14 10/17   Knee distraction  TS JK JK   JK  beginning of session  JK JK KR   STM L quad                          Reassessment     JK    JK                 Neuro Re-Ed             LAQs       5\" 2x 10  5\" 2x 10  5\" 2x 10  2# 5\" 2x10   SAQs       2x 10       Quad set       5\" 1x10   1x5       Seated marches             Prone hip ext             Clamshells 3''x20 GTB 3\"x30 GTB 3''x30 BTB 20x B/L BTB   3''x20 B/L  RTB 3''x20 B/L  GTB 3''x20 B/L  GTB 3\"x20 B/L GTB   Bridges 20x w/ abd GTB 3x10 GTB 3x10 BTB 3x10 w/ hip ABD BTB   20x w/ ABD RTB 20x w/ ABD GTB 20x w/ ABD GTB 20x w/ abd GTB   Hamstring curls        Prone 2x10 4#  Prone 2x10 4#   3 way hip P!            Matrix: Flex/Ext 10# EXT, 20# FLEX w/ 5' hold after in EXT 25# 3x10     35# 3x10  25#  3x10    35#  3x10 w/ 5' hold after 35# 2x10 each with 5' hold in EXT 35# 2x10 each with 5' hold in EXT        TKE 5\" x20 10# [place]   5\" x20 10# [place]   [place] 20x 10# 5'' x20 10# [place] 5\" x20 10# [place]   Prone knee hangs.  2' no OP  2' 2# AW    2 min no OP  1.5 min     Repeated knee ext str  20x5\"            TG squats     2x10 L22 " "       Ther Ex             Pt education     HEP        Nustep - for ROM  RB L1 10  ' For LE ROM RB L1 10' RB L2 10' RB L2 10' RB L1 10    10min L3 LE only  RB L1 10  ' For LE ROM RB L1 10  ' For LE ROM RB L1 10  ' For LE ROM   Hamstring stretch w/ stool             Prone quad stretch 3x30'' L   3x30'' L   3x 30\" L only  3x 30\" L only   3x 30\" L only   Supine hip ABD stretch                                                    Ther Activity             FSU 4'' 2x10 L            LSD 4'' 2x10 L            Gait Training                                       Modalities                                              "

## 2024-11-15 ENCOUNTER — OFFICE VISIT (OUTPATIENT)
Dept: PHYSICAL THERAPY | Facility: CLINIC | Age: 69
End: 2024-11-15
Payer: MEDICARE

## 2024-11-15 DIAGNOSIS — G89.29 CHRONIC PAIN OF LEFT KNEE: ICD-10-CM

## 2024-11-15 DIAGNOSIS — M25.562 CHRONIC PAIN OF LEFT KNEE: ICD-10-CM

## 2024-11-15 DIAGNOSIS — M17.12 PRIMARY OSTEOARTHRITIS OF LEFT KNEE: Primary | ICD-10-CM

## 2024-11-15 DIAGNOSIS — R26.89 ANTALGIC GAIT: ICD-10-CM

## 2024-11-15 PROCEDURE — 97112 NEUROMUSCULAR REEDUCATION: CPT

## 2024-11-15 PROCEDURE — 97110 THERAPEUTIC EXERCISES: CPT

## 2024-11-15 NOTE — PROGRESS NOTES
"Daily Note     Today's date: 11/15/2024  Patient name: Radha Pantoja  : 1955  MRN: [id number]  Referring provider: Laron Carter DO  Dx:   Encounter Diagnosis     ICD-10-CM    1. Primary osteoarthritis of left knee  M17.12       2. Chronic pain of left knee  M25.562     G89.29       3. Antalgic gait  R26.89                      Subjective: Pt reports no significant change in status since last visit.      Objective: See treatment diary below      Assessment: Tolerated treatment well. Able to implement additional activities in WB to further challenge hip and knee strength.  Pt was able to tolerate activity involving periods of single limb support on left lower extremity.  Pt able to complete all exercises today without difficulty or pain.  Patient would benefit from continued PT      Plan: Continue per plan of care.      Precautions: Standard precautions    POC expires Unit limit Auth  expiration date PT/OT + Visit Limit?   24 BOMN  BOMN      Sec - BOMN no auth           Visit/Unit Tracking  AUTH Status:  Date 11/1 9/20 9/23 9/26 9/30 10/3 10/7 10/10 10/14 10/17 10/22 10/29   N/A Used 13 2 3 4 5 6 7  8 9 10 11 12    Remaining                   Scan     Or     Visit  [email or web address]  Access Code: [email or web address]    Manuals 10/22 10/29 11/1 11/5 11/7 11/15 10/7 10/10 10/14 10/17   Knee distraction  TS MUNA TORO  beginning of session  JBRANDON TORO KR   STM L quad                          Reassessment     MUNA TORO                 Neuro Re-Ed             LAQs       5\" 2x 10  5\" 2x 10  5\" 2x 10  2# 5\" 2x10   SAQs       2x 10       Quad set       5\" 1x10   1x5       Seated marches             Prone hip ext             Clamshells 3''x20 GTB 3\"x30 GTB 3''x30 BTB 20x B/L BTB  20x B/L BTB 3''x20 B/L  RTB 3''x20 B/L  GTB 3''x20 B/L  GTB 3\"x20 B/L GTB   Bridges 20x w/ abd GTB 3x10 GTB 3x10 BTB 3x10 w/ hip ABD BTB  3x10 w/ hip ABD BTB 20x w/ ABD RTB 20x w/ ABD GTB 20x w/ ABD GTB 20x w/ abd GTB " "  Hamstring curls        Prone 2x10 4#  Prone 2x10 4#   3 way hip P!     X20 ABD B/L       Matrix: Flex/Ext 10# EXT, 20# FLEX w/ 5' hold after in EXT 25# 3x10     35# 3x10  25#  3x10    35#  3x10 w/ 5' hold after 35# 2x10 each with 5' hold in EXT 35# 2x10 each with 5' hold in EXT 35# 2x10 each with 5' hold in EXT       TKE 5\" x20 10# [place]   5\" x20 10# [place]  5\" x20 10# [place] 20x 10# 5'' x20 10# [place] 5\" x20 10# [place]   Prone knee hangs.  2' no OP  2' 2# AW    2 min no OP  1.5 min     Repeated knee ext str  20x5\"            TG squats     2x10 L22 2x10 L22                    Ther Ex             Pt education     HEP        Nustep - for ROM  RB L1 10  ' For LE ROM RB L1 10' RB L2 10' RB L2 10' RB L1 10   RB L1 10 10min L3 LE only  RB L1 10  ' For LE ROM RB L1 10  ' For LE ROM RB L1 10  ' For LE ROM   Hamstring stretch w/ stool             Prone quad stretch 3x30'' L   3x30'' L  3x30'' L 3x 30\" L only  3x 30\" L only   3x 30\" L only   Supine hip ABD stretch                                                    Ther Activity             FSU 4'' 2x10 L     4'' 2x10 L       LSD 4'' 2x10 L     4'' 2x10 L       Lateral walks      4 laps at mirror RTB       Gait Training                                       Modalities                                                "

## 2024-11-18 ENCOUNTER — APPOINTMENT (OUTPATIENT)
Dept: PHYSICAL THERAPY | Facility: CLINIC | Age: 69
End: 2024-11-18
Payer: MEDICARE

## 2024-11-19 ENCOUNTER — OFFICE VISIT (OUTPATIENT)
Dept: PHYSICAL THERAPY | Facility: CLINIC | Age: 69
End: 2024-11-19
Payer: MEDICARE

## 2024-11-19 DIAGNOSIS — M25.562 CHRONIC PAIN OF LEFT KNEE: ICD-10-CM

## 2024-11-19 DIAGNOSIS — M17.12 PRIMARY OSTEOARTHRITIS OF LEFT KNEE: Primary | ICD-10-CM

## 2024-11-19 DIAGNOSIS — G89.29 CHRONIC PAIN OF LEFT KNEE: ICD-10-CM

## 2024-11-19 DIAGNOSIS — R26.89 ANTALGIC GAIT: ICD-10-CM

## 2024-11-19 PROCEDURE — 97110 THERAPEUTIC EXERCISES: CPT

## 2024-11-19 PROCEDURE — 97112 NEUROMUSCULAR REEDUCATION: CPT

## 2024-11-19 NOTE — PROGRESS NOTES
"Daily Note     Today's date: 2024  Patient name: Radha Pantoja  : 1955  MRN: [id number]  Referring provider: Laron Carter DO  Dx:   Encounter Diagnosis     ICD-10-CM    1. Primary osteoarthritis of left knee  M17.12       2. Chronic pain of left knee  M25.562     G89.29       3. Antalgic gait  R26.89                      Subjective: Pt reports that she still has throbbing at night time in her L knee. Notes that she has been walking a lot and remains active. She also notices less of a limp when she walks.       Objective: See treatment diary below      Assessment: Tolerated treatment well. Continued to focus on Wbing exercises throughout session today. An antalgic gait pattern as noted with clinical ambulation.  Progressed step ups with no complaints.  Patient would benefit from continued PT      Plan: Continue per plan of care.      Precautions: Standard precautions    POC expires Unit limit Auth  expiration date PT/OT + Visit Limit?   24 BOMN  BOMN      Sec - BOMN no auth           Visit/Unit Tracking  AUTH Status:  Date 11/1 9/20 9/23 9/26 9/30 10/3 10/7 10/10 10/14 10/17 10/22 10/29   N/A Used 13 2 3 4 5 6 7  8 9 10 11 12    Remaining                   Scan     Or     Visit  [email or web address]  Access Code: [email or web address]    Manuals 10/22 10/29 11/1 11/5 11/7 11/15 11/19  10/14 10/17   Knee distraction  TS MUNA TORO     JK KR   STM L quad                          Reassessment     JBRANDON    JBRANDON                 Neuro Re-Ed             LAQs         5\" 2x 10  2# 5\" 2x10   SAQs             Quad set             Seated marches             Prone hip ext             Clamshells 3''x20 GTB 3\"x30 GTB 3''x30 BTB 20x B/L BTB  20x B/L BTB 20x B/L BTB  3''x20 B/L  GTB 3\"x20 B/L GTB   Bridges 20x w/ abd GTB 3x10 GTB 3x10 BTB 3x10 w/ hip ABD BTB  3x10 w/ hip ABD BTB 3x10 w/ hip ABD BTB  20x w/ ABD GTB 20x w/ abd GTB   Hamstring curls          Prone 2x10 4#   3 way hip P!     X20 ABD B/L X20 ABD " "B/L      Matrix: Flex/Ext 10# EXT, 20# FLEX w/ 5' hold after in EXT 25# 3x10     35# 3x10  25#  3x10    35#  3x10 w/ 5' hold after 35# 2x10 each with 5' hold in EXT 35# 2x10 each with 5' hold in EXT 35# 2x10 each with 5' hold in EXT 35# 2x10 each with 5' hold in EXT      TKE 5\" x20 10# [place]   5\" x20 10# [place]  5\" x20 10# [place] 5\" x20 10# [place]  5'' x20 10# [place] 5\" x20 10# [place]   Prone knee hangs.  2' no OP  2' 2# AW          Repeated knee ext str  20x5\"            TG squats     2x10 L22 2x10 L22 20x L22                   Ther Ex             Pt education     HEP        Nustep - for ROM  RB L1 10  ' For LE ROM RB L1 10' RB L2 10' RB L2 10' RB L1 10   RB L1 10 RB L1 10  RB L1 10  ' For LE ROM RB L1 10  ' For LE ROM   Hamstring stretch w/ stool             Prone quad stretch 3x30'' L   3x30'' L  3x30'' L 3x30\" L   3x 30\" L only   Supine hip ABD stretch                                                    Ther Activity             FSU 4'' 2x10 L     4'' 2x10 L 6\" 2x10 L      LSD 4'' 2x10 L     4'' 2x10 L 4\"  2x10 L      Lateral walks      4 laps at mirror RTB 4 laps at mirror RTB      Gait Training                                       Modalities                                                "

## 2024-11-22 ENCOUNTER — OFFICE VISIT (OUTPATIENT)
Dept: PHYSICAL THERAPY | Facility: CLINIC | Age: 69
End: 2024-11-22
Payer: MEDICARE

## 2024-11-22 DIAGNOSIS — M17.12 PRIMARY OSTEOARTHRITIS OF LEFT KNEE: Primary | ICD-10-CM

## 2024-11-22 DIAGNOSIS — R26.89 ANTALGIC GAIT: ICD-10-CM

## 2024-11-22 DIAGNOSIS — M25.562 CHRONIC PAIN OF LEFT KNEE: ICD-10-CM

## 2024-11-22 DIAGNOSIS — G89.29 CHRONIC PAIN OF LEFT KNEE: ICD-10-CM

## 2024-11-22 PROCEDURE — 97112 NEUROMUSCULAR REEDUCATION: CPT

## 2024-11-22 PROCEDURE — 97530 THERAPEUTIC ACTIVITIES: CPT

## 2024-11-22 PROCEDURE — 97110 THERAPEUTIC EXERCISES: CPT

## 2024-11-22 NOTE — PROGRESS NOTES
"Daily Note     Today's date: 2024  Patient name: Radha Pantoja  : 1955  MRN: [id number]  Referring provider: Laron Carter DO  Dx:   Encounter Diagnosis     ICD-10-CM    1. Primary osteoarthritis of left knee  M17.12       2. Chronic pain of left knee  M25.562     G89.29       3. Antalgic gait  R26.89                      Subjective: Pt reports no changes in status since last visit.      Objective: See treatment diary below      Assessment: Tolerated treatment well. Able to progress in resistance today without significant reproduction of pain.  PT will continue to assess and implement interventions as appropriate.  Patient would benefit from continued PT      Plan: Continue per plan of care.      Precautions: Standard precautions    POC expires Unit limit Auth  expiration date PT/OT + Visit Limit?   24 BOMN  BOMN      Sec - BOMN no auth           Visit/Unit Tracking  AUTH Status:  Date 11/1 11/5 11/7 11/15 11/19 11/22 10/7 10/10 10/14 10/17 10/22 10/29   N/A Used 13 14 15 16 17 18 7  8 9 10 11 12    Remaining                   Scan     Or     Visit  [email or web address]  Access Code: [email or web address]    Manuals 10/22 10/29 11/1 11/5 11/7 11/15 11/19 11/22 10/14 10/17   Knee distraction  TS JK JK     JK KR   STM L quad                          Reassessment     JK    JK                 Neuro Re-Ed             LAQs         5\" 2x 10  2# 5\" 2x10   SAQs             Quad set             Seated marches             Prone hip ext             Clamshells 3''x20 GTB 3\"x30 GTB 3''x30 BTB 20x B/L BTB  20x B/L BTB 20x B/L BTB 20x B/L BTB 3''x20 B/L  GTB 3\"x20 B/L GTB   Bridges 20x w/ abd GTB 3x10 GTB 3x10 BTB 3x10 w/ hip ABD BTB  3x10 w/ hip ABD BTB 3x10 w/ hip ABD BTB 3x10 w/ hip ABD BTB 20x w/ ABD GTB 20x w/ abd GTB   Hamstring curls          Prone 2x10 4#   3 way hip P!     X20 ABD B/L X20 ABD B/L X20 ABD B/L     Matrix: Flex/Ext 10# EXT, 20# FLEX w/ 5' hold after in EXT 25# 3x10     35# " "3x10  25#  3x10    35#  3x10 w/ 5' hold after 35# 2x10 each with 5' hold in EXT 35# 2x10 each with 5' hold in EXT 35# 2x10 each with 5' hold in EXT 35# 2x10 each with 5' hold in EXT 40# 2x10 each with 5' hold in EXT     TKE 5\" x20 10# [place]   5\" x20 10# [place]  5\" x20 10# [place] 5\" x20 10# [place] 5''x20 15# [place] 5'' x20 10# [place] 5\" x20 10# [place]   Prone knee hangs.  2' no OP  2' 2# AW          Repeated knee ext str  20x5\"            TG squats     2x10 L22 2x10 L22 20x L22 20x L22                  Ther Ex             Pt education     HEP        Nustep - for ROM  RB L1 10  ' For LE ROM RB L1 10' RB L2 10' RB L2 10' RB L1 10   RB L1 10 RB L1 10 RB L1 10 RB L1 10  ' For LE ROM RB L1 10  ' For LE ROM   Hamstring stretch w/ stool             Prone quad stretch 3x30'' L   3x30'' L  3x30'' L 3x30\" L   3x 30\" L only   Supine hip ABD stretch                                                    Ther Activity             FSU 4'' 2x10 L     4'' 2x10 L 6\" 2x10 L 6\" 2x10 L     LSD 4'' 2x10 L     4'' 2x10 L 4\"  2x10 L 4\"  2x10 L     Lateral walks      4 laps at mirror RTB 4 laps at mirror RTB 2 laps length of gym RTB     Gait Training                                       Modalities                                                  "

## 2024-11-25 ENCOUNTER — TELEPHONE (OUTPATIENT)
Age: 69
End: 2024-11-25

## 2024-11-25 RX ORDER — TRANEXAMIC ACID 10 MG/ML
1000 INJECTION, SOLUTION INTRAVENOUS ONCE
OUTPATIENT
Start: 2024-11-26

## 2024-11-25 NOTE — TELEPHONE ENCOUNTER
Caller: Patient    Doctor: Raul    Reason for call: Questioned when she will get sx instructions? Advised calls are between 3-7.    Call back#: [phone]

## 2024-11-26 ENCOUNTER — APPOINTMENT (OUTPATIENT)
Dept: RADIOLOGY | Facility: HOSPITAL | Age: 69
End: 2024-11-26
Payer: MEDICARE

## 2024-11-26 ENCOUNTER — ANESTHESIA EVENT (OUTPATIENT)
Dept: PERIOP | Facility: HOSPITAL | Age: 69
End: 2024-11-26
Payer: MEDICARE

## 2024-11-26 ENCOUNTER — HOSPITAL ENCOUNTER (OUTPATIENT)
Facility: HOSPITAL | Age: 69
Setting detail: OUTPATIENT SURGERY
Discharge: HOME | End: 2024-11-26
Attending: ORTHOPAEDIC SURGERY | Admitting: ORTHOPAEDIC SURGERY
Payer: MEDICARE

## 2024-11-26 ENCOUNTER — ANESTHESIA (OUTPATIENT)
Dept: PERIOP | Facility: HOSPITAL | Age: 69
End: 2024-11-26
Payer: MEDICARE

## 2024-11-26 VITALS
DIASTOLIC BLOOD PRESSURE: 68 MMHG | OXYGEN SATURATION: 96 % | SYSTOLIC BLOOD PRESSURE: 122 MMHG | HEIGHT: 65 IN | HEART RATE: 91 BPM | RESPIRATION RATE: 22 BRPM | WEIGHT: 150 LBS | TEMPERATURE: 97.2 F | BODY MASS INDEX: 24.99 KG/M2

## 2024-11-26 DIAGNOSIS — Z96.652 S/P TOTAL KNEE ARTHROPLASTY, LEFT: Primary | ICD-10-CM

## 2024-11-26 LAB
ABO GROUP BLD: NORMAL
RH BLD: POSITIVE

## 2024-11-26 PROCEDURE — 27447 TOTAL KNEE ARTHROPLASTY: CPT | Performed by: PHYSICIAN ASSISTANT

## 2024-11-26 PROCEDURE — C1776 JOINT DEVICE (IMPLANTABLE): HCPCS | Performed by: ORTHOPAEDIC SURGERY

## 2024-11-26 PROCEDURE — C9290 INJ, BUPIVACAINE LIPOSOME: HCPCS | Performed by: ANESTHESIOLOGY

## 2024-11-26 PROCEDURE — 97162 PT EVAL MOD COMPLEX 30 MIN: CPT

## 2024-11-26 PROCEDURE — 27447 TOTAL KNEE ARTHROPLASTY: CPT | Performed by: ORTHOPAEDIC SURGERY

## 2024-11-26 PROCEDURE — 73560 X-RAY EXAM OF KNEE 1 OR 2: CPT

## 2024-11-26 PROCEDURE — C1713 ANCHOR/SCREW BN/BN,TIS/BN: HCPCS | Performed by: ORTHOPAEDIC SURGERY

## 2024-11-26 DEVICE — ATTUNE KNEE SYSTEM FEMORAL CRUCIATE RETAINING NARROW SIZE 6N LEFT CEMENTED
Type: IMPLANTABLE DEVICE | Site: KNEE | Status: FUNCTIONAL
Brand: ATTUNE

## 2024-11-26 DEVICE — ATTUNE KNEE SYSTEM TIBIAL INSERT FIXED BEARING MEDIAL STABILIZED LEFT AOX 6, 10MM
Type: IMPLANTABLE DEVICE | Site: KNEE | Status: FUNCTIONAL
Brand: ATTUNE

## 2024-11-26 DEVICE — ATTUNE PATELLA MEDIALIZED DOME 32MM CEMENTED AOX
Type: IMPLANTABLE DEVICE | Site: KNEE | Status: FUNCTIONAL
Brand: ATTUNE

## 2024-11-26 DEVICE — ATTUNE KNEE SYSTEM TIBIAL BASE FIXED BEARING SIZE 4 CEMENTED
Type: IMPLANTABLE DEVICE | Site: KNEE | Status: FUNCTIONAL
Brand: ATTUNE

## 2024-11-26 DEVICE — SMARTSET HIGH PERFORMANCE MV MEDIUM VISCOSITY BONE CEMENT 40G
Type: IMPLANTABLE DEVICE | Site: KNEE | Status: FUNCTIONAL
Brand: SMARTSET

## 2024-11-26 RX ORDER — PROPOFOL 10 MG/ML
INJECTION, EMULSION INTRAVENOUS CONTINUOUS PRN
Status: DISCONTINUED | OUTPATIENT
Start: 2024-11-26 | End: 2024-11-26

## 2024-11-26 RX ORDER — LIDOCAINE HYDROCHLORIDE 10 MG/ML
INJECTION, SOLUTION EPIDURAL; INFILTRATION; INTRACAUDAL; PERINEURAL AS NEEDED
Status: DISCONTINUED | OUTPATIENT
Start: 2024-11-26 | End: 2024-11-26

## 2024-11-26 RX ORDER — CEFAZOLIN SODIUM 2 G/50ML
2000 SOLUTION INTRAVENOUS EVERY 8 HOURS
Status: DISCONTINUED | OUTPATIENT
Start: 2024-11-26 | End: 2024-11-26 | Stop reason: HOSPADM

## 2024-11-26 RX ORDER — DIPHENHYDRAMINE HYDROCHLORIDE 50 MG/ML
12.5 INJECTION INTRAMUSCULAR; INTRAVENOUS ONCE AS NEEDED
Status: DISCONTINUED | OUTPATIENT
Start: 2024-11-26 | End: 2024-11-26 | Stop reason: HOSPADM

## 2024-11-26 RX ORDER — BUPIVACAINE HYDROCHLORIDE 7.5 MG/ML
INJECTION, SOLUTION INTRASPINAL AS NEEDED
Status: DISCONTINUED | OUTPATIENT
Start: 2024-11-26 | End: 2024-11-26

## 2024-11-26 RX ORDER — DEXAMETHASONE SODIUM PHOSPHATE 10 MG/ML
INJECTION, SOLUTION INTRAMUSCULAR; INTRAVENOUS AS NEEDED
Status: DISCONTINUED | OUTPATIENT
Start: 2024-11-26 | End: 2024-11-26

## 2024-11-26 RX ORDER — ACETAMINOPHEN 325 MG/1
975 TABLET ORAL ONCE
Status: DISCONTINUED | OUTPATIENT
Start: 2024-11-26 | End: 2024-11-26 | Stop reason: HOSPADM

## 2024-11-26 RX ORDER — GABAPENTIN 100 MG/1
100 CAPSULE ORAL ONCE
Status: DISCONTINUED | OUTPATIENT
Start: 2024-11-26 | End: 2024-11-26 | Stop reason: HOSPADM

## 2024-11-26 RX ORDER — ONDANSETRON 2 MG/ML
INJECTION INTRAMUSCULAR; INTRAVENOUS AS NEEDED
Status: DISCONTINUED | OUTPATIENT
Start: 2024-11-26 | End: 2024-11-26

## 2024-11-26 RX ORDER — GABAPENTIN 100 MG/1
200 CAPSULE ORAL ONCE
Status: COMPLETED | OUTPATIENT
Start: 2024-11-26 | End: 2024-11-26

## 2024-11-26 RX ORDER — EPHEDRINE SULFATE 50 MG/ML
INJECTION INTRAVENOUS AS NEEDED
Status: DISCONTINUED | OUTPATIENT
Start: 2024-11-26 | End: 2024-11-26

## 2024-11-26 RX ORDER — OXYCODONE HYDROCHLORIDE 5 MG/1
5 TABLET ORAL EVERY 4 HOURS PRN
Status: DISCONTINUED | OUTPATIENT
Start: 2024-11-26 | End: 2024-11-26 | Stop reason: HOSPADM

## 2024-11-26 RX ORDER — TRANEXAMIC ACID 10 MG/ML
1000 INJECTION, SOLUTION INTRAVENOUS ONCE
Status: COMPLETED | OUTPATIENT
Start: 2024-11-26 | End: 2024-11-26

## 2024-11-26 RX ORDER — CELECOXIB 200 MG/1
200 CAPSULE ORAL ONCE
Status: COMPLETED | OUTPATIENT
Start: 2024-11-26 | End: 2024-11-26

## 2024-11-26 RX ORDER — CEFAZOLIN SODIUM 2 G/50ML
2000 SOLUTION INTRAVENOUS ONCE
Status: COMPLETED | OUTPATIENT
Start: 2024-11-26 | End: 2024-11-26

## 2024-11-26 RX ORDER — KETOROLAC TROMETHAMINE 30 MG/ML
15 INJECTION, SOLUTION INTRAMUSCULAR; INTRAVENOUS EVERY 8 HOURS SCHEDULED
Status: DISCONTINUED | OUTPATIENT
Start: 2024-11-26 | End: 2024-11-26 | Stop reason: HOSPADM

## 2024-11-26 RX ORDER — DOCUSATE SODIUM 100 MG/1
100 CAPSULE, LIQUID FILLED ORAL 2 TIMES DAILY
Qty: 40 CAPSULE | Refills: 0 | Status: SHIPPED | OUTPATIENT
Start: 2024-11-26

## 2024-11-26 RX ORDER — PROMETHAZINE HYDROCHLORIDE 25 MG/ML
12.5 INJECTION, SOLUTION INTRAMUSCULAR; INTRAVENOUS ONCE AS NEEDED
Status: DISCONTINUED | OUTPATIENT
Start: 2024-11-26 | End: 2024-11-26 | Stop reason: HOSPADM

## 2024-11-26 RX ORDER — FENTANYL CITRATE/PF 50 MCG/ML
50 SYRINGE (ML) INJECTION
Status: COMPLETED | OUTPATIENT
Start: 2024-11-26 | End: 2024-11-26

## 2024-11-26 RX ORDER — HYDROMORPHONE HCL/PF 1 MG/ML
0.5 SYRINGE (ML) INJECTION
Status: DISCONTINUED | OUTPATIENT
Start: 2024-11-26 | End: 2024-11-26 | Stop reason: HOSPADM

## 2024-11-26 RX ORDER — CELECOXIB 100 MG/1
100 CAPSULE ORAL ONCE
Status: DISCONTINUED | OUTPATIENT
Start: 2024-11-26 | End: 2024-11-26

## 2024-11-26 RX ORDER — BUPIVACAINE HYDROCHLORIDE 5 MG/ML
INJECTION, SOLUTION EPIDURAL; INTRACAUDAL
Status: COMPLETED | OUTPATIENT
Start: 2024-11-26 | End: 2024-11-26

## 2024-11-26 RX ORDER — OXYCODONE HYDROCHLORIDE 5 MG/1
5 TABLET ORAL EVERY 4 HOURS PRN
Qty: 20 TABLET | Refills: 0 | Status: SHIPPED | OUTPATIENT
Start: 2024-11-26 | End: 2024-12-03 | Stop reason: SDUPTHER

## 2024-11-26 RX ORDER — MIDAZOLAM HYDROCHLORIDE 2 MG/2ML
INJECTION, SOLUTION INTRAMUSCULAR; INTRAVENOUS AS NEEDED
Status: DISCONTINUED | OUTPATIENT
Start: 2024-11-26 | End: 2024-11-26

## 2024-11-26 RX ORDER — FENTANYL CITRATE 50 UG/ML
INJECTION, SOLUTION INTRAMUSCULAR; INTRAVENOUS AS NEEDED
Status: DISCONTINUED | OUTPATIENT
Start: 2024-11-26 | End: 2024-11-26

## 2024-11-26 RX ORDER — SODIUM CHLORIDE, SODIUM LACTATE, POTASSIUM CHLORIDE, CALCIUM CHLORIDE 600; 310; 30; 20 MG/100ML; MG/100ML; MG/100ML; MG/100ML
INJECTION, SOLUTION INTRAVENOUS CONTINUOUS PRN
Status: DISCONTINUED | OUTPATIENT
Start: 2024-11-26 | End: 2024-11-26

## 2024-11-26 RX ORDER — MAGNESIUM HYDROXIDE 1200 MG/15ML
LIQUID ORAL AS NEEDED
Status: DISCONTINUED | OUTPATIENT
Start: 2024-11-26 | End: 2024-11-26 | Stop reason: HOSPADM

## 2024-11-26 RX ORDER — ACETAMINOPHEN 325 MG/1
975 TABLET ORAL ONCE
Status: COMPLETED | OUTPATIENT
Start: 2024-11-26 | End: 2024-11-26

## 2024-11-26 RX ORDER — ONDANSETRON 2 MG/ML
4 INJECTION INTRAMUSCULAR; INTRAVENOUS EVERY 6 HOURS PRN
Status: CANCELLED | OUTPATIENT
Start: 2024-11-26

## 2024-11-26 RX ORDER — OXYCODONE HYDROCHLORIDE 10 MG/1
10 TABLET ORAL EVERY 4 HOURS PRN
Status: DISCONTINUED | OUTPATIENT
Start: 2024-11-26 | End: 2024-11-26 | Stop reason: HOSPADM

## 2024-11-26 RX ORDER — ASPIRIN 81 MG/1
81 TABLET ORAL 2 TIMES DAILY
Qty: 60 TABLET | Refills: 0 | Status: SHIPPED | OUTPATIENT
Start: 2024-11-26

## 2024-11-26 RX ORDER — CHLORHEXIDINE GLUCONATE ORAL RINSE 1.2 MG/ML
15 SOLUTION DENTAL ONCE
Status: COMPLETED | OUTPATIENT
Start: 2024-11-26 | End: 2024-11-26

## 2024-11-26 RX ORDER — PROPOFOL 10 MG/ML
INJECTION, EMULSION INTRAVENOUS AS NEEDED
Status: DISCONTINUED | OUTPATIENT
Start: 2024-11-26 | End: 2024-11-26

## 2024-11-26 RX ORDER — BUPIVACAINE HYDROCHLORIDE 2.5 MG/ML
INJECTION, SOLUTION EPIDURAL; INFILTRATION; INTRACAUDAL
Status: COMPLETED | OUTPATIENT
Start: 2024-11-26 | End: 2024-11-26

## 2024-11-26 RX ORDER — CHLORHEXIDINE GLUCONATE 40 MG/ML
SOLUTION TOPICAL DAILY PRN
Status: DISCONTINUED | OUTPATIENT
Start: 2024-11-26 | End: 2024-11-26 | Stop reason: HOSPADM

## 2024-11-26 RX ADMIN — MIDAZOLAM HYDROCHLORIDE 1 MG: 1 INJECTION, SOLUTION INTRAMUSCULAR; INTRAVENOUS at 09:40

## 2024-11-26 RX ADMIN — FENTANYL CITRATE 25 MCG: 50 INJECTION, SOLUTION INTRAMUSCULAR; INTRAVENOUS at 11:48

## 2024-11-26 RX ADMIN — DEXAMETHASONE SODIUM PHOSPHATE 10 MG: 10 INJECTION INTRAMUSCULAR; INTRAVENOUS at 10:06

## 2024-11-26 RX ADMIN — ONDANSETRON 4 MG: 2 INJECTION INTRAMUSCULAR; INTRAVENOUS at 10:48

## 2024-11-26 RX ADMIN — MIDAZOLAM HYDROCHLORIDE 1 MG: 1 INJECTION, SOLUTION INTRAMUSCULAR; INTRAVENOUS at 09:16

## 2024-11-26 RX ADMIN — SODIUM CHLORIDE, SODIUM LACTATE, POTASSIUM CHLORIDE, AND CALCIUM CHLORIDE: .6; .31; .03; .02 INJECTION, SOLUTION INTRAVENOUS at 09:30

## 2024-11-26 RX ADMIN — PROPOFOL 100 MCG/KG/MIN: 10 INJECTION, EMULSION INTRAVENOUS at 09:50

## 2024-11-26 RX ADMIN — ACETAMINOPHEN 975 MG: 325 TABLET, FILM COATED ORAL at 08:09

## 2024-11-26 RX ADMIN — FENTANYL CITRATE 50 MCG: 50 INJECTION INTRAMUSCULAR; INTRAVENOUS at 12:45

## 2024-11-26 RX ADMIN — EPHEDRINE SULFATE 10 MG: 50 INJECTION, SOLUTION INTRAVENOUS at 09:49

## 2024-11-26 RX ADMIN — FENTANYL CITRATE 10 MCG: 50 INJECTION, SOLUTION INTRAMUSCULAR; INTRAVENOUS at 09:45

## 2024-11-26 RX ADMIN — PHENYLEPHRINE HYDROCHLORIDE 70 MCG/MIN: 10 INJECTION INTRAVENOUS at 09:51

## 2024-11-26 RX ADMIN — GABAPENTIN 200 MG: 100 CAPSULE ORAL at 08:09

## 2024-11-26 RX ADMIN — CHLORHEXIDINE GLUCONATE 0.12% ORAL RINSE 15 ML: 1.2 LIQUID ORAL at 08:09

## 2024-11-26 RX ADMIN — PROPOFOL 60 MG: 10 INJECTION, EMULSION INTRAVENOUS at 09:48

## 2024-11-26 RX ADMIN — FENTANYL CITRATE 50 MCG: 50 INJECTION INTRAMUSCULAR; INTRAVENOUS at 12:55

## 2024-11-26 RX ADMIN — LIDOCAINE HYDROCHLORIDE 50 ML: 10 INJECTION, SOLUTION EPIDURAL; INFILTRATION; INTRACAUDAL; PERINEURAL at 09:48

## 2024-11-26 RX ADMIN — CELECOXIB 200 MG: 200 CAPSULE ORAL at 08:09

## 2024-11-26 RX ADMIN — BUPIVACAINE HYDROCHLORIDE IN DEXTROSE 1.5 ML: 7.5 INJECTION, SOLUTION SUBARACHNOID at 09:45

## 2024-11-26 RX ADMIN — CEFAZOLIN SODIUM 2000 MG: 2 SOLUTION INTRAVENOUS at 09:49

## 2024-11-26 RX ADMIN — CEFAZOLIN SODIUM 2000 MG: 2 SOLUTION INTRAVENOUS at 15:17

## 2024-11-26 RX ADMIN — BUPIVACAINE 10 ML: 13.3 INJECTION, SUSPENSION, LIPOSOMAL INFILTRATION at 09:20

## 2024-11-26 RX ADMIN — BUPIVACAINE HYDROCHLORIDE 20 ML: 2.5 INJECTION, SOLUTION EPIDURAL; INFILTRATION; INTRACAUDAL; PERINEURAL at 09:25

## 2024-11-26 RX ADMIN — FENTANYL CITRATE 50 MCG: 50 INJECTION, SOLUTION INTRAMUSCULAR; INTRAVENOUS at 09:16

## 2024-11-26 RX ADMIN — BUPIVACAINE HYDROCHLORIDE 10 ML: 5 INJECTION, SOLUTION EPIDURAL; INTRACAUDAL at 09:20

## 2024-11-26 RX ADMIN — OXYCODONE HYDROCHLORIDE 10 MG: 10 TABLET ORAL at 14:30

## 2024-11-26 RX ADMIN — TRANEXAMIC ACID 1000 MG: 10 INJECTION, SOLUTION INTRAVENOUS at 09:56

## 2024-11-26 RX ADMIN — FENTANYL CITRATE 15 MCG: 50 INJECTION, SOLUTION INTRAMUSCULAR; INTRAVENOUS at 12:00

## 2024-11-26 NOTE — PLAN OF CARE
Problem: PHYSICAL THERAPY ADULT  Goal: Performs mobility at highest level of function for planned discharge setting.  See evaluation for individualized goals.  Description: Treatment/Interventions: Functional transfer training, LE strengthening/ROM, Elevations, Therapeutic exercise, Endurance training, Patient/family training, Bed mobility, Equipment eval/education, Gait training, Continued evaluation, OT  Equipment Recommended: Walker       See flowsheet documentation for full assessment, interventions and recommendations.  Note: Prognosis: Good  Problem List: Decreased strength, Decreased endurance, Impaired balance, Decreased range of motion, Decreased mobility, Pain, Orthopedic restrictions  Assessment: Radha Pantoja is a 69 y.o. female admitted to [facility] on 11/26/2024 for Left total knee arthroplasty. Pt  has a past medical history of Absolute anemia (05/02/2019), Anemia, Anxiety, Cancer ([facility]), Colon polyp, Depression, H/O total mastectomy, Hyperlipidemia, Hypokalemia (02/02/2019), PONV (postoperative nausea and vomiting), and Recurrent major depressive disorder, remission status unspecified ([facility]) (03/04/2022).. Order placed for PT eval and tx. PT was consulted and pt was seen on 11/26/2024 for mobility assessment and d/c planning. Chart review and two person identifiers were completed.   Currently pt presents with decreased strength , decreased ROM, decreased static sitting balance , decreased dynamic sitting balance , decreased static standing balance, decreased dynamic standing balance , decreased gait speed, decreased step length , and decreased muscular endurance . Due to these impairments, they will require assistance to perform bed mobility, sit to stand , ambulation, stair negotiation, and transfers. Pt is currently functioning at a supervision assistance x1 level for bed mobility, supervision assistance x1 level for transfers, contact guard assistance x1 level for ambulation with Rolling  Walker, and minimum assistance x1 for stair climbing/elevations. These activity limitations significantly impact their ability to participate in previous home and community roles and responsibilities  and ambulation in home. The patient's AM-PAC Basic Mobility Inpatient Short Form Raw Score is 18. PT recommends level III minimum resource intensity. They will benefit from skilled therapy to to reduce the risk of falls, to allow for safe ambulation, and to maximize functional potential.        Rehab Resource Intensity Level, PT: III (Minimum Resource Intensity)    See flowsheet documentation for full assessment.

## 2024-11-26 NOTE — ANESTHESIA PREPROCEDURE EVALUATION
Procedure:  ARTHROPLASTY KNEE TOTAL- Left- Same Day Discharge (Left: Knee)    Relevant Problems   CARDIO   (+) Hyperlipidemia, unspecified      HEMATOLOGY   (+) Iron deficiency anemia, unspecified      MUSCULOSKELETAL   (+) Primary osteoarthritis of left knee      NEURO/PSYCH   (+) Anxiety        Physical Exam    Airway    Mallampati score: II  TM Distance: >3 FB  Neck ROM: full     Dental   No notable dental hx     Cardiovascular  Rhythm: regular, Rate: normal    Pulmonary   Breath sounds clear to auscultation    Other Findings  post-pubertal.      Anesthesia Plan  ASA Score- 3     Anesthesia Type- spinal with ASA Monitors.         Additional Monitors:     Airway Plan:     Comment: Spinal, backup GETA, preop adductor canal block.       Plan Factors-Exercise tolerance (METS): >4 METS.    Chart reviewed. EKG reviewed. Imaging results reviewed. Existing labs reviewed. Patient summary reviewed.    Patient is not a current smoker.      Obstructive sleep apnea risk education given perioperatively.        Induction-     Postoperative Plan- Plan for postoperative opioid use.     Perioperative Resuscitation Plan - Level 1 - Full Code.       Informed Consent- Anesthetic plan and risks discussed with patient and spouse.  I personally reviewed this patient with the CRNA. Discussed and agreed on the Anesthesia Plan with the CRNA..

## 2024-11-26 NOTE — DISCHARGE INSTR - AVS FIRST PAGE
Discharge Instructions - Orthopedics  Radha Pantoja 69 y.o. female MRN: [id number]  Unit/Bed#: [facility]    Weight Bearing Status:                                           Weightbearing as tolerated Left lower extremity.  Walker for safety during ambulation    DVT prophylaxis:  Aspirin 81mg twice daily x 4 weeks post operatively     Pain:  Continue analgesics as directed. Please take with food and water.     Dressing Instructions:   Please keep clean, dry and intact for 7 days post operatively. May shower during this time with dressing intact. If dressing becomes saturated in the shower, please remove immediately. Allow warm, soapy water to run over dressing/surgical incision. No scrubbing, no soaking the knee. Do not remove sutures or steri strips. No application of ointments or creams over or near the incision. Can remove the Brian stocking if uncomfortable     Appt Instructions:   Follow up in office with Dr Carter on 12/09/2024    Contact the office sooner if you experience any increased numbness/tingling in the extremities.

## 2024-11-26 NOTE — ANESTHESIA POSTPROCEDURE EVALUATION
Post-Op Assessment Note    CV Status:  Stable    Pain management: adequate       Mental Status:  Alert and awake   Hydration Status:  Euvolemic   PONV Controlled:  Controlled   Airway Patency:  Patent  Two or more mitigation strategies used for obstructive sleep apnea   Post Op Vitals Reviewed: Yes    No anethesia notable event occurred.    Staff: Anesthesiologist, CRNA           Last Filed PACU Vitals:  Vitals Value Taken Time   Temp 36.5    Pulse 76 11/26/24 1226   /57 11/26/24 1226   Resp 15 11/26/24 1226   SpO2 93 % 11/26/24 1226   Vitals shown include unfiled device data.    Modified [place]:  No data recorded

## 2024-11-26 NOTE — PHYSICAL THERAPY NOTE
Physical Therapy Evaluation    Patient's Name: Radha Pantoja    Admitting Diagnosis  Primary osteoarthritis of left knee [M17.12]    Problem List  Patient Active Problem List   Diagnosis    Alcoholism (HCC)    Anxiety    Hyperlipidemia, unspecified    Mild vitamin D deficiency    Hx of colonic polyp    Iron deficiency anemia, unspecified    H/O bilateral oophorectomy    H/O bilateral mastectomy    Impaired fasting blood sugar    Primary osteoarthritis of left knee    S/P total knee arthroplasty, left       Past Medical History  Past Medical History:   Diagnosis Date    Absolute anemia 05/02/2019    Anemia     IRON  INFUSIONS    Anxiety     Cancer (HCC)     breast/ovarian had preventative masectomy/ovary removal    Colon polyp     Depression     Last Assessed 8/27/2015    H/O total mastectomy     Hyperlipidemia     Last Assessed 8/7/2015    Hypokalemia 02/02/2019    PONV (postoperative nausea and vomiting)     gets nauseas    Recurrent major depressive disorder, remission status unspecified (HCC) 03/04/2022       Past Surgical History  Past Surgical History:   Procedure Laterality Date    APPENDECTOMY      BILATERAL OOPHORECTOMY      Last Assessed 8/27/2015    BREAST RECONSTRUCTION      Last Assessed 8/27/2015    CHOLECYSTECTOMY      COLONOSCOPY      MASTECTOMY Bilateral     Radical; Last Assessed 8/27/2015    ND COLONOSCOPY FLX DX W/COLLJ SPEC WHEN PFRMD N/A 4/3/2018    Procedure: COLONOSCOPY;  Surgeon: Fausto Frazier MD;  Location: [facility];  Service: Gastroenterology    ND LAPAROSCOPY SURG CHOLECYSTECTOMY N/A 10/5/2017    Procedure: LAPAROSCOPIC CHOLECYSTECTOMY;  Surgeon: Chava Blancas MD;  Location: [facility];  Service: General    TONSILLECTOMY         Recent Imaging  XR knee 1 or 2 vw left    (Results Pending)       Recent Vital Signs  Vitals:    11/26/24 1400 11/26/24 1415 11/26/24 1430 11/26/24 1447   BP: 147/65 127/58 120/59 122/68   Pulse: 86 95 90 91   Resp: (!) 31 (!) 24 19 22   Temp:        TempSrc:       SpO2: 94% 94% 97% 96%   Weight:       Height:              11/26/24 1447   PT Last Visit   PT Visit Date 11/26/24   Note Type   Note type Evaluation   Pain Assessment   Pain Assessment Tool 0-10   Pain Score 2   Pain Location/Orientation Orientation: Left;Location: Knee   Pain Onset/Description Onset: Ongoing;Descriptor: Dull   Hospital Pain Intervention(s) Repositioned;Ambulation/increased activity   Restrictions/Precautions   Weight Bearing Precautions Per Order Yes   LLE Weight Bearing Per Order WBAT   Braces or Orthoses   (TROM doesn't wear)   Home Living   Type of Home House   Home Layout Stairs to enter without rails;One level;Performs ADLs on one level;Able to live on main level with bedroom/bathroom  (9-12 BAIRON)   Bathroom Shower/Tub Walk-in shower   Bathroom Toilet Standard   Bathroom Equipment Built-in shower seat   Bathroom Accessibility Accessible   Home Equipment Walker;Cane   Additional Comments no AD at baseline   Prior Function   Level of [place] Independent with ADLs;Independent with functional mobility;Independent with IADLS   Lives With Spouse   Receives Help From Family   IADLs Independent with driving;Independent with meal prep;Independent with medication management   Falls in the last 6 months 0   Vocational Part time employment  ([employer])   General   Family/Caregiver Present Yes   Cognition   Orientation Level Oriented X4   RLE Assessment   RLE Assessment WFL  (grossly 4/5 observed through functional mobility)   LLE Assessment   LLE Assessment   (Pt able to tolerate weight through leg, pt required VC for foot placement (foot flat) when ambulating)   Vision-Basic Assessment   Current Vision Wears contacts   Coordination   Sensation WFL   Bed Mobility   Supine to Sit 5  Supervision   Additional items Assist x 1;Increased time required;Verbal cues   Sit to Supine 5  Supervision   Additional items Assist x 1;Increased time required;Verbal cues   Transfers   Sit to Stand 5   Supervision   Additional items Assist x 1;Increased time required;Verbal cues   Stand to Sit 5  Supervision   Additional items Assist x 1;Increased time required;Verbal cues   Additional Comments with RW   Ambulation/Elevation   Gait pattern Decreased toe off;Decreased heel strike;Decreased hip extension;Step to;Short stride;Decreased L stance;Decreased foot clearance   Gait Assistance   (CGA)   Additional items Assist x 1;Verbal cues   Assistive Device Rolling walker   Distance 20', 15'   Stair Management Assistance 4  Minimal assist   Additional items Assist x 1;Verbal cues;Increased time required   Stair Management Technique Foreward;Step to pattern;With walker  (6 inch practice stair)   Number of Stairs 7   Balance   Static Sitting Good   Dynamic Sitting Fair +   Static Standing Fair +   Dynamic Standing Fair   Ambulatory Fair   Activity Tolerance   Activity Tolerance Patient tolerated treatment well   Nurse Made Aware Spoke to RN   Assessment   Prognosis Good   Problem List Decreased strength;Decreased endurance;Impaired balance;Decreased range of motion;Decreased mobility;Pain;Orthopedic restrictions   Assessment Radha Pantoja is a 69 y.o. female admitted to [facility] on 11/26/2024 for Left total knee arthroplasty. Pt  has a past medical history of Absolute anemia (05/02/2019), Anemia, Anxiety, Cancer (HCC), Colon polyp, Depression, H/O total mastectomy, Hyperlipidemia, Hypokalemia (02/02/2019), PONV (postoperative nausea and vomiting), and Recurrent major depressive disorder, remission status unspecified ([facility]) (03/04/2022).. Order placed for PT eval and tx. PT was consulted and pt was seen on 11/26/2024 for mobility assessment and d/c planning. Chart review and two person identifiers were completed.   Currently pt presents with decreased strength , decreased ROM, decreased static sitting balance , decreased dynamic sitting balance , decreased static standing balance, decreased dynamic standing balance ,  decreased gait speed, decreased step length , and decreased muscular endurance . Due to these impairments, they will require assistance to perform bed mobility, sit to stand , ambulation, stair negotiation, and transfers. Pt is currently functioning at a supervision assistance x1 level for bed mobility, supervision assistance x1 level for transfers, contact guard assistance x1 level for ambulation with Rolling Walker, and minimum assistance x1 for stair climbing/elevations. These activity limitations significantly impact their ability to participate in previous home and community roles and responsibilities  and ambulation in home. The patient's AM-[facility] Basic Mobility Inpatient Short Form Raw Score is 18. PT recommends level III minimum resource intensity. They will benefit from skilled therapy to to reduce the risk of falls, to allow for safe ambulation, and to maximize functional potential.   Goals   STG Expiration Date 12/06/24   Short Term Goal #1 Within 10 days patient will complete: 1) Bed mobility skills with modified independent assistance to facilitate safe return to previous living environment 2) Functional transfers with modified independent assistance to facilitate safe return to previous living environment  3) Ambulation with least restrictive AD modified independent assistance without LOB and stable vitals for safe ambulation home/ community distances. 4) Stair training up/down flight 12 step/s with appropriate rail/s and modified independent assistance for safe access to previous living environment. 5) Improve balance grades to fair + to reduce risk of falls. 6)Improve LE strength grades by 1 to increase independence w/ transfers and gait.  7) PT for ongoing pt and family education; DME needs and D/C planning to promote highest level of function in least restrictive environment.   Plan   Treatment/Interventions Functional transfer training;LE strengthening/ROM;Elevations;Therapeutic exercise;Endurance  training;Patient/family training;Bed mobility;Equipment eval/education;Gait training;Continued evaluation;OT   PT Frequency Twice a day   Discharge Recommendation   Rehab Resource Intensity Level, PT III (Minimum Resource Intensity)   Equipment Recommended Walker   Walker Package Recommended Wheeled walker   AM-PAC Basic Mobility Inpatient   Turning in Flat Bed Without Bedrails 3   Lying on Back to Sitting on Edge of Flat Bed Without Bedrails 3   Moving Bed to Chair 3   Standing Up From Chair Using Arms 3   Walk in Room 3   Climb 3-5 Stairs With Railing 3   Basic Mobility Inpatient Raw Score 18   Basic Mobility Standardized Score 41.05   [facility] Highest Level Of Mobility   -HL Goal 6: Walk 10 steps or more   -HLM Achieved 7: Walk 25 feet or more   End of Consult   Patient Position at End of Consult All needs within reach;Supine       Recommendations                                                                                                              Pt will benefit from continued skilled IP PT to address the above mentioned impairments in order to maximize recovery and increase functional independence when completing mobility and ADLs. See flow sheet for goals and POC.     PT Evaluation Time: 2806-5665    Prasad Craft, PT, DPT

## 2024-11-26 NOTE — ANESTHESIA PROCEDURE NOTES
Spinal Block    Patient location during procedure: OR  Start time: 11/26/2024 9:45 AM  Reason for block: procedure for pain  Staffing  Performed by: Remedios Rodriguez MD  Authorized by: Remedios Rodriguez MD    Preanesthetic Checklist  Completed: patient identified, IV checked, site marked, risks and benefits discussed, surgical consent, monitors and equipment checked, pre-op evaluation and timeout performed  Spinal Block  Patient position: sitting  Prep: ChloraPrep  Patient monitoring: heart rate, continuous pulse ox and frequent blood pressure checks  Approach: midline  Location: L4-5  Needle  Needle type: Pencan   Needle gauge: 24 G  Needle length: 4 in  Assessment  Sensory level: T10  Injection Assessment:  negative aspiration for heme, positive aspiration for clear CSF and no paresthesia on injection.  Additional Notes  Epi wash

## 2024-11-26 NOTE — ANESTHESIA PROCEDURE NOTES
Peripheral Block    Patient location during procedure: holding area  Start time: 11/26/2024 9:25 AM  Reason for block: at surgeon's request and post-op pain management  Staffing  Performed by: Remedios Rodriguez MD  Authorized by: Remedios Rodriguez MD    Preanesthetic Checklist  Completed: patient identified, IV checked, site marked, risks and benefits discussed, surgical consent, monitors and equipment checked, pre-op evaluation and timeout performed  Peripheral Block  Prep: ChloraPrep  Patient monitoring: frequent blood pressure checks, continuous pulse oximetry and heart rate  Block type: IPACK  Laterality: left  Injection technique: single-shot  Procedures: ultrasound guided, Ultrasound guidance required for the procedure to increase accuracy and safety of medication placement and decrease risk of complications.  Ultrasound permanent image saved  bupivacaine (PF) (MARCAINE) 0.25 % injection 20 mL - Perineural   20 mL - 11/26/2024 9:25:00 AM  Needle  Needle type: Stimuplex   Needle gauge: 20 G  Needle length: 6 in  Needle localization: anatomical landmarks and ultrasound guidance  Assessment  Injection assessment: incremental injection, frequent aspiration, injected with ease, negative aspiration, negative for heart rate change, no paresthesia on injection, no symptoms of intraneural/intravenous injection and needle tip visualized at all times  Paresthesia pain: none  Post-procedure:  site cleaned  patient tolerated the procedure well with no immediate complications

## 2024-11-26 NOTE — OP NOTE
OPERATIVE REPORT  PATIENT NAME: Radha Pantoja  : 1955  MRN: [id number]  Pt Location:  [facility] RO[facility] 04    Surgery Date: 2024    Surgeons and Role:     * Laron Carter DO - Primary     * Radha Marroquin PA-C - Assisting     Preop Diagnosis:  Primary osteoarthritis of left knee [M17.12]    Post-Op Diagnosis Codes:     * Primary osteoarthritis of left knee [M17.12]    Procedure(s):  Left - ARTHROPLASTY KNEE TOTAL- Left- Same Day Discharge    Specimens:  * No specimens in log *    Estimated Blood Loss:   Minimal, tourniquet used    Drains:  NG/OG/Enteral Tube Orogastric 18 Fr Right mouth (Active)   Number of days: 2609       [REMOVED] Urethral Catheter Double-lumen;Non-latex 16 Fr. (Removed)   Number of days: 0       Anesthesia Type:   Spinal with adductor canal and iPACK blocks    Operative Indications:  Primary osteoarthritis of left knee [M17.12]  Persistent pain affecting activities of daily living despite nonoperative treatment    Operative Findings:  Tricompartmental osteoarthritis of left knee with severe medial compartment degenerative changes.    Complications:   None    Knee Technique: Suture (direct) Repair  Knee Approach: Medial Parapatellar    Chronic Narcotic Use:  No      Procedure and Technique:    Antibiotics: 2 g Ancef  Tourniquet Time: 116 minutes  Implants: Depuy Attune cemented TKA size CR 6 narrow femur, 4 tibia, 32 mm patella, 10 mm MS polyethylene spacer  TXA: 1 g IV    The patient was seen in the preoperative holding area and the appropriate site of surgery was confirmed and the patient was marked.  Adductor canal and iPACK blocks were performed in the preoperative holding area by the anesthesia team. Upon bringing the patient back to the operating room she was placed on the operating room table and spinal anesthesia was provided by the anesthesiology team. Preoperative exam under anesthesia revealed range of motion from 10 to 120 degrees with partially correctable varus  alignment. A tourniquet was placed high on the operative extremity. The left lower extremity was prepped and draped in usual sterile fashion. Preoperative time-out was performed to once again confirm the site of surgery and procedure. Esmarch was used to exsanguinate the leg and the tourniquet was inflated to 275 mm Hg.    A midline incision was made 3 cm proximal to the patella extending down to the tibial tubercle. Skin flaps were made medially and laterally and the VMO was identified. A fresh 10 blade was used to make a medial parapatellar arthrotomy. A medial flap was sharply developed along the proximal tibia.  Ding elevator was used to aid in medial subperiosteal soft tissue release. Fat pad was sharply excised. Synovium was sharply excised from anterior femur. Rongeur was used to excise medial and lateral femoral osteophytes. The patella was everted and the knee was flexed to allow visualization of the cruciates and menisci. Retractors were carefully placed medial and laterally. Medial and lateral meniscus sharply released at their anterior horns. Anterior and posterior cruciate ligaments were released and excised. Attention was then turned to distal femur cut. Intramedullary drill hole was made approximately 1 cm anterior to the PCL and just medial to midline. Intramedullary guide was placed down to the distal femur. Guide was pinned in place and appropriate 10 mm resection of the distal femur, secondary to preoperative flexion contracture, was performed in 5 degrees of valgus. Attention was then turned to proximal tibial cut.    The tibia was subluxed anteriorly and remaining visible medial and lateral meniscus tissue was removed. Rongeur was used to remove anterior and medial tibial osteophytes. The tibial guide was set to 3° of posterior slope and clamp was placed appropriately over the medial and lateral malleoli. Height was adjusted to approximate the appropriate resection level of the proximal tibia  and provisionally pinned in place. Proper coronal alignment was adjusted and proper resection depth was confirmed with guide taking 3 mm from the medial side. Two more headless pins were placed in the proximal resection guide followed by a threaded headed pin to stabilize the guide for tibial cut. Tibial cut was completed and the proximal tibia was removed. After removal of guide a tibial tray and drop eliseo were used to confirm proper alignment of the cut.  The knee was placed in full extension and extension gap was confirmed to be appropriate. The knee was placed back into flexion and sizing of the femoral component was performed. The appropriate size was determined to be a 6 narrow. Femoral cutting jig was planned in place in 3 degrees of external rotation. After making the remaining femoral cuts the jig was removed along with bony fragments. Remaining tibial and femoral osteophytes were excised with a rongeur. Femoral CR box cut guide was then pinned in the appropriate place on the distal femur. Box cut was performed. Attention was turned back to the tibia.  A size 4 tibial tray was noted to be the proper size placed at the medial 1/3 of the tibial tubercle in appropriate external rotation.  Threaded headed pins were used to position the tibial tray and drill tower was impacted. Medullary canal was drilled and appropriate keel punch was impacted followed by removal of impaction tower and pins. Femoral trial  was impacted and trial poly spacer was inserted. The knee was noted be stable through flexion and extension, achieving full extension with proper patellar tracking through range of motion with 10 mm MS poly.    Attention was then turned to the patella. Synovium was removed from the periphery of the patella and osteophytes were excised. Caliper measured the patellar thickness to be 23 mm. Patellar cut guide was used to resect 9.5 mm of patella. Trialing of patellar buttons revealed 32 mm button to the be the  appropriate size. Holes for patellar button were drilled appropriately. Trial button was inserted to once again confirm proper range of motion and tracking with trial components. All trial components were removed and the knee was copiously irrigated. Bovi cautery was used to achieve hemostasis in the posterior capsule.  Surgical site was then further irrigated and all bony surfaces were appropriately dried. Cement was mixed for insertion of final components. Surgical gloves were changed prior to implantation of components. Soft cement was placed on the underside of the tibial tray as well as the posterior femur. Once cement was of the appropriate consistently, it was placed into the tibial canal and onto the tibial surface with cement gun and further using digital pressure for interdigitation of the cement with bone. Tibial component was impacted and excess cement was removed. Cement was then placed onto the cut surface of the femur and digital pressure was used to interdigitate the cemented to bone. Femoral component was applied and impacted followed by removal of excess cement. 10 mm trial polyethylene spacer was placed into the knee and the knee was placed in full extension. Cement was then applied to the cut surface of the patella as well as the back of the patellar button. After applying the patella in the appropriate position, patellar clamp was used to secure the button. Excess cement was removed. Upon drying of cement, surgical site was copiously irrigated with Irricept solution followed by normal saline. Final 10 mm polyethylene spacer was inserted and noted to be stable through range of motion.    Surgical incision was once again copiously irrigated. Knee was once again taken through a range of motion and confirmed proper range of motion achieving full extension and flexion to beyond 120° with proper patellar tracking. Capsule was closed with 1 Vicryl and 1 Stratafix suture. Skin was then closed with 2-0  Vicryl and 4-0 Monocryl followed by Steri-Strips. A sterile dressing was applied. The patient tolerated the procedure well was transferred to the PACU without complication.    Postoperatively the patient will be weight bearing as tolerated left lower extremity. We will obtain postoperative X-rays in PACU. The patient will recieve Ancef 2 g x 1 dose postoperatively prior to discharge.  They will begin aspirin 81 mg twice daily for DVT prophylaxis beginning on postoperative day 1, for 4 weeks total postoperatively. The patient will work with physical and occupational therapy while in the hospital.  Plan for same-day discharge home today.     I was present for the entire procedure. A qualified resident physician was not available, and A physician assistant, Radha Marroquin PA-C, was required during the procedure for tissue retraction and handling, dissection and suturing.    Patient Disposition:  PACU             SIGNATURE: Laron Carter DO  DATE: November 26, 2024  TIME: 12:46 PM

## 2024-11-26 NOTE — INTERVAL H&P NOTE
H&P reviewed. After examining the patient I find no changes in the patients condition since the H&P had been written. Patient was seen and evaluated in the office where continued nonoperative vs surgical management of Left knee osteoarthritis and pain was discussed. In light of patients persistent pain, difficulty with ADLs and function, patient would like to move forward with surgical intervention. Risks of surgical intervention discussed in the office with patient and detailed consent obtained. Patient will go to OR on 11/26/2024 with Dr Carter for Left total knee arthroplasty.     14 point ROS in office   Musculoskeletal Left knee pain      Heart RRR  Lungs CTA BL   Abdomen Present nontender     Left Knee   Range of motion from 10 to 115 with pain at terminal flexion    There is mild crepitus with range of motion  There is small effusion  There is mild tenderness over the medial joint line  There is 4+/5 quadriceps strength and equal tone  The patient is able to perform a straight leg raise  Positive patellar grind  Varus alignment partially correctable with valgus stress at 30 degrees  Stable to varus stress at 0 and 30 degrees  The patient is neurovascular intact distally      Vitals:    11/07/24 0816   BP: 115/61   Pulse: 70   Resp: 16   Temp: 97.7 °F (36.5 °C)   SpO2: 99%

## 2024-11-26 NOTE — ANESTHESIA PROCEDURE NOTES
Peripheral Block    Patient location during procedure: holding area  Start time: 11/26/2024 9:20 AM  Reason for block: procedure for pain, at surgeon's request and post-op pain management  Staffing  Performed by: Remedios Rodriguez MD  Authorized by: Remedios Rodriguez MD    Preanesthetic Checklist  Completed: patient identified, IV checked, site marked, risks and benefits discussed, surgical consent, monitors and equipment checked, pre-op evaluation and timeout performed  Peripheral Block  Prep: ChloraPrep  Patient monitoring: frequent blood pressure checks, continuous pulse oximetry and heart rate  Block type: Adductor Canal  Laterality: left  Injection technique: single-shot  Procedures: ultrasound guided, Ultrasound guidance required for the procedure to increase accuracy and safety of medication placement and decrease risk of complications.  Ultrasound permanent image saved  bupivacaine (PF) (MARCAINE) 0.5 % injection 20 mL - Perineural   10 mL - 11/26/2024 9:20:00 AM  bupivacaine liposomal (EXPAREL) 1.3 % injection 20 mL - Perineural   10 mL - 11/26/2024 9:20:00 AM  Needle  Needle type: Stimuplex   Needle gauge: 20 G  Needle length: 6 in  Needle localization: anatomical landmarks and ultrasound guidance  Assessment  Injection assessment: incremental injection, frequent aspiration, injected with ease, negative aspiration, negative for heart rate change, no paresthesia on injection, no symptoms of intraneural/intravenous injection and needle tip visualized at all times  Paresthesia pain: none  Post-procedure:  site cleaned  patient tolerated the procedure well with no immediate complications

## 2024-11-27 ENCOUNTER — TELEPHONE (OUTPATIENT)
Dept: OBGYN CLINIC | Facility: HOSPITAL | Age: 69
End: 2024-11-27

## 2024-11-27 DIAGNOSIS — Z96.652 S/P TOTAL KNEE ARTHROPLASTY, LEFT: Primary | ICD-10-CM

## 2024-11-27 NOTE — TELEPHONE ENCOUNTER
"Patient contacted for a postoperative follow up assessment. Patient states current pain level of a  \"tolerable\"  and is walking with RW.  Patient denies increase in swelling and dressing is  \"intact, the small amount of bleeding from yesterday stopped.\" Patient is icing the site regularly. NN educated patient on post-op bruising, swelling, icing, and constipation.    Confirmed upcoming appointments w/ patient:   PT 11/29  Postop 12/9     We reviewed patients AVS medication list. Patient is taking Oxycodone 5mg every 4 hours, ASA 81mg BID, and Colace 100mg BID. Educated on Tylenol, max dosing 3000mg/24 hrs, starting Tylenol 1000mg every 8 hrs, Pt agreeable. Patient has not had a BM but is taking prescribed Colace and passing gas. Discussed postop constipation, increasing fluids/fiber, prunes or prune juice, or adding Miralax into regimen if needed. Pt verbalizes understanding.      Patient denies vomiting, abdominal pain, chest pain, shortness of breath, fever, dizziness, and calf pain Patient reports nausea and \"a little dizziness\" when taking Oxycodone. Patient reports hx low BP, discussed slow position changes, importance of hydration and regularly using the walker for balance and stability. Pt confirms she is taking Oxycodone w/ food. Not requesting anti-nausea at this time. Patient does not have any other questions or concerns at this time. Pt was encouraged to call with any questions, concerns or issues.      "

## 2024-11-27 NOTE — ANESTHESIA POSTPROCEDURE EVALUATION
Post-Op Assessment Note    Last Filed PACU Vitals:  Vitals Value Taken Time   Temp 97.2 °F (36.2 °C) 11/26/24 1225   Pulse 77 11/26/24 1315   /60 11/26/24 1315   Resp 18 11/26/24 1315   SpO2 96 % 11/26/24 1315       Modified Yosi:  Activity: 2 (11/26/2024  1:15 PM)  Respiration: 2 (11/26/2024  1:15 PM)  Circulation: 2 (11/26/2024  1:15 PM)  Consciousness: 1 (11/26/2024  1:15 PM)  Oxygen Saturation: 2 (11/26/2024  1:15 PM)  Modified Yosi Score: 9 (11/26/2024  1:15 PM)

## 2024-11-29 ENCOUNTER — OFFICE VISIT (OUTPATIENT)
Dept: PHYSICAL THERAPY | Facility: CLINIC | Age: 69
End: 2024-11-29
Payer: MEDICARE

## 2024-11-29 DIAGNOSIS — M79.2 NERVE PAIN: ICD-10-CM

## 2024-11-29 DIAGNOSIS — M17.12 PRIMARY OSTEOARTHRITIS OF LEFT KNEE: ICD-10-CM

## 2024-11-29 DIAGNOSIS — Z96.652 S/P TOTAL KNEE ARTHROPLASTY, LEFT: Primary | ICD-10-CM

## 2024-11-29 PROCEDURE — 97110 THERAPEUTIC EXERCISES: CPT | Performed by: PHYSICAL THERAPIST

## 2024-11-29 PROCEDURE — 97164 PT RE-EVAL EST PLAN CARE: CPT | Performed by: PHYSICAL THERAPIST

## 2024-11-29 NOTE — PROGRESS NOTES
PT Re-Evaluation     Today's date: 2024  Patient name: Radha Pantoja  : 1955  MRN: [id number]  Referring provider: Laron Carter DO  Dx:   Encounter Diagnosis     ICD-10-CM    1. S/P total knee arthroplasty, left  Z96.652 Ambulatory Referral to Physical Therapy          Start Time: 1046  Stop Time: 1145  Total time in clinic (min): 59 minutes    Assessment  Impairments: abnormal gait, abnormal muscle firing, abnormal or restricted ROM, activity intolerance, impaired balance, impaired physical strength, lacks appropriate home exercise program, pain with function, safety issue, weight-bearing intolerance and poor body mechanics    Assessment details: Radha Pantoja is a 69 y.o. female who presents with h/o chronic left knee pain having failed conservative treatment to date. Patient currently presents s/p elective left total knee arthroplasty, DOS: 24. Patient denies experiencing acute-postoperative complications. Patient's pain is being managed with prescribed medication. Patient presents with joint effusion / edema, limited quadriceps tone, decreased LE strength, antalgic gait, and limited knee ROM. Due to these impairments, Patient has difficulty performing a/iadls and engaging in social activities. Patient has been educated in post-op contraindications / precautions, wound care, and gait training. Patient would benefit from skilled physical therapy to address their aforementioned impairments, improve their level of function and to improve their overall quality of life.    Understanding of Dx/Px/POC: excellent     Prognosis: excellent    Goals  Short Term Goals: to be achieved by 4 weeks  1) Patient to be independent with basic HEP.  2) Decrease pain by 4/10 at its worst.  3) Increase knee flexion ROM to 110 deg.  4) Increase knee extension ROM by > 5 deg.  5) Increase LE strength by 1/2 MMT grade in all deficient planes.  6) Patient to negotiate steps with a reciprocal  pattern with use of HR.   7) Patient to report decreased sleep interruption secondary to pain.  8) Increase ambulatory tolerance by 10 min with LRAD.    Long Term Goals: to be achieved by discharge  1) FOTO equal to or greater than 71.  2) Patient to be independent with comprehensive HEP.  3) Abolish pain for improved quality of life.  4) Increase LE strength to 5/5 MMT grade in all planes to improve a/iadls.  5) Achieve full knee extension ROM to improve a/iadls.  6) Increase knee flexion ROM to within 5 deg of contralateral LE to improve a/iadls.  7) Patient to negotiate steps with a reciprocal pattern without use of Hr.  8) Increase ambulatory tolerance to 60 min to improve participation in social activities.  9) Patient to report no sleep interruption secondary to pain.  10) Increase TUG and 5x STS by > at least 3 seconds to improve a/iadls.      Plan  Patient would benefit from: skilled PT  Planned modality interventions: biofeedback, cryotherapy, electrical stimulation/Russian stimulation, TENS and low level laser therapy    Planned therapy interventions: activity modification, ADL retraining, ADL training, balance, balance/weight bearing training, body mechanics training, dressing changes, functional ROM exercises, gait training, home exercise program, IADL retraining, joint mobilization, manual therapy, massage, neuromuscular re-education, patient education, self care, strengthening, stretching, therapeutic activities, therapeutic exercise and transfer training    Frequency: 2-3x week.  Duration in weeks: 12  Plan of Care beginning date: 11/29/2024  Plan of Care expiration date: 2/21/2025  Treatment plan discussed with: patient        Subjective Evaluation    History of Present Illness  Mechanism of injury: Patient presents s/p left TKA, DOS: 11/26/24. Patient is managing her post-operative pain with Oxycodone, Meloxicam, Tylenol, and Gabapentin PRN. Patient has been icing her knee consistently. Patient's  quality of sleep has been improving. Patient denies experiencing acute postoperative complications. Patient has had difficulty lifting her leg and with general mobility. Patient's next f/u appointment with her surgeon is scheduled for 25.   Patient Goals  Patient goals for therapy: decreased pain, increased motion, increased strength, independence with ADLs/IADLs, return to sport/leisure activities and return to work  Patient goal: improve quality of gait  Pain  Current pain ratin  At best pain ratin  At worst pain ratin  Location: left knee: diffuse  Quality: sharp    Social Support    Employment status: working ( at [employer]: anticipated return date of 25)  Treatments  Previous treatment: physical therapy        Objective     Observations   Left Knee   Positive for edema, effusion and incision (incision coered by Tegaderm, unable to visualize incision).     Right Knee   Negative for edema, effusion and incision.     Active Range of Motion   Left Knee   Flexion: 78 degrees with pain  Extension: 20 degrees with pain    Right Knee   Flexion: 148 degrees   Extension: 0 degrees     Passive Range of Motion   Left Knee   Flexion: 91 degrees WFL  Extension: 18 degrees WFL    Right Knee   Flexion: 148 degrees   Extension: 0 degrees     Strength/Myotome Testing     Left Hip   Planes of Motion   Flexion: 4-    Right Hip   Planes of Motion   Flexion: 5    Left Knee   Flexion: 4  Extension: 3 ((+) pain)    Right Knee   Flexion: 5  Extension: 5    Left Ankle/Foot   Dorsiflexion: 5    Right Ankle/Foot   Dorsiflexion: 5    Tests   Left Ankle/Foot   Negative for Homans' sign.     Right Ankle/Foot   Negative for Homans' sign.     Functional Assessment        Comments  Bilateral squat: Mod I with standard walker, 0-40 deg, (+) pain  Timed Up and Go (TUG): 26.71 sec, Mod I ambulation with standard walker and sit to/from stand with bilateral arm rests  Five Times Sit to Stand Test: 13.69 sec, Mod I with b/l  arm rests, left knee extended with increased weightbearing through left LE, good eccentric control               POC expires Unit limit Auth  expiration date PT/OT + Visit Limit?   11/25/24 BOMN  BOMN      Sec - BOMN no auth           Visit/Unit Tracking  AUTH Status:  Date 11/1 11/5 11/7 11/15 11/19 11/22 11/29  10/14 10/17 10/22 10/29   N/A Used 13 14 15 16 17 18 19  9 10 11 12    Remaining                  Precautions: s/p left TKA (DOS: 11/26/24)      Manuals 11/29            Left knee PROM             L calf str.             Pat mobs             Post-op RE GR            Neuro Re-Ed             Quad sets with heel prop Reviewed            SAQ             LAQ             Kesier: TKE             Supine SLR                                       Ther Ex             Patient education: pathophysiology, graded activity, edema management, wound care, gait training, rehab / surgical overview GR            Cardiovascular warm-up, knee ROM: Nustep, bike             Ankle pumps Reviewed            Long-sitting calf str. With heel prop Reviewed            Heel slides Reviewed            HR             Seated knee extension LLPS Reviewed            Seated knee flexion AAROM Reviewed                         Ther Activity             Squats             FSU                          Gait Training                                       Modalities                                         Access Code: [email or web address]  URL: [email or web address]  Date: 11/29/2024  Prepared by: Matthew Roll    Exercises  - Ankle Pumps in Elevation  - 3-5 x daily - 7 x weekly - 5-10 minutes hold  - Supine Heel Slide with Strap  - 3-5 x daily - 7 x weekly - 2 sets - 10 reps - 10 seconds hold  - Long Sitting Calf Stretch with Strap  - 3-5 x daily - 7 x weekly - 5 sets - 1 reps - 30 seconds hold  - Long Sitting Quad Set with Towel Roll Under Heel  - 3-5 x daily - 7 x weekly - 2-3 sets - 10 reps - 5 seconds hold  - Seated Knee Flexion Stretch  - 3-5 x  daily - 7 x weekly - 2 sets - 10 reps - 10 seconds hold  - Seated Knee Extension Stretch with Chair  - 3-5 x daily - 7 x weekly - 1 sets - 5-10 minutes hold

## 2024-12-02 ENCOUNTER — TELEPHONE (OUTPATIENT)
Age: 69
End: 2024-12-02

## 2024-12-02 DIAGNOSIS — M79.2 NERVE PAIN: ICD-10-CM

## 2024-12-02 RX ORDER — GABAPENTIN 300 MG/1
300 CAPSULE ORAL
Qty: 90 CAPSULE | Refills: 5 | Status: SHIPPED | OUTPATIENT
Start: 2024-12-02

## 2024-12-02 RX ORDER — GABAPENTIN 300 MG/1
300 CAPSULE ORAL
Qty: 90 CAPSULE | Refills: 0 | Status: CANCELLED | OUTPATIENT
Start: 2024-12-02

## 2024-12-02 NOTE — TELEPHONE ENCOUNTER
Caller: Patient     Doctor:  Dr. Carter    Reason for call: Patient calling stating that she has been attempting to get her Gabapentin filled and reached out to her PCP for pain but has not been getting any response.  Patient asking if Dr. Carter would be willing to fill prescription.    Call back#: [phone]

## 2024-12-02 NOTE — TELEPHONE ENCOUNTER
Pt calling upset medication was not sent yet. Advised pt note was sent HP to ordering provider. Pt feels this isn't being done in a timely manner, yelled and hung up.

## 2024-12-02 NOTE — TELEPHONE ENCOUNTER
Patient request refill of Gabapentin 300 mg to be filled with [id number].    Patient is completely out of medication and was attempting to get filled by Ortho.  Ortho is not original provider.    Patient is completely out of medication.    Requested Prescriptions     Pending Prescriptions Disp Refills    gabapentin (NEURONTIN) 300 mg capsule 90 capsule 5     Sig: Take 1 capsule (300 mg total) by mouth daily at bedtime

## 2024-12-02 NOTE — TELEPHONE ENCOUNTER
Patient called to request a refill for their meloxicam 15mg advised a refill was requested on 11/29/24 and is pending approval. Patient is asking if she is to be taking this for pain post op.     She is also requesting refill on gabapentin (NEURONTIN) 300 mg capsule . She is taking this at night time to help with the pain and anxiety due to surgery.  Please call her at [phone]

## 2024-12-03 ENCOUNTER — TELEPHONE (OUTPATIENT)
Age: 69
End: 2024-12-03

## 2024-12-03 ENCOUNTER — OFFICE VISIT (OUTPATIENT)
Dept: PHYSICAL THERAPY | Facility: CLINIC | Age: 69
End: 2024-12-03
Payer: MEDICARE

## 2024-12-03 DIAGNOSIS — R26.89 ANTALGIC GAIT: ICD-10-CM

## 2024-12-03 DIAGNOSIS — M17.12 PRIMARY OSTEOARTHRITIS OF LEFT KNEE: ICD-10-CM

## 2024-12-03 DIAGNOSIS — Z96.652 S/P TOTAL KNEE ARTHROPLASTY, LEFT: Primary | ICD-10-CM

## 2024-12-03 DIAGNOSIS — M25.562 CHRONIC PAIN OF LEFT KNEE: ICD-10-CM

## 2024-12-03 DIAGNOSIS — Z96.652 S/P TOTAL KNEE ARTHROPLASTY, LEFT: ICD-10-CM

## 2024-12-03 DIAGNOSIS — G89.29 CHRONIC PAIN OF LEFT KNEE: ICD-10-CM

## 2024-12-03 PROCEDURE — 97530 THERAPEUTIC ACTIVITIES: CPT | Performed by: PHYSICAL THERAPIST

## 2024-12-03 PROCEDURE — 97140 MANUAL THERAPY 1/> REGIONS: CPT | Performed by: PHYSICAL THERAPIST

## 2024-12-03 PROCEDURE — 97110 THERAPEUTIC EXERCISES: CPT | Performed by: PHYSICAL THERAPIST

## 2024-12-03 RX ORDER — MELOXICAM 15 MG/1
15 TABLET ORAL DAILY
Qty: 30 TABLET | Refills: 0 | Status: SHIPPED | OUTPATIENT
Start: 2024-12-03

## 2024-12-03 RX ORDER — OXYCODONE HYDROCHLORIDE 5 MG/1
5 TABLET ORAL EVERY 6 HOURS PRN
Qty: 20 TABLET | Refills: 0 | Status: SHIPPED | OUTPATIENT
Start: 2024-12-03

## 2024-12-03 NOTE — TELEPHONE ENCOUNTER
PA for OXY-5MG  CANCELLED due to     []Approval on file-dates approved   []Medication already on Formulary  []Brand Name Preferred  []Patient no longer covered by insurance    l   Authorization is not needed for the patient/medication.

## 2024-12-03 NOTE — TELEPHONE ENCOUNTER
Patient is checking on a refill of her Oxycodone she requested. She really needs it to be able to complete her PT exercises.  Thank you.

## 2024-12-03 NOTE — PROGRESS NOTES
"Daily Note     Today's date: 12/3/2024  Patient name: Radha Pantoja  : 1955  MRN: [id number]  Referring provider: Laron Carter DO  Dx:   Encounter Diagnosis     ICD-10-CM    1. S/P total knee arthroplasty, left  Z96.652       2. Primary osteoarthritis of left knee  M17.12       3. Chronic pain of left knee  M25.562     G89.29       4. Antalgic gait  R26.89                      Subjective: Patient states she has been having a lot of pain since surgery and hasn't wanted to do her HEP as a result.  Talked to surgeon and had prescription meds refilled.  Ambulating in home without AD      Objective: See treatment diary below      Assessment: Tolerated treatment well. Patient demonstrated fatigue post treatment and would benefit from continued PT.  Encouraged patient to continue to use AD at this time, not only for pain relief, but also to help normalize gait.  Patient ambulates with knee flexion contracture and antalgic gait pattern.  Stressed the importance of daily HEP performance to improve strength, flexibility, and mobility in the knee.  Patient expressed understanding.      Plan: Continue per plan of care.        POC expires Unit limit Auth  expiration date PT/OT + Visit Limit?   24 BOMN  BOMN      Sec - BOMN no auth           Visit/Unit Tracking  AUTH Status:  Date 11/1 11/5 11/7 11/15 11/19 11/22 11/29 12/3 10/14 10/17 10/22 10/29   N/A Used 13 14 15 16 17 18 19 20 9 10 11 12    Remaining                  Precautions: s/p left TKA (DOS: 24)      Manuals 11/29 12/3           Left knee PROM  JF           L calf str.  JF           Pat mobs  JF           Post-op RE GR            Neuro Re-Ed             Quad sets with heel prop Reviewed            SAQ             LAQ  3\"x30           Kesier: TKE             Supine SLR                                       Ther Ex             Patient education: pathophysiology, graded activity, edema management, wound care, gait training, rehab / " "surgical overview GR            Cardiovascular warm-up, knee ROM: Nustep, bike             Ankle pumps Reviewed            HS stretch at step  3x30\"           Long-sitting calf str. With heel prop Reviewed            Heel slides Reviewed 5\"x30           HR             Seated knee extension LLPS Reviewed            Seated knee flexion AAROM Reviewed            QS  5\"x30           Ther Activity             Squats  3x10           FSU  6\" 3x10                        Gait Training                                       Modalities                                         Access Code: [email or web address]  URL: [email or web address]  Date: 11/29/2024  Prepared by: Matthew Marie    Exercises  - Ankle Pumps in Elevation  - 3-5 x daily - 7 x weekly - 5-10 minutes hold  - Supine Heel Slide with Strap  - 3-5 x daily - 7 x weekly - 2 sets - 10 reps - 10 seconds hold  - Long Sitting Calf Stretch with Strap  - 3-5 x daily - 7 x weekly - 5 sets - 1 reps - 30 seconds hold  - Long Sitting Quad Set with Towel Roll Under Heel  - 3-5 x daily - 7 x weekly - 2-3 sets - 10 reps - 5 seconds hold  - Seated Knee Flexion Stretch  - 3-5 x daily - 7 x weekly - 2 sets - 10 reps - 10 seconds hold  - Seated Knee Extension Stretch with Chair  - 3-5 x daily - 7 x weekly - 1 sets - 5-10 minutes hold     "

## 2024-12-03 NOTE — TELEPHONE ENCOUNTER
Caller: Patient    Doctor: Dr. Carter    Reason for call: Patient asking if a refill of the Oxycodone can be sent to the pharmacy below.      [id number] - [place] - [address]       Call back#: [phone]

## 2024-12-03 NOTE — TELEPHONE ENCOUNTER
Caller: Patient     Doctor: Raul     Reason for call: Asked for a copy of letter it was sent to [employer]     Call back#: [phone]

## 2024-12-05 ENCOUNTER — OFFICE VISIT (OUTPATIENT)
Dept: PHYSICAL THERAPY | Facility: CLINIC | Age: 69
End: 2024-12-05
Payer: MEDICARE

## 2024-12-05 DIAGNOSIS — R26.89 ANTALGIC GAIT: ICD-10-CM

## 2024-12-05 DIAGNOSIS — Z96.652 S/P TOTAL KNEE ARTHROPLASTY, LEFT: Primary | ICD-10-CM

## 2024-12-05 DIAGNOSIS — M17.12 PRIMARY OSTEOARTHRITIS OF LEFT KNEE: ICD-10-CM

## 2024-12-05 DIAGNOSIS — M25.562 CHRONIC PAIN OF LEFT KNEE: ICD-10-CM

## 2024-12-05 DIAGNOSIS — G89.29 CHRONIC PAIN OF LEFT KNEE: ICD-10-CM

## 2024-12-05 PROCEDURE — 97110 THERAPEUTIC EXERCISES: CPT

## 2024-12-05 PROCEDURE — 97530 THERAPEUTIC ACTIVITIES: CPT

## 2024-12-05 PROCEDURE — 97140 MANUAL THERAPY 1/> REGIONS: CPT

## 2024-12-05 PROCEDURE — 97112 NEUROMUSCULAR REEDUCATION: CPT

## 2024-12-05 NOTE — TELEPHONE ENCOUNTER
Caller: Patient    Doctor: Raul    Reason for call: Patient called to get help with locating work letter; [facility]

## 2024-12-05 NOTE — PROGRESS NOTES
"Daily Note     Today's date: 2024  Patient name: Radha Pantoja  : 1955  MRN: [id number]  Referring provider: Laron Carter DO  Dx:   Encounter Diagnosis     ICD-10-CM    1. S/P total knee arthroplasty, left  Z96.652       2. Primary osteoarthritis of left knee  M17.12       3. Chronic pain of left knee  M25.562     G89.29       4. Antalgic gait  R26.89           Start Time: 1020  Stop Time: 1113  Total time in clinic (min): 53 minutes    Subjective: Pt noted no immediate changes. Noted that her knee still feels really tight but she is able to walk around w/o an assistive device.   Pt noted that she follow up with  next week on Monday.     Objective: See treatment diary below      Assessment:   Continued with treatment session with focus on L knee s/p TKA on 24. Pt was able to make a full rotation bwd on the bike 1x while performing rocking technique with rec bike. Tolerated treatment well.  Progressing well at this time.   Patient demonstrated fatigue post treatment.   S/P treatment session, Noted some soreness.   Advised that she may have more DOMS after treatment session. Advised to continue to ice as needed t/o the day.     Plan: Continue per plan of care.        POC expires Unit limit Auth  expiration date PT/OT + Visit Limit?   24 BOMN  BOMN      Sec - BOMN no auth           Visit/Unit Tracking  AUTH Status:  Date 11/1 11/5 11/7 11/15 11/19 11/22 11/29 12/3 12/5 10/17 10/22 10/29   N/A Used 13 14 15 16 17 18 19 20 21 10 11 12    Remaining                  Precautions: s/p left TKA (DOS: 24)      Manuals 11/29 12/3 12/5          Left knee PROM  JF SC          L calf str.  JF           Pat mobs  JF           Post-op RE GR            Neuro Re-Ed             Quad sets with heel prop Reviewed  10X 10x           SAQ             LAQ  3\"x30 3\" 3x 10           Kesier: TKE   NV          Supine SLR   5x AAROM                                     Ther Ex             Patient " "education: pathophysiology, graded activity, edema management, wound care, gait training, rehab / surgical overview GR            Cardiovascular warm-up, knee ROM: Nustep, bike             Ankle pumps Reviewed            HS stretch at step  3x30\" 3x 30\"           Long-sitting calf str. With heel prop Reviewed            Heel slides Reviewed 5\"x30 5\" 3x 10            Knee flexion stretch on stairs    10\" x 10           HR             Seated knee extension LLPS Reviewed  10\"X10           Seated knee flexion AAROM Reviewed            QS  5\"x30                                                  Ther Activity             Squats  3x10 3x 10           FSU  6\" 3x10 6\" 3x 10                        Gait Training                                       Modalities                                         Access Code: [email or web address]  URL: [email or web address]  Date: 11/29/2024  Prepared by: Matthew Marie    Exercises  - Ankle Pumps in Elevation  - 3-5 x daily - 7 x weekly - 5-10 minutes hold  - Supine Heel Slide with Strap  - 3-5 x daily - 7 x weekly - 2 sets - 10 reps - 10 seconds hold  - Long Sitting Calf Stretch with Strap  - 3-5 x daily - 7 x weekly - 5 sets - 1 reps - 30 seconds hold  - Long Sitting Quad Set with Towel Roll Under Heel  - 3-5 x daily - 7 x weekly - 2-3 sets - 10 reps - 5 seconds hold  - Seated Knee Flexion Stretch  - 3-5 x daily - 7 x weekly - 2 sets - 10 reps - 10 seconds hold  - Seated Knee Extension Stretch with Chair  - 3-5 x daily - 7 x weekly - 1 sets - 5-10 minutes hold       "

## 2024-12-06 ENCOUNTER — TELEPHONE (OUTPATIENT)
Age: 69
End: 2024-12-06

## 2024-12-06 ENCOUNTER — APPOINTMENT (OUTPATIENT)
Dept: PHYSICAL THERAPY | Facility: CLINIC | Age: 69
End: 2024-12-06
Payer: MEDICARE

## 2024-12-06 DIAGNOSIS — Z96.652 S/P TOTAL KNEE ARTHROPLASTY, LEFT: Primary | ICD-10-CM

## 2024-12-06 RX ORDER — TRAMADOL HYDROCHLORIDE 50 MG/1
50 TABLET ORAL EVERY 6 HOURS PRN
Qty: 20 TABLET | Refills: 0 | Status: SHIPPED | OUTPATIENT
Start: 2024-12-06

## 2024-12-06 NOTE — TELEPHONE ENCOUNTER
Caller: Patient    Doctor: Raul    Reason for call: Patient has enough oxy to get thru to tomorrow. Questioned if she may another medication to help her deal w/the pain? States the pain is worse at night. She can hardly sleep. Questioned if she should still be taking the Tylenol? Please cb w/response    Call back#: [phone]

## 2024-12-06 NOTE — TELEPHONE ENCOUNTER
LM w/ patient at requested number, made aware of PA-C message, tramadol script at pharmacy and to continue taking Tylenol and icing. C/b number provided for any additional questions.

## 2024-12-09 ENCOUNTER — APPOINTMENT (OUTPATIENT)
Dept: RADIOLOGY | Facility: CLINIC | Age: 69
End: 2024-12-09
Payer: MEDICARE

## 2024-12-09 ENCOUNTER — OFFICE VISIT (OUTPATIENT)
Dept: OBGYN CLINIC | Facility: CLINIC | Age: 69
End: 2024-12-09

## 2024-12-09 VITALS
SYSTOLIC BLOOD PRESSURE: 107 MMHG | DIASTOLIC BLOOD PRESSURE: 68 MMHG | HEIGHT: 65 IN | HEART RATE: 76 BPM | BODY MASS INDEX: 25.16 KG/M2 | WEIGHT: 151 LBS

## 2024-12-09 DIAGNOSIS — Z96.652 S/P TOTAL KNEE ARTHROPLASTY, LEFT: ICD-10-CM

## 2024-12-09 DIAGNOSIS — Z96.652 S/P TOTAL KNEE ARTHROPLASTY, LEFT: Primary | ICD-10-CM

## 2024-12-09 PROCEDURE — 73562 X-RAY EXAM OF KNEE 3: CPT

## 2024-12-09 PROCEDURE — 99024 POSTOP FOLLOW-UP VISIT: CPT | Performed by: ORTHOPAEDIC SURGERY

## 2024-12-09 NOTE — PROGRESS NOTES
"Patient Name:  Radha Pantoja  MRN:  [id number]    Assessment & Plan     1. S/P total knee arthroplasty, left  -     XR knee 3 vw left non injury; Future; Expected date: 12/09/2024      Approximately 13 day s/p Left total knee arthroplasty performed on 11/26/2024  The patient's x-rays were reviewed today.   Suture tails were removed. Steri-strips were applied.   The patient was instructed to wash the incisions with warm soapy water in the shower and to avoid vigorous scrubbing or submerging left lower extremity.    The patient was provided with home exercises to begin.   The patient is to continue with physical therapy as prescribed to work on range of motion.   The patient should continue with aspirin for DVT prophylaxis.   The patient should continue with over the counter analgesics as needed for symptom management.  I will see the patient back in approximately 4 weeks for re-evaluation.       History of the Present Illness   Radha Pantoja is a 69 y.o. female approximately 13 day s/p Left  total knee arthroplasty performed on 11/26/2024. She is doing well overall. The patient is ambulating with single point cane. She has been to physical therapy twice. She is taking Tramadol as needed for symptom management.             Review of Systems     Review of Systems   Constitutional:  Negative for chills and fever.   HENT:  Negative for ear pain and sore throat.    Eyes:  Negative for pain and visual disturbance.   Respiratory:  Negative for cough and shortness of breath.    Cardiovascular:  Negative for chest pain and palpitations.   Gastrointestinal:  Negative for abdominal pain and vomiting.   Genitourinary:  Negative for dysuria and hematuria.   Musculoskeletal:  Negative for arthralgias and back pain.   Skin:  Negative for color change and rash.   Neurological:  Negative for seizures and syncope.   All other systems reviewed and are negative.      Physical Exam     /68   Pulse 76   Ht 5' 5\" " (1.651 m)   Wt 68.5 kg (151 lb)   BMI 25.13 kg/m²     Left  Knee  Surgical incision well healing without evidence of loosening or failure  Range of motion from 10 to 95.    There is normal postoperative effusion.    The patient is able to perform a straight leg raise  Varus stress testing reveals no instability at 0 and 30 degrees   Valgus stress testing reveals no instability at 0 and 30 degrees  Calf soft, compressible and nontender   The patient is neurovascular intact distally.      Data Review     I have personally reviewed pertinent films in PACS, and my interpretation follows.    X-rays taken 12/9/2024 of Left  knee demonstrate total knee prosthesis in stable alignment without evidence of loosening or failure. No acute fracture or dislocation    Social History     Tobacco Use    Smoking status: Never    Smokeless tobacco: Never    Tobacco comments:     Has medical marijuana card does vape   Vaping Use    Vaping status: Every Day    Substances: THC   Substance Use Topics    Alcohol use: Not Currently     Comment: recovering    Drug use: Yes     Types: Marijuana     Comment: medical marijuana           Procedures  None performed    Lacey Catalan   Scribe Attestation      I,:  Lacey Catalan am acting as a scribe while in the presence of the attending physician.:       I,:  Laron Carter DO personally performed the services described in this documentation    as scribed in my presence.:

## 2024-12-10 ENCOUNTER — OFFICE VISIT (OUTPATIENT)
Dept: PHYSICAL THERAPY | Facility: CLINIC | Age: 69
End: 2024-12-10
Payer: MEDICARE

## 2024-12-10 DIAGNOSIS — G89.29 CHRONIC PAIN OF LEFT KNEE: ICD-10-CM

## 2024-12-10 DIAGNOSIS — Z96.652 S/P TOTAL KNEE ARTHROPLASTY, LEFT: Primary | ICD-10-CM

## 2024-12-10 DIAGNOSIS — R26.89 ANTALGIC GAIT: ICD-10-CM

## 2024-12-10 DIAGNOSIS — M25.562 CHRONIC PAIN OF LEFT KNEE: ICD-10-CM

## 2024-12-10 DIAGNOSIS — M17.12 PRIMARY OSTEOARTHRITIS OF LEFT KNEE: ICD-10-CM

## 2024-12-10 PROCEDURE — 97530 THERAPEUTIC ACTIVITIES: CPT | Performed by: PHYSICAL THERAPIST

## 2024-12-10 PROCEDURE — 97140 MANUAL THERAPY 1/> REGIONS: CPT | Performed by: PHYSICAL THERAPIST

## 2024-12-10 NOTE — PROGRESS NOTES
"Daily Note     Today's date: 12/10/2024  Patient name: Radha Pantoja  : 1955  MRN: [id number]  Referring provider: Laron Carter DO  Dx:   Encounter Diagnosis     ICD-10-CM    1. S/P total knee arthroplasty, left  Z96.652       2. Primary osteoarthritis of left knee  M17.12       3. Chronic pain of left knee  M25.562     G89.29       4. Antalgic gait  R26.89           Start Time: 810  Stop Time: 910  Total time in clinic (min): 60 minutes    Subjective: Patient reports that she had her post-operative bandaging removed. Patient's knee feels a little better and seems to be bending more easily.      Objective: See treatment diary below      Assessment: Tolerated treatment well. Patient demonstrated fatigue post treatment and would benefit from continued PT. Patient with gradually improving knee flexion > knee extension ROM. Patient with good eccentric control descending steps.      Plan: Continue per plan of care.  Progress treatment as tolerated.         POC expires Unit limit Auth  expiration date PT/OT + Visit Limit?   24 BOMN  BOMN      Sec - BOMN no auth           Visit/Unit Tracking  AUTH Status:  Date 11/1 11/5 11/7 11/15 11/19 11/22 11/29 12/3 12/5 12/10  10/29   N/A Used 13 14 15 16 17 18 19 20 21 22  12    Remaining                  Precautions: s/p left TKA (DOS: 24)      Manuals 11/29 12/3 12/5 12/10         Left knee PROM  JF SC GR         L calf str.  JF  GR         Pat mobs  JF  GR         Post-op RE GR            Neuro Re-Ed             Quad sets with heel prop Reviewed  10X 10x           SAQ             LAQ  3\"x30 3\" 3x 10           Kesier: TKE   NV          Supine SLR   5x AAROM  2x10                                   Ther Ex             Patient education: pathophysiology, graded activity, edema management, wound care, gait training, rehab / surgical overview GR   HEP review         Cardiovascular warm-up, knee ROM: Nustep, bike    Rec bike 10 min         Ankle " "pumps Reviewed            HS stretch at step  3x30\" 3x 30\"           Long-sitting calf str. With heel prop Reviewed   SB calf str. 5x30\"         Heel slides Reviewed 5\"x30 5\" 3x 10            Knee flexion stretch on stairs    10\" x 10           HR             Seated knee extension LLPS Reviewed  10\"X10           Seated knee flexion AAROM Reviewed            QS  5\"x30           Standing h/s curl    3x10                                   Ther Activity             Squats  3x10 3x 10           FSU  6\" 3x10 6\" 3x 10  6\" 3x10         FSD    4\" 2x10         Total gym: squats    L22 3x10         Gait Training                                       Modalities                                         Access Code: [email or web address]  URL: [email or web address]  Date: 11/29/2024  Prepared by: Matthew Marie    Exercises  - Ankle Pumps in Elevation  - 3-5 x daily - 7 x weekly - 5-10 minutes hold  - Supine Heel Slide with Strap  - 3-5 x daily - 7 x weekly - 2 sets - 10 reps - 10 seconds hold  - Long Sitting Calf Stretch with Strap  - 3-5 x daily - 7 x weekly - 5 sets - 1 reps - 30 seconds hold  - Long Sitting Quad Set with Towel Roll Under Heel  - 3-5 x daily - 7 x weekly - 2-3 sets - 10 reps - 5 seconds hold  - Seated Knee Flexion Stretch  - 3-5 x daily - 7 x weekly - 2 sets - 10 reps - 10 seconds hold  - Seated Knee Extension Stretch with Chair  - 3-5 x daily - 7 x weekly - 1 sets - 5-10 minutes hold         "

## 2024-12-12 ENCOUNTER — OFFICE VISIT (OUTPATIENT)
Dept: PHYSICAL THERAPY | Facility: CLINIC | Age: 69
End: 2024-12-12
Payer: MEDICARE

## 2024-12-12 DIAGNOSIS — G89.29 CHRONIC PAIN OF LEFT KNEE: ICD-10-CM

## 2024-12-12 DIAGNOSIS — M25.562 CHRONIC PAIN OF LEFT KNEE: ICD-10-CM

## 2024-12-12 DIAGNOSIS — M17.12 PRIMARY OSTEOARTHRITIS OF LEFT KNEE: ICD-10-CM

## 2024-12-12 DIAGNOSIS — Z96.652 S/P TOTAL KNEE ARTHROPLASTY, LEFT: Primary | ICD-10-CM

## 2024-12-12 DIAGNOSIS — R26.89 ANTALGIC GAIT: ICD-10-CM

## 2024-12-12 PROCEDURE — 97110 THERAPEUTIC EXERCISES: CPT | Performed by: PHYSICAL THERAPIST

## 2024-12-12 PROCEDURE — 97140 MANUAL THERAPY 1/> REGIONS: CPT | Performed by: PHYSICAL THERAPIST

## 2024-12-12 NOTE — PROGRESS NOTES
"Daily Note     Today's date: 2024  Patient name: Radha Pantoja  : 1955  MRN: [id number]  Referring provider: Laron Carter DO  Dx:   Encounter Diagnosis     ICD-10-CM    1. S/P total knee arthroplasty, left  Z96.652       2. Primary osteoarthritis of left knee  M17.12       3. Chronic pain of left knee  M25.562     G89.29       4. Antalgic gait  R26.89           Start Time: 845  Stop Time: 945  Total time in clinic (min): 60 minutes    Subjective: Patient reports that her left calf has been sore.      Objective: See treatment diary below      Assessment: Tolerated treatment well. Patient demonstrated fatigue post treatment and would benefit from continued PT. Discussed with Patient recognizing signs/symptoms of DVT. No signs or symptoms present at this time. Reeducated Patient on proper gait and possibly using SPC to promote better quality of movement rather than develop compensatory habits.      Plan: Continue per plan of care.  Progress treatment as tolerated.         POC expires Unit limit Auth  expiration date PT/OT + Visit Limit?   24 BOMN  BOMN      Sec - BOMN no auth           Visit/Unit Tracking  AUTH Status:  Date 11/1 11/5 11/7 11/15 11/19 11/22 11/29 12/3 12/5 12/10 12/12    N/A Used 13 14 15 16 17 18 19 20 21 22 23     Remaining                  Precautions: s/p left TKA (DOS: 24)      Manuals 11/29 12/3 12/5 12/10 12/12        Left knee PROM  JF SC GR GR        L calf str.  JF  GR GR        Pat mobs  JF  GR GR        Post-op RE GR            Neuro Re-Ed             Quad sets with heel prop Reviewed  10X 10x           SAQ             LAQ  3\"x30 3\" 3x 10           Kesier: TKE   NV          Supine SLR   5x AAROM  2x10                                   Ther Ex             Patient education: pathophysiology, graded activity, edema management, wound care, gait training, rehab / surgical overview GR   HEP review         Cardiovascular warm-up, knee ROM: Nustep, bike    " "Rec bike 10 min Rec bike 12 min        Ankle pumps Reviewed            HS stretch at step  3x30\" 3x 30\"           Long-sitting calf str. With heel prop Reviewed   SB calf str. 5x30\" SB calf str. 5x30\"        Heel slides Reviewed 5\"x30 5\" 3x 10            Knee flexion stretch on stairs    10\" x 10           HR             Seated knee extension LLPS Reviewed  10\"X10           Seated knee flexion AAROM Reviewed            QS  5\"x30           Standing h/s curl    3x10         Prone hang     5 min                     Ther Activity             Squats  3x10 3x 10           FSU  6\" 3x10 6\" 3x 10  6\" 3x10         FSD    4\" 2x10 6\" 2x10        Total gym: squats    L22 3x10 L22 3x10        Gait Training                                       Modalities                                         Access Code: [email or web address]  URL: [email or web address]  Date: 11/29/2024  Prepared by: Matthew Roll    Exercises  - Ankle Pumps in Elevation  - 3-5 x daily - 7 x weekly - 5-10 minutes hold  - Supine Heel Slide with Strap  - 3-5 x daily - 7 x weekly - 2 sets - 10 reps - 10 seconds hold  - Long Sitting Calf Stretch with Strap  - 3-5 x daily - 7 x weekly - 5 sets - 1 reps - 30 seconds hold  - Long Sitting Quad Set with Towel Roll Under Heel  - 3-5 x daily - 7 x weekly - 2-3 sets - 10 reps - 5 seconds hold  - Seated Knee Flexion Stretch  - 3-5 x daily - 7 x weekly - 2 sets - 10 reps - 10 seconds hold  - Seated Knee Extension Stretch with Chair  - 3-5 x daily - 7 x weekly - 1 sets - 5-10 minutes hold           "

## 2024-12-13 ENCOUNTER — OFFICE VISIT (OUTPATIENT)
Dept: PHYSICAL THERAPY | Facility: CLINIC | Age: 69
End: 2024-12-13
Payer: MEDICARE

## 2024-12-13 DIAGNOSIS — M17.12 PRIMARY OSTEOARTHRITIS OF LEFT KNEE: ICD-10-CM

## 2024-12-13 DIAGNOSIS — R26.89 ANTALGIC GAIT: ICD-10-CM

## 2024-12-13 DIAGNOSIS — G89.29 CHRONIC PAIN OF LEFT KNEE: ICD-10-CM

## 2024-12-13 DIAGNOSIS — M25.562 CHRONIC PAIN OF LEFT KNEE: ICD-10-CM

## 2024-12-13 DIAGNOSIS — Z96.652 S/P TOTAL KNEE ARTHROPLASTY, LEFT: Primary | ICD-10-CM

## 2024-12-13 PROCEDURE — 97140 MANUAL THERAPY 1/> REGIONS: CPT | Performed by: PHYSICAL THERAPIST

## 2024-12-13 PROCEDURE — 97112 NEUROMUSCULAR REEDUCATION: CPT | Performed by: PHYSICAL THERAPIST

## 2024-12-13 PROCEDURE — 97110 THERAPEUTIC EXERCISES: CPT | Performed by: PHYSICAL THERAPIST

## 2024-12-13 NOTE — PROGRESS NOTES
"Daily Note     Today's date: 2024  Patient name: Radha Pantoja  : 1955  MRN: [id number]  Referring provider: Laron Carter DO  Dx:   Encounter Diagnosis     ICD-10-CM    1. S/P total knee arthroplasty, left  Z96.652       2. Primary osteoarthritis of left knee  M17.12       3. Chronic pain of left knee  M25.562     G89.29       4. Antalgic gait  R26.89           Start Time: 0900  Stop Time: 09  Total time in clinic (min): 45 minutes    Subjective: Patient reports that she was very sore after her last visit.      Objective: See treatment diary below      Assessment: Tolerated treatment well. Patient demonstrated fatigue post treatment and would benefit from continued PT. Patient with fair tolerance for gentle strengthening and neuro reeducation. Patient's incision healing well.      Plan: Continue per plan of care.  Progress treatment as tolerated.         POC expires Unit limit Auth  expiration date PT/OT + Visit Limit?   24 BOMN  BOMN      Sec - BOMN no auth           Visit/Unit Tracking  AUTH Status:  Date 11/1 11/5 11/7 11/15 11/19 11/22 11/29 12/3 12/5 12/10 12/12 12/13   N/A Used 13 14 15 16 17 18 19 20 21 22 23 24    Remaining                  Precautions: s/p left TKA (DOS: 24)      Manuals 11/29 12/3 12/5 12/10 12/12 12/13       Left knee PROM  JF SC GR GR GR       L calf str.  JF  GR GR GR       Pat mobs  JF  GR GR GR       Post-op RE GR            Neuro Re-Ed             Quad sets with heel prop Reviewed  10X 10x           SAQ      2# 2x10 5\"       LAQ  3\"x30 3\" 3x 10    2# 3x10       Kesier: TKE   NV   5# 20x5\"       Supine SLR   5x AAROM  2x10                                   Ther Ex             Patient education: pathophysiology, graded activity, edema management, wound care, gait training, rehab / surgical overview GR   HEP review         Cardiovascular warm-up, knee ROM: Nustep, bike    Rec bike 10 min Rec bike 12 min Rec bike 10 min       Ankle pumps " "Reviewed            HS stretch at step  3x30\" 3x 30\"           Long-sitting calf str. With heel prop Reviewed   SB calf str. 5x30\" SB calf str. 5x30\"        Heel slides Reviewed 5\"x30 5\" 3x 10            Knee flexion stretch on stairs    10\" x 10           HR             Seated knee extension LLPS Reviewed  10\"X10           Seated knee flexion AAROM Reviewed            QS  5\"x30           Standing h/s curl    3x10         Prone hang     5 min        Standing hip abd, ext with TB      YTB 2x10 ea. B/l       Ther Activity             Squats  3x10 3x 10           FSU  6\" 3x10 6\" 3x 10  6\" 3x10         FSD    4\" 2x10 6\" 2x10        Total gym: squats    L22 3x10 L22 3x10        Gait Training                                       Modalities                                         Access Code: [email or web address]  URL: [email or web address]  Date: 11/29/2024  Prepared by: Matthew Roll    Exercises  - Ankle Pumps in Elevation  - 3-5 x daily - 7 x weekly - 5-10 minutes hold  - Supine Heel Slide with Strap  - 3-5 x daily - 7 x weekly - 2 sets - 10 reps - 10 seconds hold  - Long Sitting Calf Stretch with Strap  - 3-5 x daily - 7 x weekly - 5 sets - 1 reps - 30 seconds hold  - Long Sitting Quad Set with Towel Roll Under Heel  - 3-5 x daily - 7 x weekly - 2-3 sets - 10 reps - 5 seconds hold  - Seated Knee Flexion Stretch  - 3-5 x daily - 7 x weekly - 2 sets - 10 reps - 10 seconds hold  - Seated Knee Extension Stretch with Chair  - 3-5 x daily - 7 x weekly - 1 sets - 5-10 minutes hold             "

## 2024-12-16 ENCOUNTER — TELEPHONE (OUTPATIENT)
Dept: OBGYN CLINIC | Facility: HOSPITAL | Age: 69
End: 2024-12-16

## 2024-12-16 NOTE — TELEPHONE ENCOUNTER
VM left for patient. Advised no creams, ointments, lotions directly on incision until seen in OFFICE on 1/6.

## 2024-12-16 NOTE — TELEPHONE ENCOUNTER
Caller: pt     Doctor: Raul     TKA- 11/26    Reason for call: pt would like to know if there is anything she can use to put on the incision states it is dry and itchy. No other issues        Call back#: [phone]

## 2024-12-17 ENCOUNTER — OFFICE VISIT (OUTPATIENT)
Dept: PHYSICAL THERAPY | Facility: CLINIC | Age: 69
End: 2024-12-17
Payer: MEDICARE

## 2024-12-17 DIAGNOSIS — M17.12 PRIMARY OSTEOARTHRITIS OF LEFT KNEE: ICD-10-CM

## 2024-12-17 DIAGNOSIS — Z96.652 S/P TOTAL KNEE ARTHROPLASTY, LEFT: Primary | ICD-10-CM

## 2024-12-17 DIAGNOSIS — G89.29 CHRONIC PAIN OF LEFT KNEE: ICD-10-CM

## 2024-12-17 DIAGNOSIS — R26.89 ANTALGIC GAIT: ICD-10-CM

## 2024-12-17 DIAGNOSIS — M25.562 CHRONIC PAIN OF LEFT KNEE: ICD-10-CM

## 2024-12-17 PROCEDURE — 97110 THERAPEUTIC EXERCISES: CPT

## 2024-12-17 PROCEDURE — 97530 THERAPEUTIC ACTIVITIES: CPT

## 2024-12-17 PROCEDURE — 97112 NEUROMUSCULAR REEDUCATION: CPT

## 2024-12-19 ENCOUNTER — OFFICE VISIT (OUTPATIENT)
Dept: PHYSICAL THERAPY | Facility: CLINIC | Age: 69
End: 2024-12-19
Payer: MEDICARE

## 2024-12-19 DIAGNOSIS — M17.12 PRIMARY OSTEOARTHRITIS OF LEFT KNEE: ICD-10-CM

## 2024-12-19 DIAGNOSIS — M25.562 CHRONIC PAIN OF LEFT KNEE: ICD-10-CM

## 2024-12-19 DIAGNOSIS — Z96.652 S/P TOTAL KNEE ARTHROPLASTY, LEFT: Primary | ICD-10-CM

## 2024-12-19 DIAGNOSIS — R26.89 ANTALGIC GAIT: ICD-10-CM

## 2024-12-19 DIAGNOSIS — G89.29 CHRONIC PAIN OF LEFT KNEE: ICD-10-CM

## 2024-12-19 PROCEDURE — 97530 THERAPEUTIC ACTIVITIES: CPT | Performed by: PHYSICAL THERAPIST

## 2024-12-19 PROCEDURE — 97140 MANUAL THERAPY 1/> REGIONS: CPT | Performed by: PHYSICAL THERAPIST

## 2024-12-19 NOTE — PROGRESS NOTES
"Daily Note     Today's date: 2024  Patient name: Radha Pantoja  : 1955  MRN: [id number]  Referring provider: Laron Carter DO  Dx:   Encounter Diagnosis     ICD-10-CM    1. S/P total knee arthroplasty, left  Z96.652       2. Primary osteoarthritis of left knee  M17.12       3. Chronic pain of left knee  M25.562     G89.29       4. Antalgic gait  R26.89           Start Time: 0845  Stop Time: 0940  Total time in clinic (min): 55 minutes    Subjective: Patient reports that she has been on her feet baking a lot and feels sore.      Objective: See treatment diary below      Assessment: Tolerated treatment fair. Patient demonstrated fatigue post treatment and would benefit from continued PT. Reeducated Patient that increasing activity in the home is an important part of recovery, but she should be emphasizing her stretching to maximize her ROM as well. Patient with improved ability to negotiate steps. Patient remains short of terminal knee extension.      Plan: Continue per plan of care.  Progress treatment as tolerated.         POC expires Unit limit Auth  expiration date PT/OT + Visit Limit?   24 BOMN  BOMN      Sec - BOMN no auth           Visit/Unit Tracking  AUTH Status:  Date 12/17 12/19  11/15 11/19 11/22 11/29 12/3 12/5 12/10 12/12 12/13   N/A Used 25 26  16 17 18 19 20 21 22 23 24    Remaining                  Precautions: s/p left TKA (DOS: 24)      Manuals 11/29 12/3 12/5 12/10 12/12 12/13 12/17 12/19     Left knee PROM  JF SC GR GR GR SC GR     L calf str.  JF  GR GR GR  GR     Pat mobs  JF  GR GR GR  GR     Post-op RE GR            Neuro Re-Ed             Quad sets with heel prop Reviewed  10X 10x           SAQ      2# 2x10 5\" 2# 2x 10      LAQ  3\"x30 3\" 3x 10    2# 3x10 2# 3x 10       Kesier: TKE   NV   5# 20x5\"       Supine SLR   5x AAROM  2x10    2x10                               Ther Ex             Patient education: pathophysiology, graded activity, edema " "management, wound care, gait training, rehab / surgical overview GR   HEP review         Cardiovascular warm-up, knee ROM: Nustep, bike    Rec bike 10 min Rec bike 12 min Rec bike 10 min Rec bike 10 min fwd rotation  Rec bike 10 min     Ankle pumps Reviewed            HS stretch at step  3x30\" 3x 30\"           Long-sitting calf str. With heel prop Reviewed   SB calf str. 5x30\" SB calf str. 5x30\"   Prostretch 5x30\"     Heel slides Reviewed 5\"x30 5\" 3x 10            Knee flexion stretch on stairs    10\" x 10           HR             Seated knee extension LLPS Reviewed  10\"X10           Seated knee flexion AAROM Reviewed            QS  5\"x30           Standing h/s curl    3x10         Prone hang     5 min        Standing hip abd, ext with TB      YTB 2x10 ea. B/l YTB 2x 10       Supine hip flexor str.        5x30\"     Ther Activity             Squats  3x10 3x 10           FSU  6\" 3x10 6\" 3x 10  6\" 3x10    8\" 3x10     FSD    4\" 2x10 6\" 2x10  6\" 2x 10  8\" 2x10     Total gym: squats    L22 3x10 L22 3x10 L22 3x 10  L22 3x 10  L22 3x10     Gait Training                                       Modalities                                         Access Code: [email or web address]  URL: [email or web address]  Date: 11/29/2024  Prepared by: Matthew Marie    Exercises  - Ankle Pumps in Elevation  - 3-5 x daily - 7 x weekly - 5-10 minutes hold  - Supine Heel Slide with Strap  - 3-5 x daily - 7 x weekly - 2 sets - 10 reps - 10 seconds hold  - Long Sitting Calf Stretch with Strap  - 3-5 x daily - 7 x weekly - 5 sets - 1 reps - 30 seconds hold  - Long Sitting Quad Set with Towel Roll Under Heel  - 3-5 x daily - 7 x weekly - 2-3 sets - 10 reps - 5 seconds hold  - Seated Knee Flexion Stretch  - 3-5 x daily - 7 x weekly - 2 sets - 10 reps - 10 seconds hold  - Seated Knee Extension Stretch with Chair  - 3-5 x daily - 7 x weekly - 1 sets - 5-10 minutes hold                 "

## 2024-12-20 ENCOUNTER — APPOINTMENT (OUTPATIENT)
Dept: PHYSICAL THERAPY | Facility: CLINIC | Age: 69
End: 2024-12-20
Payer: MEDICARE

## 2024-12-23 NOTE — PROGRESS NOTES
"Daily Note     Today's date: 2024  Patient name: Radha Pantoja  : 1955  MRN: [id number]  Referring provider: Laron Carter DO  Dx:   Encounter Diagnosis     ICD-10-CM    1. S/P total knee arthroplasty, left  Z96.652       2. Primary osteoarthritis of left knee  M17.12       3. Chronic pain of left knee  M25.562     G89.29       4. Antalgic gait  R26.89                      Subjective: ***      Objective: See treatment diary below      Assessment: Tolerated treatment {Tolerated treatment :7180072028}. Patient {assessment:7166110206}      Plan: {PLAN:8402842115}       POC expires Unit limit Auth  expiration date PT/OT + Visit Limit?   24 BOMN  BOMN      Sec - BOMN no auth           Visit/Unit Tracking  AUTH Status:  Date 12/17 12/19  11/15 11/19 11/22 11/29 12/3 12/5 12/10 12/12 12/13   N/A Used 25 26  16 17 18 19 20 21 22 23 24    Remaining                  Precautions: s/p left TKA (DOS: 24)      Manuals 11/29 12/3 12/5 12/10 12/12 12/13 12/17 12/19     Left knee PROM  JF SC GR GR GR SC GR     L calf str.  JF  GR GR GR  GR     Pat mobs  JF  GR GR GR  GR     Post-op RE GR            Neuro Re-Ed             Quad sets with heel prop Reviewed  10X 10x           SAQ      2# 2x10 5\" 2# 2x 10      LAQ  3\"x30 3\" 3x 10    2# 3x10 2# 3x 10       Kesier: TKE   NV   5# 20x5\"       Supine SLR   5x AAROM  2x10    2x10                               Ther Ex             Patient education: pathophysiology, graded activity, edema management, wound care, gait training, rehab / surgical overview GR   HEP review         Cardiovascular warm-up, knee ROM: Nustep, bike    Rec bike 10 min Rec bike 12 min Rec bike 10 min Rec bike 10 min fwd rotation  Rec bike 10 min     Ankle pumps Reviewed            HS stretch at step  3x30\" 3x 30\"           Long-sitting calf str. With heel prop Reviewed   SB calf str. 5x30\" SB calf str. 5x30\"   Prostretch 5x30\"     Heel slides Reviewed 5\"x30 5\" 3x 10            Knee " "flexion stretch on stairs    10\" x 10           HR             Seated knee extension LLPS Reviewed  10\"X10           Seated knee flexion AAROM Reviewed            QS  5\"x30           Standing h/s curl    3x10         Prone hang     5 min        Standing hip abd, ext with TB      YTB 2x10 ea. B/l YTB 2x 10       Supine hip flexor str.        5x30\"     Ther Activity             Squats  3x10 3x 10           FSU  6\" 3x10 6\" 3x 10  6\" 3x10    8\" 3x10     FSD    4\" 2x10 6\" 2x10  6\" 2x 10  8\" 2x10     Total gym: squats    L22 3x10 L22 3x10 L22 3x 10  L22 3x 10  L22 3x10     Gait Training                                       Modalities                                         Access Code: [email or web address]  URL: [email or web address]  Date: 11/29/2024  Prepared by: Matthew Marie    Exercises  - Ankle Pumps in Elevation  - 3-5 x daily - 7 x weekly - 5-10 minutes hold  - Supine Heel Slide with Strap  - 3-5 x daily - 7 x weekly - 2 sets - 10 reps - 10 seconds hold  - Long Sitting Calf Stretch with Strap  - 3-5 x daily - 7 x weekly - 5 sets - 1 reps - 30 seconds hold  - Long Sitting Quad Set with Towel Roll Under Heel  - 3-5 x daily - 7 x weekly - 2-3 sets - 10 reps - 5 seconds hold  - Seated Knee Flexion Stretch  - 3-5 x daily - 7 x weekly - 2 sets - 10 reps - 10 seconds hold  - Seated Knee Extension Stretch with Chair  - 3-5 x daily - 7 x weekly - 1 sets - 5-10 minutes hold                 "

## 2024-12-24 ENCOUNTER — APPOINTMENT (OUTPATIENT)
Dept: PHYSICAL THERAPY | Facility: CLINIC | Age: 69
End: 2024-12-24
Payer: MEDICARE

## 2024-12-24 DIAGNOSIS — G89.29 CHRONIC PAIN OF LEFT KNEE: ICD-10-CM

## 2024-12-24 DIAGNOSIS — M25.562 CHRONIC PAIN OF LEFT KNEE: ICD-10-CM

## 2024-12-24 DIAGNOSIS — M17.12 PRIMARY OSTEOARTHRITIS OF LEFT KNEE: ICD-10-CM

## 2024-12-24 DIAGNOSIS — R26.89 ANTALGIC GAIT: ICD-10-CM

## 2024-12-24 DIAGNOSIS — Z96.652 S/P TOTAL KNEE ARTHROPLASTY, LEFT: Primary | ICD-10-CM

## 2024-12-26 ENCOUNTER — OFFICE VISIT (OUTPATIENT)
Dept: PHYSICAL THERAPY | Facility: CLINIC | Age: 69
End: 2024-12-26
Payer: MEDICARE

## 2024-12-26 DIAGNOSIS — Z96.652 S/P TOTAL KNEE ARTHROPLASTY, LEFT: Primary | ICD-10-CM

## 2024-12-26 DIAGNOSIS — M25.562 CHRONIC PAIN OF LEFT KNEE: ICD-10-CM

## 2024-12-26 DIAGNOSIS — R26.89 ANTALGIC GAIT: ICD-10-CM

## 2024-12-26 DIAGNOSIS — G89.29 CHRONIC PAIN OF LEFT KNEE: ICD-10-CM

## 2024-12-26 DIAGNOSIS — M17.12 PRIMARY OSTEOARTHRITIS OF LEFT KNEE: ICD-10-CM

## 2024-12-26 PROCEDURE — 97530 THERAPEUTIC ACTIVITIES: CPT

## 2024-12-26 PROCEDURE — 97110 THERAPEUTIC EXERCISES: CPT

## 2024-12-26 PROCEDURE — 97140 MANUAL THERAPY 1/> REGIONS: CPT

## 2024-12-26 NOTE — PROGRESS NOTES
"Daily Note     Today's date: 2024  Patient name: Radha Pantoja  : 1955  MRN: [id number]  Referring provider: Laron Carter DO  Dx:   Encounter Diagnosis     ICD-10-CM    1. S/P total knee arthroplasty, left  Z96.652       2. Primary osteoarthritis of left knee  M17.12       3. Chronic pain of left knee  M25.562     G89.29       4. Antalgic gait  R26.89                      Subjective: Pt states the knee has been feeling very achey and sore as she was on her feet a lot last week.      Objective: See treatment diary below      Assessment: Tolerated treatment well. Educated pt on typical post-op rehabilitation course and physiological responses to exercise amidst tissue healing, pt verbalized understanding.  Pt able to complete all exercises today with min cueing for technique.  Decreased step height during FSD d/t increase pain levels today.  Patient would benefit from continued PT      Plan: Continue per plan of care.        POC expires Unit limit Auth  expiration date PT/OT + Visit Limit?   24 BOMN  BOMN      Sec - BOMN no auth           Visit/Unit Tracking  AUTH Status:  Date 12/17 12/19 12/26 11/15 11/19 11/22 11/29 12/3 12/5 12/10 12/12 12/13   N/A Used 25 26 27 16 17 18 19 20 21 22 23 24    Remaining                  Precautions: s/p left TKA (DOS: 24)      Manuals 11/29 12/3 12/5 12/10 12/12 12/13 12/17 12/19 12/26    Left knee PROM  JF SC GR GR GR SC GR JK    L calf str.  JF  GR GR GR  GR JK    Pat mobs  JF  GR GR GR  GR JK    Post-op RE GR            Neuro Re-Ed             Quad sets with heel prop Reviewed  10X 10x           SAQ      2# 2x10 5\" 2# 2x 10      LAQ  3\"x30 3\" 3x 10    2# 3x10 2# 3x 10       Kesier: TKE   NV   5# 20x5\"       Supine SLR   5x AAROM  2x10    2x10 2x10                              Ther Ex             Patient education: pathophysiology, graded activity, edema management, wound care, gait training, rehab / surgical overview GR   HEP review     JK " "post-op recovery, physiological response to exercise    Cardiovascular warm-up, knee ROM: Nustep, bike    Rec bike 10 min Rec bike 12 min Rec bike 10 min Rec bike 10 min fwd rotation  Rec bike 10 min Rec bike 7 min    Ankle pumps Reviewed            HS stretch at step  3x30\" 3x 30\"           Long-sitting calf str. With heel prop Reviewed   SB calf str. 5x30\" SB calf str. 5x30\"   Prostretch 5x30\" Prostretch 5x30\"    Heel slides Reviewed 5\"x30 5\" 3x 10            Knee flexion stretch on stairs    10\" x 10           HR             Seated knee extension LLPS Reviewed  10\"X10           Seated knee flexion AAROM Reviewed            QS  5\"x30           Standing h/s curl    3x10         Prone hang     5 min        Standing hip abd, ext with TB      YTB 2x10 ea. B/l YTB 2x 10       Supine hip flexor str.        5x30\" 5x30\" w/ strap    Ther Activity             Squats  3x10 3x 10           FSU  6\" 3x10 6\" 3x 10  6\" 3x10    8\" 3x10 8\" 3x10    FSD    4\" 2x10 6\" 2x10  6\" 2x 10  8\" 2x10 6'' 2x10    Total gym: squats    L22 3x10 L22 3x10 L22 3x 10  L22 3x 10  L22 3x10 L22 3x10    Gait Training                                       Modalities                                         Access Code: [email or web address]  URL: [email or web address]  Date: 11/29/2024  Prepared by: Matthew Roll    Exercises  - Ankle Pumps in Elevation  - 3-5 x daily - 7 x weekly - 5-10 minutes hold  - Supine Heel Slide with Strap  - 3-5 x daily - 7 x weekly - 2 sets - 10 reps - 10 seconds hold  - Long Sitting Calf Stretch with Strap  - 3-5 x daily - 7 x weekly - 5 sets - 1 reps - 30 seconds hold  - Long Sitting Quad Set with Towel Roll Under Heel  - 3-5 x daily - 7 x weekly - 2-3 sets - 10 reps - 5 seconds hold  - Seated Knee Flexion Stretch  - 3-5 x daily - 7 x weekly - 2 sets - 10 reps - 10 seconds hold  - Seated Knee Extension Stretch with Chair  - 3-5 x daily - 7 x weekly - 1 sets - 5-10 minutes hold                   "

## 2024-12-27 ENCOUNTER — TELEPHONE (OUTPATIENT)
Age: 69
End: 2024-12-27

## 2024-12-27 ENCOUNTER — EVALUATION (OUTPATIENT)
Dept: PHYSICAL THERAPY | Facility: CLINIC | Age: 69
End: 2024-12-27
Payer: MEDICARE

## 2024-12-27 ENCOUNTER — OFFICE VISIT (OUTPATIENT)
Dept: OBGYN CLINIC | Facility: CLINIC | Age: 69
End: 2024-12-27

## 2024-12-27 VITALS — HEIGHT: 65 IN | WEIGHT: 151 LBS | BODY MASS INDEX: 25.16 KG/M2

## 2024-12-27 DIAGNOSIS — Z96.652 S/P TOTAL KNEE ARTHROPLASTY, LEFT: Primary | ICD-10-CM

## 2024-12-27 DIAGNOSIS — R26.89 ANTALGIC GAIT: ICD-10-CM

## 2024-12-27 DIAGNOSIS — M17.12 PRIMARY OSTEOARTHRITIS OF LEFT KNEE: ICD-10-CM

## 2024-12-27 DIAGNOSIS — M79.2 NERVE PAIN: ICD-10-CM

## 2024-12-27 DIAGNOSIS — M25.562 CHRONIC PAIN OF LEFT KNEE: ICD-10-CM

## 2024-12-27 DIAGNOSIS — G89.29 CHRONIC PAIN OF LEFT KNEE: ICD-10-CM

## 2024-12-27 DIAGNOSIS — M24.562 FLEXION CONTRACTURE OF LEFT KNEE: ICD-10-CM

## 2024-12-27 DIAGNOSIS — M24.662 FIBROSIS OF LEFT KNEE JOINT: ICD-10-CM

## 2024-12-27 PROCEDURE — 97110 THERAPEUTIC EXERCISES: CPT | Performed by: PHYSICAL THERAPIST

## 2024-12-27 PROCEDURE — 99024 POSTOP FOLLOW-UP VISIT: CPT | Performed by: ORTHOPAEDIC SURGERY

## 2024-12-27 RX ORDER — GABAPENTIN 300 MG/1
300 CAPSULE ORAL 4 TIMES DAILY
Qty: 360 CAPSULE | Refills: 3 | Status: SHIPPED | OUTPATIENT
Start: 2024-12-27

## 2024-12-27 RX ORDER — MELOXICAM 15 MG/1
15 TABLET ORAL DAILY
Qty: 30 TABLET | Refills: 0 | Status: SHIPPED | OUTPATIENT
Start: 2024-12-27

## 2024-12-27 NOTE — PROGRESS NOTES
PT Re-Evaluation     Today's date: 2024  Patient name: Radha Pantoja  : 1955  MRN: [id number]  Referring provider: Laron Carter DO  Dx:   Encounter Diagnosis     ICD-10-CM    1. S/P total knee arthroplasty, left  Z96.652       2. Primary osteoarthritis of left knee  M17.12       3. Chronic pain of left knee  M25.562     G89.29       4. Antalgic gait  R26.89           Start Time: 820  Stop Time: 945  Total time in clinic (min): 85 minutes    Assessment  Impairments: abnormal gait, abnormal muscle firing, abnormal or restricted ROM, activity intolerance, impaired balance, impaired physical strength, lacks appropriate home exercise program, pain with function, safety issue, weight-bearing intolerance and poor body mechanics    Assessment details: Radha Pantoja is a 69 y.o. female who presents with h/o chronic left knee pain having failed conservative treatment to date. Patient currently presents s/p elective left total knee arthroplasty, DOS: 24. Patient denies experiencing acute-postoperative complications. Patient's pain continues to be managed with prescribed medication. Post-operatively, Patient has maintained excellent compliance with established POC and has attended a total number of 10 physical therapy appointments. Patient has made significant improvements overall, including decreased pain, improved quality of gait, increased knee ROM, decreased joint effusion, increased LE strength, and improved overall activity tolerance. Patient's greatest limitation to this point remains significantly limited terminal knee extension, contributing to antalgic gait. Patient has limited knee flexion as well, in addition to moderate joint effusion and LE weakness. Patient challenged with negotiating steps. Patient presents with significant TTP over calf with positive Day's, new varicose veins over medial knee and popliteal space, as well as continued swelling. Contact surgeon to  recommend Candy Splint for possible knee extension contracture and US to r/o DVT. Patient would benefit from skilled physical therapy to address their aforementioned impairments, improve their level of function and to improve their overall quality of life.  Understanding of Dx/Px/POC: excellent     Prognosis: excellent    Goals  Short Term Goals: to be achieved by 4 weeks   1) Patient to be independent with basic HEP. MET  2) Decrease pain by 4/10 at its worst. PROGRESSED, BUT NOT MET  3) Increase knee flexion ROM to 110 deg. PROGRESSED, BUT NOT MET  4) Increase knee extension ROM by > 5 deg. NOT MET  5) Increase LE strength by 1/2 MMT grade in all deficient planes. MET  6) Patient to negotiate steps with a reciprocal pattern with use of HR. PROGRESSED, BUT NOT MET  7) Patient to report decreased sleep interruption secondary to pain. NOT MET  8) Increase ambulatory tolerance by 10 min with LRAD. MET    Long Term Goals: to be achieved by discharge ALL GOALS PROGRESSING  1) FOTO equal to or greater than 71.  2) Patient to be independent with comprehensive HEP.  3) Abolish pain for improved quality of life.  4) Increase LE strength to 5/5 MMT grade in all planes to improve a/iadls.  5) Achieve full knee extension ROM to improve a/iadls.  6) Increase knee flexion ROM to within 5 deg of contralateral LE to improve a/iadls.  7) Patient to negotiate steps with a reciprocal pattern without use of Hr.  8) Increase ambulatory tolerance to 60 min to improve participation in social activities.  9) Patient to report no sleep interruption secondary to pain.  10) Increase TUG and 5x STS by > at least 3 seconds to improve a/iadls.      Plan  Patient would benefit from: skilled PT  Planned modality interventions: biofeedback, cryotherapy, electrical stimulation/Russian stimulation, TENS and low level laser therapy    Planned therapy interventions: activity modification, ADL retraining, ADL training, balance, balance/weight bearing  training, body mechanics training, dressing changes, functional ROM exercises, gait training, home exercise program, IADL retraining, joint mobilization, manual therapy, massage, neuromuscular re-education, patient education, self care, strengthening, stretching, therapeutic activities, therapeutic exercise and transfer training    Frequency: 2-3x week.  Duration in weeks: 8  Plan of Care beginning date: 2024  Plan of Care expiration date: 2025  Treatment plan discussed with: patient        Subjective Evaluation    History of Present Illness  Mechanism of injury: Patient reports that overall her left knee pain has been improved since surgery, which tends to be better with movement. Patient has increased pain when at rest. Patient's quality of sleep remains disrupted. Patient continues to have significant swelling in her knee despite icing it regularly. Patient has been having pain in her calf despite her  massaging it. Patient estimates her left knee overall level of function to be approximately 50% of her contralateral. Patient's next f/u appointment with her surgeon is scheduled for 25.   Patient Goals  Patient goals for therapy: decreased pain, increased motion, increased strength, independence with ADLs/IADLs, return to sport/leisure activities and return to work  Patient goal: improve quality of gait  Pain  Current pain ratin  At best pain ratin  At worst pain ratin  Location: left knee: diffuse  Quality: sharp and dull ache    Social Support    Employment status: working ( at [employer]: anticipated return date of 25)  Treatments  Previous treatment: physical therapy        Objective     Observations   Left Knee   Positive for edema, effusion and incision (clean, well approximated, good scar tissue mobility ; new onset of erythema emanating from distal incision ; increased warmth t/o knee).     Right Knee   Negative for edema, effusion and incision.     Additional  Observation Details  (+) varicose veins over medial knee and popliteal space    Active Range of Motion   Left Knee   Flexion: 93 degrees with pain  Extension: 20 degrees with pain    Right Knee   Flexion: 148 degrees   Extension: 0 degrees     Passive Range of Motion   Left Knee   Flexion: 98 degrees WFL  Extension: 18 degrees WFL    Right Knee   Flexion: 148 degrees   Extension: 0 degrees     Mobility   Patellar Mobility:   Left Knee   WFL: medial, lateral, superior and inferior.     Strength/Myotome Testing     Left Hip   Planes of Motion   Flexion: 4  Abduction: 4+    Right Hip   Planes of Motion   Flexion: 5    Left Knee   Flexion: 4+  Extension: 4- ((+) pain)  Quadriceps contraction: fair    Right Knee   Flexion: 5  Extension: 5    Left Ankle/Foot   Dorsiflexion: 5    Right Ankle/Foot   Dorsiflexion: 5    Tests   Left Ankle/Foot   Positive for Homans' sign.     Right Ankle/Foot   Negative for Homans' sign.     Ambulation   Weight-Bearing Status   Weight-Bearing Status (Left): weight-bearing as tolerated   Weight-Bearing Status (Right): full weight-bearing    Assistive device used: none    Ambulation: Level Surfaces   Ambulation without assistive device: independent    Observational Gait   Gait: antalgic     Additional Observational Gait Details  Increased left knee flexion during stance and swing phase    Functional Assessment        Comments  POST-OP REEVALUATION (11/29/24):   Bilateral squat: Mod I with standard walker, 0-40 deg, (+) pain  Timed Up and Go (TUG): 26.71 sec, Mod I ambulation with standard walker and sit to/from stand with bilateral arm rests  Five Times Sit to Stand Test: 13.69 sec, Mod I with b/l arm rests, left knee extended with increased weightbearing through left LE, good eccentric control    REEVALUATION (12/27/24):  Bilateral squat: Mod I, 0-60 deg  Timed Up and Go (TUG): 12.28 sec, Independent ambulation and sit to/from stand  Five Times Sit to Stand Test: 13.28 sec, Independent, good  "eccentric control, even weightbearing bilaterally ; 9.56 sec, Mod I with b/l arm rests  Forward step up 8\" step: Mod I with unilateral hand rail, antalgic  Forward step down 8\" step: Mod I with unilateral hand rail, fair eccentric control with increased weightbearing through UE ; 6\" step: Mod I with unilateral hand rail, good eccentric control               POC expires Unit limit Auth  expiration date PT/OT + Visit Limit?   2/21/24 BOMN  BOMN      Sec - BOMN no auth           Visit/Unit Tracking  AUTH Status:  Date 12/17 12/19 12/26 12/27  11/22 11/29 12/3 12/5 12/10 12/12 12/13   N/A Used 25 26 27 28  18 19 20 21 22 23 24    Remaining                  Precautions: s/p left TKA (DOS: 11/26/24)      Manuals 11/29 12/3 12/5 12/10 12/12 12/13 12/17 12/19 12/26 12/27   Left knee PROM  JF SC GR GR GR SC GR JK    L calf str.  JF  GR GR GR  GR JK    Pat mobs  JF  GR GR GR  GR JK    Post-op RE GR            Reassessment          GR   Neuro Re-Ed             Quad sets with heel prop Reviewed  10X 10x           SAQ      2# 2x10 5\" 2# 2x 10      LAQ  3\"x30 3\" 3x 10    2# 3x10 2# 3x 10       Kesier: TKE   NV   5# 20x5\"       Supine SLR   5x AAROM  2x10    2x10 2x10                              Ther Ex             Patient education: pathophysiology, graded activity, edema management, wound care, gait training, rehab / surgical overview, Candy Splint, US to r/o DVT GR   HEP review     JK post-op recovery, physiological response to exercise GR   Cardiovascular warm-up, knee ROM: Nustep, bike    Rec bike 10 min Rec bike 12 min Rec bike 10 min Rec bike 10 min fwd rotation  Rec bike 10 min Rec bike 7 min Rec bike 10 min   Ankle pumps Reviewed            HS stretch at step  3x30\" 3x 30\"           Long-sitting calf str. With heel prop Reviewed   SB calf str. 5x30\" SB calf str. 5x30\"   Prostretch 5x30\" Prostretch 5x30\" Calf str. Reviewed   Heel slides Reviewed 5\"x30 5\" 3x 10        Reviewed    Knee flexion stretch on stairs    10\" x 10   " "     Reviewed   HR             Seated knee extension LLPS Reviewed  10\"X10        Reviewed   Seated knee flexion AAROM Reviewed            QS  5\"x30           Standing h/s curl    3x10         Prone hang     5 min     Reviewed   Standing hip abd, ext with TB      YTB 2x10 ea. B/l YTB 2x 10       Supine hip flexor str.        5x30\" 5x30\" w/ strap    Ther Activity             Squats  3x10 3x 10           FSU  6\" 3x10 6\" 3x 10  6\" 3x10    8\" 3x10 8\" 3x10    FSD    4\" 2x10 6\" 2x10  6\" 2x 10  8\" 2x10 6'' 2x10    Total gym: squats    L22 3x10 L22 3x10 L22 3x 10  L22 3x 10  L22 3x10 L22 3x10    Gait Training                                       Modalities                                         Access Code: [email or web address]  URL: [email or web address]  Date: 11/29/2024  Prepared by: Matthew Marie    Exercises  - Ankle Pumps in Elevation  - 3-5 x daily - 7 x weekly - 5-10 minutes hold  - Supine Heel Slide with Strap  - 3-5 x daily - 7 x weekly - 2 sets - 10 reps - 10 seconds hold  - Long Sitting Calf Stretch with Strap  - 3-5 x daily - 7 x weekly - 5 sets - 1 reps - 30 seconds hold  - Long Sitting Quad Set with Towel Roll Under Heel  - 3-5 x daily - 7 x weekly - 2-3 sets - 10 reps - 5 seconds hold  - Seated Knee Flexion Stretch  - 3-5 x daily - 7 x weekly - 2 sets - 10 reps - 10 seconds hold  - Seated Knee Extension Stretch with Chair  - 3-5 x daily - 7 x weekly - 1 sets - 5-10 minutes hold       "

## 2024-12-27 NOTE — PROGRESS NOTES
Patient Name:  Radha Pantoja  MRN:  [id number]    Assessment & Plan     1. S/P total knee arthroplasty, left  -     meloxicam (MOBIC) 15 mg tablet; Take 1 tablet (15 mg total) by mouth daily  2. Flexion contracture of left knee      Approximately 4 week s/p Left total knee arthroplasty performed on 11/26/2024 with residual flexion contracture  Overall, patient doing well s/p surgical intervention despite recent set back with stiffness and medial knee pain  In regard to Left calf, patient is nontender and compressible without current pain with palpation.   The patient lacks 20 degrees of terminal knee extension despite PT and home exercises. Order placed today for Dynasplint to use to improve knee extension, 3 times daily for 3 hours each session. Would recommend using for at least 3 months.   Strongly encouraged patient to continue home exercises, specifically for knee extension and hamstring stretching 3 times daily. Encouraged patient to work on knee flexion to approximately 110 degrees.   Continue OTC medication, meloxicam and ice application  Follow up 01/06/2024. Call office if symptoms persist or worsen, if patient notices worsening erythema over the knee, shortness of breath or chest pain.    History of the Present Illness   Radha Pantoja is a 69 y.o. female approximately 4 week s/p Left total knee arthroplasty performed on 11/26/2024. Today, patient reports she has been feeling more stiff over the past week. She locates a few areas of light redness over the anterolateral knee with some tenderness. She also has some discomfort proximal medial tibia with touch. Patient admits she has shooting pain in this area, as well. She has been attending outpatient PT and reports calf pain with massage. She denies fevers, chills, chest pain or shortness of breath.          Review of Systems     Review of Systems   Constitutional:  Negative for chills and fever.   HENT:  Negative for congestion.   "  Respiratory:  Negative for cough, chest tightness and shortness of breath.    Cardiovascular:  Negative for chest pain and palpitations.   Gastrointestinal:  Negative for abdominal pain.   Endocrine: Negative for cold intolerance and heat intolerance.   Neurological:  Negative for syncope.   Psychiatric/Behavioral:  Negative for confusion.        Physical Exam     Ht 5' 5\" (1.651 m)   Wt 68.5 kg (151 lb)   BMI 25.13 kg/m²     Left Knee  Surgical incision well healing  Area of purple/red discoloration at anterolateral aspect of surgical incision  Range of motion from 15-20 to 100.    There is no effusion.    There is tenderness over the proximal medial tibia.    The patient is able to perform a straight leg raise   Calf soft, compressible and nontender   The patient is neurovascular intact distally.      Data Review     I have personally reviewed pertinent films in PACS, and my interpretation follows.    No new images     Social History     Tobacco Use    Smoking status: Never    Smokeless tobacco: Never    Tobacco comments:     Has medical marijuana card does vape   Vaping Use    Vaping status: Every Day    Substances: THC   Substance Use Topics    Alcohol use: Not Currently     Comment: recovering    Drug use: Yes     Types: Marijuana     Comment: medical marijuana           Procedures  None     Laron Carter,      "

## 2024-12-27 NOTE — TELEPHONE ENCOUNTER
Radha reports gabapentin was prescribed differently at last office visit than it had been in the past. She states it used to be prescribed to take 4 times per day as needed, is currently prescribed as take one capsule at bedtime.    She states the pharmacy dispensed 30 capsules. She states she needs to take this medication more than once per day since recent surgery.     She requests a new prescription to be sent to the pharmacy with previous instructions to take up to 4 times per day.

## 2024-12-31 ENCOUNTER — OFFICE VISIT (OUTPATIENT)
Dept: PHYSICAL THERAPY | Facility: CLINIC | Age: 69
End: 2024-12-31
Payer: MEDICARE

## 2024-12-31 DIAGNOSIS — M25.562 CHRONIC PAIN OF LEFT KNEE: ICD-10-CM

## 2024-12-31 DIAGNOSIS — Z96.652 S/P TOTAL KNEE ARTHROPLASTY, LEFT: Primary | ICD-10-CM

## 2024-12-31 DIAGNOSIS — M17.12 PRIMARY OSTEOARTHRITIS OF LEFT KNEE: ICD-10-CM

## 2024-12-31 DIAGNOSIS — G89.29 CHRONIC PAIN OF LEFT KNEE: ICD-10-CM

## 2024-12-31 DIAGNOSIS — R26.89 ANTALGIC GAIT: ICD-10-CM

## 2024-12-31 PROCEDURE — 97110 THERAPEUTIC EXERCISES: CPT | Performed by: PHYSICAL THERAPIST

## 2024-12-31 PROCEDURE — 97140 MANUAL THERAPY 1/> REGIONS: CPT | Performed by: PHYSICAL THERAPIST

## 2024-12-31 NOTE — PROGRESS NOTES
"Daily Note     Today's date: 2024  Patient name: Radha Pantoja  : 1955  MRN: [id number]  Referring provider: Laron Carter DO  Dx:   Encounter Diagnosis     ICD-10-CM    1. S/P total knee arthroplasty, left  Z96.652       2. Primary osteoarthritis of left knee  M17.12       3. Chronic pain of left knee  M25.562     G89.29       4. Antalgic gait  R26.89           Start Time: 815  Stop Time: 902  Total time in clinic (min): 47 minutes    Subjective: Patient reports that she followed up with her surgeon the day of her last appointment and he was not concerned about a blood clot. Patient was ordered a Candy Splint, but has yet to receive it.      Objective: See treatment diary below      Assessment: Tolerated treatment well. Patient demonstrated fatigue post treatment and would benefit from continued PT. Patient remains limited into extension > flexion. Discussed benefits of Candy Splint and importance of HEP adherence.      Plan: Continue per plan of care.  Progress treatment as tolerated.         POC expires Unit limit Auth  expiration date PT/OT + Visit Limit?   24 BOMN  BOMN      Sec - BOMN no auth           Visit/Unit Tracking  AUTH Status:  Date 12/17 12/19 12/26 12/27 12/31  11/29 12/3 12/5 12/10 12/12 12/13   N/A Used 25 26 27 28 29  19 20 21 22 23 24    Remaining                  Precautions: s/p left TKA (DOS: 24)      Manuals 12/31  12/5 12/10 12/12 12/13 12/17 12/19 12/26 12/27   Left knee PROM GR  SC GR GR GR SC GR JK    L calf str.    GR GR GR  GR JK    Pat mobs GR   GR GR GR  GR JK    Post-op RE             Reassessment          GR   Neuro Re-Ed             Quad sets with heel prop   10X 10x           SAQ      2# 2x10 5\" 2# 2x 10      LAQ   3\" 3x 10    2# 3x10 2# 3x 10       Kesier: TKE   NV   5# 20x5\"       Supine SLR   5x AAROM  2x10    2x10 2x10                              Ther Ex             Patient education: pathophysiology, graded activity, edema management, " "wound care, gait training, rehab / surgical overview, Candy Splint, US to r/o DVT    HEP review     JK post-op recovery, physiological response to exercise GR   Cardiovascular warm-up, knee ROM: Nustep, bike Rec bike 10 min   Rec bike 10 min Rec bike 12 min Rec bike 10 min Rec bike 10 min fwd rotation  Rec bike 10 min Rec bike 7 min Rec bike 10 min   Ankle pumps             HS stretch at step   3x 30\"           Long-sitting calf str. With heel prop    SB calf str. 5x30\" SB calf str. 5x30\"   Prostretch 5x30\" Prostretch 5x30\" Calf str. Reviewed   Heel slides   5\" 3x 10        Reviewed    Knee flexion stretch on stairs    10\" x 10        Reviewed   HR             Seated knee extension LLPS   10\"X10        Reviewed   Seated knee flexion AAROM             QS             Standing h/s curl    3x10         Prone hang     5 min     Reviewed   Standing hip abd, ext with TB      YTB 2x10 ea. B/l YTB 2x 10       Supine hip flexor str.        5x30\" 5x30\" w/ strap    Supine knee flexion AAROM against wall 15x10\"            Prone quad str. 5x30\"                                                                Ther Activity             Squats   3x 10           FSU   6\" 3x 10  6\" 3x10    8\" 3x10 8\" 3x10    FSD    4\" 2x10 6\" 2x10  6\" 2x 10  8\" 2x10 6'' 2x10    Total gym: squats    L22 3x10 L22 3x10 L22 3x 10  L22 3x 10  L22 3x10 L22 3x10    Gait Training                                       Modalities                                         Access Code: [email or web address]  URL: [email or web address]  Date: 11/29/2024  Prepared by: Matthew Roll    Exercises  - Ankle Pumps in Elevation  - 3-5 x daily - 7 x weekly - 5-10 minutes hold  - Supine Heel Slide with Strap  - 3-5 x daily - 7 x weekly - 2 sets - 10 reps - 10 seconds hold  - Long Sitting Calf Stretch with Strap  - 3-5 x daily - 7 x weekly - 5 sets - 1 reps - 30 seconds hold  - Long Sitting Quad Set with Towel Roll Under Heel  - 3-5 x daily - 7 x weekly - 2-3 sets - 10 reps - 5 " seconds hold  - Seated Knee Flexion Stretch  - 3-5 x daily - 7 x weekly - 2 sets - 10 reps - 10 seconds hold  - Seated Knee Extension Stretch with Chair  - 3-5 x daily - 7 x weekly - 1 sets - 5-10 minutes hold

## 2025-01-02 ENCOUNTER — OFFICE VISIT (OUTPATIENT)
Dept: PHYSICAL THERAPY | Facility: CLINIC | Age: 70
End: 2025-01-02
Payer: MEDICARE

## 2025-01-02 DIAGNOSIS — G89.29 CHRONIC PAIN OF LEFT KNEE: ICD-10-CM

## 2025-01-02 DIAGNOSIS — M17.12 PRIMARY OSTEOARTHRITIS OF LEFT KNEE: ICD-10-CM

## 2025-01-02 DIAGNOSIS — R26.89 ANTALGIC GAIT: ICD-10-CM

## 2025-01-02 DIAGNOSIS — Z96.652 S/P TOTAL KNEE ARTHROPLASTY, LEFT: Primary | ICD-10-CM

## 2025-01-02 DIAGNOSIS — M25.562 CHRONIC PAIN OF LEFT KNEE: ICD-10-CM

## 2025-01-02 PROCEDURE — 97112 NEUROMUSCULAR REEDUCATION: CPT

## 2025-01-02 PROCEDURE — 97530 THERAPEUTIC ACTIVITIES: CPT

## 2025-01-02 PROCEDURE — 97110 THERAPEUTIC EXERCISES: CPT

## 2025-01-02 NOTE — PROGRESS NOTES
"Daily Note     Today's date: 2025  Patient name: Radha Pantoja  : 1955  MRN: [id number]  Referring provider: Laron Carter DO  Dx:   Encounter Diagnosis     ICD-10-CM    1. S/P total knee arthroplasty, left  Z96.652       2. Primary osteoarthritis of left knee  M17.12       3. Chronic pain of left knee  M25.562     G89.29       4. Antalgic gait  R26.89                      Subjective: Pt noted that she has been feeling good.   She noted that she did  a shift at work for .   Dynasplint is going to come today at 10:30am.     Objective: See treatment diary below      Assessment:  Continued with treatment session with progressions as able with focus on ROM and strengthening. Tolerated treatment well. Patient exhibited good technique with therapeutic exercises and would benefit from continued PT   Advised may have additional muscle sorenss after PT session due to resuming some functional exercises as well as ROM.     Plan: Continue per plan of care.        POC expires Unit limit Auth  expiration date PT/OT + Visit Limit?   24 BOMN  BOMN      Sec - BOMN no auth           Visit/Unit Tracking  AUTH Status:  Date 12/17 12/19 12/26 12/27 12/31 1/2  12/3 12/5 12/10 12/12 12/13   N/A Used 25 26 27 28 29 1  20 21 22 23 24    Remaining                  Precautions: s/p left TKA (DOS: 24)      Manuals 12/31 1/2  12/10 12/12 12/13 12/17 12/19 12/26 12/27   Left knee PROM GR SC  GR GR GR SC GR JK    L calf str.    GR GR GR  GR JK    Pat mobs GR NV   GR GR GR  GR JK    Post-op RE             Reassessment          GR                                          Neuro Re-Ed             Quad sets with heel prop             SAQ      2# 2x10 5\" 2# 2x 10      LAQ  3x 10     2# 3x10 2# 3x 10       Kesier: TKE  5\" 3x 10     5# 20x5\"       Supine SLR    2x10    2x10 2x10                              Ther Ex             Patient education: pathophysiology, graded activity, edema management, wound " "care, gait training, rehab / surgical overview, Candy Splint, US to r/o DVT    HEP review     JK post-op recovery, physiological response to exercise GR   Cardiovascular warm-up, knee ROM: Nustep, bike Rec bike 10 min Rec bike fwd rotoation 10 min  Rec bike 10 min Rec bike 12 min Rec bike 10 min Rec bike 10 min fwd rotation  Rec bike 10 min Rec bike 7 min Rec bike 10 min   Ankle pumps             HS stretch at step  30\"x 3            Long-sitting calf str. With heel prop  Prostretch 30\"x 3   SB calf str. 5x30\" SB calf str. 5x30\"   Prostretch 5x30\" Prostretch 5x30\" Calf str. Reviewed   Heel slides          Reviewed    Knee flexion stretch on stairs           Reviewed   HR             Seated knee extension LLPS          Reviewed   Seated knee flexion AAROM             QS             Standing h/s curl    3x10         Prone hang     5 min     Reviewed   Standing hip abd, ext with TB      YTB 2x10 ea. B/l YTB 2x 10       Supine hip flexor str.        5x30\" 5x30\" w/ strap    Supine knee flexion AAROM against wall 15x10\"            Prone quad str. 5x30\"                                                                Ther Activity             Squats             FSU  8\" 3x 10   6\" 3x10    8\" 3x10 8\" 3x10    FSD  8\"3x 10  4\" 2x10 6\" 2x10  6\" 2x 10  8\" 2x10 6'' 2x10    Total gym: squats    L22 3x10 L22 3x10 L22 3x 10  L22 3x 10  L22 3x10 L22 3x10    Gait Training                                       Modalities                                         Access Code: [email or web address]  URL: [email or web address]  Date: 11/29/2024  Prepared by: Matthew Roll    Exercises  - Ankle Pumps in Elevation  - 3-5 x daily - 7 x weekly - 5-10 minutes hold  - Supine Heel Slide with Strap  - 3-5 x daily - 7 x weekly - 2 sets - 10 reps - 10 seconds hold  - Long Sitting Calf Stretch with Strap  - 3-5 x daily - 7 x weekly - 5 sets - 1 reps - 30 seconds hold  - Long Sitting Quad Set with Towel Roll Under Heel  - 3-5 x daily - 7 x weekly - 2-3 " sets - 10 reps - 5 seconds hold  - Seated Knee Flexion Stretch  - 3-5 x daily - 7 x weekly - 2 sets - 10 reps - 10 seconds hold  - Seated Knee Extension Stretch with Chair  - 3-5 x daily - 7 x weekly - 1 sets - 5-10 minutes hold          1 on 1 time for 38 minutes on 1/2/25.

## 2025-01-03 ENCOUNTER — APPOINTMENT (OUTPATIENT)
Dept: PHYSICAL THERAPY | Facility: CLINIC | Age: 70
End: 2025-01-03
Payer: MEDICARE

## 2025-01-06 ENCOUNTER — OFFICE VISIT (OUTPATIENT)
Dept: OBGYN CLINIC | Facility: CLINIC | Age: 70
End: 2025-01-06

## 2025-01-06 VITALS — WEIGHT: 148 LBS | BODY MASS INDEX: 24.66 KG/M2 | HEIGHT: 65 IN

## 2025-01-06 DIAGNOSIS — Z96.652 S/P TOTAL KNEE ARTHROPLASTY, LEFT: Primary | ICD-10-CM

## 2025-01-06 PROCEDURE — 99024 POSTOP FOLLOW-UP VISIT: CPT | Performed by: ORTHOPAEDIC SURGERY

## 2025-01-06 RX ORDER — CEPHALEXIN 500 MG/1
500 CAPSULE ORAL EVERY 6 HOURS SCHEDULED
Qty: 20 CAPSULE | Refills: 0 | Status: SHIPPED | OUTPATIENT
Start: 2025-01-06 | End: 2025-01-11

## 2025-01-06 NOTE — LETTER
January 6, 2025     Patient: Radha Pantoja  YOB: 1955  Date of Visit: 1/6/2025      To Whom it May Concern:    Radha Pantoja is under my professional care. Radha was seen in my office on 1/6/2025. Radha may return to work with no restrictions.    If you have any questions or concerns, please don't hesitate to call.         Sincerely,          Laron Carter DO        CC: No Recipients

## 2025-01-07 ENCOUNTER — OFFICE VISIT (OUTPATIENT)
Dept: PHYSICAL THERAPY | Facility: CLINIC | Age: 70
End: 2025-01-07
Payer: MEDICARE

## 2025-01-07 DIAGNOSIS — M25.562 CHRONIC PAIN OF LEFT KNEE: ICD-10-CM

## 2025-01-07 DIAGNOSIS — R26.89 ANTALGIC GAIT: ICD-10-CM

## 2025-01-07 DIAGNOSIS — Z96.652 S/P TOTAL KNEE ARTHROPLASTY, LEFT: Primary | ICD-10-CM

## 2025-01-07 DIAGNOSIS — G89.29 CHRONIC PAIN OF LEFT KNEE: ICD-10-CM

## 2025-01-07 DIAGNOSIS — M17.12 PRIMARY OSTEOARTHRITIS OF LEFT KNEE: ICD-10-CM

## 2025-01-07 PROCEDURE — 97530 THERAPEUTIC ACTIVITIES: CPT

## 2025-01-07 PROCEDURE — 97110 THERAPEUTIC EXERCISES: CPT

## 2025-01-07 PROCEDURE — 97112 NEUROMUSCULAR REEDUCATION: CPT

## 2025-01-07 NOTE — PROGRESS NOTES
Daily Note     Today's date: 2025  Patient name: Radha Pantoja  : 1955  MRN: [id number]  Referring provider: Laron Carter DO  Dx:   Encounter Diagnosis     ICD-10-CM    1. S/P total knee arthroplasty, left  Z96.652       2. Primary osteoarthritis of left knee  M17.12       3. Chronic pain of left knee  M25.562     G89.29       4. Antalgic gait  R26.89           Start Time: 1600  Stop Time: 1645  Total time in clinic (min): 45 minutes    Subjective: Pt note that she did start using her dynasplint at home and reported that she does have a red sudarshan on her LLE inferior to knee. She reported that she feels like it may be from the dynasplint being too tight. She notes that she will reach out to dynasplint rep if happens again.       Objective: See treatment diary below      Assessment:  Continued with treatment session within POC. Tolerated treatment well. Verbal cues for decrease hip compensation for L front step downs.  Patient exhibited good technique with therapeutic exercises and would benefit from continued PT.   S/p treatment session noted no immediate changes.   May have additional DOMS s/p PT secondary to progressions added today with functional strengthening.       Plan: Continue per plan of care.        POC expires Unit limit Auth  expiration date PT/OT + Visit Limit?   25 BOMN 25 BOMN      Sec - BOMN no auth           Visit/Unit Tracking  AUTH Status:  Date 12/17 12/19 12/26 12/27 12/31 1/2 1/7 12/3 12/5 12/10 12/12 12/13   N/A Used 25 26 27 28 29 1 2 20 21 22 23 24    Remaining                  Precautions: s/p left TKA (DOS: 24)      Manuals    Left knee PROM GR SC SC  GR GR SC GR JK    L calf str.     GR GR  GR JK    Pat mobs GR NV    GR GR  GR JK    Post-op RE             Reassessment          GR                                          Neuro Re-Ed             Quad sets with heel prop             SAQ      2# 2x10  "5\" 2# 2x 10      LAQ  3x 10  Matrix 10# 2x 10    2# 3x10 2# 3x 10       Kesier: TKE  5\" 3x 10  5\" 5# 2x 10   5# 20x5\"       Supine SLR        2x10 2x10                              Ther Ex             Patient education: pathophysiology, graded activity, edema management, wound care, gait training, rehab / surgical overview, Candy Splint, US to r/o DVT         JK post-op recovery, physiological response to exercise GR   Cardiovascular warm-up, knee ROM: Nustep, bike Rec bike 10 min Rec bike fwd rotoation 10 min Rec bike 10 min   Rec bike 12 min Rec bike 10 min Rec bike 10 min fwd rotation  Rec bike 10 min Rec bike 7 min Rec bike 10 min   Ankle pumps             HS stretch at step  30\"x 3            Long-sitting calf str. With heel prop  Prostretch 30\"x 3    SB calf str. 5x30\"   Prostretch 5x30\" Prostretch 5x30\" Calf str. Reviewed   Heel slides          Reviewed    Knee flexion stretch on stairs           Reviewed   HR             Seated knee extension LLPS          Reviewed   Seated knee flexion AAROM             QS             Standing h/s curl             Prone hang     5 min     Reviewed   Standing hip abd, ext with TB   RTB 2x 10    YTB 2x10 ea. B/l YTB 2x 10       Supine hip flexor str.        5x30\" 5x30\" w/ strap    Supine knee flexion AAROM against wall 15x10\"            Prone quad str. 5x30\"            Matrix HS curl    25# 2 x 10                                                  Ther Activity             Squats   2x 10          FSU  8\" 3x 10  6\" 2x 10      8\" 3x10 8\" 3x10    FSD  8\"3x 10 6\" 2x 10 VC's   6\" 2x10  6\" 2x 10  8\" 2x10 6'' 2x10    LSD   4\" 2x 10                        Total gym: squats     L22 3x10 L22 3x 10  L22 3x 10  L22 3x10 L22 3x10    Gait Training                                       Modalities                                         Access Code: [email or web address]  URL: [email or web address]  Date: 11/29/2024  Prepared by: Matthew Wells  - Ankle Pumps in Elevation  - 3-5 x " daily - 7 x weekly - 5-10 minutes hold  - Supine Heel Slide with Strap  - 3-5 x daily - 7 x weekly - 2 sets - 10 reps - 10 seconds hold  - Long Sitting Calf Stretch with Strap  - 3-5 x daily - 7 x weekly - 5 sets - 1 reps - 30 seconds hold  - Long Sitting Quad Set with Towel Roll Under Heel  - 3-5 x daily - 7 x weekly - 2-3 sets - 10 reps - 5 seconds hold  - Seated Knee Flexion Stretch  - 3-5 x daily - 7 x weekly - 2 sets - 10 reps - 10 seconds hold  - Seated Knee Extension Stretch with Chair  - 3-5 x daily - 7 x weekly - 1 sets - 5-10 minutes hold

## 2025-01-10 ENCOUNTER — OFFICE VISIT (OUTPATIENT)
Dept: PHYSICAL THERAPY | Facility: CLINIC | Age: 70
End: 2025-01-10
Payer: MEDICARE

## 2025-01-10 DIAGNOSIS — M25.562 CHRONIC PAIN OF LEFT KNEE: ICD-10-CM

## 2025-01-10 DIAGNOSIS — Z96.652 S/P TOTAL KNEE ARTHROPLASTY, LEFT: Primary | ICD-10-CM

## 2025-01-10 DIAGNOSIS — G89.29 CHRONIC PAIN OF LEFT KNEE: ICD-10-CM

## 2025-01-10 DIAGNOSIS — M17.12 PRIMARY OSTEOARTHRITIS OF LEFT KNEE: ICD-10-CM

## 2025-01-10 DIAGNOSIS — R26.89 ANTALGIC GAIT: ICD-10-CM

## 2025-01-10 PROCEDURE — 97140 MANUAL THERAPY 1/> REGIONS: CPT | Performed by: PHYSICAL THERAPIST

## 2025-01-10 PROCEDURE — 97110 THERAPEUTIC EXERCISES: CPT | Performed by: PHYSICAL THERAPIST

## 2025-01-10 NOTE — PROGRESS NOTES
"Daily Note     Today's date: 1/10/2025  Patient name: Radha Pantoja  : 1955  MRN: [id number]  Referring provider: Laron Carter DO  Dx:   Encounter Diagnosis     ICD-10-CM    1. S/P total knee arthroplasty, left  Z96.652       2. Primary osteoarthritis of left knee  M17.12       3. Chronic pain of left knee  M25.562     G89.29       4. Antalgic gait  R26.89           Start Time: 0900  Stop Time: 0945  Total time in clinic (min): 45 minutes    Subjective: Patient reports that she used her Candy Splint for the past 3 days at a setting of 9 for 2-3 hours a day. Patient developed numbness in her leg and increased pain.      Objective: See treatment diary below      Assessment: Tolerated treatment well. Patient demonstrated fatigue post treatment and would benefit from continued PT. Discussed modifying Candy Splint schedule, decreasing total time to maximum of 1 hour per day and decreasing intensity of stretch. Educated Patient that the splint should not be cutting off circulation and she should have no numbness. Patient does have increased knee flexion ROM to 120 deg. Slight improvement of extension noted.      Plan: Continue per plan of care.  Progress treatment as tolerated.         POC expires Unit limit Auth  expiration date PT/OT + Visit Limit?   25 BOMN 25 BOMN      Sec - BOMN no auth           Visit/Unit Tracking  AUTH Status:  Date 12/17 12/19 12/26 12/27 12/31 1/2 1/7 1/10  12/10 12/12 12/13   N/A Used 25 26 27 28 29 1 2 3  22 23 24    Remaining                  Precautions: s/p left TKA (DOS: 24)      Manuals 12/31 1/2 1/7 1/10  12/13 12/17 12/19 12/26 12/27   Left knee PROM GR SC SC GR  GR SC GR JK    L calf str.    GR  GR  GR JK    Pat mobs GR NV   GR  GR  GR JK    Post-op RE             Reassessment          GR                                          Neuro Re-Ed             Quad sets with heel prop             SAQ      2# 2x10 5\" 2# 2x 10      LAQ  3x 10  Matrix 10# 2x " "10    2# 3x10 2# 3x 10       Kesier: TKE  5\" 3x 10  5\" 5# 2x 10   5# 20x5\"       Supine SLR        2x10 2x10                              Ther Ex             Patient education: pathophysiology, graded activity, edema management, wound care, gait training, rehab / surgical overview, Candy Splint    GR     JK post-op recovery, physiological response to exercise GR   Cardiovascular warm-up, knee ROM: Nustep, bike Rec bike 10 min Rec bike fwd rotoation 10 min Rec bike 10 min  Rec bike 10 min  Rec bike 10 min Rec bike 10 min fwd rotation  Rec bike 10 min Rec bike 7 min Rec bike 10 min   Ankle pumps             HS stretch at step  30\"x 3            Long-sitting calf str. With heel prop  Prostretch 30\"x 3       Prostretch 5x30\" Prostretch 5x30\" Calf str. Reviewed   Heel slides          Reviewed    Knee flexion stretch on stairs           Reviewed   HR             Seated knee extension LLPS          Reviewed   Seated knee flexion AAROM             QS             Standing h/s curl             Prone hang          Reviewed   Standing hip abd, ext with TB   RTB 2x 10    YTB 2x10 ea. B/l YTB 2x 10       Supine hip flexor str.        5x30\" 5x30\" w/ strap    Supine knee flexion AAROM against wall 15x10\"            Prone quad str. 5x30\"            Matrix HS curl    25# 2 x 10                                                  Ther Activity             Squats   2x 10          FSU  8\" 3x 10  6\" 2x 10      8\" 3x10 8\" 3x10    FSD  8\"3x 10 6\" 2x 10 VC's     6\" 2x 10  8\" 2x10 6'' 2x10    LSD   4\" 2x 10                        Total gym: squats      L22 3x 10  L22 3x 10  L22 3x10 L22 3x10    Gait Training                                       Modalities                                         Access Code: [email or web address]  URL: [email or web address]  Date: 11/29/2024  Prepared by: Matthew Marie    Exercises  - Ankle Pumps in Elevation  - 3-5 x daily - 7 x weekly - 5-10 minutes hold  - Supine Heel Slide with Strap  - 3-5 x daily - 7 x " weekly - 2 sets - 10 reps - 10 seconds hold  - Long Sitting Calf Stretch with Strap  - 3-5 x daily - 7 x weekly - 5 sets - 1 reps - 30 seconds hold  - Long Sitting Quad Set with Towel Roll Under Heel  - 3-5 x daily - 7 x weekly - 2-3 sets - 10 reps - 5 seconds hold  - Seated Knee Flexion Stretch  - 3-5 x daily - 7 x weekly - 2 sets - 10 reps - 10 seconds hold  - Seated Knee Extension Stretch with Chair  - 3-5 x daily - 7 x weekly - 1 sets - 5-10 minutes hold

## 2025-01-14 ENCOUNTER — OFFICE VISIT (OUTPATIENT)
Dept: PHYSICAL THERAPY | Facility: CLINIC | Age: 70
End: 2025-01-14
Payer: MEDICARE

## 2025-01-14 DIAGNOSIS — R26.89 ANTALGIC GAIT: ICD-10-CM

## 2025-01-14 DIAGNOSIS — Z96.652 S/P TOTAL KNEE ARTHROPLASTY, LEFT: Primary | ICD-10-CM

## 2025-01-14 DIAGNOSIS — M17.12 PRIMARY OSTEOARTHRITIS OF LEFT KNEE: ICD-10-CM

## 2025-01-14 DIAGNOSIS — G89.29 CHRONIC PAIN OF LEFT KNEE: ICD-10-CM

## 2025-01-14 DIAGNOSIS — M25.562 CHRONIC PAIN OF LEFT KNEE: ICD-10-CM

## 2025-01-14 PROCEDURE — 97530 THERAPEUTIC ACTIVITIES: CPT

## 2025-01-14 PROCEDURE — 97112 NEUROMUSCULAR REEDUCATION: CPT

## 2025-01-14 PROCEDURE — 97110 THERAPEUTIC EXERCISES: CPT

## 2025-01-14 NOTE — PROGRESS NOTES
"Daily Note     Today's date: 2025  Patient name: Radha Pantoja  : 1955  MRN: [id number]  Referring provider: Laron Carter DO  Dx:   Encounter Diagnosis     ICD-10-CM    1. S/P total knee arthroplasty, left  Z96.652       2. Primary osteoarthritis of left knee  M17.12       3. Chronic pain of left knee  M25.562     G89.29       4. Antalgic gait  R26.89           Start Time: 0758  Stop Time: 0840  Total time in clinic (min): 42 minutes    Subjective: pt noted that she has been using the dynasplint less (between level 5 and 20 minutes on)  and feels like it has been helping and she feels a lot better. She notes no p! Upon arrival.       Objective: See treatment diary below      Assessment:  Continued with treatment session with focus on L knee. Tolerated treatment well. Patient exhibited good technique with therapeutic exercises and would benefit from continued PT  S/p treatment session, noted feeling good and having no changes but some soreness in knee.   DOMS may be noted at the end of treatment session     Plan: Continue per plan of care.        POC expires Unit limit Auth  expiration date PT/OT + Visit Limit?   25 BOMN 25 BOMN      Sec - BOMN no auth           Visit/Unit Tracking  AUTH Status:  Date 12/17 12/19 12/26 12/27 12/31 1/2 1/7 1/10 1/14  12/12 12/13   N/A Used 25 26 27 28 29 1 2 3 4  23 24    Remaining                  Precautions: s/p left TKA (DOS: 24)      Manuals 12/31 1/2 1/7 1/10 1/14  12/17 12/19 12/26 12/27   Left knee PROM GR SC SC GR SC  SC GR JK    L calf str.    GR    GR JK    Pat mobs GR NV   GR    GR JK    Post-op RE             Reassessment          GR                                          Neuro Re-Ed             Quad sets with heel prop             SAQ       2# 2x 10      LAQ  3x 10  Matrix 10# 2x 10   Matrix 10x   2# 3x 10       Kesier: TKE  5\" 3x 10  5\" 5# 2x 10          Supine SLR     3x 10    2x10 2x10                              Ther Ex " "            Patient education: pathophysiology, graded activity, edema management, wound care, gait training, rehab / surgical overview, Candy Splint    GR     JK post-op recovery, physiological response to exercise GR   Cardiovascular warm-up, knee ROM: Nustep, bike Rec bike 10 min Rec bike fwd rotoation 10 min Rec bike 10 min  Rec bike 10 min 10 min   Rec bike 10 min fwd rotation  Rec bike 10 min Rec bike 7 min Rec bike 10 min   Ankle pumps             HS stretch at step  30\"x 3            Long-sitting calf str. With heel prop  Prostretch 30\"x 3       Prostretch 5x30\" Prostretch 5x30\" Calf str. Reviewed   Heel slides          Reviewed    Knee flexion stretch on stairs           Reviewed   HR             Seated knee extension LLPS          Reviewed   Seated knee flexion AAROM             QS             Standing h/s curl             Prone hang          Reviewed   Standing hip abd, ext with TB   RTB 2x 10     YTB 2x 10       Supine hip flexor str.        5x30\" 5x30\" w/ strap    Supine knee flexion AAROM against wall 15x10\"            Prone quad str. 5x30\"            Matrix HS curl    25# 2 x 10   25# 3x 10                                                Ther Activity             Squats   2x 10  2x 10         FSU  8\" 3x 10  6\" 2x 10   6\" 3x 10    8\" 3x10 8\" 3x10    FSD  8\"3x 10 6\" 2x 10 VC's   6\" 3x 10   6\" 2x 10  8\" 2x10 6'' 2x10    LSD   4\" 2x 10   4\" 3x 10                      Total gym: squats       L22 3x 10  L22 3x10 L22 3x10    Gait Training                                       Modalities                                         Access Code: [email or web address]  URL: [email or web address]  Date: 11/29/2024  Prepared by: Matthew Marie    Exercises  - Ankle Pumps in Elevation  - 3-5 x daily - 7 x weekly - 5-10 minutes hold  - Supine Heel Slide with Strap  - 3-5 x daily - 7 x weekly - 2 sets - 10 reps - 10 seconds hold  - Long Sitting Calf Stretch with Strap  - 3-5 x daily - 7 x weekly - 5 sets - 1 reps - 30 " seconds hold  - Long Sitting Quad Set with Towel Roll Under Heel  - 3-5 x daily - 7 x weekly - 2-3 sets - 10 reps - 5 seconds hold  - Seated Knee Flexion Stretch  - 3-5 x daily - 7 x weekly - 2 sets - 10 reps - 10 seconds hold  - Seated Knee Extension Stretch with Chair  - 3-5 x daily - 7 x weekly - 1 sets - 5-10 minutes hold

## 2025-01-17 ENCOUNTER — OFFICE VISIT (OUTPATIENT)
Dept: PHYSICAL THERAPY | Facility: CLINIC | Age: 70
End: 2025-01-17
Payer: MEDICARE

## 2025-01-17 DIAGNOSIS — Z96.652 S/P TOTAL KNEE ARTHROPLASTY, LEFT: Primary | ICD-10-CM

## 2025-01-17 DIAGNOSIS — M17.12 PRIMARY OSTEOARTHRITIS OF LEFT KNEE: ICD-10-CM

## 2025-01-17 DIAGNOSIS — G89.29 CHRONIC PAIN OF LEFT KNEE: ICD-10-CM

## 2025-01-17 DIAGNOSIS — R26.89 ANTALGIC GAIT: ICD-10-CM

## 2025-01-17 DIAGNOSIS — M25.562 CHRONIC PAIN OF LEFT KNEE: ICD-10-CM

## 2025-01-17 PROCEDURE — 97110 THERAPEUTIC EXERCISES: CPT | Performed by: PHYSICAL THERAPIST

## 2025-01-17 PROCEDURE — 97140 MANUAL THERAPY 1/> REGIONS: CPT | Performed by: PHYSICAL THERAPIST

## 2025-01-17 PROCEDURE — 97112 NEUROMUSCULAR REEDUCATION: CPT | Performed by: PHYSICAL THERAPIST

## 2025-01-17 NOTE — PROGRESS NOTES
"Daily Note     Today's date: 2025  Patient name: Radha Pantoja  : 1955  MRN: [id number]  Referring provider: Laron Carter DO  Dx:   Encounter Diagnosis     ICD-10-CM    1. S/P total knee arthroplasty, left  Z96.652       2. Primary osteoarthritis of left knee  M17.12       3. Chronic pain of left knee  M25.562     G89.29       4. Antalgic gait  R26.89           Start Time: 730  Stop Time: 08  Total time in clinic (min): 50 minutes    Subjective: Patient reports that her knee remains achy at times. Patient has been able to work, but finds she has more pain towards the end of a work day.      Objective: See treatment diary below      Assessment: Tolerated treatment well. Patient demonstrated fatigue post treatment and would benefit from continued PT. Patient with improved quality of gait, demonstrated improved knee extension during terminal swing phase. Patient's knee extension PROM gradually improving, but remains limited.      Plan: Continue per plan of care.  Progress treatment as tolerated.         POC expires Unit limit Auth  expiration date PT/OT + Visit Limit?   25 BOMN 25 BOMN      Sec - BOMN no auth           Visit/Unit Tracking  AUTH Status:  Date 12/17 12/19 12/26 12/27 12/31 1/2 1/7 1/10 1/14 1/17  12/13   N/A Used 25 26 27 28 29 1 2 3 4 5  24    Remaining                  Precautions: s/p left TKA (DOS: 24)      Manuals 12/31 1/2 1/7 1/10 1/14 1/17  12/19 12/26 12/27   Left knee PROM GR SC SC GR SC GR  GR JK    L calf str.    GR  GR  GR JK    Pat mobs GR NV   GR  GR  GR JK    Post-op RE             Reassessment          GR                                          Neuro Re-Ed             Quad sets with heel prop             SAQ             LAQ  3x 10  Matrix 10# 2x 10   Matrix 10x  Matrix: 30# 3x10       Kesier: TKE  5\" 3x 10  5\" 5# 2x 10   8# 30x5\"       Supine SLR     3x 10    2x10 2x10    TM: retro walk      0.3 mph 5 min                    Ther Ex         " "    Patient education: pathophysiology, graded activity, edema management, wound care, gait training, rehab / surgical overview, Candy Splint    GR     JK post-op recovery, physiological response to exercise GR   Cardiovascular warm-up, knee ROM: Nustep, bike Rec bike 10 min Rec bike fwd rotoation 10 min Rec bike 10 min  Rec bike 10 min 10 min  10 min  Rec bike 10 min Rec bike 7 min Rec bike 10 min   Ankle pumps             HS stretch at step  30\"x 3            Long-sitting calf str. With heel prop  Prostretch 30\"x 3       Prostretch 5x30\" Prostretch 5x30\" Calf str. Reviewed   Heel slides          Reviewed    Knee flexion stretch on stairs           Reviewed   HR             Seated knee extension LLPS          Reviewed   Seated knee flexion AAROM             QS             Standing h/s curl             Prone hang          Reviewed   Standing hip abd, ext with TB   RTB 2x 10           Supine hip flexor str.        5x30\" 5x30\" w/ strap    Supine knee flexion AAROM against wall 15x10\"            Prone quad str. 5x30\"            Matrix HS curl    25# 2 x 10   25# 3x 10  30# 3x10                                              Ther Activity             Squats   2x 10  2x 10         FSU  8\" 3x 10  6\" 2x 10   6\" 3x 10    8\" 3x10 8\" 3x10    FSD  8\"3x 10 6\" 2x 10 VC's   6\" 3x 10    8\" 2x10 6'' 2x10    LSD   4\" 2x 10   4\" 3x 10                      Total gym: squats        L22 3x10 L22 3x10    Gait Training                                       Modalities                                         Access Code: [email or web address]  URL: [email or web address]  Date: 11/29/2024  Prepared by: Matthew Marie    Exercises  - Ankle Pumps in Elevation  - 3-5 x daily - 7 x weekly - 5-10 minutes hold  - Supine Heel Slide with Strap  - 3-5 x daily - 7 x weekly - 2 sets - 10 reps - 10 seconds hold  - Long Sitting Calf Stretch with Strap  - 3-5 x daily - 7 x weekly - 5 sets - 1 reps - 30 seconds hold  - Long Sitting Quad Set with Towel Roll " Under Heel  - 3-5 x daily - 7 x weekly - 2-3 sets - 10 reps - 5 seconds hold  - Seated Knee Flexion Stretch  - 3-5 x daily - 7 x weekly - 2 sets - 10 reps - 10 seconds hold  - Seated Knee Extension Stretch with Chair  - 3-5 x daily - 7 x weekly - 1 sets - 5-10 minutes hold

## 2025-01-19 ENCOUNTER — TELEPHONE (OUTPATIENT)
Dept: PHYSICAL THERAPY | Facility: CLINIC | Age: 70
End: 2025-01-19

## 2025-01-20 ENCOUNTER — APPOINTMENT (OUTPATIENT)
Dept: PHYSICAL THERAPY | Facility: CLINIC | Age: 70
End: 2025-01-20
Payer: MEDICARE

## 2025-01-20 NOTE — TELEPHONE ENCOUNTER
Called cell/home phone and no answer. Sent e-mail detailing delayed opening due to weather. Requested Patient call clinic number to reschedule.

## 2025-01-22 ENCOUNTER — OFFICE VISIT (OUTPATIENT)
Dept: PHYSICAL THERAPY | Facility: CLINIC | Age: 70
End: 2025-01-22
Payer: MEDICARE

## 2025-01-22 DIAGNOSIS — M17.12 PRIMARY OSTEOARTHRITIS OF LEFT KNEE: ICD-10-CM

## 2025-01-22 DIAGNOSIS — M25.562 CHRONIC PAIN OF LEFT KNEE: ICD-10-CM

## 2025-01-22 DIAGNOSIS — R26.89 ANTALGIC GAIT: ICD-10-CM

## 2025-01-22 DIAGNOSIS — G89.29 CHRONIC PAIN OF LEFT KNEE: ICD-10-CM

## 2025-01-22 DIAGNOSIS — Z96.652 S/P TOTAL KNEE ARTHROPLASTY, LEFT: Primary | ICD-10-CM

## 2025-01-22 PROCEDURE — 97112 NEUROMUSCULAR REEDUCATION: CPT

## 2025-01-22 PROCEDURE — 97140 MANUAL THERAPY 1/> REGIONS: CPT

## 2025-01-22 PROCEDURE — 97110 THERAPEUTIC EXERCISES: CPT

## 2025-01-22 NOTE — PROGRESS NOTES
Daily Note     Today's date: 2025  Patient name: Radha Pantoja  : 1955  MRN: [id number]  Referring provider: Laron Carter DO  Dx:   Encounter Diagnosis     ICD-10-CM    1. S/P total knee arthroplasty, left  Z96.652       2. Primary osteoarthritis of left knee  M17.12       3. Chronic pain of left knee  M25.562     G89.29       4. Antalgic gait  R26.89                      Subjective: Pt noted that she worked a short shift yesterday and felt pretty good. She noted that her knee continued to bother her at night when turning from R to L or vise versa t/o the night. Pt explains that her knee is normally in a flexed position and requires her to get up and stretch into extension prior to going onto other side therefore disturbs her sleep.     Next follow up with ortho next week.     Objective: See treatment diary below      Assessment:  Continued with treatment session at this time was focusing on knee extension. Overall noted feeling well with the additional exercises completed. Improved form noticed on TM retro walking with VC's. Tolerated treatment well. Patient exhibited good technique with therapeutic exercises and would benefit from continued PT  May have soreness s/p treatment.     Plan: Continue per plan of care.        POC expires Unit limit Auth  expiration date PT/OT + Visit Limit?   25 BOMN 25 BOMN      Sec - BOMN no auth           Visit/Unit Tracking  AUTH Status:  Date 12/17 12/19 12/26 12/27 12/31 1/2 1/7 1/10 1/14 1/17 1/22    N/A Used  28 29 1 2 3 4 5 6     Remaining                  Precautions: s/p left TKA (DOS: 24)      Manuals 12/31 1/2 1/7 1/10 1/14 1/17 1/22  12/26 12/27   Left knee PROM GR SC SC GR SC GR SC  JK    L calf str.    GR  GR   JK    Pat mobs GR NV   GR  GR   JK    Post-op RE             Reassessment          GR                                          Neuro Re-Ed             Quad sets with heel prop             SAQ             LAQ  3x  "10  Matrix 10# 2x 10   Matrix 10x  Matrix: 30# 3x10 Hold machine down       Kesier: TKE  5\" 3x 10  5\" 5# 2x 10   8# 30x5\" 8# 30x 5\"       Supine SLR     3x 10     2x10    TM: retro walk      0.3 mph 5 min 0.3 mph 5 min with VC's                    Ther Ex             Patient education: pathophysiology, graded activity, edema management, wound care, gait training, rehab / surgical overview, Candy Splint    GR     JK post-op recovery, physiological response to exercise GR   Cardiovascular warm-up, knee ROM: Nustep, bike Rec bike 10 min Rec bike fwd rotoation 10 min Rec bike 10 min  Rec bike 10 min 10 min  10 min 10 min rec bike  Rec bike 7 min Rec bike 10 min   Ankle pumps             HS stretch at step  30\"x 3      10\"x 10 with self OP       Long-sitting calf str. With heel prop  Prostretch 30\"x 3        Prostretch 5x30\" Calf str. Reviewed   Heel slides          Reviewed    Knee flexion stretch on stairs           Reviewed   HR             Seated knee extension LLPS          Reviewed   Seated knee flexion AAROM             QS             Standing h/s curl             Prone hang        5 min 2#   Reviewed   Standing hip abd, ext with TB   RTB 2x 10           Supine hip flexor str.         5x30\" w/ strap    Supine knee flexion AAROM against wall 15x10\"            Prone quad str. 5x30\"            Matrix HS curl    25# 2 x 10   25# 3x 10  30# 3x10 Hold machine down                                              Ther Activity             Squats   2x 10  2x 10         FSU  8\" 3x 10  6\" 2x 10   6\" 3x 10     8\" 3x10    FSD  8\"3x 10 6\" 2x 10 VC's   6\" 3x 10     6'' 2x10    LSD   4\" 2x 10   4\" 3x 10                      Total gym: squats         L22 3x10    Gait Training                                       Modalities                                         Access Code: [email or web address]  URL: [email or web address]  Date: 11/29/2024  Prepared by: Matthew Marie    Exercises  - Ankle Pumps in Elevation  - 3-5 x daily - 7 x " weekly - 5-10 minutes hold  - Supine Heel Slide with Strap  - 3-5 x daily - 7 x weekly - 2 sets - 10 reps - 10 seconds hold  - Long Sitting Calf Stretch with Strap  - 3-5 x daily - 7 x weekly - 5 sets - 1 reps - 30 seconds hold  - Long Sitting Quad Set with Towel Roll Under Heel  - 3-5 x daily - 7 x weekly - 2-3 sets - 10 reps - 5 seconds hold  - Seated Knee Flexion Stretch  - 3-5 x daily - 7 x weekly - 2 sets - 10 reps - 10 seconds hold  - Seated Knee Extension Stretch with Chair  - 3-5 x daily - 7 x weekly - 1 sets - 5-10 minutes hold

## 2025-01-27 ENCOUNTER — EVALUATION (OUTPATIENT)
Dept: PHYSICAL THERAPY | Facility: CLINIC | Age: 70
End: 2025-01-27
Payer: MEDICARE

## 2025-01-27 ENCOUNTER — APPOINTMENT (OUTPATIENT)
Dept: RADIOLOGY | Facility: CLINIC | Age: 70
End: 2025-01-27
Payer: MEDICARE

## 2025-01-27 ENCOUNTER — OFFICE VISIT (OUTPATIENT)
Dept: OBGYN CLINIC | Facility: CLINIC | Age: 70
End: 2025-01-27

## 2025-01-27 VITALS — HEIGHT: 65 IN | WEIGHT: 150.4 LBS | BODY MASS INDEX: 25.06 KG/M2

## 2025-01-27 DIAGNOSIS — Z96.652 S/P TOTAL KNEE ARTHROPLASTY, LEFT: Primary | ICD-10-CM

## 2025-01-27 DIAGNOSIS — M25.562 CHRONIC PAIN OF LEFT KNEE: ICD-10-CM

## 2025-01-27 DIAGNOSIS — R26.89 ANTALGIC GAIT: ICD-10-CM

## 2025-01-27 DIAGNOSIS — Z96.652 S/P TOTAL KNEE ARTHROPLASTY, LEFT: ICD-10-CM

## 2025-01-27 DIAGNOSIS — M17.12 PRIMARY OSTEOARTHRITIS OF LEFT KNEE: ICD-10-CM

## 2025-01-27 DIAGNOSIS — G89.29 CHRONIC PAIN OF LEFT KNEE: ICD-10-CM

## 2025-01-27 PROCEDURE — 73560 X-RAY EXAM OF KNEE 1 OR 2: CPT

## 2025-01-27 PROCEDURE — 97110 THERAPEUTIC EXERCISES: CPT | Performed by: PHYSICAL THERAPIST

## 2025-01-27 PROCEDURE — 99024 POSTOP FOLLOW-UP VISIT: CPT | Performed by: ORTHOPAEDIC SURGERY

## 2025-01-27 PROCEDURE — 97530 THERAPEUTIC ACTIVITIES: CPT | Performed by: PHYSICAL THERAPIST

## 2025-01-27 NOTE — PROGRESS NOTES
"Patient Name:  Radha Pantoja  MRN:  [id number]    Assessment & Plan     1. S/P total knee arthroplasty, left  -     XR knee 1 or 2 vw left; Future; Expected date: 01/27/2025      Approximately 9 week s/p Left total knee arthroplasty performed on 11/26/2024  X-rays reviewed with patient  Strongly encouraged patient to work on knee extension with hamstring stretching 3 times daily. Encouraged patient to use the brace for the next few weeks, 20-30 minutes, 2-3 times daily.   Continue outpatient PT and focusing on range of motion  Continue OTC medications as needed  Call GI and PCP regarding GI symptoms  Can discontinue meloxicam  Follow up 3 months new, Left knee AP and lateral x-rays     History of the Present Illness   Radha Pantoja is a 69 y.o. female approximately 9 week s/p Left total knee arthroplasty performed on 11/26/2024. Today, patient reports she does not like the brace because it causes local pain from the straps. She continues outpatient PT and states she is feeling better with ROM and PT is stating she is improving. She had a long day at work the other day and felt more stiff than usual.       Review of Systems     Review of Systems   Constitutional:  Negative for chills and fever.   HENT:  Negative for congestion.    Respiratory:  Negative for cough, chest tightness and shortness of breath.    Cardiovascular:  Negative for chest pain and palpitations.   Gastrointestinal:  Negative for abdominal pain.   Endocrine: Negative for cold intolerance and heat intolerance.   Neurological:  Negative for syncope.   Psychiatric/Behavioral:  Negative for confusion.        Physical Exam     Ht 5' 5\" (1.651 m)   Wt 68.2 kg (150 lb 6.4 oz)   BMI 25.03 kg/m²     Left Knee   Surgical incision well healed with small healing scab over mid incision without signs of infection, drainage or erythema  Range of motion from 7-10 to 100 degrees without pain.    There is no effusion.    The patient is able to " perform a straight leg raise strength.     Calf soft, compressible and nontender   The patient is neurovascular intact distally.      Data Review     I have personally reviewed pertinent films in PACS, and my interpretation follows.    X-rays taken 01/27/2025 of Left knee independently reviewed and demonstrate total knee prosthesis in appropriate alignment without evidence of loosening or lucency.     Social History     Tobacco Use    Smoking status: Never     Passive exposure: Never    Smokeless tobacco: Never    Tobacco comments:     Has medical marijuana card does vape   Vaping Use    Vaping status: Every Day    Substances: THC   Substance Use Topics    Alcohol use: Not Currently     Comment: recovering    Drug use: Yes     Types: Marijuana     Comment: medical marijuana           Procedures  None     Radha Marroquin PA-C

## 2025-01-27 NOTE — PROGRESS NOTES
PT Re-Evaluation     Today's date: 2025  Patient name: Radha Pantoja  : 1955  MRN: [id number]  Referring provider: Laron Carter DO  Dx:   Encounter Diagnosis     ICD-10-CM    1. S/P total knee arthroplasty, left  Z96.652       2. Primary osteoarthritis of left knee  M17.12       3. Chronic pain of left knee  M25.562     G89.29       4. Antalgic gait  R26.89           Start Time: 0754  Stop Time: 0850  Total time in clinic (min): 56 minutes    Assessment  Impairments: abnormal gait, abnormal muscle firing, abnormal or restricted ROM, activity intolerance, impaired balance, impaired physical strength, lacks appropriate home exercise program, pain with function, safety issue, weight-bearing intolerance and poor body mechanics    Assessment details: Radha Pantoja is a 69 y.o. female who presents with h/o chronic left knee pain having failed conservative treatment to date. Patient currently presents s/p elective left total knee arthroplasty, DOS: 24. Post-operatively, Patient has maintained excellent compliance with established POC and has attended a total number of 18 physical therapy appointments. Patient has made significant improvements overall, including decreased pain, improved quality of gait, increased knee ROM, decreased joint effusion, increased LE strength, and improved overall activity tolerance. Patient's functional mobility has continued to improve as demonstrated by decreased time needed to complete TUG and Five Times Sit to Stand tests. Patient has returned to work as a . Patient's greatest limitation to this point remains limited terminal knee extension, contributing to antalgic gait. Patient continues to present with mild functional weakness and has some joint effusion. Patient has been encouraged to modify use of DynaSplint to maximize her chances of obtaining full knee extension and avoid a ARELI. Patient would benefit from skilled physical therapy to  address their aforementioned impairments, improve their level of function and to improve their overall quality of life.  Understanding of Dx/Px/POC: excellent     Prognosis: excellent    Goals  Short Term Goals: to be achieved by 4 weeks   1) Patient to be independent with basic HEP. MET  2) Decrease pain by 4/10 at its worst. PROGRESSED, BUT NOT MET  3) Increase knee flexion ROM to 110 deg. MET  4) Increase knee extension ROM by > 5 deg. MET  5) Increase LE strength by 1/2 MMT grade in all deficient planes. MET  6) Patient to negotiate steps with a reciprocal pattern with use of HR. MET  7) Patient to report decreased sleep interruption secondary to pain. MET  8) Increase ambulatory tolerance by 10 min with LRAD. MET    Long Term Goals: to be achieved by discharge ALL GOALS PROGRESSING  1) FOTO equal to or greater than 71.  2) Patient to be independent with comprehensive HEP.  3) Abolish pain for improved quality of life.  4) Increase LE strength to 5/5 MMT grade in all planes to improve a/iadls.  5) Achieve full knee extension ROM to improve a/iadls.  6) Increase knee flexion ROM to within 5 deg of contralateral LE to improve a/iadls.  7) Patient to negotiate steps with a reciprocal pattern without use of Hr.  8) Increase ambulatory tolerance to 60 min to improve participation in social activities.  9) Patient to report no sleep interruption secondary to pain.  10) Increase TUG and 5x STS by > at least 3 seconds to improve a/iadls.      Plan  Patient would benefit from: skilled PT  Planned modality interventions: biofeedback, cryotherapy, electrical stimulation/Russian stimulation, TENS and low level laser therapy    Planned therapy interventions: activity modification, ADL retraining, ADL training, balance, balance/weight bearing training, body mechanics training, dressing changes, functional ROM exercises, gait training, home exercise program, IADL retraining, joint mobilization, manual therapy, massage,  neuromuscular re-education, patient education, self care, strengthening, stretching, therapeutic activities, therapeutic exercise and transfer training    Frequency: 2-3x week.  Duration in weeks: 8  Plan of Care beginning date: 2025  Plan of Care expiration date: 3/10/2025  Treatment plan discussed with: patient        Subjective Evaluation    History of Present Illness  Mechanism of injury: Patient reports that overall her knee pain continues to improve, but remains sore. Patient's knee feels more sore after a long work shift on her feet for several hours. Patient's knee remains stiff in the morning after sleeping, but has been sleeping better overall. Patient continues to take Gabapentin at night, and Meloxicam as needed. Patient's knee remains swollen, but has improved. Patient is able to negotiate steps with a reciprocal pattern. Patient has stopped using her DynaSplint because it was making her leg tingle and was very sensitive. Patient estimates her left knee overall level of function to be approximately 80% of her premorbid norm. Patient's next f/u appointment with her surgeon is scheduled for later today.  Patient Goals  Patient goals for therapy: decreased pain, increased motion, increased strength, independence with ADLs/IADLs, return to sport/leisure activities and return to work  Patient goal: improve quality of gait  Pain  Current pain ratin  At best pain ratin  At worst pain ratin  Location: left thigh and lower leg > knee (muscular)  Quality: dull ache and tight    Social Support    Employment status: working ( at [employer]: anticipated return date of 25)  Treatments  Previous treatment: physical therapy        Objective     Observations   Left Knee   Positive for edema, effusion and incision (clean, well approximated, good scar tissue mobility, no signs of infection).     Right Knee   Negative for edema, effusion and incision.     Active Range of Motion   Left Knee  "  Flexion: 116 degrees with pain  Extension: 10 degrees with pain    Right Knee   Flexion: 148 degrees   Extension: 0 degrees     Passive Range of Motion   Left Knee   Flexion: 122 degrees with pain  Extension: 8 degrees with pain    Right Knee   Flexion: 148 degrees   Extension: 0 degrees     Mobility   Patellar Mobility:   Left Knee   WFL: medial, lateral, superior and inferior.     Strength/Myotome Testing     Left Hip   Planes of Motion   Flexion: 4+  Abduction: 4+    Right Hip   Planes of Motion   Flexion: 5    Left Knee   Flexion: 5  Extension: 5 ((+) pain)  Quadriceps contraction: fair    Right Knee   Flexion: 5  Extension: 5    Left Ankle/Foot   Dorsiflexion: 5    Right Ankle/Foot   Dorsiflexion: 5    Tests   Left Ankle/Foot   Positive for Homans' sign.     Right Ankle/Foot   Negative for Homans' sign.     Ambulation   Weight-Bearing Status   Weight-Bearing Status (Left): full weight bearing   Weight-Bearing Status (Right): full weight-bearing    Assistive device used: none    Ambulation: Level Surfaces   Ambulation without assistive device: independent    Observational Gait   Gait: antalgic     Additional Observational Gait Details  Increased left knee flexion during stance and swing phase    Functional Assessment        Comments  POST-OP REEVALUATION (11/29/24):   Bilateral squat: Mod I with standard walker, 0-40 deg, (+) pain  Timed Up and Go (TUG): 26.71 sec, Mod I ambulation with standard walker and sit to/from stand with bilateral arm rests  Five Times Sit to Stand Test: 13.69 sec, Mod I with b/l arm rests, left knee extended with increased weightbearing through left LE, good eccentric control    REEVALUATION (12/27/24):  Bilateral squat: Mod I, 0-60 deg  Timed Up and Go (TUG): 12.28 sec, Independent ambulation and sit to/from stand  Five Times Sit to Stand Test: 13.28 sec, Independent, good eccentric control, even weightbearing bilaterally ; 9.56 sec, Mod I with b/l arm rests  Forward step up 8\" step: " "Mod I with unilateral hand rail, antalgic  Forward step down 8\" step: Mod I with unilateral hand rail, fair eccentric control with increased weightbearing through UE ; 6\" step: Mod I with unilateral hand rail, good eccentric control    REEVALUATION (1/27/25):  Bilateral squat: WFL  Timed Up and Go (TUG): 8.12 sec, Independent sit to/from stand and ambulation  Five Times Sit to Stand Test: 11.18 sec, Independent, good eccentric control, equal weightbearing bilaterally ; 8.86 sec, Mod I with b/l arm rests, good eccentric control  Forward step up 8\" step: Independent  Forward step down 8\" step: Independent, good eccentric control      Flowsheet Rows      Flowsheet Row Most Recent Value   PT/OT G-Codes    Current Score 44   Projected Score 71               POC expires Unit limit Auth  expiration date PT/OT + Visit Limit?   3/10//25 BOMN 12/31/25 BOMN      Sec - BOMN no auth           Visit/Unit Tracking  AUTH Status:  Date 12/17 12/19 12/26 12/27 12/31 1/2 1/7 1/10 1/14 1/17 1/22 1/27   N/A Used 25 26 27 28 29 1 2 3 4 5 6 7    Remaining                  Precautions: s/p left TKA (DOS: 11/26/24)      Manuals 12/31 1/2 1/7 1/10 1/14 1/17 1/22 1/27  12/27   Left knee PROM GR SC SC GR SC GR SC      L calf str.    GR  GR       Pat mobs GR NV   GR  GR       Post-op RE             Reassessment        GR  GR                                          Neuro Re-Ed             Quad sets with heel prop             SAQ             LAQ  3x 10  Matrix 10# 2x 10   Matrix 10x  Matrix: 30# 3x10 Hold machine down       Kesier: TKE  5\" 3x 10  5\" 5# 2x 10   8# 30x5\" 8# 30x 5\"       Supine SLR     3x 10         TM: retro walk      0.3 mph 5 min 0.3 mph 5 min with VC's                    Ther Ex             Patient education: pathophysiology, graded activity, edema management, wound care, gait training, rehab / surgical overview, Candy Splint    GR    GR  GR   Cardiovascular warm-up, knee ROM: Nustep, bike Rec bike 10 min Rec bike fwd rotoation " "10 min Rec bike 10 min  Rec bike 10 min 10 min  10 min 10 min rec bike Rec bike 10 min  Rec bike 10 min   Ankle pumps             HS stretch at step  30\"x 3      10\"x 10 with self OP       Long-sitting calf str. With heel prop  Prostretch 30\"x 3         Calf str. Reviewed   Heel slides          Reviewed    Knee flexion stretch on stairs           Reviewed   HR             Seated knee extension LLPS          Reviewed   Seated knee flexion AAROM             QS             Standing h/s curl             Prone hang        5 min 2#   Reviewed   Standing hip abd, ext with TB   RTB 2x 10           Supine hip flexor str.             Supine knee flexion AAROM against wall 15x10\"            Prone quad str. 5x30\"            Matrix HS curl    25# 2 x 10   25# 3x 10  30# 3x10 Hold machine down                                              Ther Activity             Squats   2x 10  2x 10         FSU  8\" 3x 10  6\" 2x 10   6\" 3x 10         FSD  8\"3x 10 6\" 2x 10 VC's   6\" 3x 10         LSD   4\" 2x 10   4\" 3x 10                      Total gym: squats             Functional testing: TUG, 5x STS, steps        GR     Gait Training                                       Modalities                                         Access Code: [email or web address]  URL: [email or web address]  Date: 11/29/2024  Prepared by: Matthew Roll    Exercises  - Ankle Pumps in Elevation  - 3-5 x daily - 7 x weekly - 5-10 minutes hold  - Supine Heel Slide with Strap  - 3-5 x daily - 7 x weekly - 2 sets - 10 reps - 10 seconds hold  - Long Sitting Calf Stretch with Strap  - 3-5 x daily - 7 x weekly - 5 sets - 1 reps - 30 seconds hold  - Long Sitting Quad Set with Towel Roll Under Heel  - 3-5 x daily - 7 x weekly - 2-3 sets - 10 reps - 5 seconds hold  - Seated Knee Flexion Stretch  - 3-5 x daily - 7 x weekly - 2 sets - 10 reps - 10 seconds hold  - Seated Knee Extension Stretch with Chair  - 3-5 x daily - 7 x weekly - 1 sets - 5-10 minutes hold       "

## 2025-01-29 ENCOUNTER — TELEPHONE (OUTPATIENT)
Age: 70
End: 2025-01-29

## 2025-01-29 NOTE — TELEPHONE ENCOUNTER
Caller: Danielito Gupta     Doctor: Raul    Reason for call: Lee called stated he is sending over a form for confirmation by fax that needs a signature for a splint.    Call back#: [phone]

## 2025-01-30 ENCOUNTER — OFFICE VISIT (OUTPATIENT)
Dept: PHYSICAL THERAPY | Facility: CLINIC | Age: 70
End: 2025-01-30
Payer: MEDICARE

## 2025-01-30 DIAGNOSIS — G89.29 CHRONIC PAIN OF LEFT KNEE: ICD-10-CM

## 2025-01-30 DIAGNOSIS — M25.562 CHRONIC PAIN OF LEFT KNEE: ICD-10-CM

## 2025-01-30 DIAGNOSIS — M17.12 PRIMARY OSTEOARTHRITIS OF LEFT KNEE: ICD-10-CM

## 2025-01-30 DIAGNOSIS — R26.89 ANTALGIC GAIT: ICD-10-CM

## 2025-01-30 DIAGNOSIS — Z96.652 S/P TOTAL KNEE ARTHROPLASTY, LEFT: Primary | ICD-10-CM

## 2025-01-30 DIAGNOSIS — F41.9 ANXIETY: ICD-10-CM

## 2025-01-30 PROCEDURE — 97140 MANUAL THERAPY 1/> REGIONS: CPT | Performed by: PHYSICAL THERAPIST

## 2025-01-30 PROCEDURE — 97530 THERAPEUTIC ACTIVITIES: CPT | Performed by: PHYSICAL THERAPIST

## 2025-01-30 PROCEDURE — 97110 THERAPEUTIC EXERCISES: CPT | Performed by: PHYSICAL THERAPIST

## 2025-01-30 RX ORDER — ESCITALOPRAM OXALATE 10 MG/1
10 TABLET ORAL DAILY
Qty: 30 TABLET | Refills: 5 | Status: SHIPPED | OUTPATIENT
Start: 2025-01-30

## 2025-01-30 NOTE — PROGRESS NOTES
"Daily Note     Today's date: 2025  Patient name: Radha Pantoja  : 1955  MRN: [id number]  Referring provider: Laron Carter DO  Dx:   Encounter Diagnosis     ICD-10-CM    1. S/P total knee arthroplasty, left  Z96.652       2. Primary osteoarthritis of left knee  M17.12       3. Chronic pain of left knee  M25.562     G89.29       4. Antalgic gait  R26.89           Start Time: 0805  Stop Time: 0850  Total time in clinic (min): 45 minutes    Subjective: Patient reports that she had a f/u appointment with her surgeon and it went well. Patient is no longer using her DynaSplint but purchased a pedal bike to work on her ROM and is having her  stretch her knee. Patient remains stiff and sore at the end of a long work day at night.      Objective: See treatment diary below      Assessment: Tolerated treatment well. Patient demonstrated fatigue post treatment and would benefit from continued PT. Patient continues to have sharp pain with gentle knee extension stretching. Patient with overall improving functional mobility.      Plan: Continue per plan of care.  Progress treatment as tolerated.         POC expires Unit limit Auth  expiration date PT/OT + Visit Limit?   3/10//25 BOMN 25 BOMN      Sec - BOMN no auth           Visit/Unit Tracking  AUTH Status:  Date 1/30  12/26 12/27 12/31 1/2 1/7 1/10 1/14 1/17 1/22 1/27   N/A Used 8  27 28 29 1 2 3 4 5 6 7    Remaining                  Precautions: s/p left TKA (DOS: 24)      Manuals 12/31 1/2 1/7 1/10 1/14 1/17 1/22 1/27 1/30    Left knee PROM GR SC SC GR SC GR SC  GR    L calf str.    GR  GR   GR    Pat mobs GR NV   GR  GR   GR    Post-op RE             Reassessment        GR                                            Neuro Re-Ed             Quad sets with heel prop             SAQ             LAQ  3x 10  Matrix 10# 2x 10   Matrix 10x  Matrix: 30# 3x10 Hold machine down       Kesier: TKE  5\" 3x 10  5\" 5# 2x 10   8# 30x5\" 8# 30x 5\"    " "   Supine SLR     3x 10         TM: retro walk      0.3 mph 5 min 0.3 mph 5 min with VC's                    Ther Ex             Patient education: pathophysiology, graded activity, edema management, wound care, gait training, rehab / surgical overview, Candy Splint    GR    GR     Cardiovascular warm-up, knee ROM: Nustep, bike Rec bike 10 min Rec bike fwd rotoation 10 min Rec bike 10 min  Rec bike 10 min 10 min  10 min 10 min rec bike Rec bike 10 min Rec bike 10 min    Ankle pumps             HS stretch at step  30\"x 3      10\"x 10 with self OP       Long-sitting calf str. With heel prop  Prostretch 30\"x 3            Heel slides              Knee flexion stretch on stairs              HR             Seated knee extension LLPS             Seated knee flexion AAROM             QS             Standing h/s curl             Prone hang        5 min 2#      Standing hip abd, ext with TB   RTB 2x 10           Supine hip flexor str.             Supine knee flexion AAROM against wall 15x10\"            Prone quad str. 5x30\"            Sidestepping with TB         GTB 3 laps    Matrix HS curl    25# 2 x 10   25# 3x 10  30# 3x10 Hold machine down                                              Ther Activity             Squats   2x 10  2x 10     Foam 10# KB 3x10    FSU  8\" 3x 10  6\" 2x 10   6\" 3x 10         FSD  8\"3x 10 6\" 2x 10 VC's   6\" 3x 10         LSD   4\" 2x 10   4\" 3x 10     6\" 3x10    Stationary lunges         2x10    Total gym: squats             Functional testing: TUG, 5x STS, steps        GR     Gait Training                                       Modalities                                         Access Code: [email or web address]  URL: [email or web address]  Date: 11/29/2024  Prepared by: Matthew Marie    Exercises  - Ankle Pumps in Elevation  - 3-5 x daily - 7 x weekly - 5-10 minutes hold  - Supine Heel Slide with Strap  - 3-5 x daily - 7 x weekly - 2 sets - 10 reps - 10 seconds hold  - Long Sitting Calf Stretch with " Strap  - 3-5 x daily - 7 x weekly - 5 sets - 1 reps - 30 seconds hold  - Long Sitting Quad Set with Towel Roll Under Heel  - 3-5 x daily - 7 x weekly - 2-3 sets - 10 reps - 5 seconds hold  - Seated Knee Flexion Stretch  - 3-5 x daily - 7 x weekly - 2 sets - 10 reps - 10 seconds hold  - Seated Knee Extension Stretch with Chair  - 3-5 x daily - 7 x weekly - 1 sets - 5-10 minutes hold

## 2025-02-03 ENCOUNTER — OFFICE VISIT (OUTPATIENT)
Dept: PHYSICAL THERAPY | Facility: CLINIC | Age: 70
End: 2025-02-03
Payer: MEDICARE

## 2025-02-03 DIAGNOSIS — M17.12 PRIMARY OSTEOARTHRITIS OF LEFT KNEE: ICD-10-CM

## 2025-02-03 DIAGNOSIS — R26.89 ANTALGIC GAIT: ICD-10-CM

## 2025-02-03 DIAGNOSIS — Z96.652 S/P TOTAL KNEE ARTHROPLASTY, LEFT: Primary | ICD-10-CM

## 2025-02-03 DIAGNOSIS — G89.29 CHRONIC PAIN OF LEFT KNEE: ICD-10-CM

## 2025-02-03 DIAGNOSIS — M25.562 CHRONIC PAIN OF LEFT KNEE: ICD-10-CM

## 2025-02-03 PROCEDURE — 97110 THERAPEUTIC EXERCISES: CPT | Performed by: PHYSICAL THERAPIST

## 2025-02-03 PROCEDURE — 97530 THERAPEUTIC ACTIVITIES: CPT | Performed by: PHYSICAL THERAPIST

## 2025-02-03 PROCEDURE — 97140 MANUAL THERAPY 1/> REGIONS: CPT | Performed by: PHYSICAL THERAPIST

## 2025-02-03 NOTE — PROGRESS NOTES
"Daily Note     Today's date: 2/3/2025  Patient name: Radha Pantoja  : 1955  MRN: [id number]  Referring provider: Laron Carter DO  Dx:   Encounter Diagnosis     ICD-10-CM    1. S/P total knee arthroplasty, left  Z96.652       2. Primary osteoarthritis of left knee  M17.12       3. Chronic pain of left knee  M25.562     G89.29       4. Antalgic gait  R26.89           Start Time: 0800  Stop Time: 08  Total time in clinic (min): 43 minutes    Subjective: Patient reports that her thigh has been cramping lately. Patient has purchased Magnesium.      Objective: See treatment diary below      Assessment: Tolerated treatment well. Patient demonstrated fatigue post treatment and would benefit from continued PT. Patient challenged with stability over uneven surface. Patient with less pain in knee this visit when stretched at end of session. Discussed increasing water in take as dehydration may be contributing to cramping.      Plan: Continue per plan of care.  Progress treatment as tolerated.         POC expires Unit limit Auth  expiration date PT/OT + Visit Limit?   3/10//25 BOMN 25 BOMN      Sec - BOMN no auth           Visit/Unit Tracking  AUTH Status:  Date 1/30 2/3  12/27 12/31 1/2 1/7 1/10 1/14 1/17 1/22 1/27   N/A Used 8 9  28 29 1 2 3 4 5 6 7    Remaining                  Precautions: s/p left TKA (DOS: 24)      Manuals  1/2 1/7 1/10 1/14 1/17 1/22 1/27 1/30 2/3   Left knee PROM GR SC SC GR SC GR SC  GR GR   L calf str.    GR  GR   GR GR   Pat mobs GR NV   GR  GR   GR GR   Post-op RE             Reassessment        GR                                            Neuro Re-Ed             Quad sets with heel prop             SAQ             LAQ  3x 10  Matrix 10# 2x 10   Matrix 10x  Matrix: 30# 3x10 Hold machine down       Kesier: TKE  5\" 3x 10  5\" 5# 2x 10   8# 30x5\" 8# 30x 5\"       Supine SLR     3x 10         TM: retro walk      0.3 mph 5 min 0.3 mph 5 min with VC's               " "     Ther Ex             Patient education: pathophysiology, graded activity, edema management, wound care, gait training, rehab / surgical overview, Candy Splint    GR    GR     Cardiovascular warm-up, knee ROM: Nustep, bike Rec bike 10 min Rec bike fwd rotoation 10 min Rec bike 10 min  Rec bike 10 min 10 min  10 min 10 min rec bike Rec bike 10 min Rec bike 10 min Rec bike 10 min   Ankle pumps             HS stretch at step  30\"x 3      10\"x 10 with self OP       Long-sitting calf str. With heel prop  Prostretch 30\"x 3            Heel slides              Knee flexion stretch on stairs              HR             Seated knee extension LLPS             Seated knee flexion AAROM             QS             Standing h/s curl             Prone hang        5 min 2#      Standing hip abd, ext with TB   RTB 2x 10           Supine hip flexor str.             Supine knee flexion AAROM against wall 15x10\"            Prone quad str. 5x30\"            Sidestepping with TB         GTB 3 laps    Matrix HS curl    25# 2 x 10   25# 3x 10  30# 3x10 Hold machine down                                              Ther Activity             Squats   2x 10  2x 10     Foam 10# KB 3x10 Foam 15# KB 3x10   FSU  8\" 3x 10  6\" 2x 10   6\" 3x 10         FSD  8\"3x 10 6\" 2x 10 VC's   6\" 3x 10         LSD   4\" 2x 10   4\" 3x 10     6\" 3x10 6\" 2x10   Stationary lunges         2x10 2x10   Total gym: squats          Single leg L20 2x10   Functional testing: TUG, 5x STS, steps        GR     Bosu FSU          2x10 3\"   Gait Training                                       Modalities                                         Access Code: [email or web address]  URL: [email or web address]  Date: 11/29/2024  Prepared by: Matthew Marie    Exercises  - Ankle Pumps in Elevation  - 3-5 x daily - 7 x weekly - 5-10 minutes hold  - Supine Heel Slide with Strap  - 3-5 x daily - 7 x weekly - 2 sets - 10 reps - 10 seconds hold  - Long Sitting Calf Stretch with Strap  - 3-5 x " daily - 7 x weekly - 5 sets - 1 reps - 30 seconds hold  - Long Sitting Quad Set with Towel Roll Under Heel  - 3-5 x daily - 7 x weekly - 2-3 sets - 10 reps - 5 seconds hold  - Seated Knee Flexion Stretch  - 3-5 x daily - 7 x weekly - 2 sets - 10 reps - 10 seconds hold  - Seated Knee Extension Stretch with Chair  - 3-5 x daily - 7 x weekly - 1 sets - 5-10 minutes hold

## 2025-02-06 ENCOUNTER — OFFICE VISIT (OUTPATIENT)
Age: 70
End: 2025-02-06
Payer: MEDICARE

## 2025-02-06 VITALS
WEIGHT: 146 LBS | HEIGHT: 65 IN | DIASTOLIC BLOOD PRESSURE: 70 MMHG | SYSTOLIC BLOOD PRESSURE: 102 MMHG | OXYGEN SATURATION: 98 % | HEART RATE: 77 BPM | BODY MASS INDEX: 24.32 KG/M2

## 2025-02-06 DIAGNOSIS — Z12.11 ENCOUNTER FOR SCREENING FOR MALIGNANT NEOPLASM OF COLON: ICD-10-CM

## 2025-02-06 DIAGNOSIS — R19.7 DIARRHEA, UNSPECIFIED TYPE: ICD-10-CM

## 2025-02-06 DIAGNOSIS — Z86.0100 HISTORY OF COLON POLYPS: ICD-10-CM

## 2025-02-06 DIAGNOSIS — R63.4 WEIGHT LOSS, ABNORMAL: ICD-10-CM

## 2025-02-06 DIAGNOSIS — R19.4 CHANGE IN BOWEL HABITS: Primary | ICD-10-CM

## 2025-02-06 PROCEDURE — 99204 OFFICE O/P NEW MOD 45 MIN: CPT | Performed by: INTERNAL MEDICINE

## 2025-02-06 RX ORDER — AMOXICILLIN 500 MG/1
CAPSULE ORAL
COMMUNITY
Start: 2025-01-03

## 2025-02-06 RX ORDER — HYDROCORTISONE 25 MG/G
CREAM TOPICAL
COMMUNITY
Start: 2025-01-23

## 2025-02-06 RX ORDER — SODIUM CHLORIDE, SODIUM LACTATE, POTASSIUM CHLORIDE, CALCIUM CHLORIDE 600; 310; 30; 20 MG/100ML; MG/100ML; MG/100ML; MG/100ML
125 INJECTION, SOLUTION INTRAVENOUS CONTINUOUS
OUTPATIENT
Start: 2025-02-06

## 2025-02-06 NOTE — PATIENT INSTRUCTIONS
Scheduled date of colonoscopy (as of today): 2/19/25  Physician performing colonoscopy: Freddie  Location of colonoscopy: [place]  Bowel prep reviewed with patient: Miralax  Instructions reviewed with patient by: Arin ROBERTS  Clearances:

## 2025-02-06 NOTE — ASSESSMENT & PLAN NOTE
Orders:    Colonoscopy; Future    Clostridium difficile toxin by PCR with EIA; Future    Fecal leukocytes; Future    Ova and parasite examination; Future    Stool Enteric Bacterial Panel by PCR; Future

## 2025-02-06 NOTE — H&P (VIEW-ONLY)
Name: Radha Pantoja      : 1955      MRN: [id number]  Encounter Provider: Fausto Frazier MD  Encounter Date: 2025   Encounter department: [facility] [place]  :  Assessment & Plan  Encounter for screening for malignant neoplasm of colon    Orders:  •  Ambulatory Referral to Gastroenterology    Change in bowel habits    Orders:  •  Colonoscopy; Future  •  Clostridium difficile toxin by PCR with EIA; Future  •  Fecal leukocytes; Future  •  Ova and parasite examination; Future  •  Stool Enteric Bacterial Panel by PCR; Future    Diarrhea, unspecified type    Orders:  •  Colonoscopy; Future  •  Clostridium difficile toxin by PCR with EIA; Future  •  Fecal leukocytes; Future  •  Ova and parasite examination; Future  •  Stool Enteric Bacterial Panel by PCR; Future    Weight loss, abnormal    Orders:  •  Colonoscopy; Future    History of colon polyps    Orders:  •  Colonoscopy; Future    Patient will undergo stool studies.  Last colonoscopy May 2019.  She will go for repeat colonoscopy.  Further recommendations will depend on study results.  She is advised against drinking coffee at this point.    History of Present Illness   HPI  Radha Pantoja is a 69 y.o. female who presents with onset of diarrhea following a total knee replacement.  According to the patient she has 2 or 3 extremely watery bowel movements in the morning.  There is no abdominal pain.  No blood.  No fever or chills.  This usually visit for the rest of the day.  She has no nocturnal symptomatology.  She has lost about 4 pounds in the past few weeks.  No fever or chills.  No other definitive exacerbating or remitting factors.  Colonoscopy in May 2019 for history of polyps and anemia was normal at that time.  She is not having any other lower or upper GI complaints.  She notes that she has been drinking much more coffee recently.  No other issues.      Review of Systems   Constitutional:   "Positive for unexpected weight change.   Gastrointestinal:         As per HPI   All other systems reviewed and are negative.       Objective   /70   Pulse 77   Ht 5' 5\" (1.651 m)   Wt 66.2 kg (146 lb)   SpO2 98%   BMI 24.30 kg/m²      Physical Exam  Constitutional:       Appearance: Normal appearance. She is normal weight.   HENT:      Head: Normocephalic.   Eyes:      Pupils: Pupils are equal, round, and reactive to light.   Cardiovascular:      Rate and Rhythm: Normal rate and regular rhythm.      Pulses: Normal pulses.      Heart sounds: Normal heart sounds.   Pulmonary:      Effort: Pulmonary effort is normal.      Breath sounds: Normal breath sounds.   Abdominal:      General: Abdomen is flat. Bowel sounds are normal.      Palpations: Abdomen is soft.   Musculoskeletal:         General: Normal range of motion.      Cervical back: Normal range of motion.   Skin:     General: Skin is warm and dry.   Neurological:      General: No focal deficit present.      Mental Status: She is alert and oriented to person, place, and time.   Psychiatric:         Mood and Affect: Mood normal.         Behavior: Behavior normal.       "

## 2025-02-06 NOTE — PROGRESS NOTES
Name: Radha Pantoja      : 1955      MRN: [id number]  Encounter Provider: Fausto Frazier MD  Encounter Date: 2025   Encounter department: [facility] [place]  :  Assessment & Plan  Encounter for screening for malignant neoplasm of colon    Orders:  •  Ambulatory Referral to Gastroenterology    Change in bowel habits    Orders:  •  Colonoscopy; Future  •  Clostridium difficile toxin by PCR with EIA; Future  •  Fecal leukocytes; Future  •  Ova and parasite examination; Future  •  Stool Enteric Bacterial Panel by PCR; Future    Diarrhea, unspecified type    Orders:  •  Colonoscopy; Future  •  Clostridium difficile toxin by PCR with EIA; Future  •  Fecal leukocytes; Future  •  Ova and parasite examination; Future  •  Stool Enteric Bacterial Panel by PCR; Future    Weight loss, abnormal    Orders:  •  Colonoscopy; Future    History of colon polyps    Orders:  •  Colonoscopy; Future    Patient will undergo stool studies.  Last colonoscopy May 2019.  She will go for repeat colonoscopy.  Further recommendations will depend on study results.  She is advised against drinking coffee at this point.    History of Present Illness   HPI  Radha Pantoja is a 69 y.o. female who presents with onset of diarrhea following a total knee replacement.  According to the patient she has 2 or 3 extremely watery bowel movements in the morning.  There is no abdominal pain.  No blood.  No fever or chills.  This usually visit for the rest of the day.  She has no nocturnal symptomatology.  She has lost about 4 pounds in the past few weeks.  No fever or chills.  No other definitive exacerbating or remitting factors.  Colonoscopy in May 2019 for history of polyps and anemia was normal at that time.  She is not having any other lower or upper GI complaints.  She notes that she has been drinking much more coffee recently.  No other issues.      Review of Systems   Constitutional:   "Positive for unexpected weight change.   Gastrointestinal:         As per HPI   All other systems reviewed and are negative.       Objective   /70   Pulse 77   Ht 5' 5\" (1.651 m)   Wt 66.2 kg (146 lb)   SpO2 98%   BMI 24.30 kg/m²      Physical Exam  Constitutional:       Appearance: Normal appearance. She is normal weight.   HENT:      Head: Normocephalic.   Eyes:      Pupils: Pupils are equal, round, and reactive to light.   Cardiovascular:      Rate and Rhythm: Normal rate and regular rhythm.      Pulses: Normal pulses.      Heart sounds: Normal heart sounds.   Pulmonary:      Effort: Pulmonary effort is normal.      Breath sounds: Normal breath sounds.   Abdominal:      General: Abdomen is flat. Bowel sounds are normal.      Palpations: Abdomen is soft.   Musculoskeletal:         General: Normal range of motion.      Cervical back: Normal range of motion.   Skin:     General: Skin is warm and dry.   Neurological:      General: No focal deficit present.      Mental Status: She is alert and oriented to person, place, and time.   Psychiatric:         Mood and Affect: Mood normal.         Behavior: Behavior normal.       "

## 2025-02-07 ENCOUNTER — APPOINTMENT (OUTPATIENT)
Dept: LAB | Facility: CLINIC | Age: 70
End: 2025-02-07

## 2025-02-07 ENCOUNTER — OFFICE VISIT (OUTPATIENT)
Dept: PHYSICAL THERAPY | Facility: CLINIC | Age: 70
End: 2025-02-07
Payer: MEDICARE

## 2025-02-07 DIAGNOSIS — M25.562 CHRONIC PAIN OF LEFT KNEE: ICD-10-CM

## 2025-02-07 DIAGNOSIS — R26.89 ANTALGIC GAIT: ICD-10-CM

## 2025-02-07 DIAGNOSIS — M17.12 PRIMARY OSTEOARTHRITIS OF LEFT KNEE: ICD-10-CM

## 2025-02-07 DIAGNOSIS — G89.29 CHRONIC PAIN OF LEFT KNEE: ICD-10-CM

## 2025-02-07 DIAGNOSIS — Z96.652 S/P TOTAL KNEE ARTHROPLASTY, LEFT: Primary | ICD-10-CM

## 2025-02-07 PROCEDURE — 97140 MANUAL THERAPY 1/> REGIONS: CPT | Performed by: PHYSICAL THERAPIST

## 2025-02-07 PROCEDURE — 97110 THERAPEUTIC EXERCISES: CPT | Performed by: PHYSICAL THERAPIST

## 2025-02-07 NOTE — PROGRESS NOTES
"Daily Note     Today's date: 2025  Patient name: Radha Pantoja  : 1955  MRN: [id number]  Referring provider: Laron Carter DO  Dx:   Encounter Diagnosis     ICD-10-CM    1. S/P total knee arthroplasty, left  Z96.652       2. Primary osteoarthritis of left knee  M17.12       3. Chronic pain of left knee  M25.562     G89.29       4. Antalgic gait  R26.89           Start Time: 818  Stop Time: 902  Total time in clinic (min): 44 minutes    Subjective: Patient reports that her knee remains stiff in the morning, but after she stretches it gets looser. Patient still limps towards the end of her work shift, but thinks this is partially learned behavior.      Objective: See treatment diary below      Assessment: Tolerated treatment well. Patient demonstrated fatigue post treatment and would benefit from continued PT. Patient with improved quality of gait with near normal gait. Patient does continue to lack terminal knee extension, but this has significantly improved.      Plan: Continue per plan of care.  Progress treatment as tolerated.         POC expires Unit limit Auth  expiration date PT/OT + Visit Limit?   3/10//25 BOMN 25 BOMN      Sec - BOMN no auth           Visit/Unit Tracking  AUTH Status:  Date 1/30 2/3 2/7  12/31 1/2 1/7 1/10 1/14 1/17 1/22 1/27   N/A Used 8 9 10  29 1 2 3 4 5 6 7    Remaining                  Precautions: s/p left TKA (DOS: 24)      Manuals 2/7  1/7 1/10 1/14 1/17 1/22 1/27 1/30 2/3   Left knee PROM GR  SC GR SC GR SC  GR GR   L calf str.    GR  GR   GR GR   Pat mobs GR   GR  GR   GR GR   Post-op RE             Reassessment        GR                                            Neuro Re-Ed             Quad sets with heel prop             SAQ             LAQ   Matrix 10# 2x 10   Matrix 10x  Matrix: 30# 3x10 Hold machine down       Kesier: TKE   5\" 5# 2x 10   8# 30x5\" 8# 30x 5\"       Supine SLR     3x 10         TM: retro walk      0.3 mph 5 min 0.3 mph 5 min " "with VC's                    Ther Ex             Patient education: pathophysiology, graded activity, edema management, wound care, gait training, rehab / surgical overview, Candy Splint    GR    GR     Cardiovascular warm-up, knee ROM: Nustep, bike Rec bike 10 min  Rec bike 10 min  Rec bike 10 min 10 min  10 min 10 min rec bike Rec bike 10 min Rec bike 10 min Rec bike 10 min   Ankle pumps             HS stretch at step       10\"x 10 with self OP       Long-sitting calf str. With heel prop Prostretch 3x30\"            Heel slides              Knee flexion stretch on stairs              HR             Seated knee extension LLPS             Seated knee flexion AAROM             QS             Standing h/s curl             Prone hang        5 min 2#      Standing hip abd, ext with TB Off 2\" step in SLS GTB 20x ea. B/l  RTB 2x 10           Supine hip flexor str.             Supine knee flexion AAROM against wall             Prone quad str. 3x30\"            Sidestepping with TB         GTB 3 laps    Matrix HS curl  30# 3x10  25# 2 x 10   25# 3x 10  30# 3x10 Hold machine down       Matrix: knee ext. 30# 3x10                                      Ther Activity             Squats   2x 10  2x 10     Foam 10# KB 3x10 Foam 15# KB 3x10   FSU   6\" 2x 10   6\" 3x 10         FSD   6\" 2x 10 VC's   6\" 3x 10         LSD   4\" 2x 10   4\" 3x 10     6\" 3x10 6\" 2x10   Stationary lunges         2x10 2x10   Total gym: squats          Single leg L20 2x10   Functional testing: TUG, 5x STS, steps        GR     Bosu FSU          2x10 3\"   Gait Training                                       Modalities                                         Access Code: [email or web address]  URL: [email or web address]  Date: 11/29/2024  Prepared by: Matthew Marie    Exercises  - Ankle Pumps in Elevation  - 3-5 x daily - 7 x weekly - 5-10 minutes hold  - Supine Heel Slide with Strap  - 3-5 x daily - 7 x weekly - 2 sets - 10 reps - 10 seconds hold  - Long Sitting Calf " Stretch with Strap  - 3-5 x daily - 7 x weekly - 5 sets - 1 reps - 30 seconds hold  - Long Sitting Quad Set with Towel Roll Under Heel  - 3-5 x daily - 7 x weekly - 2-3 sets - 10 reps - 5 seconds hold  - Seated Knee Flexion Stretch  - 3-5 x daily - 7 x weekly - 2 sets - 10 reps - 10 seconds hold  - Seated Knee Extension Stretch with Chair  - 3-5 x daily - 7 x weekly - 1 sets - 5-10 minutes hold

## 2025-02-08 ENCOUNTER — APPOINTMENT (OUTPATIENT)
Dept: LAB | Facility: CLINIC | Age: 70
End: 2025-02-08
Payer: MEDICARE

## 2025-02-08 DIAGNOSIS — R19.4 CHANGE IN BOWEL HABITS: ICD-10-CM

## 2025-02-08 DIAGNOSIS — R19.7 DIARRHEA, UNSPECIFIED TYPE: ICD-10-CM

## 2025-02-08 PROCEDURE — 87505 NFCT AGENT DETECTION GI: CPT

## 2025-02-08 PROCEDURE — 87209 SMEAR COMPLEX STAIN: CPT

## 2025-02-08 PROCEDURE — 87177 OVA AND PARASITES SMEARS: CPT

## 2025-02-08 PROCEDURE — 89055 LEUKOCYTE ASSESSMENT FECAL: CPT

## 2025-02-09 LAB
C COLI+JEJUNI TUF STL QL NAA+PROBE: NEGATIVE
C DIFF TOX GENS STL QL NAA+PROBE: NEGATIVE
EC STX1+STX2 GENES STL QL NAA+PROBE: NEGATIVE
SALMONELLA SP SPAO STL QL NAA+PROBE: NEGATIVE
SHIGELLA SP+EIEC IPAH STL QL NAA+PROBE: NEGATIVE

## 2025-02-10 ENCOUNTER — TELEPHONE (OUTPATIENT)
Age: 70
End: 2025-02-10

## 2025-02-10 NOTE — TELEPHONE ENCOUNTER
Caller: Patient     Doctor: Dr. Carter    Reason for call: Asking if her work letter can be revised to stated that she had surgery on 11/26/24 and due to surgery she was out of work for 6 weeks. She can return to work with no restrictions. Please upload to her [employer].        Call back#: [phone]

## 2025-02-11 ENCOUNTER — OFFICE VISIT (OUTPATIENT)
Dept: PHYSICAL THERAPY | Facility: CLINIC | Age: 70
End: 2025-02-11
Payer: MEDICARE

## 2025-02-11 DIAGNOSIS — M25.562 CHRONIC PAIN OF LEFT KNEE: ICD-10-CM

## 2025-02-11 DIAGNOSIS — M17.12 PRIMARY OSTEOARTHRITIS OF LEFT KNEE: ICD-10-CM

## 2025-02-11 DIAGNOSIS — G89.29 CHRONIC PAIN OF LEFT KNEE: ICD-10-CM

## 2025-02-11 DIAGNOSIS — R26.89 ANTALGIC GAIT: ICD-10-CM

## 2025-02-11 DIAGNOSIS — Z96.652 S/P TOTAL KNEE ARTHROPLASTY, LEFT: Primary | ICD-10-CM

## 2025-02-11 PROCEDURE — 97140 MANUAL THERAPY 1/> REGIONS: CPT

## 2025-02-11 PROCEDURE — 97530 THERAPEUTIC ACTIVITIES: CPT

## 2025-02-11 PROCEDURE — 97110 THERAPEUTIC EXERCISES: CPT

## 2025-02-11 NOTE — PROGRESS NOTES
"Daily Note     Today's date: 2025  Patient name: Radha Pantoja  : 1955  MRN: [id number]  Referring provider: Laron Carter DO  Dx:   Encounter Diagnosis     ICD-10-CM    1. S/P total knee arthroplasty, left  Z96.652       2. Primary osteoarthritis of left knee  M17.12       3. Chronic pain of left knee  M25.562     G89.29       4. Antalgic gait  R26.89           Start Time: 808  Stop Time: 847  Total time in clinic (min): 39 minutes    Subjective: pt noted that she has been feeling good and back to work. She noted that she has been having her  complete the knee extension stretch instead of Dynasplint. Noted still having some stiffness.       Objective: See treatment diary below      Assessment:  Continued with treatment session with focus on strengthening as well as ROM. Noticed an improvement of ext this visit. Tolerated treatment well. Patient exhibited good technique with therapeutic exercises and would benefit from continued PT  S/p treatment noted no immediate changes.     Plan: Continue per plan of care.        POC expires Unit limit Auth  expiration date PT/OT + Visit Limit?   3/10//25 BOMN 25 BOMN      Sec - BOMN no auth           Visit/Unit Tracking  AUTH Status:  Date 1/30 2/3 2/7 2/11 12/31 1/2 1/7 1/10 1/14 1/17 1/22 1/27   N/A Used 8 9 10 11 29 1 2 3 4 5 6 7    Remaining                  Precautions: s/p left TKA (DOS: 24)      Manuals 2/7 2/11  1/10 1/14 1/17 1/22 1/27 1/30 2/3   Left knee PROM GR SC  GR SC GR SC  GR GR   L calf str.    GR  GR   GR GR   Pat mobs GR   GR  GR   GR GR   Post-op RE             Reassessment        GR                                            Neuro Re-Ed             Quad sets with heel prop             SAQ             LAQ     Matrix 10x  Matrix: 30# 3x10 Hold machine down       Kesier: TKE      8# 30x5\" 8# 30x 5\"       Supine SLR     3x 10         TM: retro walk      0.3 mph 5 min 0.3 mph 5 min with VC's                  " "  Ther Ex             Patient education: pathophysiology, graded activity, edema management, wound care, gait training, rehab / surgical overview, Candy Splint    GR    GR     Cardiovascular warm-up, knee ROM: Nustep, bike Rec bike 10 min Rec bike 10 min   Rec bike 10 min 10 min  10 min 10 min rec bike Rec bike 10 min Rec bike 10 min Rec bike 10 min   Ankle pumps             HS stretch at step       10\"x 10 with self OP       Long-sitting calf str. With heel prop Prostretch 3x30\"            Heel slides              Knee flexion stretch on stairs              HR             Seated knee extension LLPS             Seated knee flexion AAROM             QS             Standing h/s curl             Prone hang        5 min 2#      Standing hip abd, ext with TB Off 2\" step in SLS GTB 20x ea. B/l Off 2\" step in SLS GTB 20x ea. B/l           Supine hip flexor str.             Supine knee flexion AAROM against wall             Prone quad str. 3x30\"            Sidestepping with TB         GTB 3 laps    Matrix HS curl  30# 3x10 30# 3x 10    25# 3x 10  30# 3x10 Hold machine down       Matrix: knee ext. 30# 3x10 30# 3x 10                                      Ther Activity             Squats     2x 10     Foam 10# KB 3x10 Foam 15# KB 3x10   FSU     6\" 3x 10         FSD     6\" 3x 10         LSD     4\" 3x 10     6\" 3x10 6\" 2x10   Stationary lunges         2x10 2x10   Total gym: squats  Single LE L22 3x 10         Single leg L20 2x10   Functional testing: TUG, 5x STS, steps        GR     Bosu FSU  2x 10 3\" and Lateral         2x10 3\"   Gait Training                                       Modalities                                         Access Code: [email or web address]  URL: [email or web address]  Date: 11/29/2024  Prepared by: Matthew Marie    Exercises  - Ankle Pumps in Elevation  - 3-5 x daily - 7 x weekly - 5-10 minutes hold  - Supine Heel Slide with Strap  - 3-5 x daily - 7 x weekly - 2 sets - 10 reps - 10 seconds hold  - Long " Sitting Calf Stretch with Strap  - 3-5 x daily - 7 x weekly - 5 sets - 1 reps - 30 seconds hold  - Long Sitting Quad Set with Towel Roll Under Heel  - 3-5 x daily - 7 x weekly - 2-3 sets - 10 reps - 5 seconds hold  - Seated Knee Flexion Stretch  - 3-5 x daily - 7 x weekly - 2 sets - 10 reps - 10 seconds hold  - Seated Knee Extension Stretch with Chair  - 3-5 x daily - 7 x weekly - 1 sets - 5-10 minutes hold

## 2025-02-13 LAB — WBC SPEC QL GRAM STN: NORMAL

## 2025-02-14 ENCOUNTER — APPOINTMENT (OUTPATIENT)
Dept: PHYSICAL THERAPY | Facility: CLINIC | Age: 70
End: 2025-02-14
Payer: MEDICARE

## 2025-02-17 NOTE — PROGRESS NOTES
Daily Note     Today's date: 2025  Patient name: Radha Pantoja  : 1955  MRN: [id number]  Referring provider: Laron Carter DO  Dx:   Encounter Diagnosis     ICD-10-CM    1. S/P total knee arthroplasty, left  Z96.652       2. Primary osteoarthritis of left knee  M17.12       3. Chronic pain of left knee  M25.562     G89.29       4. Antalgic gait  R26.89           Start Time: 845  Stop Time: 930  Total time in clinic (min): 45 minutes    Subjective: pt noted that her knee has been being good and having some stiffness. No pain upon arrival.   Ortho  scheduled for about 2 1/2 months from now.     Objective: See treatment diary below      Assessment:  Continued with treatment session with focus on L knee stability, motion mainly ext based, as well as strengthening.    TG squats verbal cues to avoid genu valgus mechanics of L knee. Was able to complete with a slow and controled manor.   Tolerated treatment well. Patient exhibited good technique with therapeutic exercises and would benefit from continued PT  S/p  treatment session,  noted some increase muscle burning this visit but no changes in p!.     May notice increase DOMS s/p PT session.     Plan: Continue per plan of care.  Progress as able.        POC expires Unit limit Auth  expiration date PT/OT + Visit Limit?   3/10//25 BOMN 25 BOMN      Sec - BOMN no auth           Visit/Unit Tracking  AUTH Status:  Date  2/3 2 1/2 1/7 1/10 1/14 1/17 1/22 1/27   N/A Used 8 9 10 11 12 1 2 3 4 5 6 7    Remaining                  Precautions: s/p left TKA (DOS: 24)      Manuals /3   Left knee PROM GR SC SC   SC GR SC  GR GR   L calf str.      GR   GR GR   Pat mobs GR     GR   GR GR   Post-op RE             Reassessment        GR                                            Neuro Re-Ed             Quad sets with heel prop             SAQ             LAQ     Matrix 10x  Matrix: 30# 3x10  "Hold machine down       Kesier: TKE      8# 30x5\" 8# 30x 5\"       Supine SLR     3x 10         TM: retro walk      0.3 mph 5 min 0.3 mph 5 min with VC's        Slider lunges.    NV bwd           Standing star clocks with TB    GTB 5x                                                  Ther Ex             Patient education: pathophysiology, graded activity, edema management, wound care, gait training, rehab / surgical overview, Candy Splint        GR     Cardiovascular warm-up, knee ROM: Nustep, bike Rec bike 10 min Rec bike 10 min  Rec bike 10 min L1   10 min  10 min 10 min rec bike Rec bike 10 min Rec bike 10 min Rec bike 10 min   Ankle pumps             HS stretch at step       10\"x 10 with self OP       Long-sitting calf str. With heel prop Prostretch 3x30\"            Heel slides              Knee flexion stretch on stairs              HR             Seated knee extension LLPS             Seated knee flexion AAROM             QS             Standing h/s curl             Prone hang        5 min 2#      Standing hip abd, ext with TB Off 2\" step in SLS GTB 20x ea. B/l Off 2\" step in SLS GTB 20x ea. B/l           Supine hip flexor str.             Supine knee flexion AAROM against wall             Prone quad str. 3x30\"            Sidestepping with TB         GTB 3 laps    Matrix HS curl  30# 3x10 30# 3x 10  25# 3x 10   25# 3x 10  30# 3x10 Hold machine down       Matrix: knee ext. 30# 3x10 30# 3x 10  25# 3x 10                                     Ther Activity             Squats     2x 10     Foam 10# KB 3x10 Foam 15# KB 3x10   FSU   HEP   6\" 3x 10         FSD   6\" 3x 10 VC's   6\" 3x 10         LSD   6\" 3x 10 VC\"s  4\" 3x 10     6\" 3x10 6\" 2x10   Stationary lunges         2x10 2x10   Total gym: squats  Single LE L22 3x 10   Single LE L 17  3x 10        Single leg L20 2x10   Functional testing: TUG, 5x STS, steps        GR     Bosu FSU  2x 10 3\" and Lateral   2x 10 3\" and lateral       2x10 3\"   Lateral walks    GTB 5 laps " at mirror no UE           Monster walks    GTB  5 laps at mirror  no UE                                     Gait Training                                       Modalities                                         Access Code: [email or web address]  URL: [email or web address]  Date: 11/29/2024  Prepared by: Matthew Marie    Exercises  - Ankle Pumps in Elevation  - 3-5 x daily - 7 x weekly - 5-10 minutes hold  - Supine Heel Slide with Strap  - 3-5 x daily - 7 x weekly - 2 sets - 10 reps - 10 seconds hold  - Long Sitting Calf Stretch with Strap  - 3-5 x daily - 7 x weekly - 5 sets - 1 reps - 30 seconds hold  - Long Sitting Quad Set with Towel Roll Under Heel  - 3-5 x daily - 7 x weekly - 2-3 sets - 10 reps - 5 seconds hold  - Seated Knee Flexion Stretch  - 3-5 x daily - 7 x weekly - 2 sets - 10 reps - 10 seconds hold  - Seated Knee Extension Stretch with Chair  - 3-5 x daily - 7 x weekly - 1 sets - 5-10 minutes hold

## 2025-02-18 ENCOUNTER — OFFICE VISIT (OUTPATIENT)
Dept: PHYSICAL THERAPY | Facility: CLINIC | Age: 70
End: 2025-02-18
Payer: MEDICARE

## 2025-02-18 DIAGNOSIS — G89.29 CHRONIC PAIN OF LEFT KNEE: ICD-10-CM

## 2025-02-18 DIAGNOSIS — M25.562 CHRONIC PAIN OF LEFT KNEE: ICD-10-CM

## 2025-02-18 DIAGNOSIS — Z96.652 S/P TOTAL KNEE ARTHROPLASTY, LEFT: Primary | ICD-10-CM

## 2025-02-18 DIAGNOSIS — R26.89 ANTALGIC GAIT: ICD-10-CM

## 2025-02-18 DIAGNOSIS — M17.12 PRIMARY OSTEOARTHRITIS OF LEFT KNEE: ICD-10-CM

## 2025-02-18 PROCEDURE — 97530 THERAPEUTIC ACTIVITIES: CPT

## 2025-02-18 PROCEDURE — 97110 THERAPEUTIC EXERCISES: CPT

## 2025-02-18 PROCEDURE — 97140 MANUAL THERAPY 1/> REGIONS: CPT

## 2025-02-19 ENCOUNTER — ANESTHESIA EVENT (OUTPATIENT)
Dept: GASTROENTEROLOGY | Facility: HOSPITAL | Age: 70
End: 2025-02-19
Payer: MEDICARE

## 2025-02-19 ENCOUNTER — HOSPITAL ENCOUNTER (OUTPATIENT)
Dept: GASTROENTEROLOGY | Facility: HOSPITAL | Age: 70
Setting detail: OUTPATIENT SURGERY
Discharge: HOME | End: 2025-02-19
Attending: INTERNAL MEDICINE
Payer: MEDICARE

## 2025-02-19 ENCOUNTER — ANESTHESIA (OUTPATIENT)
Dept: GASTROENTEROLOGY | Facility: HOSPITAL | Age: 70
End: 2025-02-19
Payer: MEDICARE

## 2025-02-19 VITALS
HEART RATE: 57 BPM | TEMPERATURE: 98.8 F | HEIGHT: 65 IN | WEIGHT: 144.4 LBS | OXYGEN SATURATION: 97 % | BODY MASS INDEX: 24.06 KG/M2 | DIASTOLIC BLOOD PRESSURE: 72 MMHG | RESPIRATION RATE: 20 BRPM | SYSTOLIC BLOOD PRESSURE: 131 MMHG

## 2025-02-19 DIAGNOSIS — R63.4 WEIGHT LOSS, ABNORMAL: ICD-10-CM

## 2025-02-19 DIAGNOSIS — Z86.0100 HISTORY OF COLON POLYPS: ICD-10-CM

## 2025-02-19 DIAGNOSIS — R19.4 CHANGE IN BOWEL HABITS: ICD-10-CM

## 2025-02-19 DIAGNOSIS — R19.7 DIARRHEA, UNSPECIFIED TYPE: ICD-10-CM

## 2025-02-19 PROCEDURE — 88305 TISSUE EXAM BY PATHOLOGIST: CPT | Performed by: PATHOLOGY

## 2025-02-19 PROCEDURE — 45380 COLONOSCOPY AND BIOPSY: CPT | Performed by: INTERNAL MEDICINE

## 2025-02-19 RX ORDER — SODIUM CHLORIDE, SODIUM LACTATE, POTASSIUM CHLORIDE, CALCIUM CHLORIDE 600; 310; 30; 20 MG/100ML; MG/100ML; MG/100ML; MG/100ML
125 INJECTION, SOLUTION INTRAVENOUS CONTINUOUS
Status: DISCONTINUED | OUTPATIENT
Start: 2025-02-19 | End: 2025-02-23 | Stop reason: HOSPADM

## 2025-02-19 RX ORDER — PROPOFOL 10 MG/ML
INJECTION, EMULSION INTRAVENOUS AS NEEDED
Status: DISCONTINUED | OUTPATIENT
Start: 2025-02-19 | End: 2025-02-19

## 2025-02-19 RX ORDER — LIDOCAINE HYDROCHLORIDE 20 MG/ML
INJECTION, SOLUTION EPIDURAL; INFILTRATION; INTRACAUDAL; PERINEURAL AS NEEDED
Status: DISCONTINUED | OUTPATIENT
Start: 2025-02-19 | End: 2025-02-19

## 2025-02-19 RX ADMIN — PROPOFOL 30 MG: 10 INJECTION, EMULSION INTRAVENOUS at 10:03

## 2025-02-19 RX ADMIN — PROPOFOL 30 MG: 10 INJECTION, EMULSION INTRAVENOUS at 10:07

## 2025-02-19 RX ADMIN — PROPOFOL 100 MG: 10 INJECTION, EMULSION INTRAVENOUS at 09:59

## 2025-02-19 RX ADMIN — SODIUM CHLORIDE, SODIUM LACTATE, POTASSIUM CHLORIDE, AND CALCIUM CHLORIDE 125 ML/HR: .6; .31; .03; .02 INJECTION, SOLUTION INTRAVENOUS at 08:15

## 2025-02-19 RX ADMIN — PROPOFOL 30 MG: 10 INJECTION, EMULSION INTRAVENOUS at 10:05

## 2025-02-19 RX ADMIN — PROPOFOL 50 MG: 10 INJECTION, EMULSION INTRAVENOUS at 10:01

## 2025-02-19 RX ADMIN — LIDOCAINE HYDROCHLORIDE 50 MG: 20 INJECTION, SOLUTION EPIDURAL; INFILTRATION; INTRACAUDAL; PERINEURAL at 09:59

## 2025-02-19 NOTE — ANESTHESIA PREPROCEDURE EVALUATION
Procedure:  COLONOSCOPY    Relevant Problems   CARDIO   (+) Hyperlipidemia, unspecified      HEMATOLOGY   (+) Iron deficiency anemia, unspecified      MUSCULOSKELETAL   (+) Primary osteoarthritis of left knee      NEURO/PSYCH   (+) Anxiety      Behavioral Health   (+) Alcoholism (HCC)       Left Ventricle: Left ventricular cavity size is normal. Wall thickness is normal. The left ventricular ejection fraction is 60%. Systolic function is normal. Wall motion is normal. Diastolic function is normal.    Tricuspid Valve: There is mild regurgitation.    Pulmonic Valve: There is mild regurgitation.    TDS due to breast implants.    No prior echo for comparison.           Physical Exam    Airway    Mallampati score: II  TM Distance: >3 FB  Neck ROM: full     Dental       Cardiovascular  Cardiovascular exam normal    Pulmonary  Pulmonary exam normal     Other Findings  post-pubertal.      Anesthesia Plan  ASA Score- 2     Anesthesia Type- IV sedation with anesthesia with ASA Monitors.         Additional Monitors:     Airway Plan:            Plan Factors-Exercise tolerance (METS): >4 METS.    Chart reviewed. EKG reviewed. Imaging results reviewed. Existing labs reviewed. Patient summary reviewed.                  Induction- intravenous.    Postoperative Plan-         Informed Consent- Anesthetic plan and risks discussed with patient.  I personally reviewed this patient with the CRNA. Discussed and agreed on the Anesthesia Plan with the CRNA..      NPO Status:  Vitals Value Taken Time   Date of last liquid 02/18/25 02/19/25 0809   Time of last liquid 2200 02/19/25 0809   Date of last solid 02/17/25 02/19/25 0809   Time of last solid 1900 02/19/25 0809

## 2025-02-19 NOTE — ANESTHESIA POSTPROCEDURE EVALUATION
Post-Op Assessment Note    CV Status:  Stable  Pain Score: 0    Pain management: adequate       Mental Status:  Alert and awake   Hydration Status:  Euvolemic   PONV Controlled:  Controlled   Airway Patency:  Patent     Post Op Vitals Reviewed: Yes    No anethesia notable event occurred.    Staff: CRNA           Last Filed PACU Vitals:  Vitals Value Taken Time   Temp 97.5    Pulse 69 02/19/25 1011   /68    Resp 21 02/19/25 1011   SpO2 98 % 02/19/25 1011   Vitals shown include unfiled device data.

## 2025-02-19 NOTE — INTERVAL H&P NOTE
H&P reviewed. After examining the patient I find no changes in the patients condition since the H&P had been written.    Vitals:    02/19/25 0810   BP: 108/67   Pulse: 75   Resp: 16   Temp: (!) 97 °F (36.1 °C)   SpO2: 100%

## 2025-02-20 ENCOUNTER — OFFICE VISIT (OUTPATIENT)
Dept: PHYSICAL THERAPY | Facility: CLINIC | Age: 70
End: 2025-02-20
Payer: MEDICARE

## 2025-02-20 DIAGNOSIS — M25.562 CHRONIC PAIN OF LEFT KNEE: ICD-10-CM

## 2025-02-20 DIAGNOSIS — M17.12 PRIMARY OSTEOARTHRITIS OF LEFT KNEE: ICD-10-CM

## 2025-02-20 DIAGNOSIS — G89.29 CHRONIC PAIN OF LEFT KNEE: ICD-10-CM

## 2025-02-20 DIAGNOSIS — Z96.652 S/P TOTAL KNEE ARTHROPLASTY, LEFT: Primary | ICD-10-CM

## 2025-02-20 DIAGNOSIS — R26.89 ANTALGIC GAIT: ICD-10-CM

## 2025-02-20 PROCEDURE — 97140 MANUAL THERAPY 1/> REGIONS: CPT | Performed by: PHYSICAL THERAPIST

## 2025-02-20 PROCEDURE — 97530 THERAPEUTIC ACTIVITIES: CPT | Performed by: PHYSICAL THERAPIST

## 2025-02-20 PROCEDURE — 97110 THERAPEUTIC EXERCISES: CPT | Performed by: PHYSICAL THERAPIST

## 2025-02-20 NOTE — PROGRESS NOTES
"Daily Note     Today's date: 2025  Patient name: Radha Pantoja  : 1955  MRN: [id number]  Referring provider: Laron Carter DO  Dx:   Encounter Diagnosis     ICD-10-CM    1. S/P total knee arthroplasty, left  Z96.652       2. Primary osteoarthritis of left knee  M17.12       3. Chronic pain of left knee  M25.562     G89.29       4. Antalgic gait  R26.89           Start Time: 933  Stop Time:   Total time in clinic (min): 47 minutes    Subjective: Patient reports that at times during the day she forgets that she had her knee operated on, but it continues to be more painful and tight at night and in the morning.      Objective: See treatment diary below      Assessment: Tolerated treatment well. Patient demonstrated fatigue post treatment and would benefit from continued PT. Patient continues to present with limited terminal knee extension, however, improved compared to in the past. Overall, improving functional mobility.       Plan: Continue per plan of care.  Progress treatment as tolerated.         POC expires Unit limit Auth  expiration date PT/OT + Visit Limit?   3/10//25 BOMN 25 BOMN      Sec - BOMN no auth           Visit/Unit Tracking  AUTH Status:  Date  2/3 2/7 2/11 2/18 2/20  1/10 1/14 1/17 1/22 1/27   N/A Used 8 9 10 11 12 13  3 4 5 6 7    Remaining                  Precautions: s/p left TKA (DOS: 24)      Manuals  2/3   Left knee PROM GR SC SC  GR  GR SC  GR GR   L calf str.      GR   GR GR   Pat mobs GR   GR  GR   GR GR   Post-op RE             Reassessment        GR                                            Neuro Re-Ed             Quad sets with heel prop             SAQ             LAQ      Matrix: 30# 3x10 Hold machine down       Kesier: TKE      8# 30x5\" 8# 30x 5\"       Supine SLR             TM: retro walk      0.3 mph 5 min 0.3 mph 5 min with VC's        Slider lunges.    NV bwd           Standing star clocks with " "TB    GTB 5x  GTB x5                                                Ther Ex             Patient education: pathophysiology, graded activity, edema management, wound care, gait training, rehab / surgical overview, Candy Splint        GR     Cardiovascular warm-up, knee ROM: Nustep, bike Rec bike 10 min Rec bike 10 min  Rec bike 10 min L1  Rec bike 10 min   10 min 10 min rec bike Rec bike 10 min Rec bike 10 min Rec bike 10 min   Ankle pumps             HS stretch at step       10\"x 10 with self OP       Long-sitting calf str. With heel prop Prostretch 3x30\"            Heel slides              Knee flexion stretch on stairs              HR             Seated knee extension LLPS             Seated knee flexion AAROM             QS             Standing h/s curl             Prone hang        5 min 2#      Standing hip abd, ext with TB Off 2\" step in SLS GTB 20x ea. B/l Off 2\" step in SLS GTB 20x ea. B/l           Supine hip flexor str.             Supine knee flexion AAROM against wall             Prone quad str. 3x30\"            Sidestepping with TB    GTB x3     GTB 3 laps    Matrix HS curl  30# 3x10 30# 3x 10  25# 3x 10  35# 3x10  30# 3x10 Hold machine down       Matrix: knee ext. 30# 3x10 30# 3x 10  25# 3x 10  35# 3x10                                   Ther Activity             Squats    15# KB, foam 3x10     Foam 10# KB 3x10 Foam 15# KB 3x10   FSU   HEP           FSD   6\" 3x 10 VC's           LSD   6\" 3x 10 VC\"s 6\" 3x10     6\" 3x10 6\" 2x10   Stationary lunges         2x10 2x10   Total gym: squats  Single LE L22 3x 10   Single LE L 17  3x 10  Single LE L20 2x10      Single leg L20 2x10   Functional testing: TUG, 5x STS, steps        GR     Bosu FSU  2x 10 3\" and Lateral   2x 10 3\" and lateral 3x10 3\"      2x10 3\"   Lateral walks    GTB 5 laps at mirror no UE  GTB 3 laps length of room         Monster walks    GTB  5 laps at mirror  no UE                                     Gait Training                                  "      Modalities                                         Access Code: [email or web address]  URL: [email or web address]  Date: 11/29/2024  Prepared by: Matthew Marie    Exercises  - Ankle Pumps in Elevation  - 3-5 x daily - 7 x weekly - 5-10 minutes hold  - Supine Heel Slide with Strap  - 3-5 x daily - 7 x weekly - 2 sets - 10 reps - 10 seconds hold  - Long Sitting Calf Stretch with Strap  - 3-5 x daily - 7 x weekly - 5 sets - 1 reps - 30 seconds hold  - Long Sitting Quad Set with Towel Roll Under Heel  - 3-5 x daily - 7 x weekly - 2-3 sets - 10 reps - 5 seconds hold  - Seated Knee Flexion Stretch  - 3-5 x daily - 7 x weekly - 2 sets - 10 reps - 10 seconds hold  - Seated Knee Extension Stretch with Chair  - 3-5 x daily - 7 x weekly - 1 sets - 5-10 minutes hold

## 2025-02-21 ENCOUNTER — APPOINTMENT (OUTPATIENT)
Dept: LAB | Facility: HOSPITAL | Age: 70
End: 2025-02-21
Payer: MEDICARE

## 2025-02-21 ENCOUNTER — CONSULT (OUTPATIENT)
Dept: OBGYN CLINIC | Facility: CLINIC | Age: 70
End: 2025-02-21
Payer: MEDICARE

## 2025-02-21 VITALS
BODY MASS INDEX: 24.39 KG/M2 | HEIGHT: 65 IN | WEIGHT: 146.4 LBS | DIASTOLIC BLOOD PRESSURE: 78 MMHG | SYSTOLIC BLOOD PRESSURE: 110 MMHG

## 2025-02-21 DIAGNOSIS — Z11.51 SCREENING FOR HPV (HUMAN PAPILLOMAVIRUS): ICD-10-CM

## 2025-02-21 DIAGNOSIS — Z01.419 ENCOUNTER FOR ANNUAL ROUTINE GYNECOLOGICAL EXAMINATION: Primary | ICD-10-CM

## 2025-02-21 DIAGNOSIS — Z12.4 SCREENING FOR CERVICAL CANCER: ICD-10-CM

## 2025-02-21 PROCEDURE — G0145 SCR C/V CYTO,THINLAYER,RESCR: HCPCS

## 2025-02-21 PROCEDURE — G0101 CA SCREEN;PELVIC/BREAST EXAM: HCPCS

## 2025-02-21 PROCEDURE — G0476 HPV COMBO ASSAY CA SCREEN: HCPCS

## 2025-02-21 NOTE — PROGRESS NOTES
Name: Radha Pantoja      : 1955      MRN: [id number]  Encounter Provider: Rosa Estrella PA-C  Encounter Date: 2025   Encounter department: [facility]  :  Assessment & Plan  Screening for cervical cancer  Patient has not had a Pap smear since  we will collect a Pap today and if it is normal patient will no longer need cervical cancer screening  Orders:    Ambulatory Referral to Obstetrics / Gynecology    Encounter for annual routine gynecological examination    Calcium 1200-1500mg (in divided doses-max 600 mg at one time) + 800-1000 IU Vit D daily unless otherwise directed. Avoid falls.   Exercise 150-300 minutes per week minimum including weight bearing exercises.   DEXA scan-       Call your insurance company to verify coverage prior to completing any ordered tests.    Annual mammogram ordered and monthly breast self exam recommended .     Colonoscopy-   due 2030      Kegels 20 times twice daily.    Vaginal moisturizers twice weekly as needed.   Return to office in one year or sooner, if needed.           History of Present Illness   HPI  Radha Pantoja is a 69 y.o. female who presents for an annual exam    Patient is here for a yearly exam. Postmenopausal   Concerns: none   Sexually active: not currently   Hx of abnormal paps  Last pap: 20-NILM/HPVneg   Mammogram: history of bilateral mastectomy   Colonoscopy: 25-repeat in 5 years   Dexa: 19-normal, next one ordered by PCP   Patient is BRCA gene +, had a bilateral oopherectomy and mastectomy as prevention    Had knee surgery in November and is doing well with recovery    Hereditary Cancer Screening  FH Breast cancer: niece (brother passed the gene to her), maternal aunt, mother  FH Ovarian cancer: mother  FH Uterine cancer: none  FH Colon cancer: none  Cancer-related family history includes Breast cancer in her maternal aunt and mother; Ovarian cancer in her mother.  There is no history of Colon cancer, Cervical cancer, or Uterine cancer.      Review of Systems   Constitutional:  Negative for chills, fatigue, fever and unexpected weight change.   Eyes:  Negative for visual disturbance.   Respiratory:  Negative for shortness of breath.    Cardiovascular:  Negative for chest pain and palpitations.   Gastrointestinal:  Positive for diarrhea (following with GI). Negative for abdominal pain, anal bleeding, blood in stool, constipation, nausea and vomiting.   Endocrine: Negative for cold intolerance and heat intolerance.   Genitourinary:  Negative for difficulty urinating, dysuria, frequency, hematuria, pelvic pain, urgency, vaginal bleeding, vaginal discharge and vaginal pain.   Musculoskeletal:  Negative for back pain.   Skin:  Negative for rash.   Neurological:  Negative for dizziness, syncope, light-headedness and headaches.   Psychiatric/Behavioral:  Negative for dysphoric mood. The patient is nervous/anxious (managable on medication).      Current Outpatient Medications on File Prior to Visit   Medication Sig Dispense Refill    escitalopram (LEXAPRO) 10 mg tablet TAKE ONE TABLET BY MOUTH ONE TIME DAILY 30 tablet 5    gabapentin (NEURONTIN) 300 mg capsule Take 1 capsule (300 mg total) by mouth 4 (four) times a day 360 capsule 3    [DISCONTINUED] amoxicillin (AMOXIL) 500 mg capsule Take by mouth (Patient not taking: Reported on 2/21/2025)      [DISCONTINUED] hydrocortisone 2.5 % cream Apply topically (Patient not taking: Reported on 2/21/2025)      [DISCONTINUED] meloxicam (MOBIC) 15 mg tablet Take 1 tablet (15 mg total) by mouth daily (Patient not taking: Reported on 2/6/2025) 30 tablet 0     Current Facility-Administered Medications on File Prior to Visit   Medication Dose Route Frequency Provider Last Rate Last Admin    lactated ringers infusion  125 mL/hr Intravenous Continuous Fausto Frazier  mL/hr at 02/19/25 0957 Continue from Pre-op at 02/19/25 0957      Social  "History     Tobacco Use    Smoking status: Never     Passive exposure: Never    Smokeless tobacco: Never    Tobacco comments:     Has medical marijuana card does vape   Vaping Use    Vaping status: Every Day    Substances: THC   Substance and Sexual Activity    Alcohol use: Not Currently     Comment: recovering    Drug use: Yes     Types: Marijuana     Comment: medical marijuana    Sexual activity: Not Currently     Partners: Male        Objective   /78 (BP Location: Left arm, Patient Position: Sitting, Cuff Size: Adult)   Ht 5' 5\" (1.651 m)   Wt 66.4 kg (146 lb 6.4 oz)   BMI 24.36 kg/m²      Physical Exam  Vitals and nursing note reviewed.   Constitutional:       General: She is not in acute distress.     Appearance: She is well-developed.   HENT:      Head: Normocephalic and atraumatic.   Eyes:      Extraocular Movements: Extraocular movements intact.   Pulmonary:      Effort: Pulmonary effort is normal.   Chest:   Breasts:     Right: No swelling, bleeding, mass, skin change or tenderness.      Left: No swelling, bleeding, mass, skin change or tenderness.      Comments: Patient had bilateral mastectomy with reconstruction  Abdominal:      Palpations: Abdomen is soft.      Tenderness: There is no abdominal tenderness.      Hernia: There is no hernia in the left inguinal area or right inguinal area.   Genitourinary:     General: Normal vulva.      Exam position: Lithotomy position.      Pubic Area: No rash.       Labia:         Right: No rash, tenderness or lesion.         Left: No rash, tenderness or lesion.       Urethra: No urethral pain or urethral swelling.      Vagina: No vaginal discharge, erythema, tenderness, bleeding or lesions.      Cervix: Friability present. No cervical motion tenderness, discharge, lesion, erythema or cervical bleeding.      Uterus: Not enlarged and not tender.       Comments: Stenotic cervix  Mild vaginal atrophy noted  Ovaries surgically absent  Lymphadenopathy:      Upper " Body:      Right upper body: No supraclavicular, axillary or pectoral adenopathy.      Left upper body: No supraclavicular, axillary or pectoral adenopathy.      Lower Body: No right inguinal adenopathy. No left inguinal adenopathy.   Skin:     General: Skin is warm and dry.   Neurological:      Mental Status: She is alert.   Psychiatric:         Mood and Affect: Mood normal.         Behavior: Behavior normal.         Rosa Estrella PA-C

## 2025-02-25 ENCOUNTER — APPOINTMENT (OUTPATIENT)
Dept: PHYSICAL THERAPY | Facility: CLINIC | Age: 70
End: 2025-02-25
Payer: MEDICARE

## 2025-02-25 DIAGNOSIS — G89.29 CHRONIC PAIN OF LEFT KNEE: ICD-10-CM

## 2025-02-25 DIAGNOSIS — Z96.652 S/P TOTAL KNEE ARTHROPLASTY, LEFT: Primary | ICD-10-CM

## 2025-02-25 DIAGNOSIS — R26.89 ANTALGIC GAIT: ICD-10-CM

## 2025-02-25 DIAGNOSIS — M25.562 CHRONIC PAIN OF LEFT KNEE: ICD-10-CM

## 2025-02-25 DIAGNOSIS — M17.12 PRIMARY OSTEOARTHRITIS OF LEFT KNEE: ICD-10-CM

## 2025-02-25 PROCEDURE — 88305 TISSUE EXAM BY PATHOLOGIST: CPT | Performed by: PATHOLOGY

## 2025-02-25 NOTE — PROGRESS NOTES
"Daily Note     Today's date: 2025  Patient name: Radha Pantoja  : 1955  MRN: [id number]  Referring provider: Laron Carter DO  Dx:   Encounter Diagnosis     ICD-10-CM    1. S/P total knee arthroplasty, left  Z96.652       2. Primary osteoarthritis of left knee  M17.12       3. Chronic pain of left knee  M25.562     G89.29       4. Antalgic gait  R26.89                      Subjective: ***      Objective: See treatment diary below      Assessment: Tolerated treatment well. Patient demonstrated fatigue post treatment and would benefit from continued PT.       Plan: Continue per plan of care.  Progress treatment as tolerated.         POC expires Unit limit Auth  expiration date PT/OT + Visit Limit?   3/10//25 BOMN 25 BOMN      Sec - BOMN no auth           Visit/Unit Tracking  AUTH Status:  Date  2/3 2   N/A Used 8 9 10 11 12 13 14  4 5 6 7    Remaining                  Precautions: s/p left TKA (DOS: 24)      Manuals 2/7 2/11 2/18 2/20 2/25  1/22 1/27 1/30 2/3   Left knee PROM GR SC SC  GR   SC  GR GR   L calf str.         GR GR   Pat mobs GR   GR     GR GR   Post-op RE             Reassessment        GR                                            Neuro Re-Ed             Quad sets with heel prop             SAQ             LAQ       Hold machine down       Kesier: TKE       8# 30x 5\"       Supine SLR             TM: retro walk       0.3 mph 5 min with VC's        Slider lunges.    NV bwd           Standing star clocks with TB    GTB 5x  GTB x5                                                Ther Ex             Patient education: pathophysiology, graded activity, edema management, wound care, gait training, rehab / surgical overview, Candy Splint        GR     Cardiovascular warm-up, knee ROM: Nustep, bike Rec bike 10 min Rec bike 10 min  Rec bike 10 min L1  Rec bike 10 min    10 min rec bike Rec bike 10 min Rec bike 10 min Rec bike 10 " "min   Ankle pumps             HS stretch at step       10\"x 10 with self OP       Long-sitting calf str. With heel prop Prostretch 3x30\"            Heel slides              Knee flexion stretch on stairs              HR             Seated knee extension LLPS             Seated knee flexion AAROM             QS             Standing h/s curl             Prone hang        5 min 2#      Standing hip abd, ext with TB Off 2\" step in SLS GTB 20x ea. B/l Off 2\" step in SLS GTB 20x ea. B/l           Supine hip flexor str.             Supine knee flexion AAROM against wall             Prone quad str. 3x30\"            Sidestepping with TB    GTB x3     GTB 3 laps    Matrix HS curl  30# 3x10 30# 3x 10  25# 3x 10  35# 3x10   Hold machine down       Matrix: knee ext. 30# 3x10 30# 3x 10  25# 3x 10  35# 3x10                                   Ther Activity             Squats    15# KB, foam 3x10     Foam 10# KB 3x10 Foam 15# KB 3x10   FSU   HEP           FSD   6\" 3x 10 VC's           LSD   6\" 3x 10 VC\"s 6\" 3x10     6\" 3x10 6\" 2x10   Stationary lunges         2x10 2x10   Total gym: squats  Single LE L22 3x 10   Single LE L 17  3x 10  Single LE L20 2x10      Single leg L20 2x10   Functional testing: TUG, 5x STS, steps        GR     Bosu FSU  2x 10 3\" and Lateral   2x 10 3\" and lateral 3x10 3\"      2x10 3\"   Lateral walks    GTB 5 laps at mirror no UE  GTB 3 laps length of room         Monster walks    GTB  5 laps at mirror  no UE                                     Gait Training                                       Modalities                                         Access Code: [email or web address]  URL: [email or web address]  Date: 11/29/2024  Prepared by: Matthew Marie    Exercises  - Ankle Pumps in Elevation  - 3-5 x daily - 7 x weekly - 5-10 minutes hold  - Supine Heel Slide with Strap  - 3-5 x daily - 7 x weekly - 2 sets - 10 reps - 10 seconds hold  - Long Sitting Calf Stretch with Strap  - 3-5 x daily - 7 x weekly - 5 sets - 1 " reps - 30 seconds hold  - Long Sitting Quad Set with Towel Roll Under Heel  - 3-5 x daily - 7 x weekly - 2-3 sets - 10 reps - 5 seconds hold  - Seated Knee Flexion Stretch  - 3-5 x daily - 7 x weekly - 2 sets - 10 reps - 10 seconds hold  - Seated Knee Extension Stretch with Chair  - 3-5 x daily - 7 x weekly - 1 sets - 5-10 minutes hold

## 2025-02-27 ENCOUNTER — RESULTS FOLLOW-UP (OUTPATIENT)
Age: 70
End: 2025-02-27

## 2025-02-27 LAB
LAB AP GYN PRIMARY INTERPRETATION: NORMAL
Lab: NORMAL

## 2025-02-28 ENCOUNTER — TELEPHONE (OUTPATIENT)
Age: 70
End: 2025-02-28

## 2025-02-28 ENCOUNTER — OFFICE VISIT (OUTPATIENT)
Dept: PHYSICAL THERAPY | Facility: CLINIC | Age: 70
End: 2025-02-28
Payer: MEDICARE

## 2025-02-28 DIAGNOSIS — M25.562 CHRONIC PAIN OF LEFT KNEE: ICD-10-CM

## 2025-02-28 DIAGNOSIS — R26.89 ANTALGIC GAIT: ICD-10-CM

## 2025-02-28 DIAGNOSIS — G89.29 CHRONIC PAIN OF LEFT KNEE: ICD-10-CM

## 2025-02-28 DIAGNOSIS — Z96.652 S/P TOTAL KNEE ARTHROPLASTY, LEFT: Primary | ICD-10-CM

## 2025-02-28 DIAGNOSIS — M17.12 PRIMARY OSTEOARTHRITIS OF LEFT KNEE: ICD-10-CM

## 2025-02-28 PROCEDURE — 97140 MANUAL THERAPY 1/> REGIONS: CPT

## 2025-02-28 PROCEDURE — 97530 THERAPEUTIC ACTIVITIES: CPT

## 2025-02-28 PROCEDURE — 97110 THERAPEUTIC EXERCISES: CPT

## 2025-02-28 NOTE — LETTER
February 28, 2025     Patient: Radha Pantoja  YOB: 1955  Date of Visit:       To Whom it May Concern:    Radha Pantoja is under my professional care. Radha is being treated for gastrointestinal issues and will be out of work on  2/28/2025. Radha may return to work on 03/03/2025 .    If you have any questions or concerns, please don't hesitate to call.         Sincerely,          Fausto Frazier MD.

## 2025-02-28 NOTE — TELEPHONE ENCOUNTER
Please let the patient know the biopsies did not show any specific or significant changes.  There is a mild inflammation on one of the biopsies which is nonspecific.  If she is still having the diarrhea she may try over-the-counter Imodium and call me in 2 weeks with her progress.

## 2025-02-28 NOTE — TELEPHONE ENCOUNTER
Patients GI provider:  Dr. Frazier    Number to return call: [phone]    Reason for call: Pt called in stating she will need a a work note stating she is under the care of Dr. Frazier. Patient stated she is still have lots of stomach problems and had to stay home from work. Patient is requesting work note sent to email address listed on chart. Patient is also requesting a call back with results. Please reach out to patient.    Scheduled procedure/appointment date if applicable: Apt/procedure n/a

## 2025-02-28 NOTE — PROGRESS NOTES
"Daily Note     Today's date: 2025  Patient name: Radha Pantoja  : 1955  MRN: [id number]  Referring provider: Laron Carter DO  Dx:   Encounter Diagnosis     ICD-10-CM    1. S/P total knee arthroplasty, left  Z96.652       2. Primary osteoarthritis of left knee  M17.12       3. Chronic pain of left knee  M25.562     G89.29       4. Antalgic gait  R26.89                      Subjective: Pt states knee has been feeling good however it continues to tighten up throughout the night, but after moving it it will loosen up.      Objective: See treatment diary below      Assessment: Tolerated treatment well. TE's were completed today with minimal cueing for technique and no increase in P!.  Patient would benefit from continued PT      Plan: Continue per plan of care.        POC expires Unit limit Auth  expiration date PT/OT + Visit Limit?   3/10//25 BOMN 25 BOMN      Sec - BOMN no auth           Visit/Unit Tracking  AUTH Status:  Date 1/30 2/3 2/7 2/11 2/18 2/20 2/28 1/10 1/14 1/17 1/22 1/27   N/A Used 8 9 10 11 12 13 14 3 4 5 6 7    Remaining                  Precautions: s/p left TKA (DOS: 24)      Manuals 2/7 2/11 2/18 2/20 2/28 1/17 1/22 1/27 1/30 2/3   Left knee PROM GR SC SC  GR JK GR SC  GR GR   L calf str.      GR   GR GR   Pat mobs GR   GR  GR   GR GR   Post-op RE             Reassessment        GR                                            Neuro Re-Ed             Quad sets with heel prop             SAQ             LAQ      Matrix: 30# 3x10 Hold machine down       Kesier: TKE      8# 30x5\" 8# 30x 5\"       Supine SLR             TM: retro walk      0.3 mph 5 min 0.3 mph 5 min with VC's        Slider lunges.    NV bwd           Standing star clocks with TB    GTB 5x  GTB x5 GTB x5                                               Ther Ex             Patient education: pathophysiology, graded activity, edema management, wound care, gait training, rehab / surgical overview, Candy Splint " "       GR     Cardiovascular warm-up, knee ROM: Nustep, bike Rec bike 10 min Rec bike 10 min  Rec bike 10 min L1  Rec bike 10 min  Rec 5 min 10 min 10 min rec bike Rec bike 10 min Rec bike 10 min Rec bike 10 min   Ankle pumps             HS stretch at step       10\"x 10 with self OP       Long-sitting calf str. With heel prop Prostretch 3x30\"            Heel slides              Knee flexion stretch on stairs              HR             Seated knee extension LLPS             Seated knee flexion AAROM             QS             Standing h/s curl             Prone hang        5 min 2#      Standing hip abd, ext with TB Off 2\" step in SLS GTB 20x ea. B/l Off 2\" step in SLS GTB 20x ea. B/l           Supine hip flexor str.             Supine knee flexion AAROM against wall             Prone quad str. 3x30\"            Sidestepping with TB    GTB x3     GTB 3 laps    Matrix HS curl  30# 3x10 30# 3x 10  25# 3x 10  35# 3x10 35# 3x10 30# 3x10 Hold machine down       Matrix: knee ext. 30# 3x10 30# 3x 10  25# 3x 10  35# 3x10 35# 3x10                                  Ther Activity             Squats    15# KB, foam 3x10 15# KB, foam 3x10    Foam 10# KB 3x10 Foam 15# KB 3x10   FSU   HEP           FSD   6\" 3x 10 VC's           LSD   6\" 3x 10 VC\"s 6\" 3x10 6\" 3x10    6\" 3x10 6\" 2x10   Stationary lunges         2x10 2x10   Total gym: squats  Single LE L22 3x 10   Single LE L 17  3x 10  Single LE L20 2x10 Single LE L20 2x10     Single leg L20 2x10   Functional testing: TUG, 5x STS, steps        GR     Bosu FSU  2x 10 3\" and Lateral   2x 10 3\" and lateral 3x10 3\" 3x10 3\"     2x10 3\"   Lateral walks    GTB 5 laps at mirror no UE  GTB 3 laps length of room GTB 3 laps length of room        Monster walks    GTB  5 laps at mirror  no UE                                     Gait Training                                       Modalities                                         Access Code: [email or web address]  URL: [email or web address]  Date: " 11/29/2024  Prepared by: Matthew Marie    Exercises  - Ankle Pumps in Elevation  - 3-5 x daily - 7 x weekly - 5-10 minutes hold  - Supine Heel Slide with Strap  - 3-5 x daily - 7 x weekly - 2 sets - 10 reps - 10 seconds hold  - Long Sitting Calf Stretch with Strap  - 3-5 x daily - 7 x weekly - 5 sets - 1 reps - 30 seconds hold  - Long Sitting Quad Set with Towel Roll Under Heel  - 3-5 x daily - 7 x weekly - 2-3 sets - 10 reps - 5 seconds hold  - Seated Knee Flexion Stretch  - 3-5 x daily - 7 x weekly - 2 sets - 10 reps - 10 seconds hold  - Seated Knee Extension Stretch with Chair  - 3-5 x daily - 7 x weekly - 1 sets - 5-10 minutes hold

## 2025-02-28 NOTE — PROGRESS NOTES
PT Re-Evaluation     Today's date: 2025  Patient name: Radha Pantoja  : 1955  MRN: [id number]  Referring provider: Laron Carter DO  Dx:   Encounter Diagnosis     ICD-10-CM    1. S/P total knee arthroplasty, left  Z96.652       2. Primary osteoarthritis of left knee  M17.12       3. Chronic pain of left knee  M25.562     G89.29       4. Antalgic gait  R26.89                      Assessment    Assessment details: Radha Pantoja is a 69 y.o. female who presents with h/o chronic left knee pain having failed conservative treatment to date. Patient currently presents s/p elective left total knee arthroplasty, DOS: 24. Post-operatively, Patient has maintained excellent compliance with established POC and has attended a total number of 26 physical therapy appointments. Patient has made significant improvements overall, including decreased pain, improved quality of gait, increased knee ROM, decreased joint effusion, increased LE strength, and improved overall activity tolerance. Patient's functional mobility has improved significantly as demonstrated by decreased time needed to complete TUG and Five Times Sit to Stand tests. Patient has returned to work as a . Despite significant improvement of terminal knee extension, the greatest limitation to this point remains limited terminal knee extension, contributing to altered gait mechanics. Patient has been encouraged to continue stretching into terminal knee extension to maximize ROM, function and quality of gait. Patient is independent with her comprehensive HEP. Patient has been instructed to contact PT if she notices a decline in function or if she has any questions/concerns. Patient will be discharged at this time.  Understanding of Dx/Px/POC: excellent     Prognosis: excellent    Goals  Short Term Goals: to be achieved by 4 weeks   1) Patient to be independent with basic HEP. MET  2) Decrease pain by 4/10 at its worst.  PROGRESSED, BUT NOT MET  3) Increase knee flexion ROM to 110 deg. MET  4) Increase knee extension ROM by > 5 deg. MET  5) Increase LE strength by 1/2 MMT grade in all deficient planes. MET  6) Patient to negotiate steps with a reciprocal pattern with use of HR. MET  7) Patient to report decreased sleep interruption secondary to pain. MET  8) Increase ambulatory tolerance by 10 min with LRAD. MET    Long Term Goals: to be achieved by discharge  1) FOTO equal to or greater than 71. MET  2) Patient to be independent with comprehensive HEP. MET  3) Abolish pain for improved quality of life. PROGRESSED, BUT NOT MET  4) Increase LE strength to 5/5 MMT grade in all planes to improve a/iadls. MET  5) Achieve full knee extension ROM to improve a/iadls. PROGRESSED, BUT NOT MET  6) Increase knee flexion ROM to within 5 deg of contralateral LE to improve a/iadls. PROGRESSED, BUT NOT MET  7) Patient to negotiate steps with a reciprocal pattern without use of Hr. MET  8) Increase ambulatory tolerance to 60 min to improve participation in social activities. MET  9) Patient to report no sleep interruption secondary to pain. MET  10) Increase TUG and 5x STS by > at least 3 seconds to improve a/iadls. MET      Plan  Speech planned therapy intervention: home exercise program    Plan of Care beginning date: 2/28/2025  Plan of Care expiration date: 2/28/2025  Treatment plan discussed with: patient      Subjective Evaluation    History of Present Illness  Mechanism of injury: Patient reports that overall her knee pain continues to improve, but remains sore. Patient's knee feels more sore after a long work shift on her feet for several hours. Patient's knee remains stiff in the morning after sleeping, but has been sleeping better overall. Patient continues to take Gabapentin at night, and Meloxicam as needed. Patient's knee remains swollen, but has improved. Patient is able to negotiate steps with a reciprocal pattern. Patient has stopped  using her DynaSplint because it was making her leg tingle and was very sensitive. Patient estimates her left knee overall level of function to be approximately 80% of her premorbid norm. Patient's next f/u appointment with her surgeon is scheduled for later today.  Patient Goals  Patient goals for therapy: decreased pain, increased motion, increased strength, independence with ADLs/IADLs, return to sport/leisure activities and return to work  Patient goal: improve quality of gait  Pain  Current pain ratin  At best pain ratin  At worst pain ratin  Location: left thigh and lower leg > knee (muscular)  Quality: dull ache and tight    Social Support    Employment status: working ( at [employer]: anticipated return date of 25)  Treatments  Previous treatment: physical therapy      Objective     Observations   Left Knee   Positive for edema, effusion and incision (clean, well approximated, good scar tissue mobility, no signs of infection).     Right Knee   Negative for edema, effusion and incision.     Active Range of Motion   Left Knee   Flexion: 116 degrees with pain  Extension: 10 degrees with pain    Right Knee   Flexion: 148 degrees   Extension: 0 degrees     Passive Range of Motion   Left Knee   Flexion: 122 degrees with pain  Extension: 8 degrees with pain    Right Knee   Flexion: 148 degrees   Extension: 0 degrees     Mobility   Patellar Mobility:   Left Knee   WFL: medial, lateral, superior and inferior.     Strength/Myotome Testing     Left Hip   Planes of Motion   Flexion: 4+  Abduction: 4+    Right Hip   Planes of Motion   Flexion: 5    Left Knee   Flexion: 5  Extension: 5 ((+) pain)  Quadriceps contraction: fair    Right Knee   Flexion: 5  Extension: 5    Left Ankle/Foot   Dorsiflexion: 5    Right Ankle/Foot   Dorsiflexion: 5    Tests   Left Ankle/Foot   Positive for Homans' sign.     Right Ankle/Foot   Negative for Homans' sign.     Ambulation   Weight-Bearing Status   Weight-Bearing  "Status (Left): full weight bearing   Weight-Bearing Status (Right): full weight-bearing    Assistive device used: none    Ambulation: Level Surfaces   Ambulation without assistive device: independent    Observational Gait   Gait: antalgic     Additional Observational Gait Details  Increased left knee flexion during stance and swing phase    Functional Assessment        Comments  POST-OP REEVALUATION (11/29/24):   Bilateral squat: Mod I with standard walker, 0-40 deg, (+) pain  Timed Up and Go (TUG): 26.71 sec, Mod I ambulation with standard walker and sit to/from stand with bilateral arm rests  Five Times Sit to Stand Test: 13.69 sec, Mod I with b/l arm rests, left knee extended with increased weightbearing through left LE, good eccentric control    REEVALUATION (12/27/24):  Bilateral squat: Mod I, 0-60 deg  Timed Up and Go (TUG): 12.28 sec, Independent ambulation and sit to/from stand  Five Times Sit to Stand Test: 13.28 sec, Independent, good eccentric control, even weightbearing bilaterally ; 9.56 sec, Mod I with b/l arm rests  Forward step up 8\" step: Mod I with unilateral hand rail, antalgic  Forward step down 8\" step: Mod I with unilateral hand rail, fair eccentric control with increased weightbearing through UE ; 6\" step: Mod I with unilateral hand rail, good eccentric control    REEVALUATION (1/27/25):  Bilateral squat: WFL  Timed Up and Go (TUG): 8.12 sec, Independent sit to/from stand and ambulation  Five Times Sit to Stand Test: 11.18 sec, Independent, good eccentric control, equal weightbearing bilaterally ; 8.86 sec, Mod I with b/l arm rests, good eccentric control  Forward step up 8\" step: Independent  Forward step down 8\" step: Independent, good eccentric control               POC expires Unit limit Auth  expiration date PT/OT + Visit Limit?   3/10//25 BOMN 12/31/25 BOMN      Sec - BOMN no auth           Visit/Unit Tracking  AUTH Status:  Date 1/30 2/3 2/7 2/11 2/18 2/20 2/28 1/14 1/17 1/22 1/27 " "  N/A Used 8 9 10 11 12 13 14  4 5 6 7    Remaining                  Precautions: s/p left TKA (DOS: 11/26/24)      Manuals 2/7 2/11 2/18 2/20 2/28  1/22 1/27 1/30 2/3   Left knee PROM GR SC SC  GR   SC  GR GR   L calf str.         GR GR   Pat mobs GR   GR     GR GR   Post-op RE             Reassessment        GR                                            Neuro Re-Ed             Quad sets with heel prop             SAQ             LAQ       Hold machine down       Kesier: TKE       8# 30x 5\"       Supine SLR             TM: retro walk       0.3 mph 5 min with VC's        Slider lunges.    NV bwd           Standing star clocks with TB    GTB 5x  GTB x5                                                Ther Ex             Patient education: pathophysiology, graded activity, edema management, wound care, gait training, rehab / surgical overview, Candy Splint        GR     Cardiovascular warm-up, knee ROM: Nustep, bike Rec bike 10 min Rec bike 10 min  Rec bike 10 min L1  Rec bike 10 min    10 min rec bike Rec bike 10 min Rec bike 10 min Rec bike 10 min   Ankle pumps             HS stretch at step       10\"x 10 with self OP       Long-sitting calf str. With heel prop Prostretch 3x30\"            Heel slides              Knee flexion stretch on stairs              HR             Seated knee extension LLPS             Seated knee flexion AAROM             QS             Standing h/s curl             Prone hang        5 min 2#      Standing hip abd, ext with TB Off 2\" step in SLS GTB 20x ea. B/l Off 2\" step in SLS GTB 20x ea. B/l           Supine hip flexor str.             Supine knee flexion AAROM against wall             Prone quad str. 3x30\"            Sidestepping with TB    GTB x3     GTB 3 laps    Matrix HS curl  30# 3x10 30# 3x 10  25# 3x 10  35# 3x10   Hold machine down       Matrix: knee ext. 30# 3x10 30# 3x 10  25# 3x 10  35# 3x10                                   Ther Activity             Squats    15# KB, foam 3x10  " "   Foam 10# KB 3x10 Foam 15# KB 3x10   FSU   HEP           FSD   6\" 3x 10 VC's           LSD   6\" 3x 10 VC\"s 6\" 3x10     6\" 3x10 6\" 2x10   Stationary lunges         2x10 2x10   Total gym: squats  Single LE L22 3x 10   Single LE L 17  3x 10  Single LE L20 2x10      Single leg L20 2x10   Functional testing: TUG, 5x STS, steps        GR     Bosu FSU  2x 10 3\" and Lateral   2x 10 3\" and lateral 3x10 3\"      2x10 3\"   Lateral walks    GTB 5 laps at mirror no UE  GTB 3 laps length of room         Monster walks    GTB  5 laps at mirror  no UE                                     Gait Training                                       Modalities                                         Access Code: [email or web address]  URL: [email or web address]  Date: 11/29/2024  Prepared by: Matthew Marie    Exercises  - Ankle Pumps in Elevation  - 3-5 x daily - 7 x weekly - 5-10 minutes hold  - Supine Heel Slide with Strap  - 3-5 x daily - 7 x weekly - 2 sets - 10 reps - 10 seconds hold  - Long Sitting Calf Stretch with Strap  - 3-5 x daily - 7 x weekly - 5 sets - 1 reps - 30 seconds hold  - Long Sitting Quad Set with Towel Roll Under Heel  - 3-5 x daily - 7 x weekly - 2-3 sets - 10 reps - 5 seconds hold  - Seated Knee Flexion Stretch  - 3-5 x daily - 7 x weekly - 2 sets - 10 reps - 10 seconds hold  - Seated Knee Extension Stretch with Chair  - 3-5 x daily - 7 x weekly - 1 sets - 5-10 minutes hold       "

## 2025-03-04 ENCOUNTER — OFFICE VISIT (OUTPATIENT)
Dept: PHYSICAL THERAPY | Facility: CLINIC | Age: 70
End: 2025-03-04
Payer: MEDICARE

## 2025-03-04 DIAGNOSIS — M17.12 PRIMARY OSTEOARTHRITIS OF LEFT KNEE: ICD-10-CM

## 2025-03-04 DIAGNOSIS — R26.89 ANTALGIC GAIT: ICD-10-CM

## 2025-03-04 DIAGNOSIS — M25.562 CHRONIC PAIN OF LEFT KNEE: ICD-10-CM

## 2025-03-04 DIAGNOSIS — Z96.652 S/P TOTAL KNEE ARTHROPLASTY, LEFT: Primary | ICD-10-CM

## 2025-03-04 DIAGNOSIS — G89.29 CHRONIC PAIN OF LEFT KNEE: ICD-10-CM

## 2025-03-04 PROCEDURE — 97110 THERAPEUTIC EXERCISES: CPT | Performed by: PHYSICAL THERAPIST

## 2025-03-04 PROCEDURE — 97530 THERAPEUTIC ACTIVITIES: CPT | Performed by: PHYSICAL THERAPIST

## 2025-03-04 NOTE — PROGRESS NOTES
PT Re-Evaluation     Today's date: 3/4/2025  Patient name: Radha Pantoja  : 1955  MRN: [id number]  Referring provider: Laron Carter DO  Dx:   Encounter Diagnosis     ICD-10-CM    1. S/P total knee arthroplasty, left  Z96.652       2. Primary osteoarthritis of left knee  M17.12       3. Chronic pain of left knee  M25.562     G89.29       4. Antalgic gait  R26.89           Start Time: 730  Stop Time: 815  Total time in clinic (min): 45 minutes    Assessment  Impairments: abnormal gait, abnormal muscle firing, abnormal or restricted ROM, activity intolerance, impaired balance, impaired physical strength, lacks appropriate home exercise program, pain with function, weight-bearing intolerance and poor body mechanics    Assessment details: Radha Pantoja is a 69 y.o. female who presents with h/o chronic left knee pain having failed conservative treatment to date. Patient currently presents s/p elective left total knee arthroplasty, DOS: 24. Post-operatively, Patient has maintained excellent compliance with established POC and has attended a total number of 26 physical therapy appointments. Patient has made significant improvements overall, including decreased pain, improved quality of gait, increased knee ROM, decreased joint effusion, increased LE strength, and improved overall activity tolerance. Patient's functional mobility has continued to improve as demonstrated by decreased time needed to complete TUG and Five Times Sit to Stand tests. Patient has returned to work as a . Patient's greatest limitation to this point remains limited terminal knee extension, contributing to antalgic gait. Patient continues to present with mild functional weakness. Patient has been encouraged to consistently perform home stretches to maximize knee ROM. Patient does continue to have some cramping/pain sensation in her hamstrings and calf. Patient overall reports her level of function to be  significantly improved compared to her preoperative baseline. Patient would benefit from skilled physical therapy to address their aforementioned impairments, improve their level of function and to improve their overall quality of life. Anticipate D/C to home in the upcoming weeks.  Understanding of Dx/Px/POC: excellent     Prognosis: excellent    Goals  Short Term Goals: to be achieved by 4 weeks   1) Patient to be independent with basic HEP. MET  2) Decrease pain by 4/10 at its worst. PROGRESSED, BUT NOT MET  3) Increase knee flexion ROM to 110 deg. MET  4) Increase knee extension ROM by > 5 deg. MET  5) Increase LE strength by 1/2 MMT grade in all deficient planes. MET  6) Patient to negotiate steps with a reciprocal pattern with use of HR. MET  7) Patient to report decreased sleep interruption secondary to pain. MET  8) Increase ambulatory tolerance by 10 min with LRAD. MET    Long Term Goals: to be achieved by discharge ALL GOALS PROGRESSING  1) FOTO equal to or greater than 71.  2) Patient to be independent with comprehensive HEP.  3) Abolish pain for improved quality of life.  4) Increase LE strength to 5/5 MMT grade in all planes to improve a/iadls.  5) Achieve full knee extension ROM to improve a/iadls.  6) Increase knee flexion ROM to within 5 deg of contralateral LE to improve a/iadls.  7) Patient to negotiate steps with a reciprocal pattern without use of Hr.  8) Increase ambulatory tolerance to 60 min to improve participation in social activities.  9) Patient to report no sleep interruption secondary to pain.  10) Increase TUG and 5x STS by > at least 3 seconds to improve a/iadls.      Plan  Patient would benefit from: skilled PT  Planned modality interventions: biofeedback, cryotherapy, electrical stimulation/Russian stimulation, TENS and low level laser therapy    Planned therapy interventions: activity modification, ADL retraining, ADL training, balance, balance/weight bearing training, body  mechanics training, dressing changes, functional ROM exercises, gait training, home exercise program, IADL retraining, joint mobilization, manual therapy, massage, neuromuscular re-education, patient education, self care, strengthening, stretching, therapeutic activities, therapeutic exercise and transfer training    Frequency: 2-3x week.  Duration in weeks: 4  Plan of Care beginning date: 3/4/2025  Plan of Care expiration date: 2025  Treatment plan discussed with: patient        Subjective Evaluation    History of Present Illness  Mechanism of injury: Patient reports that her post-operative pain is 99% better compared to her preoperative pain. Patient occasionally gets a quick jab to the anteromedial aspect of her knee. Patient continues to have a tightness or cramping sensation in the back of her hamstring and calf, which is most noticeable when sleeping. Patient has been taking Gabapentin and Magnesium to help aid this. Patient has been able to work and finds herself avoiding the longer work hours. Patient is able to get through the work day without limping. Patient estimates her left knee overall level of function to be in the mid 90's. Patient's next f/u appointment with her surgeon is scheduled for 25.  Patient Goals  Patient goals for therapy: decreased pain, increased motion, increased strength, independence with ADLs/IADLs, return to sport/leisure activities and return to work  Patient goal: improve quality of gait  Pain  Current pain ratin  At best pain ratin  At worst pain ratin  Location: left thigh and lower leg > knee (muscular)  Quality: dull ache, tight and sharp    Social Support    Employment status: working ( at [employer]: anticipated return date of 25)  Treatments  Previous treatment: physical therapy        Objective     Observations   Left Knee   Positive for edema, effusion and incision (clean, well approximated, good scar tissue mobility, no signs of infection).      Right Knee   Negative for edema, effusion and incision.     Active Range of Motion   Left Knee   Flexion: 117 degrees with pain  Extension: 10 degrees with pain    Right Knee   Flexion: 148 degrees   Extension: 0 degrees     Passive Range of Motion   Left Knee   Flexion: 122 degrees with pain  Extension: 8 degrees with pain    Right Knee   Flexion: 148 degrees   Extension: 0 degrees     Mobility   Patellar Mobility:   Left Knee   WFL: medial, lateral, superior and inferior.     Strength/Myotome Testing     Left Hip   Planes of Motion   Flexion: 5  Abduction: 5    Right Hip   Planes of Motion   Flexion: 5    Left Knee   Flexion: 4 ((+) pain)  Extension: 5  Quadriceps contraction: fair    Right Knee   Flexion: 5  Extension: 5    Left Ankle/Foot   Dorsiflexion: 5    Right Ankle/Foot   Dorsiflexion: 5    Tests   Left Ankle/Foot   Positive for Homans' sign.     Right Ankle/Foot   Negative for Homans' sign.     Ambulation   Weight-Bearing Status   Weight-Bearing Status (Left): full weight bearing   Weight-Bearing Status (Right): full weight-bearing    Assistive device used: none    Ambulation: Level Surfaces   Ambulation without assistive device: independent    Observational Gait     Additional Observational Gait Details  Increased left knee flexion during stance and swing phase    Functional Assessment        Comments  POST-OP REEVALUATION (11/29/24):   Bilateral squat: Mod I with standard walker, 0-40 deg, (+) pain  Timed Up and Go (TUG): 26.71 sec, Mod I ambulation with standard walker and sit to/from stand with bilateral arm rests  Five Times Sit to Stand Test: 13.69 sec, Mod I with b/l arm rests, left knee extended with increased weightbearing through left LE, good eccentric control    REEVALUATION (12/27/24):  Bilateral squat: Mod I, 0-60 deg  Timed Up and Go (TUG): 12.28 sec, Independent ambulation and sit to/from stand  Five Times Sit to Stand Test: 13.28 sec, Independent, good eccentric control, even  "weightbearing bilaterally ; 9.56 sec, Mod I with b/l arm rests  Forward step up 8\" step: Mod I with unilateral hand rail, antalgic  Forward step down 8\" step: Mod I with unilateral hand rail, fair eccentric control with increased weightbearing through UE ; 6\" step: Mod I with unilateral hand rail, good eccentric control    REEVALUATION (1/27/25):  Bilateral squat: WFL  Timed Up and Go (TUG): 8.12 sec, Independent sit to/from stand and ambulation  Five Times Sit to Stand Test: 11.18 sec, Independent, good eccentric control, equal weightbearing bilaterally ; 8.86 sec, Mod I with b/l arm rests, good eccentric control  Forward step up 8\" step: Independent  Forward step down 8\" step: Independent, good eccentric control    REEVALUATION (3/4/25):  Bilateral squat: WFL  Timed Up and Go (TUG): 7.87 sec, Independent ambulation and sit to/from stand  Five Times Sit to Stand Test: 9.46 sec, Independent, good eccentric control  Forward step up 8\" step: Independent  Forward step down 8\" step: Independent, good eccentric control      Flowsheet Rows      Flowsheet Row Most Recent Value   PT/OT G-Codes    Current Score 44   Projected Score 71                 POC expires Unit limit Auth  expiration date PT/OT + Visit Limit?   3/10//25 BOMN 12/31/25 BOMN      Sec - BOMN no auth           Visit/Unit Tracking  AUTH Status:  Date 1/30 2/3 2/7 2/11 2/18 2/20 2/28 3/4 1/17 1/22 1/27   N/A Used 8 9 10 11 12 13 14 15  5 6 7    Remaining                  Precautions: s/p left TKA (DOS: 11/26/24)      Manuals 2/7 2/11 2/18 2/20 2/28 3/4 1/27 1/30 2/3   Left knee PROM GR SC SC  GR JK    GR GR   L calf str.         GR GR   Pat mobs GR   GR     GR GR   Post-op RE             Reassessment      GR  GR                                            Neuro Re-Ed             Quad sets with heel prop             SAQ             LAQ             Kesier: TKE             Supine SLR             TM: retro walk              Slider lunges.    NV bwd         " "  Standing star clocks with TB    GTB 5x  GTB x5 GTB x5                                               Ther Ex             Patient education: pathophysiology, graded activity, edema management, wound care, gait training, rehab / surgical overview, HEP review      GR  GR     Cardiovascular warm-up, knee ROM: Nustep, bike Rec bike 10 min Rec bike 10 min  Rec bike 10 min L1  Rec bike 10 min  Rec 5 min Rec bike 10 min  Rec bike 10 min Rec bike 10 min Rec bike 10 min   Ankle pumps             HS stretch at step             Long-sitting calf str. With heel prop Prostretch 3x30\"            Heel slides              Knee flexion stretch on stairs              HR             Seated knee extension LLPS             Seated knee flexion AAROM             QS             Standing h/s curl             Prone hang             Standing hip abd, ext with TB Off 2\" step in SLS GTB 20x ea. B/l Off 2\" step in SLS GTB 20x ea. B/l           Supine hip flexor str.             Supine knee flexion AAROM against wall             Prone quad str. 3x30\"            Sidestepping with TB    GTB x3     GTB 3 laps    Matrix HS curl  30# 3x10 30# 3x 10  25# 3x 10  35# 3x10 35# 3x10        Matrix: knee ext. 30# 3x10 30# 3x 10  25# 3x 10  35# 3x10 35# 3x10                                  Ther Activity             Squats    15# KB, foam 3x10 15# KB, foam 3x10    Foam 10# KB 3x10 Foam 15# KB 3x10   FSU   HEP           FSD   6\" 3x 10 VC's           LSD   6\" 3x 10 VC\"s 6\" 3x10 6\" 3x10    6\" 3x10 6\" 2x10   Stationary lunges         2x10 2x10   Total gym: squats  Single LE L22 3x 10   Single LE L 17  3x 10  Single LE L20 2x10 Single LE L20 2x10     Single leg L20 2x10   Functional testing: TUG, 5x STS, steps      Performed  GR     Bosu FSU  2x 10 3\" and Lateral   2x 10 3\" and lateral 3x10 3\" 3x10 3\"     2x10 3\"   Lateral walks    GTB 5 laps at mirror no UE  GTB 3 laps length of room GTB 3 laps length of room        Monster walks    GTB  5 laps at mirror  no UE   "                                   Gait Training                                       Modalities                                         Access Code: [email or web address]  URL: [email or web address]  Date: 11/29/2024  Prepared by: Matthew Marie    Exercises  - Ankle Pumps in Elevation  - 3-5 x daily - 7 x weekly - 5-10 minutes hold  - Supine Heel Slide with Strap  - 3-5 x daily - 7 x weekly - 2 sets - 10 reps - 10 seconds hold  - Long Sitting Calf Stretch with Strap  - 3-5 x daily - 7 x weekly - 5 sets - 1 reps - 30 seconds hold  - Long Sitting Quad Set with Towel Roll Under Heel  - 3-5 x daily - 7 x weekly - 2-3 sets - 10 reps - 5 seconds hold  - Seated Knee Flexion Stretch  - 3-5 x daily - 7 x weekly - 2 sets - 10 reps - 10 seconds hold  - Seated Knee Extension Stretch with Chair  - 3-5 x daily - 7 x weekly - 1 sets - 5-10 minutes hold

## 2025-03-07 ENCOUNTER — APPOINTMENT (OUTPATIENT)
Dept: PHYSICAL THERAPY | Facility: CLINIC | Age: 70
End: 2025-03-07
Payer: MEDICARE

## 2025-03-10 NOTE — PROGRESS NOTES
"Patient Name:  Radha Pantoja  MRN:  [id number]    Assessment & Plan     1. S/P total knee arthroplasty, left  -     cephalexin (KEFLEX) 500 mg capsule; Take 1 capsule (500 mg total) by mouth every 6 (six) hours for 5 days        Approximately 6 week s/p Left total knee arthroplasty performed on 11/26/2024 with residual limitation to terminal extension.  Overall, the patient is doing well, improving range of motion  Continue Dynasplint as directed  Continue outpatient physical therapy and home exercises as directed  Keflex 500 mg QID prescribed due to suture abscess. Suture remnant removed from incision today.   Continue meloxicam and OTC Tylenol as needed for symptom management  Follow up 3 weeks for reevaluation and new AP/lateral x-rays    History of the Present Illness   Radha Pantoja is a 69 y.o. female approximately 6 week s/p Left total knee arthroplasty performed on 11/26/2024. Today, patient reports her dynasplint arrived today and she used it for approximately 30 minutes. She is having soreness in her medial knee. She did return to work at [employer].  She denies fever, chills.        Review of Systems     Review of Systems   Constitutional:  Negative for chills and fever.   HENT:  Negative for congestion.    Respiratory:  Negative for cough, chest tightness and shortness of breath.    Cardiovascular:  Negative for chest pain and palpitations.   Gastrointestinal:  Negative for abdominal pain.   Endocrine: Negative for cold intolerance and heat intolerance.   Neurological:  Negative for syncope.   Psychiatric/Behavioral:  Negative for confusion.        Physical Exam     Ht 5' 5\" (1.651 m)   Wt 67.1 kg (148 lb)   BMI 24.63 kg/m²     Left Knee  Surgical incision well healing  Suture abscess mid incision. No erythema or  warmth. Scant purulent discharge at sight of suture.  Suture tail removed.\  Range of motion 12 to 110  There is no effusion.    There is tenderness over the proximal medial " Subjective   Kellie Sheth is a 6 y.o. female who is brought in for this well child visit. They are accompanied by mother and patient.    Has no significant past medical history. Presents in office today for yearly well child visit and with slight concern for her thumb sucking. Regards that she will suck her thumb to soothe. No vision or hearing concerns at this time. Denies constipation or diarrhea issues. Has regular dental hygiene and visits the dentist. Remarks overall having normal sleep at night. Is active and keeps up with others her age.     Immunization History   Administered Date(s) Administered    DTaP / HiB / IPV 2019, 2019, 2019    DTaP IPV combined vaccine (KINRIX, QUADRACEL) 10/02/2023    DTaP vaccine, pediatric  (INFANRIX) 08/07/2020    Flu vaccine (IIV4), preservative free *Check age/dose* 09/28/2022, 10/02/2023    Hepatitis A vaccine, pediatric/adolescent (HAVRIX, VAQTA) 08/07/2020, 03/25/2022    Hepatitis B vaccine, 19 yrs and under (RECOMBIVAX, ENGERIX) 2019, 2019, 2019    HiB PRP-OMP conjugate vaccine, pediatric (PEDVAXHIB) 08/07/2020    Influenza, Unspecified 2019, 03/09/2020    Influenza, seasonal, injectable 01/08/2021    MMR and varicella combined vaccine, subcutaneous (PROQUAD) 01/08/2021    MMR vaccine, subcutaneous (MMR II) 08/07/2020    Pneumococcal conjugate vaccine, 13-valent (PREVNAR 13) 2019, 2019, 2019, 08/07/2020    Rotavirus Monovalent 2019, 2019    Varicella vaccine, subcutaneous (VARIVAX) 08/07/2020     History of previous adverse reactions to immunizations? no  The following portions of the patient's history were reviewed by a provider in this encounter and updated as appropriate:       Well Child Assessment:  History was provided by the mother. Kellie lives with her mother.   Dental  The patient has a dental home. The patient brushes teeth regularly. Last dental exam was 6-12 months ago.    Elimination  Elimination problems do not include constipation or diarrhea.   Sleep  There are no sleep problems.       Objective   Vitals:    03/10/25 1319   BP: 102/67   Pulse: 92   Temp: 36.2 °C (97.1 °F)   SpO2: 98%   Weight: 21.7 kg   Height: 1.219 m (4')     Growth parameters are noted and are appropriate for age.  Physical Exam  Vitals reviewed. Exam conducted with a chaperone present.   Constitutional:       General: She is active.      Appearance: Normal appearance. She is well-developed and normal weight.   HENT:      Head: Normocephalic and atraumatic.      Right Ear: Tympanic membrane, ear canal and external ear normal.      Left Ear: Tympanic membrane, ear canal and external ear normal.      Nose: Nose normal.      Mouth/Throat:      Mouth: Mucous membranes are moist.      Pharynx: Oropharynx is clear.      Comments: Open bite, both maxillary central incisors missing.  Eyes:      Extraocular Movements: Extraocular movements intact.      Conjunctiva/sclera: Conjunctivae normal.      Pupils: Pupils are equal, round, and reactive to light.   Cardiovascular:      Rate and Rhythm: Normal rate and regular rhythm.      Pulses: Normal pulses.      Heart sounds: Normal heart sounds.   Pulmonary:      Effort: Pulmonary effort is normal.      Breath sounds: Normal breath sounds.   Abdominal:      General: Abdomen is flat. Bowel sounds are normal.      Palpations: Abdomen is soft.   Genitourinary:     General: Normal vulva.   Musculoskeletal:         General: Normal range of motion.      Cervical back: Normal range of motion and neck supple.   Skin:     General: Skin is warm and dry.      Capillary Refill: Capillary refill takes less than 2 seconds.   Neurological:      General: No focal deficit present.      Mental Status: She is alert and oriented for age.   Psychiatric:         Mood and Affect: Mood normal.         Behavior: Behavior normal.         Thought Content: Thought content normal.  tibia.    The patient is able to perform a straight leg raise   Calf soft, compressible and nontender   The patient is neurovascular intact distally.      Data Review     I have personally reviewed pertinent films in PACS, and my interpretation follows.    No new images     Social History     Tobacco Use    Smoking status: Never    Smokeless tobacco: Never    Tobacco comments:     Has medical marijuana card does vape   Vaping Use    Vaping status: Every Day    Substances: THC   Substance Use Topics    Alcohol use: Not Currently     Comment: recovering    Drug use: Yes     Types: Marijuana     Comment: medical marijuana           Procedures  None     Lacey Catalan   Scribe Attestation      I,:  Lacey Catalan am acting as a scribe while in the presence of the attending physician.:       I,:  Laron Carter, DO personally performed the services described in this documentation    as scribed in my presence.:                      Assessment/Plan   1. Encounter for routine child health examination without abnormal findings        2. Pediatric body mass index (BMI) of 5th percentile to less than 85th percentile for age          Healthy 6 y.o. female child.  1. Anticipatory guidance discussed.  Gave handout on well-child issues at this age.  Specific topics reviewed: car seat/seat belts; don't put in front seat, caution with possible poisons (including pills, plants, cosmetics), importance of regular dental care, importance of varied diet, and school preparation.  2.  Weight management:  The patient was counseled regarding nutrition and physical activity.  3. Development: appropriate for age  4. Vision and hearing screening: normal  5. Advised ways to help with patient to stop sucking their thumb.  Distract with fidget or other calming toy to use instead of thumb sucking.  6.  Kellie has not seen a dentist yet - advised mom to schedule a dental check up.  6. Follow-up visit in 1 year for next well child visit, or sooner as needed.    Hearing Screening    500Hz 1000Hz 2000Hz   Right ear 25 25 25   Left ear 25 25 25     Vision Screening    Right eye Left eye Both eyes   Without correction   pass   With correction        By signing my name below, IMitchell Scribe   attest that this documentation has been prepared under the direction and in the presence of Batsheva Nichols MD.

## 2025-03-12 ENCOUNTER — TELEPHONE (OUTPATIENT)
Age: 70
End: 2025-03-12

## 2025-03-12 ENCOUNTER — OFFICE VISIT (OUTPATIENT)
Dept: PHYSICAL THERAPY | Facility: CLINIC | Age: 70
End: 2025-03-12
Payer: MEDICARE

## 2025-03-12 DIAGNOSIS — M17.12 PRIMARY OSTEOARTHRITIS OF LEFT KNEE: ICD-10-CM

## 2025-03-12 DIAGNOSIS — Z96.652 S/P TOTAL KNEE ARTHROPLASTY, LEFT: Primary | ICD-10-CM

## 2025-03-12 PROCEDURE — 97140 MANUAL THERAPY 1/> REGIONS: CPT

## 2025-03-12 PROCEDURE — 97112 NEUROMUSCULAR REEDUCATION: CPT

## 2025-03-12 PROCEDURE — 97110 THERAPEUTIC EXERCISES: CPT

## 2025-03-12 NOTE — PROGRESS NOTES
Daily Note     Today's date: 3/12/2025  Patient name: Radha Pantoja  : 1955  MRN: [id number]  Referring provider: Laron Carter DO  Dx:   Encounter Diagnosis     ICD-10-CM    1. S/P total knee arthroplasty, left  Z96.652       2. Primary osteoarthritis of left knee  M17.12           Start Time: 0821  Stop Time: 0900  Total time in clinic (min): 39 minutes    Subjective: pt noted that she has a lot of stiffness.       Objective: See treatment diary below      Assessment:  Continued with treatment session, no progressions at this time. Tolerated treatment well. Patient demonstrated fatigue post treatment, exhibited good technique with therapeutic exercises, and would benefit from continued PT  S/p treatment session, treatment session.     Plan: Continue per plan of care.        POC expires Unit limit Auth  expiration date PT/OT + Visit Limit?   3/10//25 BOMN 25 BOMN      Sec - BOMN no auth           Visit/Unit Tracking  AUTH Status:  Date  2/3 2/7 2/11 2/18 2/20 2/28 3   N/A Used 8 9 10 11 12 13 14 15  5 6 7    Remaining                  Precautions: s/p left TKA (DOS: 24)      Manuals 2/7 2/11 2/18 2/20 2/28 3/4 3/12   2/3   Left knee PROM GR SC SC  GR JK  SC   GR   L calf str.          GR   Pat mobs GR   GR      GR   Post-op RE             Reassessment      GR                                              Neuro Re-Ed             Quad sets with heel prop             SAQ             LAQ             Kesier: TKE             Supine SLR             TM: retro walk              Slider lunges.    NV bwd           Standing star clocks with TB    GTB 5x  GTB x5 GTB x5                                               Ther Ex             Patient education: pathophysiology, graded activity, edema management, wound care, gait training, rehab / surgical overview, HEP review      GR       Cardiovascular warm-up, knee ROM: Nustep, bike Rec bike 10 min Rec bike 10 min  Rec bike 10  "min L1  Rec bike 10 min  Rec 5 min Rec bike 10 min Rec bike 10 min    Rec bike 10 min   Ankle pumps             HS stretch at step             Long-sitting calf str. With heel prop Prostretch 3x30\"            Heel slides              Knee flexion stretch on stairs              HR             Seated knee extension LLPS             Seated knee flexion AAROM             QS             Standing h/s curl             Prone hang             Standing hip abd, ext with TB Off 2\" step in SLS GTB 20x ea. B/l Off 2\" step in SLS GTB 20x ea. B/l           Supine hip flexor str.             Supine knee flexion AAROM against wall             Prone quad str. 3x30\"            Sidestepping with TB    GTB x3         Matrix HS curl  30# 3x10 30# 3x 10  25# 3x 10  35# 3x10 35# 3x10  35# 3x 10       Matrix: knee ext. 30# 3x10 30# 3x 10  25# 3x 10  35# 3x10 35# 3x10  35# 3x 10                                 Ther Activity             Squats    15# KB, foam 3x10 15# KB, foam 3x10     Foam 15# KB 3x10   FSU   HEP           FSD   6\" 3x 10 VC's           LSD   6\" 3x 10 VC\"s 6\" 3x10 6\" 3x10  6\" 3x 10    6\" 2x10   Stationary lunges          2x10   Total gym: squats  Single LE L22 3x 10   Single LE L 17  3x 10  Single LE L20 2x10 Single LE L20 2x10  Single LE L22 2x 10    Single leg L20 2x10   Functional testing: TUG, 5x STS, steps      Performed       Bosu FSU  2x 10 3\" and Lateral   2x 10 3\" and lateral 3x10 3\" 3x10 3\"  3x 10  3\"    2x10 3\"   Lateral walks    GTB 5 laps at mirror no UE  GTB 3 laps length of room GTB 3 laps length of room        Monster walks    GTB  5 laps at mirror  no UE     GTB 5 laps                                 Gait Training                                       Modalities                                         Access Code: [email or web address]  URL: [email or web address]  Date: 11/29/2024  Prepared by: Matthew Marie    Exercises  - Ankle Pumps in Elevation  - 3-5 x daily - 7 x weekly - 5-10 minutes hold  - Supine Heel " Slide with Strap  - 3-5 x daily - 7 x weekly - 2 sets - 10 reps - 10 seconds hold  - Long Sitting Calf Stretch with Strap  - 3-5 x daily - 7 x weekly - 5 sets - 1 reps - 30 seconds hold  - Long Sitting Quad Set with Towel Roll Under Heel  - 3-5 x daily - 7 x weekly - 2-3 sets - 10 reps - 5 seconds hold  - Seated Knee Flexion Stretch  - 3-5 x daily - 7 x weekly - 2 sets - 10 reps - 10 seconds hold  - Seated Knee Extension Stretch with Chair  - 3-5 x daily - 7 x weekly - 1 sets - 5-10 minutes hold

## 2025-03-12 NOTE — TELEPHONE ENCOUNTER
Caller: Patient    Doctor: Dr. Carter    Reason for call: Patient had surgery 11/26/2024 ARTHROPLASTY KNEE TOTAL- Left  // Patient is have pain in her knee and can't move it normally // Please advise    Call back#: [phone]

## 2025-03-13 NOTE — TELEPHONE ENCOUNTER
Caller: Bridgett    Doctor: Dr. Carter    Reason for call: returning Nurse Rock. Call   Patient will wait a call back    Call back#: [phone]

## 2025-03-14 ENCOUNTER — OFFICE VISIT (OUTPATIENT)
Dept: PHYSICAL THERAPY | Facility: CLINIC | Age: 70
End: 2025-03-14
Payer: MEDICARE

## 2025-03-14 DIAGNOSIS — M25.562 CHRONIC PAIN OF LEFT KNEE: ICD-10-CM

## 2025-03-14 DIAGNOSIS — R26.89 ANTALGIC GAIT: ICD-10-CM

## 2025-03-14 DIAGNOSIS — G89.29 CHRONIC PAIN OF LEFT KNEE: ICD-10-CM

## 2025-03-14 DIAGNOSIS — Z96.652 S/P TOTAL KNEE ARTHROPLASTY, LEFT: Primary | ICD-10-CM

## 2025-03-14 DIAGNOSIS — M17.12 PRIMARY OSTEOARTHRITIS OF LEFT KNEE: ICD-10-CM

## 2025-03-14 PROCEDURE — 97140 MANUAL THERAPY 1/> REGIONS: CPT | Performed by: PHYSICAL THERAPIST

## 2025-03-14 PROCEDURE — 97110 THERAPEUTIC EXERCISES: CPT | Performed by: PHYSICAL THERAPIST

## 2025-03-14 NOTE — PROGRESS NOTES
"Daily Note     Today's date: 3/14/2025  Patient name: Radha Pantoja  : 1955  MRN: [id number]  Referring provider: Laron Carter DO  Dx:   Encounter Diagnosis     ICD-10-CM    1. S/P total knee arthroplasty, left  Z96.652       2. Primary osteoarthritis of left knee  M17.12       3. Chronic pain of left knee  M25.562     G89.29       4. Antalgic gait  R26.89           Start Time: 732  Stop Time: 804  Total time in clinic (min): 32 minutes    Subjective: Patient reports that she felt find during and after her last PT session, but after work that night her knee swelled like a \"cantaloupe\". Patient contacted her surgeon and was instructed to rest. Patient's swelling has gone down, but went back up after returning to work. It remains swollen at this time.      Objective: See treatment diary below      Assessment: Tolerated treatment well. Patient demonstrated fatigue post treatment and would benefit from continued PT. Patient presents with increased swelling localized to left knee. No signs of infection and no signs of instability or loosening of prosthesis. Patient likely irritated knee from excessive activity between PT session and work. Instructed Patient to engage in relative rest and avoid excessive activity. Deferred strengthening this visit.      Plan: Continue per plan of care.  Progress treatment as tolerated.         POC expires Unit limit Auth  expiration date PT/OT + Visit Limit?   3/10//25 BOMN 25 BOMN      Sec - BOMN no auth           Visit/Unit Tracking  AUTH Status:  Date 1/30 2/3 2/7 2/11 2/18 2/20 2/28 3/4 3/14  1/22 1/27   N/A Used 8 9 10 11 12 13 14 15 16  6 7    Remaining                  Precautions: s/p left TKA (DOS: 24)      Manuals 2/7 2/11 2/18 2/20 2/28 3/4 3/12 3/14  2/3   Left knee PROM GR SC SC  GR JK  SC GR  GR   L calf, hip flexor str.        GR  GR   Pat mobs GR   GR    GR  GR   Post-op RE             Reassessment      GR                               " "               Neuro Re-Ed             Quad sets with heel prop             SAQ             LAQ             Kesier: TKE             Supine SLR             TM: retro walk              Slider lunges.    NV bwd           Standing star clocks with TB    GTB 5x  GTB x5 GTB x5                                               Ther Ex             Patient education: pathophysiology, graded activity, edema management, wound care, gait training, rehab / surgical overview, HEP review      GR       Cardiovascular warm-up, knee ROM: Nustep, bike Rec bike 10 min Rec bike 10 min  Rec bike 10 min L1  Rec bike 10 min  Rec 5 min Rec bike 10 min Rec bike 10 min  Rec bike 10 min  Rec bike 10 min   Ankle pumps             HS stretch at step             Long-sitting calf str. With heel prop Prostretch 3x30\"            Heel slides              Knee flexion stretch on stairs              HR             Seated knee extension LLPS             Seated knee flexion AAROM             QS             Standing h/s curl             Prone hang             Standing hip abd, ext with TB Off 2\" step in SLS GTB 20x ea. B/l Off 2\" step in SLS GTB 20x ea. B/l           Supine hip flexor str.             Supine knee flexion AAROM against wall             Prone quad str. 3x30\"            Sidestepping with TB    GTB x3         Matrix HS curl  30# 3x10 30# 3x 10  25# 3x 10  35# 3x10 35# 3x10  35# 3x 10       Matrix: knee ext. 30# 3x10 30# 3x 10  25# 3x 10  35# 3x10 35# 3x10  35# 3x 10                                 Ther Activity             Squats    15# KB, foam 3x10 15# KB, foam 3x10     Foam 15# KB 3x10   FSU   HEP           FSD   6\" 3x 10 VC's           LSD   6\" 3x 10 VC\"s 6\" 3x10 6\" 3x10  6\" 3x 10    6\" 2x10   Stationary lunges          2x10   Total gym: squats  Single LE L22 3x 10   Single LE L 17  3x 10  Single LE L20 2x10 Single LE L20 2x10  Single LE L22 2x 10  B/L L22 3x10  Single leg L20 2x10   Functional testing: TUG, 5x STS, steps      Performed     " "  Bosu FSU  2x 10 3\" and Lateral   2x 10 3\" and lateral 3x10 3\" 3x10 3\"  3x 10  3\"    2x10 3\"   Lateral walks    GTB 5 laps at mirror no UE  GTB 3 laps length of room GTB 3 laps length of room        Monster walks    GTB  5 laps at mirror  no UE     GTB 5 laps                                 Gait Training                                       Modalities                                         Access Code: [email or web address]  URL: [email or web address]  Date: 11/29/2024  Prepared by: Matthew Marie    Exercises  - Ankle Pumps in Elevation  - 3-5 x daily - 7 x weekly - 5-10 minutes hold  - Supine Heel Slide with Strap  - 3-5 x daily - 7 x weekly - 2 sets - 10 reps - 10 seconds hold  - Long Sitting Calf Stretch with Strap  - 3-5 x daily - 7 x weekly - 5 sets - 1 reps - 30 seconds hold  - Long Sitting Quad Set with Towel Roll Under Heel  - 3-5 x daily - 7 x weekly - 2-3 sets - 10 reps - 5 seconds hold  - Seated Knee Flexion Stretch  - 3-5 x daily - 7 x weekly - 2 sets - 10 reps - 10 seconds hold  - Seated Knee Extension Stretch with Chair  - 3-5 x daily - 7 x weekly - 1 sets - 5-10 minutes hold         "

## 2025-03-17 ENCOUNTER — OFFICE VISIT (OUTPATIENT)
Dept: OBGYN CLINIC | Facility: CLINIC | Age: 70
End: 2025-03-17
Payer: MEDICARE

## 2025-03-17 ENCOUNTER — APPOINTMENT (OUTPATIENT)
Dept: RADIOLOGY | Facility: CLINIC | Age: 70
End: 2025-03-17
Payer: MEDICARE

## 2025-03-17 VITALS — WEIGHT: 146.6 LBS | HEIGHT: 65 IN | BODY MASS INDEX: 24.43 KG/M2

## 2025-03-17 DIAGNOSIS — Z96.652 S/P TOTAL KNEE ARTHROPLASTY, LEFT: ICD-10-CM

## 2025-03-17 DIAGNOSIS — M25.462 EFFUSION OF LEFT KNEE: ICD-10-CM

## 2025-03-17 DIAGNOSIS — Z96.652 S/P TOTAL KNEE ARTHROPLASTY, LEFT: Primary | ICD-10-CM

## 2025-03-17 PROCEDURE — 20610 DRAIN/INJ JOINT/BURSA W/O US: CPT | Performed by: ORTHOPAEDIC SURGERY

## 2025-03-17 PROCEDURE — 73560 X-RAY EXAM OF KNEE 1 OR 2: CPT

## 2025-03-17 PROCEDURE — 99214 OFFICE O/P EST MOD 30 MIN: CPT | Performed by: ORTHOPAEDIC SURGERY

## 2025-03-17 NOTE — PROGRESS NOTES
Name: Radha Pantoja      : 1955      MRN: [id number]  Encounter Provider: Laron Carter DO  Encounter Date: 3/17/2025   Encounter department: [facility] [place]  :  Assessment & Plan  S/P total knee arthroplasty, left    Orders:    XR knee 1 or 2 vw left; Future    Large joint arthrocentesis: L knee    CBC and differential; Future    Comprehensive metabolic panel; Future    Sedimentation rate, automated; Future    C-reactive protein; Future    Effusion of left knee    Orders:    Large joint arthrocentesis: L knee      Approximately 3.5 month s/p Left total knee arthroplasty performed on 2024  X-rays reviewed with patient  In light of patient's recent swelling, pain, difficulty with ambulation, offered and accepted aspiration of Left knee to rule out infectious etiology. Risks discussed. Aspirated approximately 9cc of bloody fluid from the knee. Tolerated procedure well. Sent for Synovasure testing.   Also placed bloodwork in patient's chart   Advised patient to continue to work on stretching at home and therapy   Work note placed to remain out of work  Follow up as previously scheduled 2025, will call patient with synovasure results. Continue to monitor symptoms and call office if noticing fevers, chills, worsening pain or swelling      History of the Present Illness   History of Present Illness   HPI   Radha Pantoja is a 69 y.o. female approximately 3.5 month s/p Left total knee arthroplasty performed on 2024. Today, patient reports she has been having on and off swelling/pain for the past week. She admits to worsening pain and swelling at the end of the day after work. She had to call her  from the car to help her walk up the stairs. She admits to one episode of nighttime sweats, but admits this has not happened since menopause. She reports her swelling has improved this morning.            Review of Systems     Review of  "Systems   Constitutional:  Negative for chills and fever.   HENT:  Negative for congestion.    Respiratory:  Negative for cough, chest tightness and shortness of breath.    Cardiovascular:  Negative for chest pain and palpitations.   Gastrointestinal:  Negative for abdominal pain.   Endocrine: Negative for cold intolerance and heat intolerance.   Neurological:  Negative for syncope.   Psychiatric/Behavioral:  Negative for confusion.        Physical Exam   Objective   Ht 5' 5\" (1.651 m)   Wt 66.5 kg (146 lb 9.6 oz)   BMI 24.40 kg/m²        Left Knee  Surgical incision well healed without acute signs of infection or erythema  Range of motion from 7-10 to 105 degrees.    There is trace effusion.    There is tenderness over the pes anserinus bursa.    The patient is able to perform a straight leg raise with 5/5 quad strength.     Calf soft, compressible and nontender   The patient is neurovascular intact distally.      Data Review     I have personally reviewed pertinent films in PACS, and my interpretation follows.    X-rays taken 03/17/2025 of Left knee independently reviewed and demonstrate total knee prosthesis in appropriate alignment without evidence of fracture, loosening or lucency     Physical therapy notes reviewed, most recently from 3/14/2025.    Social History     Tobacco Use    Smoking status: Never     Passive exposure: Never    Smokeless tobacco: Never    Tobacco comments:     Has medical marijuana card does vape   Vaping Use    Vaping status: Every Day    Substances: THC   Substance Use Topics    Alcohol use: Not Currently     Comment: recovering    Drug use: Yes     Types: Marijuana     Comment: medical marijuana           Large joint arthrocentesis: L knee  Universal Protocol:  Risks and benefits: risks, benefits and alternatives were discussed  Consent given by: patient  Patient identity confirmed: verbally with patient  Procedure Details  Location: knee - L knee  Needle size: 22 G  Approach: " lateral    Aspirate amount: 9 mL  Aspirate: bloody  Analysis: fluid sample sent for laboratory analysis  Patient tolerance: patient tolerated the procedure well with no immediate complications  Dressing:  Sterile dressing applied    Adri Marroquin PA-C

## 2025-03-17 NOTE — ASSESSMENT & PLAN NOTE
Orders:    XR knee 1 or 2 vw left; Future    Large joint arthrocentesis: L knee    CBC and differential; Future    Comprehensive metabolic panel; Future    Sedimentation rate, automated; Future    C-reactive protein; Future

## 2025-03-17 NOTE — LETTER
March 17, 2025     Patient: Radha Pantoja  YOB: 1955  Date of Visit: 3/17/2025      To Whom it May Concern:    Radha Pantoja is under my professional care. Radha was seen in my office on 3/17/2025. Please excuse Radha from work this week. She may return 03/24/2025 without restrictions.     If you have any questions or concerns, please don't hesitate to call.         Sincerely,          Laron Carter,         CC: No Recipients

## 2025-03-18 ENCOUNTER — OFFICE VISIT (OUTPATIENT)
Dept: PHYSICAL THERAPY | Facility: CLINIC | Age: 70
End: 2025-03-18
Payer: MEDICARE

## 2025-03-18 DIAGNOSIS — G89.29 CHRONIC PAIN OF LEFT KNEE: ICD-10-CM

## 2025-03-18 DIAGNOSIS — R26.89 ANTALGIC GAIT: ICD-10-CM

## 2025-03-18 DIAGNOSIS — M25.562 CHRONIC PAIN OF LEFT KNEE: ICD-10-CM

## 2025-03-18 DIAGNOSIS — M17.12 PRIMARY OSTEOARTHRITIS OF LEFT KNEE: ICD-10-CM

## 2025-03-18 DIAGNOSIS — Z96.652 S/P TOTAL KNEE ARTHROPLASTY, LEFT: Primary | ICD-10-CM

## 2025-03-18 PROCEDURE — 97110 THERAPEUTIC EXERCISES: CPT | Performed by: PHYSICAL THERAPIST

## 2025-03-18 PROCEDURE — 97140 MANUAL THERAPY 1/> REGIONS: CPT | Performed by: PHYSICAL THERAPIST

## 2025-03-21 ENCOUNTER — APPOINTMENT (OUTPATIENT)
Dept: PHYSICAL THERAPY | Facility: CLINIC | Age: 70
End: 2025-03-21
Payer: MEDICARE

## 2025-03-21 ENCOUNTER — APPOINTMENT (OUTPATIENT)
Dept: LAB | Facility: CLINIC | Age: 70
End: 2025-03-21
Payer: MEDICARE

## 2025-03-21 ENCOUNTER — TELEPHONE (OUTPATIENT)
Age: 70
End: 2025-03-21

## 2025-03-21 DIAGNOSIS — M17.12 PRIMARY OSTEOARTHRITIS OF LEFT KNEE: ICD-10-CM

## 2025-03-21 DIAGNOSIS — Z96.652 S/P TOTAL KNEE ARTHROPLASTY, LEFT: Primary | ICD-10-CM

## 2025-03-21 DIAGNOSIS — M25.562 CHRONIC PAIN OF LEFT KNEE: ICD-10-CM

## 2025-03-21 DIAGNOSIS — R26.89 ANTALGIC GAIT: ICD-10-CM

## 2025-03-21 DIAGNOSIS — G89.29 CHRONIC PAIN OF LEFT KNEE: ICD-10-CM

## 2025-03-21 DIAGNOSIS — Z96.652 S/P TOTAL KNEE ARTHROPLASTY, LEFT: ICD-10-CM

## 2025-03-21 LAB
ALBUMIN SERPL BCG-MCNC: 4.5 G/DL (ref 3.5–5)
ALP SERPL-CCNC: 52 U/L (ref 34–104)
ALT SERPL W P-5'-P-CCNC: 25 U/L (ref 7–52)
ANION GAP SERPL CALCULATED.3IONS-SCNC: 8 MMOL/L (ref 4–13)
AST SERPL W P-5'-P-CCNC: 22 U/L (ref 13–39)
BASOPHILS # BLD AUTO: 0.07 THOUSANDS/ÂΜL (ref 0–0.1)
BASOPHILS NFR BLD AUTO: 2 % (ref 0–1)
BILIRUB SERPL-MCNC: 0.39 MG/DL (ref 0.2–1)
BUN SERPL-MCNC: 22 MG/DL (ref 5–25)
CALCIUM SERPL-MCNC: 9.5 MG/DL (ref 8.4–10.2)
CHLORIDE SERPL-SCNC: 104 MMOL/L (ref 96–108)
CO2 SERPL-SCNC: 28 MMOL/L (ref 21–32)
CREAT SERPL-MCNC: 0.52 MG/DL (ref 0.6–1.3)
CRP SERPL QL: 2.2 MG/L
EOSINOPHIL # BLD AUTO: 0.07 THOUSAND/ÂΜL (ref 0–0.61)
EOSINOPHIL NFR BLD AUTO: 2 % (ref 0–6)
ERYTHROCYTE [DISTWIDTH] IN BLOOD BY AUTOMATED COUNT: 12 % (ref 11.6–15.1)
ERYTHROCYTE [SEDIMENTATION RATE] IN BLOOD: 19 MM/HOUR (ref 0–29)
GFR SERPL CREATININE-BSD FRML MDRD: 98 ML/MIN/1.73SQ M
GLUCOSE SERPL-MCNC: 101 MG/DL (ref 65–140)
HCT VFR BLD AUTO: 38.4 % (ref 34.8–46.1)
HGB BLD-MCNC: 12.4 G/DL (ref 11.5–15.4)
IMM GRANULOCYTES # BLD AUTO: 0.01 THOUSAND/UL (ref 0–0.2)
IMM GRANULOCYTES NFR BLD AUTO: 0 % (ref 0–2)
LYMPHOCYTES # BLD AUTO: 0.9 THOUSANDS/ÂΜL (ref 0.6–4.47)
LYMPHOCYTES NFR BLD AUTO: 22 % (ref 14–44)
MCH RBC QN AUTO: 30.8 PG (ref 26.8–34.3)
MCHC RBC AUTO-ENTMCNC: 32.3 G/DL (ref 31.4–37.4)
MCV RBC AUTO: 96 FL (ref 82–98)
MONOCYTES # BLD AUTO: 0.3 THOUSAND/ÂΜL (ref 0.17–1.22)
MONOCYTES NFR BLD AUTO: 7 % (ref 4–12)
NEUTROPHILS # BLD AUTO: 2.76 THOUSANDS/ÂΜL (ref 1.85–7.62)
NEUTS SEG NFR BLD AUTO: 67 % (ref 43–75)
NRBC BLD AUTO-RTO: 0 /100 WBCS
PLATELET # BLD AUTO: 216 THOUSANDS/UL (ref 149–390)
PMV BLD AUTO: 9.7 FL (ref 8.9–12.7)
POTASSIUM SERPL-SCNC: 4.2 MMOL/L (ref 3.5–5.3)
PROT SERPL-MCNC: 6.8 G/DL (ref 6.4–8.4)
RBC # BLD AUTO: 4.02 MILLION/UL (ref 3.81–5.12)
SODIUM SERPL-SCNC: 140 MMOL/L (ref 135–147)
WBC # BLD AUTO: 4.11 THOUSAND/UL (ref 4.31–10.16)

## 2025-03-21 PROCEDURE — 85652 RBC SED RATE AUTOMATED: CPT

## 2025-03-21 PROCEDURE — 86140 C-REACTIVE PROTEIN: CPT

## 2025-03-21 PROCEDURE — 36415 COLL VENOUS BLD VENIPUNCTURE: CPT

## 2025-03-21 PROCEDURE — 85025 COMPLETE CBC W/AUTO DIFF WBC: CPT

## 2025-03-21 PROCEDURE — 80053 COMPREHEN METABOLIC PANEL: CPT

## 2025-03-21 NOTE — TELEPHONE ENCOUNTER
Caller: Radha    Doctor: Dr. Carter    Reason for call: Calling for some suggestions on the swelling, stiffness and cramping at night.  This is causing her to not sleep.   Checking status of the earlier call: explained you are waiting for a response from the       Call back#: [phone]

## 2025-03-21 NOTE — TELEPHONE ENCOUNTER
Spoke to patient. Advised I do not see synovsure results scanned in to her chart but that she will get a call once these results are in. As it did not appear she had the bloodwork done that was ordered last appointment I did ask her about this. She did not realized this was ordered and will stop by the lab today to have these done. She notes her pain is not getting worse, but is persistent. She worked a few hours yesterday which seemed to aggravate the knee some. She is getting some cramping about the entire leg at night , which has been ongoing for weeks. She denies any swelling, warmth, or erythema of the calf. She is currently taking tylenol. I advised that we can start meloxicam back up, as this seemed to help some previously. I will send this to her pharmacy. I advised a trial of tonic water at night as well, as the quinine can sometimes help with cramping. She appreciated the phone call. Advised we will reach back out once bloodwork and synovasure results are available.

## 2025-03-21 NOTE — TELEPHONE ENCOUNTER
Caller: patient     Doctor: Dr. Carter     Reason for call: patient asking about synovial fluid results.     Also asking for something to help with the pain from her l knee cramping.  Possibly a muscle relaxer?  Still has swelling and stiffness and cramping at night,  can't sleep.      Call back#: [phone]

## 2025-03-21 NOTE — PROGRESS NOTES
Daily Note     Today's date: 3/21/2025  Patient name: Radha Pantoja  : 1955  MRN: [id number]  Referring provider: Laron Carter DO  Dx:   Encounter Diagnosis     ICD-10-CM    1. S/P total knee arthroplasty, left  Z96.652       2. Primary osteoarthritis of left knee  M17.12       3. Chronic pain of left knee  M25.562     G89.29       4. Antalgic gait  R26.89                      Subjective: ***      Objective: See treatment diary below      Assessment: Tolerated treatment well. Patient would benefit from continued PT      Plan: Continue per plan of care.  Progress treatment as tolerated.         POC expires Unit limit Auth  expiration date PT/OT + Visit Limit?   3/10//25 BOMN 25 BOMN      Sec - BOMN no auth           Visit/Unit Tracking  AUTH Status:  Date  2/3 2 3/4 3/14 3/18 3/21    N/A Used 8 9 10 11 12 13 14 15 16 17 18     Remaining                  Precautions: s/p left TKA (DOS: 24)      Manuals  3/4 3/12 3/14 3/18 3/21   Left knee PROM GR SC SC  GR JK  SC GR GR    L calf, hip flexor str.        GR GR    Pat mobs GR   GR    GR GR    Post-op RE             Reassessment      GR                                              Neuro Re-Ed             Quad sets with heel prop             SAQ             LAQ             Kesier: TKE             Supine SLR             TM: retro walk              Slider lunges.    NV bwd           Standing star clocks with TB    GTB 5x  GTB x5 GTB x5                                               Ther Ex             Patient education: pathophysiology, graded activity, edema management, compression sleeve      GR   GR    Cardiovascular warm-up, knee ROM: Nustep, bike Rec bike 10 min Rec bike 10 min  Rec bike 10 min L1  Rec bike 10 min  Rec 5 min Rec bike 10 min Rec bike 10 min  Rec bike 10 min Rec bike 10 min    Ankle pumps             HS stretch at step             Long-sitting calf str. With heel prop  "Prostretch 3x30\"        Prostretch 3x30\"    Heel slides              Knee flexion stretch on stairs              HR             Seated knee extension LLPS             Seated knee flexion AAROM             QS             Standing h/s curl             Prone hang             Standing hip abd, ext with TB Off 2\" step in SLS GTB 20x ea. B/l Off 2\" step in SLS GTB 20x ea. B/l           Supine hip flexor str.             Supine knee flexion AAROM against wall             Prone quad str. 3x30\"        3x30\"    Sidestepping with TB    GTB x3         Matrix HS curl  30# 3x10 30# 3x 10  25# 3x 10  35# 3x10 35# 3x10  35# 3x 10       Matrix: knee ext. 30# 3x10 30# 3x 10  25# 3x 10  35# 3x10 35# 3x10  35# 3x 10                                 Ther Activity             Squats    15# KB, foam 3x10 15# KB, foam 3x10        FSU   HEP           FSD   6\" 3x 10 VC's           LSD   6\" 3x 10 VC\"s 6\" 3x10 6\" 3x10  6\" 3x 10       Stationary lunges             Total gym: squats  Single LE L22 3x 10   Single LE L 17  3x 10  Single LE L20 2x10 Single LE L20 2x10  Single LE L22 2x 10  B/L L22 3x10 B/L L22 3x10    Functional testing: TUG, 5x STS, steps      Performed       Bosu FSU  2x 10 3\" and Lateral   2x 10 3\" and lateral 3x10 3\" 3x10 3\"  3x 10  3\"       Lateral walks    GTB 5 laps at mirror no UE  GTB 3 laps length of room GTB 3 laps length of room        Monster walks    GTB  5 laps at mirror  no UE     GTB 5 laps                                 Gait Training                                       Modalities                                         Access Code: [email or web address]  URL: [email or web address]  Date: 11/29/2024  Prepared by: Matthew Marie    Exercises  - Ankle Pumps in Elevation  - 3-5 x daily - 7 x weekly - 5-10 minutes hold  - Supine Heel Slide with Strap  - 3-5 x daily - 7 x weekly - 2 sets - 10 reps - 10 seconds hold  - Long Sitting Calf Stretch with Strap  - 3-5 x daily - 7 x weekly - 5 sets - 1 reps - 30 seconds hold  - " Long Sitting Quad Set with Towel Roll Under Heel  - 3-5 x daily - 7 x weekly - 2-3 sets - 10 reps - 5 seconds hold  - Seated Knee Flexion Stretch  - 3-5 x daily - 7 x weekly - 2 sets - 10 reps - 10 seconds hold  - Seated Knee Extension Stretch with Chair  - 3-5 x daily - 7 x weekly - 1 sets - 5-10 minutes hold

## 2025-03-24 ENCOUNTER — TELEPHONE (OUTPATIENT)
Age: 70
End: 2025-03-24

## 2025-03-24 DIAGNOSIS — M25.462 EFFUSION OF LEFT KNEE: ICD-10-CM

## 2025-03-24 DIAGNOSIS — Z96.652 S/P TOTAL KNEE ARTHROPLASTY, LEFT: Primary | ICD-10-CM

## 2025-03-24 RX ORDER — MELOXICAM 15 MG/1
15 TABLET ORAL DAILY
Qty: 30 TABLET | Refills: 0 | Status: SHIPPED | OUTPATIENT
Start: 2025-03-24 | End: 2025-04-23

## 2025-03-24 NOTE — TELEPHONE ENCOUNTER
Caller: Self    Doctor: Dr. Carter    Reason for call: Patient following up on Synovasure results. I do not see anything pending. Can someone possibly call the lab to check status?    Patient is still having swelling off and on and pain can be severe especially because she walks all day for work. She would like an order place for meloxicam if possible. Can this be sent soon, she will be in that area shortly    [id number] - [place] - [address] [[id number]]     Call back#: [phone]

## 2025-03-25 ENCOUNTER — OFFICE VISIT (OUTPATIENT)
Dept: PHYSICAL THERAPY | Facility: CLINIC | Age: 70
End: 2025-03-25
Payer: MEDICARE

## 2025-03-25 DIAGNOSIS — M25.562 CHRONIC PAIN OF LEFT KNEE: ICD-10-CM

## 2025-03-25 DIAGNOSIS — R26.89 ANTALGIC GAIT: ICD-10-CM

## 2025-03-25 DIAGNOSIS — Z96.652 S/P TOTAL KNEE ARTHROPLASTY, LEFT: Primary | ICD-10-CM

## 2025-03-25 DIAGNOSIS — M17.12 PRIMARY OSTEOARTHRITIS OF LEFT KNEE: ICD-10-CM

## 2025-03-25 DIAGNOSIS — G89.29 CHRONIC PAIN OF LEFT KNEE: ICD-10-CM

## 2025-03-25 PROCEDURE — 97140 MANUAL THERAPY 1/> REGIONS: CPT | Performed by: PHYSICAL THERAPIST

## 2025-03-25 PROCEDURE — 97530 THERAPEUTIC ACTIVITIES: CPT | Performed by: PHYSICAL THERAPIST

## 2025-03-25 PROCEDURE — 97110 THERAPEUTIC EXERCISES: CPT | Performed by: PHYSICAL THERAPIST

## 2025-03-25 NOTE — PROGRESS NOTES
Daily Note     Today's date: 3/25/2025  Patient name: Radha Pantoja  : 1955  MRN: [id number]  Referring provider: Laron Carter DO  Dx:   Encounter Diagnosis     ICD-10-CM    1. S/P total knee arthroplasty, left  Z96.652       2. Primary osteoarthritis of left knee  M17.12       3. Chronic pain of left knee  M25.562     G89.29       4. Antalgic gait  R26.89           Start Time: 730  Stop Time: 815  Total time in clinic (min): 45 minutes    Subjective: Patient reports that her knee pain and swelling have been better over the past several days, but she had a really bad night sleeping last week and she has been limited at work. Patient has not received her blood results yet.      Objective: See treatment diary below      Assessment: Tolerated treatment fair. Patient demonstrated fatigue post treatment and would benefit from continued PT. Reinitiated gentle functional strengthening. Patient with good tolerance. Minimal pain noted. Continued limitation to end range extension ROM.       Plan: Continue per plan of care.  Progress treatment as tolerated.         POC expires Unit limit Auth  expiration date PT/OT + Visit Limit?   25 BOMN 25 BOMN      Sec - BOMN no auth           Visit/Unit Tracking  AUTH Status:  Date 1/30 2/3 2/7 2/11 2/18 2/20 2/28 3/4 3/14 3/18 3/25    N/A Used 8 9 10 11 12 13 14 15 16 17 18     Remaining                  Precautions: s/p left TKA (DOS: 24)      Manuals    3/4 3/12 3/14 3/18 3/25   Left knee PROM   SC  GR JK  SC GR GR GR   L calf, hip flexor str.        GR GR    Pat mobs    GR    GR GR    Post-op RE             Reassessment      GR                                              Neuro Re-Ed             Quad sets with heel prop             SAQ             LAQ             Kesier: TKE             Supine SLR             TM: retro walk              Slider lunges.    NV bwd           Standing star clocks with TB    GTB 5x  GTB x5 GTB x5       "                                         Ther Ex             Patient education: pathophysiology, graded activity, edema management, compression sleeve      GR   GR    Cardiovascular warm-up, knee ROM: Nustep, bike   Rec bike 10 min L1  Rec bike 10 min  Rec 5 min Rec bike 10 min Rec bike 10 min  Rec bike 10 min Rec bike 10 min Rec bike 10 min   Ankle pumps             HS stretch at step             Long-sitting calf str. With heel prop         Prostretch 3x30\"    Heel slides              Knee flexion stretch on stairs              HR             Seated knee extension LLPS             Seated knee flexion AAROM             QS             Standing h/s curl             Prone hang             Standing hip abd, ext with TB             Supine hip flexor str.             Supine knee flexion AAROM against wall             Prone quad str.         3x30\"    Sidestepping with TB    GTB x3         Matrix HS curl    25# 3x 10  35# 3x10 35# 3x10  35# 3x 10    25# 3x10   Matrix: knee ext.   25# 3x 10  35# 3x10 35# 3x10  35# 3x 10    25# 3x10                             Ther Activity             Squats    15# KB, foam 3x10 15# KB, foam 3x10     15# KB 3x10   FSU   HEP           FSD   6\" 3x 10 VC's        8\" 2x10   LSD   6\" 3x 10 VC\"s 6\" 3x10 6\" 3x10  6\" 3x 10       Stationary lunges             Total gym: squats    Single LE L 17  3x 10  Single LE L20 2x10 Single LE L20 2x10  Single LE L22 2x 10  B/L L22 3x10 B/L L22 3x10 B/L L22 3x10   Functional testing: TUG, 5x STS, steps      Performed       Bosu FSU    2x 10 3\" and lateral 3x10 3\" 3x10 3\"  3x 10  3\"       Lateral walks    GTB 5 laps at mirror no UE  GTB 3 laps length of room GTB 3 laps length of room        Monster walks    GTB  5 laps at mirror  no UE     GTB 5 laps                                 Gait Training                                       Modalities                                         Access Code: [email or web address]  URL: [email or web address]  Date: " 11/29/2024  Prepared by: Matthew Marie    Exercises  - Ankle Pumps in Elevation  - 3-5 x daily - 7 x weekly - 5-10 minutes hold  - Supine Heel Slide with Strap  - 3-5 x daily - 7 x weekly - 2 sets - 10 reps - 10 seconds hold  - Long Sitting Calf Stretch with Strap  - 3-5 x daily - 7 x weekly - 5 sets - 1 reps - 30 seconds hold  - Long Sitting Quad Set with Towel Roll Under Heel  - 3-5 x daily - 7 x weekly - 2-3 sets - 10 reps - 5 seconds hold  - Seated Knee Flexion Stretch  - 3-5 x daily - 7 x weekly - 2 sets - 10 reps - 10 seconds hold  - Seated Knee Extension Stretch with Chair  - 3-5 x daily - 7 x weekly - 1 sets - 5-10 minutes hold

## 2025-03-28 ENCOUNTER — EVALUATION (OUTPATIENT)
Dept: PHYSICAL THERAPY | Facility: CLINIC | Age: 70
End: 2025-03-28
Payer: MEDICARE

## 2025-03-28 DIAGNOSIS — R26.89 ANTALGIC GAIT: ICD-10-CM

## 2025-03-28 DIAGNOSIS — M25.562 CHRONIC PAIN OF LEFT KNEE: ICD-10-CM

## 2025-03-28 DIAGNOSIS — G89.29 CHRONIC PAIN OF LEFT KNEE: ICD-10-CM

## 2025-03-28 DIAGNOSIS — M17.12 PRIMARY OSTEOARTHRITIS OF LEFT KNEE: ICD-10-CM

## 2025-03-28 DIAGNOSIS — Z96.652 S/P TOTAL KNEE ARTHROPLASTY, LEFT: Primary | ICD-10-CM

## 2025-03-28 PROCEDURE — 97530 THERAPEUTIC ACTIVITIES: CPT | Performed by: PHYSICAL THERAPIST

## 2025-03-28 PROCEDURE — 97110 THERAPEUTIC EXERCISES: CPT | Performed by: PHYSICAL THERAPIST

## 2025-03-28 NOTE — PROGRESS NOTES
PT Re-Evaluation  and PT Discharge    Today's date: 3/28/2025  Patient name: Radha Pantoja  : 1955  MRN: [id number]  Referring provider: Laron Carter DO  Dx:   Encounter Diagnosis     ICD-10-CM    1. S/P total knee arthroplasty, left  Z96.652       2. Primary osteoarthritis of left knee  M17.12       3. Chronic pain of left knee  M25.562     G89.29       4. Antalgic gait  R26.89           Start Time: 730  Stop Time: 815  Total time in clinic (min): 45 minutes    Assessment    Assessment details: Radha Pantoja is a 69 y.o. female who presents with h/o chronic left knee pain having failed conservative treatment to date. Patient currently presents s/p elective left total knee arthroplasty, DOS: 24. Post-operatively, Patient has maintained excellent compliance with established POC and has attended a total number of 31 physical therapy appointments. Patient has made significant improvements overall, including decreased pain, improved quality of gait, increased knee ROM, decreased joint effusion, increased LE strength, and improved overall activity tolerance. Patient's functional mobility has continued to improve as demonstrated by decreased time needed to complete TUG and Five Times Sit to Stand tests. Patient has returned to work as a . Patient's greatest limitation to this point remains limited terminal knee extension and fluctuating swelling, contributing to antalgic gait. Patient has been encouraged to consistently perform home stretches to maximize knee ROM. Patient overall reports her level of function to be significantly improved compared to her preoperative baseline. Patient estimates her left knee overall level of function to be approximately 95% of her premorbid norm. Patient is independent with her comprehensive HEP. Patient has been instructed to contact PT if she has any questions/concerns or if she experiences a decline in function. Patient will be discharged  at this time.  Understanding of Dx/Px/POC: excellent     Prognosis: good    Goals  Short Term Goals: to be achieved by 4 weeks   1) Patient to be independent with basic HEP. MET  2) Decrease pain by 4/10 at its worst. MET  3) Increase knee flexion ROM to 110 deg. MET  4) Increase knee extension ROM by > 5 deg. MET  5) Increase LE strength by 1/2 MMT grade in all deficient planes. MET  6) Patient to negotiate steps with a reciprocal pattern with use of HR. MET  7) Patient to report decreased sleep interruption secondary to pain. MET  8) Increase ambulatory tolerance by 10 min with LRAD. MET    Long Term Goals: to be achieved by discharge   1) FOTO equal to or greater than 71. MET  2) Patient to be independent with comprehensive HEP. MET  3) Abolish pain for improved quality of life. PROGRESSED, BUT NOT MET  4) Increase LE strength to 5/5 MMT grade in all planes to improve a/iadls. MET  5) Achieve full knee extension ROM to improve a/iadls. PROGRESSED, NT NOT MET  6) Increase knee flexion ROM to within 5 deg of contralateral LE to improve a/iadls. PROGRESSED, BUT NOT MET  7) Patient to negotiate steps with a reciprocal pattern without use of Hr. MET  8) Increase ambulatory tolerance to 60 min to improve participation in social activities. MET  9) Patient to report no sleep interruption secondary to pain.  10) Increase TUG and 5x STS by > at least 3 seconds to improve a/iadls.      Plan    Planned therapy interventions: home exercise program    Frequency: 2-3x week.  Plan of Care beginning date: 3/28/2025  Plan of Care expiration date: 3/28/2025  Treatment plan discussed with: patient        Subjective Evaluation    History of Present Illness  Mechanism of injury: Patient reports that over the past 2 weeks she developed increased swelling and significant pain. Patient had fluid drained and tested, which came back negative for infection. Patient had blood work performed as well, which ws negative. Patient's cramping has  been better as well as her sleep. Patient continues to limp and compensate, partially d/t her knee not being straight. Patient states that her knee is significantly better compared to preoperative status and estimates her overall level of function to be approximately 95%. Patient's next f/u appointment with her surgeon is scheduled for 25.   Patient Goals  Patient goals for therapy: decreased pain, increased motion, increased strength, independence with ADLs/IADLs, return to sport/leisure activities and return to work  Patient goal: improve quality of gait  Pain  Current pain ratin  At best pain ratin  At worst pain ratin  Location: left knee  Quality: tight and dull ache    Social Support    Employment status: working ( at [employer]: anticipated return date of 25)  Treatments  Previous treatment: physical therapy        Objective     Observations   Left Knee   Positive for edema, effusion and incision (clean, well approximated, good scar tissue mobility, no signs of infection).     Right Knee   Negative for edema, effusion and incision.     Active Range of Motion   Left Knee   Flexion: 118 degrees with pain  Extension: 18 degrees with pain    Right Knee   Flexion: 148 degrees   Extension: 0 degrees     Passive Range of Motion   Left Knee   Flexion: 120 degrees with pain  Extension: 16 degrees with pain    Right Knee   Flexion: 148 degrees   Extension: 0 degrees     Mobility   Patellar Mobility:   Left Knee   WFL: medial, lateral, superior and inferior.     Strength/Myotome Testing     Left Hip   Planes of Motion   Flexion: 5  Abduction: 5    Right Hip   Planes of Motion   Flexion: 5    Left Knee   Flexion: 5  Extension: 5  Quadriceps contraction: fair    Right Knee   Flexion: 5  Extension: 5    Left Ankle/Foot   Dorsiflexion: 5    Right Ankle/Foot   Dorsiflexion: 5    Tests   Left Ankle/Foot   Positive for Homans' sign.     Right Ankle/Foot   Negative for Homans' sign.     Ambulation  "  Weight-Bearing Status   Weight-Bearing Status (Left): full weight bearing   Weight-Bearing Status (Right): full weight-bearing    Assistive device used: none    Ambulation: Level Surfaces   Ambulation without assistive device: independent    Observational Gait   Gait: antalgic     Additional Observational Gait Details  Increased left knee flexion during stance and swing phase    Functional Assessment        Comments  POST-OP REEVALUATION (11/29/24):   Bilateral squat: Mod I with standard walker, 0-40 deg, (+) pain  Timed Up and Go (TUG): 26.71 sec, Mod I ambulation with standard walker and sit to/from stand with bilateral arm rests  Five Times Sit to Stand Test: 13.69 sec, Mod I with b/l arm rests, left knee extended with increased weightbearing through left LE, good eccentric control    REEVALUATION (12/27/24):  Bilateral squat: Mod I, 0-60 deg  Timed Up and Go (TUG): 12.28 sec, Independent ambulation and sit to/from stand  Five Times Sit to Stand Test: 13.28 sec, Independent, good eccentric control, even weightbearing bilaterally ; 9.56 sec, Mod I with b/l arm rests  Forward step up 8\" step: Mod I with unilateral hand rail, antalgic  Forward step down 8\" step: Mod I with unilateral hand rail, fair eccentric control with increased weightbearing through UE ; 6\" step: Mod I with unilateral hand rail, good eccentric control    REEVALUATION (1/27/25):  Bilateral squat: WFL  Timed Up and Go (TUG): 8.12 sec, Independent sit to/from stand and ambulation  Five Times Sit to Stand Test: 11.18 sec, Independent, good eccentric control, equal weightbearing bilaterally ; 8.86 sec, Mod I with b/l arm rests, good eccentric control  Forward step up 8\" step: Independent  Forward step down 8\" step: Independent, good eccentric control    REEVALUATION (3/4/25):  Bilateral squat: WFL  Timed Up and Go (TUG): 7.87 sec, Independent ambulation and sit to/from stand  Five Times Sit to Stand Test: 9.46 sec, Independent, good eccentric " "control  Forward step up 8\" step: Independent  Forward step down 8\" step: Independent, good eccentric control    REEVALUATION (3/28/25):  Bilateral squat: WFL  Timed Up and Go (TUG): 7.08 sec, Independent ambulation and sit to/from stand  Five Times Sit to Stand Test: 8.74 sec, Independent, good eccentric control  Forward step up 8\" step: Independent  Forward step down 8\" step: Independent, good eccentric control      Flowsheet Rows      Flowsheet Row Most Recent Value   PT/OT G-Codes    Current Score 44   Projected Score 71                   POC expires Unit limit Auth  expiration date PT/OT + Visit Limit?   4/1/25 BOMN 12/31/25 BOMN      Sec - BOMN no auth           Visit/Unit Tracking  AUTH Status:  Date 1/30 2/3 2/7 2/11 2/18 2/20 2/28 3/4 3/14 3/18 3/25 3/28   N/A Used 8 9 10 11 12 13 14 15 16 17 18 19    Remaining                  Precautions: s/p left TKA (DOS: 11/26/24)      Manuals 3/28  2/18 2/20 2/28 3/4 3/12 3/14 3/18 3/25   Left knee PROM   SC  GR JK  SC GR GR GR   L calf, hip flexor str.        GR GR    Pat mobs    GR    GR GR    Post-op RE             Reassessment      GR                                              Neuro Re-Ed             Quad sets with heel prop             SAQ             LAQ             Kesier: TKE             Supine SLR             TM: retro walk              Slider lunges.    NV bwd           Standing star clocks with TB    GTB 5x  GTB x5 GTB x5                                               Ther Ex             Patient education: pathophysiology, graded activity, edema management, HEP review GR     GR   GR    Cardiovascular warm-up, knee ROM: Nustep, bike Rec bike 10 min  Rec bike 10 min L1  Rec bike 10 min  Rec 5 min Rec bike 10 min Rec bike 10 min  Rec bike 10 min Rec bike 10 min Rec bike 10 min   Ankle pumps             HS stretch at step             Long-sitting calf str. With heel prop         Prostretch 3x30\"    Heel slides              Knee flexion stretch on stairs        " "      HR             Seated knee extension LLPS             Seated knee flexion AAROM             QS             Standing h/s curl             Prone hang             Standing hip abd, ext with TB             Supine hip flexor str.             Supine knee flexion AAROM against wall             Prone quad str.         3x30\"    Sidestepping with TB    GTB x3         Matrix HS curl    25# 3x 10  35# 3x10 35# 3x10  35# 3x 10    25# 3x10   Matrix: knee ext.   25# 3x 10  35# 3x10 35# 3x10  35# 3x 10    25# 3x10                             Ther Activity             Squats    15# KB, foam 3x10 15# KB, foam 3x10     15# KB 3x10   FSU   HEP           FSD   6\" 3x 10 VC's        8\" 2x10   LSD   6\" 3x 10 VC\"s 6\" 3x10 6\" 3x10  6\" 3x 10       Stationary lunges             Total gym: squats    Single LE L 17  3x 10  Single LE L20 2x10 Single LE L20 2x10  Single LE L22 2x 10  B/L L22 3x10 B/L L22 3x10 B/L L22 3x10   Functional testing: TUG, 5x STS, steps Performed     Performed       Bosu FSU    2x 10 3\" and lateral 3x10 3\" 3x10 3\"  3x 10  3\"       Lateral walks    GTB 5 laps at mirror no UE  GTB 3 laps length of room GTB 3 laps length of room        Monster walks    GTB  5 laps at mirror  no UE     GTB 5 laps                                 Gait Training                                       Modalities                                         Access Code: [email or web address]  URL: [email or web address]  Date: 11/29/2024  Prepared by: Matthew Roll    Exercises  - Ankle Pumps in Elevation  - 3-5 x daily - 7 x weekly - 5-10 minutes hold  - Supine Heel Slide with Strap  - 3-5 x daily - 7 x weekly - 2 sets - 10 reps - 10 seconds hold  - Long Sitting Calf Stretch with Strap  - 3-5 x daily - 7 x weekly - 5 sets - 1 reps - 30 seconds hold  - Long Sitting Quad Set with Towel Roll Under Heel  - 3-5 x daily - 7 x weekly - 2-3 sets - 10 reps - 5 seconds hold  - Seated Knee Flexion Stretch  - 3-5 x daily - 7 x weekly - 2 sets - 10 reps - 10 " seconds hold  - Seated Knee Extension Stretch with Chair  - 3-5 x daily - 7 x weekly - 1 sets - 5-10 minutes hold

## 2025-04-28 ENCOUNTER — APPOINTMENT (OUTPATIENT)
Dept: RADIOLOGY | Facility: CLINIC | Age: 70
End: 2025-04-28
Attending: ORTHOPAEDIC SURGERY
Payer: MEDICARE

## 2025-04-28 ENCOUNTER — OFFICE VISIT (OUTPATIENT)
Dept: OBGYN CLINIC | Facility: CLINIC | Age: 70
End: 2025-04-28
Payer: MEDICARE

## 2025-04-28 VITALS — WEIGHT: 147.6 LBS | HEIGHT: 65 IN | BODY MASS INDEX: 24.59 KG/M2

## 2025-04-28 DIAGNOSIS — Z96.652 S/P TOTAL KNEE ARTHROPLASTY, LEFT: Primary | ICD-10-CM

## 2025-04-28 DIAGNOSIS — Z96.652 S/P TOTAL KNEE ARTHROPLASTY, LEFT: ICD-10-CM

## 2025-04-28 PROCEDURE — 99213 OFFICE O/P EST LOW 20 MIN: CPT | Performed by: ORTHOPAEDIC SURGERY

## 2025-04-28 PROCEDURE — 73560 X-RAY EXAM OF KNEE 1 OR 2: CPT

## 2025-04-28 NOTE — PROGRESS NOTES
"Name: Radha Pantoja      : 1955      MRN: [id number]  Encounter Provider: Laron Carter DO  Encounter Date: 2025   Encounter department: [facility] [place]  :  Assessment & Plan          Approximately 5 month s/p Left total knee arthroplasty performed on 2024  X-rays reviewed with patient  Reviewed Synovasure labs with patient again   Overall, patient doing well since last appointment   Continue activities as tolerated  Follow up 6 months, new Left knee x-rays upon arrival     History of the Present Illness   History of Present Illness   HPI   Radha Pantoja is a 69 y.o. female approximately 5 month s/p Left total knee arthroplasty performed on 2024, s/p knee aspiration and Synovasure performed 2025. Today, patient reports she is significantly improved from last appointment. She admits she was still having some discomfort after last appointment, but overall improved. She has not performed many home exercises since last appointment.            Review of Systems     Review of Systems   Constitutional:  Negative for chills and fever.   HENT:  Negative for congestion.    Respiratory:  Negative for cough, chest tightness and shortness of breath.    Cardiovascular:  Negative for chest pain and palpitations.   Gastrointestinal:  Negative for abdominal pain.   Endocrine: Negative for cold intolerance and heat intolerance.   Neurological:  Negative for syncope.   Psychiatric/Behavioral:  Negative for confusion.        Physical Exam   Objective   Ht 5' 5\" (1.651 m)   BMI 24.40 kg/m²        Left  Knee  Surgical incision well healed  Range of motion from 10 to 120 degrees.    There is no effusion.    There is no tenderness over the knee.    The patient is able to perform a straight leg raise with 5/5 quad strength.     Varus stress testing reveals no pain or instability at 0 and 30 degrees   Valgus stress testing reveals no pain or " instability at 0 and 30 degrees  Calf soft, compressible and nontender   The patient is neurovascular intact distally.      Data Review     I have personally reviewed pertinent films in PACS, and my interpretation follows.    X-rays taken 04/28/2025 of Right knee independently reviewed and demonstrate total knee prosthesis in appropriate alignment without evidence of fracture or loosening     Lab Results   Component Value Date/Time    HGBA1C 5.0 10/29/2024 07:12 AM    ESR 19 03/21/2025 01:02 PM    ESR 1 11/10/2015 12:51 PM    CRP 2.2 03/21/2025 01:02 PM    LYMEIGG 0.05 03/04/2019 01:58 PM       Social History     Tobacco Use    Smoking status: Never     Passive exposure: Never    Smokeless tobacco: Never    Tobacco comments:     Has medical marijuana card does vape   Vaping Use    Vaping status: Every Day    Substances: THC   Substance Use Topics    Alcohol use: Not Currently     Comment: recovering    Drug use: Yes     Types: Marijuana     Comment: medical marijuana           Procedures  None       Radha Marroquin PA-C

## 2025-06-05 ENCOUNTER — TELEPHONE (OUTPATIENT)
Age: 70
End: 2025-06-05

## 2025-07-01 ENCOUNTER — APPOINTMENT (OUTPATIENT)
Dept: LAB | Facility: CLINIC | Age: 70
End: 2025-07-01
Payer: MEDICARE

## 2025-07-01 DIAGNOSIS — R73.03 PREDIABETES: ICD-10-CM

## 2025-07-01 DIAGNOSIS — R53.83 OTHER FATIGUE: ICD-10-CM

## 2025-07-01 DIAGNOSIS — D50.9 IRON DEFICIENCY ANEMIA, UNSPECIFIED IRON DEFICIENCY ANEMIA TYPE: ICD-10-CM

## 2025-07-01 DIAGNOSIS — E78.2 MIXED HYPERLIPIDEMIA: ICD-10-CM

## 2025-07-01 PROBLEM — R19.7 DIARRHEA: Status: RESOLVED | Noted: 2025-02-06 | Resolved: 2025-07-01

## 2025-07-01 LAB
ALBUMIN SERPL BCG-MCNC: 4.1 G/DL (ref 3.5–5)
ALP SERPL-CCNC: 41 U/L (ref 34–104)
ALT SERPL W P-5'-P-CCNC: 17 U/L (ref 7–52)
ANION GAP SERPL CALCULATED.3IONS-SCNC: 5 MMOL/L (ref 4–13)
AST SERPL W P-5'-P-CCNC: 20 U/L (ref 13–39)
BASOPHILS # BLD AUTO: 0.07 THOUSANDS/ÂΜL (ref 0–0.1)
BASOPHILS NFR BLD AUTO: 2 % (ref 0–1)
BILIRUB SERPL-MCNC: 0.5 MG/DL (ref 0.2–1)
BUN SERPL-MCNC: 21 MG/DL (ref 5–25)
CALCIUM SERPL-MCNC: 9.4 MG/DL (ref 8.4–10.2)
CHLORIDE SERPL-SCNC: 107 MMOL/L (ref 96–108)
CHOLEST SERPL-MCNC: 246 MG/DL (ref ?–200)
CO2 SERPL-SCNC: 29 MMOL/L (ref 21–32)
CREAT SERPL-MCNC: 0.44 MG/DL (ref 0.6–1.3)
EOSINOPHIL # BLD AUTO: 0.07 THOUSAND/ÂΜL (ref 0–0.61)
EOSINOPHIL NFR BLD AUTO: 2 % (ref 0–6)
ERYTHROCYTE [DISTWIDTH] IN BLOOD BY AUTOMATED COUNT: 12.6 % (ref 11.6–15.1)
EST. AVERAGE GLUCOSE BLD GHB EST-MCNC: 114 MG/DL
FERRITIN SERPL-MCNC: 13 NG/ML (ref 30–307)
GFR SERPL CREATININE-BSD FRML MDRD: 103 ML/MIN/1.73SQ M
GLUCOSE P FAST SERPL-MCNC: 81 MG/DL (ref 65–99)
HBA1C MFR BLD: 5.6 %
HCT VFR BLD AUTO: 38.8 % (ref 34.8–46.1)
HDLC SERPL-MCNC: 88 MG/DL
HGB BLD-MCNC: 12.6 G/DL (ref 11.5–15.4)
IMM GRANULOCYTES # BLD AUTO: 0.01 THOUSAND/UL (ref 0–0.2)
IMM GRANULOCYTES NFR BLD AUTO: 0 % (ref 0–2)
IRON SATN MFR SERPL: 25 % (ref 15–50)
IRON SERPL-MCNC: 98 UG/DL (ref 50–212)
LDLC SERPL CALC-MCNC: 143 MG/DL (ref 0–100)
LYMPHOCYTES # BLD AUTO: 0.81 THOUSANDS/ÂΜL (ref 0.6–4.47)
LYMPHOCYTES NFR BLD AUTO: 25 % (ref 14–44)
MCH RBC QN AUTO: 30.3 PG (ref 26.8–34.3)
MCHC RBC AUTO-ENTMCNC: 32.5 G/DL (ref 31.4–37.4)
MCV RBC AUTO: 93 FL (ref 82–98)
MONOCYTES # BLD AUTO: 0.31 THOUSAND/ÂΜL (ref 0.17–1.22)
MONOCYTES NFR BLD AUTO: 9 % (ref 4–12)
NEUTROPHILS # BLD AUTO: 2.04 THOUSANDS/ÂΜL (ref 1.85–7.62)
NEUTS SEG NFR BLD AUTO: 62 % (ref 43–75)
NONHDLC SERPL-MCNC: 158 MG/DL
NRBC BLD AUTO-RTO: 0 /100 WBCS
PLATELET # BLD AUTO: 183 THOUSANDS/UL (ref 149–390)
PMV BLD AUTO: 9.7 FL (ref 8.9–12.7)
POTASSIUM SERPL-SCNC: 4.3 MMOL/L (ref 3.5–5.3)
PROT SERPL-MCNC: 6.4 G/DL (ref 6.4–8.4)
RBC # BLD AUTO: 4.16 MILLION/UL (ref 3.81–5.12)
SODIUM SERPL-SCNC: 141 MMOL/L (ref 135–147)
TIBC SERPL-MCNC: 385 UG/DL (ref 250–450)
TRANSFERRIN SERPL-MCNC: 275 MG/DL (ref 203–362)
TRIGL SERPL-MCNC: 74 MG/DL (ref ?–150)
TSH SERPL DL<=0.05 MIU/L-ACNC: 0.72 UIU/ML (ref 0.45–4.5)
UIBC SERPL-MCNC: 287 UG/DL (ref 155–355)
WBC # BLD AUTO: 3.31 THOUSAND/UL (ref 4.31–10.16)

## 2025-07-01 PROCEDURE — 83540 ASSAY OF IRON: CPT

## 2025-07-01 PROCEDURE — 85025 COMPLETE CBC W/AUTO DIFF WBC: CPT

## 2025-07-01 PROCEDURE — 83550 IRON BINDING TEST: CPT

## 2025-07-01 PROCEDURE — 80053 COMPREHEN METABOLIC PANEL: CPT

## 2025-07-01 PROCEDURE — 84443 ASSAY THYROID STIM HORMONE: CPT

## 2025-07-01 PROCEDURE — 83036 HEMOGLOBIN GLYCOSYLATED A1C: CPT

## 2025-07-01 PROCEDURE — 80061 LIPID PANEL: CPT

## 2025-07-01 PROCEDURE — 36415 COLL VENOUS BLD VENIPUNCTURE: CPT

## 2025-07-01 PROCEDURE — 82728 ASSAY OF FERRITIN: CPT

## 2025-07-02 ENCOUNTER — OFFICE VISIT (OUTPATIENT)
Age: 70
End: 2025-07-02
Payer: MEDICARE

## 2025-07-02 VITALS
OXYGEN SATURATION: 96 % | HEIGHT: 65 IN | WEIGHT: 150 LBS | BODY MASS INDEX: 24.99 KG/M2 | SYSTOLIC BLOOD PRESSURE: 102 MMHG | DIASTOLIC BLOOD PRESSURE: 60 MMHG | HEART RATE: 82 BPM

## 2025-07-02 DIAGNOSIS — D50.9 IRON DEFICIENCY ANEMIA, UNSPECIFIED IRON DEFICIENCY ANEMIA TYPE: ICD-10-CM

## 2025-07-02 DIAGNOSIS — F41.9 ANXIETY: ICD-10-CM

## 2025-07-02 DIAGNOSIS — Z00.00 MEDICARE ANNUAL WELLNESS VISIT, SUBSEQUENT: Primary | ICD-10-CM

## 2025-07-02 DIAGNOSIS — E55.9 MILD VITAMIN D DEFICIENCY: ICD-10-CM

## 2025-07-02 DIAGNOSIS — D72.819 LEUKOPENIA, UNSPECIFIED TYPE: ICD-10-CM

## 2025-07-02 DIAGNOSIS — Z23 ENCOUNTER FOR IMMUNIZATION: ICD-10-CM

## 2025-07-02 DIAGNOSIS — E78.2 MIXED HYPERLIPIDEMIA: ICD-10-CM

## 2025-07-02 DIAGNOSIS — M79.2 NERVE PAIN: ICD-10-CM

## 2025-07-02 DIAGNOSIS — Z87.898 HISTORY OF ALCOHOL USE DISORDER: ICD-10-CM

## 2025-07-02 DIAGNOSIS — R73.03 PREDIABETES: ICD-10-CM

## 2025-07-02 PROCEDURE — G0439 PPPS, SUBSEQ VISIT: HCPCS

## 2025-07-02 PROCEDURE — 90677 PCV20 VACCINE IM: CPT

## 2025-07-02 PROCEDURE — G0009 ADMIN PNEUMOCOCCAL VACCINE: HCPCS

## 2025-07-02 RX ORDER — FERROUS SULFATE 324(65)MG
TABLET, DELAYED RELEASE (ENTERIC COATED) ORAL
Qty: 45 TABLET | Refills: 1 | Status: SHIPPED | OUTPATIENT
Start: 2025-07-02

## 2025-07-02 RX ORDER — GABAPENTIN 300 MG/1
300 CAPSULE ORAL
Qty: 90 CAPSULE | Refills: 3 | Status: SHIPPED | OUTPATIENT
Start: 2025-07-02

## 2025-07-02 NOTE — ASSESSMENT & PLAN NOTE
Ferritin was low at 13.  Recent colonoscopy in February was unremarkable.  Recommended referral to GI at this time for EGD and to complete a fit test.  She denies a history of celiac disease, H. pylori, gastric bypass.  She is s/p bilateral oophorectomy.  Recommended starting an iron supplement every other day with food.  Reviewed potential adverse effects.  Will follow-up with repeat CBC and iron panel in 3 months.  Orders:    Ambulatory Referral to Gastroenterology; Future    iFOBT (FIT); Future    ferrous sulfate 324 (65 Fe) mg; 1 po qod with food    CBC and differential; Future    TIBC Panel (incl. Iron, TIBC, % Iron Saturation); Future

## 2025-07-02 NOTE — ASSESSMENT & PLAN NOTE
LDL is elevated at 143.  Reviewed a low-cholesterol diet.  Current ASCVD rescore is 5.3%.  Will obtain CT coronary calcium score.  If greater than 100, will initiate atorvastatin 10 mg daily.  Orders:    CT coronary calcium score; Future

## 2025-07-02 NOTE — PROGRESS NOTES
Name: Radha Pantoja      : 1955      MRN: [id number]  Encounter Provider: Radha Gaines PA-C  Encounter Date: 2025   Encounter department: [facility] [address] [facility] [address]  :  Assessment & Plan  Medicare annual wellness visit, subsequent  She tries to eat a well-balanced diet of fruits, veggies, and lean meats.  She does not drink much water, more so club soda. She is active at work, but does no formal exercise, recommended 30 mins 5x a week. She sleeps well with gabapentin. She denies any alcohol, tobacco. She has medical marijuana card. She is UTD with dentist and eye doctor. She is  to her  [place]. She was a  for [employer], but then retired.  She has a part-time job at [employer].  She has a pitty. She likes to garden and read.        Anxiety  She is taking lexapro 10 mg daily and gabapentin 300 mg prior to bed.        History of alcohol use disorder  She is 5 years sober.        Iron deficiency anemia, unspecified iron deficiency anemia type  Ferritin was low at 13.  Recent colonoscopy in February was unremarkable.  Recommended referral to GI at this time for EGD and to complete a fit test.  She denies a history of celiac disease, H. pylori, gastric bypass.  She is s/p bilateral oophorectomy.  Recommended starting an iron supplement every other day with food.  Reviewed potential adverse effects.  Will follow-up with repeat CBC and iron panel in 3 months.  Orders:    Ambulatory Referral to Gastroenterology; Future    iFOBT (FIT); Future    ferrous sulfate 324 (65 Fe) mg; 1 po qod with food    CBC and differential; Future    TIBC Panel (incl. Iron, TIBC, % Iron Saturation); Future    Mixed hyperlipidemia  LDL is elevated at 143.  Reviewed a low-cholesterol diet.  Current ASCVD rescore is 5.3%.  Will obtain CT coronary calcium score.  If greater than 100, will initiate atorvastatin 10 mg daily.  Orders:    CT coronary calcium score; Future    Mild  vitamin D deficiency  Recommended starting a daily vitamin D supplement of 2000 international units.         Prediabetes  A1c has improved in the normal range.       Leukopenia, unspecified type  Chronic leukopenia since 2020, likely benign nature.  Will continue to monitor.         Nerve pain  She is taking 300 mg prior to bed.  Orders:    gabapentin (NEURONTIN) 300 mg capsule; Take 1 capsule (300 mg total) by mouth daily at bedtime    Encounter for immunization  PCV given in office today.  Orders:    Pneumococcal Conjugate Vaccine 20-valent (Pcv20)      Depression Screening and Follow-up Plan: Patient was screened for depression during today's encounter. They screened negative with a PHQ-2 score of 1.        Preventive health issues were discussed with patient, and age appropriate screening tests were ordered as noted in patient's After Visit Summary. Personalized health advice and appropriate referrals for health education or preventive services given if needed, as noted in patient's After Visit Summary.    History of Present Illness     Patient is a 70-year-old female that presents today for Medicare annual wellness visit.  She has no new complaints today.    Health maintenance:  Up-to-date on labs and screenings.  Immunizations: Due for second shingles shot and PCV vaccine.       Patient Care Team:  Tanner Martin MD as PCP - General (Internal Medicine)  MD Fausto Quiroz MD as Endoscopist    Review of Systems   Constitutional:  Negative for chills, fatigue and fever.   HENT:  Negative for ear discharge, ear pain, postnasal drip, rhinorrhea, sinus pressure, sinus pain, sore throat, tinnitus and trouble swallowing.    Eyes:  Negative for pain, discharge and itching.   Respiratory:  Negative for cough, shortness of breath and wheezing.    Cardiovascular:  Negative for chest pain, palpitations and leg swelling.   Gastrointestinal:  Negative for abdominal pain, constipation, diarrhea,  nausea and vomiting.   Endocrine: Negative for polydipsia, polyphagia and polyuria.   Genitourinary:  Negative for difficulty urinating, frequency, hematuria and urgency.   Musculoskeletal:  Negative for arthralgias, joint swelling and myalgias.   Skin:  Negative for color change.   Allergic/Immunologic: Negative for environmental allergies.   Neurological:  Negative for dizziness, weakness, light-headedness, numbness and headaches.   Hematological:  Negative for adenopathy.   Psychiatric/Behavioral:  Negative for decreased concentration and sleep disturbance. The patient is not nervous/anxious.      Medical History Reviewed by provider this encounter:  Tobacco  Allergies  Meds  Problems  Med Hx  Surg Hx  Fam Hx       Annual Wellness Visit Questionnaire   Radha is here for her Subsequent Wellness visit. Last Medicare Wellness visit information reviewed, patient interviewed and updates made to the record today.      Health Risk Assessment:   Patient rates overall health as excellent. Patient feels that their physical health rating is same. Patient is very satisfied with their life. Eyesight was rated as same. Hearing was rated as same. Patient feels that their emotional and mental health rating is much better. Patients states they are never, rarely angry. Patient states they are sometimes unusually tired/fatigued. Pain experienced in the last 7 days has been none. Patient states that she has experienced no weight loss or gain in last 6 months.     Depression Screening:   PHQ-2 Score: 1      Fall Risk Screening:   In the past year, patient has experienced: no history of falling in past year      Urinary Incontinence Screening:   Patient has not leaked urine accidently in the last six months.     Home Safety:  Patient does not have trouble with stairs inside or outside of their home. Patient has working smoke alarms and has no working carbon monoxide detector. Home safety hazards include: none.     Nutrition:    Current diet is Regular.     Medications:   Patient is currently taking over-the-counter supplements. OTC medications include: see medication list. Patient is not able to manage medications.     Activities of Daily Living (ADLs)/Instrumental Activities of Daily Living (IADLs):   Walk and transfer into and out of bed and chair?: Yes  Dress and groom yourself?: Yes    Bathe or shower yourself?: Yes    Feed yourself? Yes  Do your laundry/housekeeping?: Yes  Manage your money, pay your bills and track your expenses?: Yes  Make your own meals?: Yes    Do your own shopping?: Yes    Previous Hospitalizations:   Any hospitalizations or ED visits within the last 12 months?: No      Advance Care Planning:   Living will: Yes    Durable POA for healthcare: Yes    Advanced directive: Yes    Advanced directive counseling given: Yes      Preventive Screenings      Cardiovascular Screening:    General: Screening Not Indicated, History Lipid Disorder and Screening Current      Diabetes Screening:     General: Screening Current      Colorectal Cancer Screening:     General: Screening Current      Breast Cancer Screening:     General: Screening Not Indicated      Cervical Cancer Screening:    General: Screening Not Indicated      Osteoporosis Screening:    General: Risks and Benefits Discussed    Due for: Bone Density Ultrasound      Abdominal Aortic Aneurysm (AAA) Screening:        General: Screening Not Indicated      Lung Cancer Screening:     General: Screening Not Indicated      Hepatitis C Screening:    General: Screening Current    Screening, Brief Intervention, and Referral to Treatment (SBIRT)     Screening  Typical number of drinks in a day: 0  Typical number of drinks in a week: 0  Interpretation: Low risk drinking behavior.    Single Item Drug Screening:  How often have you used an illegal drug (including marijuana) or a prescription medication for non-medical reasons in the past year? never    Single Item Drug Screen  "Score: 0  Interpretation: Negative screen for possible drug use disorder    Social Drivers of Health     Financial Resource Strain: Low Risk  (11/3/2023)    Overall Financial Resource Strain (CARDIA)     Difficulty of Paying Living Expenses: Not hard at all   Food Insecurity: No Food Insecurity (7/2/2025)    Nursing - Inadequate Food Risk Classification     Worried About Running Out of Food in the Last Year: Never true     Ran Out of Food in the Last Year: Never true   Transportation Needs: No Transportation Needs (7/2/2025)    PRAPARE - Transportation     Lack of Transportation (Medical): No     Lack of Transportation (Non-Medical): No   Housing Stability: Unknown (7/2/2025)    Housing Stability Vital Sign     Unable to Pay for Housing in the Last Year: No     Homeless in the Last Year: No   Utilities: Not At Risk (7/2/2025)    [facility] Utilities     Threatened with loss of utilities: No     No results found.    Objective   /60 (BP Location: Left arm, Patient Position: Sitting, Cuff Size: Large)   Pulse 82   Ht 5' 5\" (1.651 m)   Wt 68 kg (150 lb)   SpO2 96%   BMI 24.96 kg/m²     Physical Exam  Vitals and nursing note reviewed.   Constitutional:       General: She is awake. She is not in acute distress.     Appearance: Normal appearance. She is well-developed, well-groomed and normal weight.   HENT:      Head: Normocephalic and atraumatic.      Right Ear: Hearing, tympanic membrane, ear canal and external ear normal.      Left Ear: Hearing, tympanic membrane, ear canal and external ear normal.      Nose: Nose normal.      Mouth/Throat:      Lips: Pink.      Mouth: Mucous membranes are moist.     Eyes:      General: Lids are normal. Vision grossly intact. Gaze aligned appropriately.      Conjunctiva/sclera: Conjunctivae normal.      Pupils: Pupils are equal, round, and reactive to light.     Neck:      Vascular: No carotid bruit.      Trachea: Trachea and phonation normal.     Cardiovascular:      Rate and " Rhythm: Normal rate and regular rhythm.      Heart sounds: Normal heart sounds, S1 normal and S2 normal. No murmur heard.     No friction rub. No gallop.   Pulmonary:      Effort: Pulmonary effort is normal. No respiratory distress.      Breath sounds: Normal breath sounds and air entry. No decreased breath sounds, wheezing, rhonchi or rales.   Abdominal:      General: Abdomen is flat.     Musculoskeletal:         General: No swelling.      Cervical back: Neck supple.      Right lower leg: No edema.      Left lower leg: No edema.     Skin:     General: Skin is warm.      Capillary Refill: Capillary refill takes less than 2 seconds.     Neurological:      Mental Status: She is alert.     Psychiatric:         Attention and Perception: Attention and perception normal.         Mood and Affect: Mood and affect normal.         Speech: Speech normal.         Behavior: Behavior normal. Behavior is cooperative.         Thought Content: Thought content normal.         Cognition and Memory: Cognition and memory normal.         Judgment: Judgment normal.

## 2025-07-02 NOTE — PATIENT INSTRUCTIONS
Medicare Preventive Visit Patient Instructions  Thank you for completing your Welcome to Medicare Visit or Medicare Annual Wellness Visit today. Your next wellness visit will be due in one year (7/3/2026).  The screening/preventive services that you may require over the next 5-10 years are detailed below. Some tests may not apply to you based off risk factors and/or age. Screening tests ordered at today's visit but not completed yet may show as past due. Also, please note that scanned in results may not display below.  Preventive Screenings:  Service Recommendations Previous Testing/Comments   Colorectal Cancer Screening  * Colonoscopy    * Fecal Occult Blood Test (FOBT)/Fecal Immunochemical Test (FIT)  * Fecal DNA/Cologuard Test  * Flexible Sigmoidoscopy Age: 45-75 years old   Colonoscopy: every 10 years (may be performed more frequently if at higher risk)  OR  FOBT/FIT: every 1 year  OR  Cologuard: every 3 years  OR  Sigmoidoscopy: every 5 years  Screening may be recommended earlier than age 45 if at higher risk for colorectal cancer. Also, an individualized decision between you and your healthcare provider will decide whether screening between the ages of 76-85 would be appropriate. Colonoscopy: 02/19/2025  FOBT/FIT: Not on file  Cologuard: Not on file  Sigmoidoscopy: Not on file    Screening Current     Breast Cancer Screening Age: 40+ years old  Frequency: every 1-2 years  Not required if history of left and right mastectomy Mammogram: 12/09/2005    Screening Not Indicated   Cervical Cancer Screening Between the ages of 21-29, pap smear recommended once every 3 years.   Between the ages of 30-65, can perform pap smear with HPV co-testing every 5 years.   Recommendations may differ for women with a history of total hysterectomy, cervical cancer, or abnormal pap smears in past. Pap Smear: 02/21/2025    Screening Not Indicated   Hepatitis C Screening Once for adults born between 1945 and 1965  More frequently in  patients at high risk for Hepatitis C Hep C Antibody: 02/03/2019    Screening Current   Diabetes Screening 1-2 times per year if you're at risk for diabetes or have pre-diabetes Fasting glucose: 81 mg/dL (7/1/2025)  A1C: 5.6 % (7/1/2025)  Screening Current   Cholesterol Screening Once every 5 years if you don't have a lipid disorder. May order more often based on risk factors. Lipid panel: 07/01/2025    Screening Not Indicated  History Lipid Disorder     Other Preventive Screenings Covered by Medicare:  Abdominal Aortic Aneurysm (AAA) Screening: covered once if your at risk. You're considered to be at risk if you have a family history of AAA.  Lung Cancer Screening: covers low dose CT scan once per year if you meet all of the following conditions: (1) Age 55-77; (2) No signs or symptoms of lung cancer; (3) Current smoker or have quit smoking within the last 15 years; (4) You have a tobacco smoking history of at least 20 pack years (packs per day multiplied by number of years you smoked); (5) You get a written order from a healthcare provider.  Glaucoma Screening: covered annually if you're considered high risk: (1) You have diabetes OR (2) Family history of glaucoma OR (3)  aged 50 and older OR (4)  American aged 65 and older  Osteoporosis Screening: covered every 2 years if you meet one of the following conditions: (1) You're estrogen deficient and at risk for osteoporosis based off medical history and other findings; (2) Have a vertebral abnormality; (3) On glucocorticoid therapy for more than 3 months; (4) Have primary hyperparathyroidism; (5) On osteoporosis medications and need to assess response to drug therapy.   Last bone density test (DXA Scan): 12/26/2019.  HIV Screening: covered annually if you're between the age of 15-65. Also covered annually if you are younger than 15 and older than 65 with risk factors for HIV infection. For pregnant patients, it is covered up to 3 times per  pregnancy.    Immunizations:  Immunization Recommendations   Influenza Vaccine Annual influenza vaccination during flu season is recommended for all persons aged >= 6 months who do not have contraindications   Pneumococcal Vaccine   * Pneumococcal conjugate vaccine = PCV13 (Prevnar 13), PCV15 (Vaxneuvance), PCV20 (Prevnar 20)  * Pneumococcal polysaccharide vaccine = PPSV23 (Pneumovax) Adults 19-63 yo with certain risk factors or if 65+ yo  If never received any pneumonia vaccine: recommend Prevnar 20 (PCV20)  Give PCV20 if previously received 1 dose of PCV13 or PPSV23   Hepatitis B Vaccine 3 dose series if at intermediate or high risk (ex: diabetes, end stage renal disease, liver disease)   Respiratory syncytial virus (RSV) Vaccine - COVERED BY MEDICARE PART D  * RSVPreF3 (Arexvy) CDC recommends that adults 60 years of age and older may receive a single dose of RSV vaccine using shared clinical decision-making (SCDM)   Tetanus (Td) Vaccine - COST NOT COVERED BY MEDICARE PART B Following completion of primary series, a booster dose should be given every 10 years to maintain immunity against tetanus. Td may also be given as tetanus wound prophylaxis.   Tdap Vaccine - COST NOT COVERED BY MEDICARE PART B Recommended at least once for all adults. For pregnant patients, recommended with each pregnancy.   Shingles Vaccine (Shingrix) - COST NOT COVERED BY MEDICARE PART B  2 shot series recommended in those 19 years and older who have or will have weakened immune systems or those 50 years and older     Health Maintenance Due:      Topic Date Due   • Colorectal Cancer Screening  02/18/2030   • Hepatitis C Screening  Completed     Immunizations Due:      Topic Date Due   • Pneumococcal Vaccine: 50+ Years (2 of 2 - PPSV23) 04/29/2022   • COVID-19 Vaccine (6 - 2024-25 season) 09/01/2024   • Influenza Vaccine (1) 09/01/2025     Advance Directives   What are advance directives?  Advance directives are legal documents that state  your wishes and plans for medical care. These plans are made ahead of time in case you lose your ability to make decisions for yourself. Advance directives can apply to any medical decision, such as the treatments you want, and if you want to donate organs.   What are the types of advance directives?  There are many types of advance directives, and each state has rules about how to use them. You may choose a combination of any of the following:  Living will:  This is a written record of the treatment you want. You can also choose which treatments you do not want, which to limit, and which to stop at a certain time. This includes surgery, medicine, IV fluid, and tube feedings.   Durable power of  for healthcare (DPAHC):  This is a written record that states who you want to make healthcare choices for you when you are unable to make them for yourself. This person, called a proxy, is usually a family member or a friend. You may choose more than 1 proxy.  Do not resuscitate (DNR) order:  A DNR order is used in case your heart stops beating or you stop breathing. It is a request not to have certain forms of treatment, such as CPR. A DNR order may be included in other types of advance directives.  Medical directive:  This covers the care that you want if you are in a coma, near death, or unable to make decisions for yourself. You can list the treatments you want for each condition. Treatment may include pain medicine, surgery, blood transfusions, dialysis, IV or tube feedings, and a ventilator (breathing machine).  Values history:  This document has questions about your views, beliefs, and how you feel and think about life. This information can help others choose the care that you would choose.  Why are advance directives important?  An advance directive helps you control your care. Although spoken wishes may be used, it is better to have your wishes written down. Spoken wishes can be misunderstood, or not  followed. Treatments may be given even if you do not want them. An advance directive may make it easier for your family to make difficult choices about your care.       © Copyright [employer] 2018 Information is for End User's use only and may not be sold, redistributed or otherwise used for commercial purposes. All illustrations and images included in CareNotes® are the copyrighted property of [employer] or [employer]

## 2025-07-22 ENCOUNTER — APPOINTMENT (OUTPATIENT)
Dept: LAB | Facility: CLINIC | Age: 70
End: 2025-07-22

## 2025-07-23 ENCOUNTER — APPOINTMENT (OUTPATIENT)
Dept: LAB | Facility: CLINIC | Age: 70
End: 2025-07-23
Payer: MEDICARE

## 2025-07-23 DIAGNOSIS — D50.9 IRON DEFICIENCY ANEMIA, UNSPECIFIED IRON DEFICIENCY ANEMIA TYPE: ICD-10-CM

## 2025-07-23 LAB — HEMOCCULT STL QL IA: POSITIVE

## 2025-07-23 PROCEDURE — G0328 FECAL BLOOD SCRN IMMUNOASSAY: HCPCS

## 2025-07-24 ENCOUNTER — TELEPHONE (OUTPATIENT)
Age: 70
End: 2025-07-24

## 2025-07-24 DIAGNOSIS — R19.5 POSITIVE FIT (FECAL IMMUNOCHEMICAL TEST): Primary | ICD-10-CM

## 2025-07-24 NOTE — TELEPHONE ENCOUNTER
Patients GI provider:  Dr. Frazier    Number to return call: [phone]    Reason for call: Pt calling to schedule ov from referral for positive FIT test. Pt wanting to see Dr. Frazier and I scheduled 1st available on 8/28/25. Pt wanting a message to Dr. Frazier stating should she see him sooner than 8/28/25 or can this wait until her appt. Please send message to provider and reach back out to pt. Thank you    Scheduled procedure/appointment date if applicable: Apt 8/28/25

## 2025-07-28 ENCOUNTER — HOSPITAL ENCOUNTER (OUTPATIENT)
Dept: CT IMAGING | Facility: CLINIC | Age: 70
Discharge: HOME | End: 2025-07-28
Payer: COMMERCIAL

## 2025-07-28 DIAGNOSIS — E78.2 MIXED HYPERLIPIDEMIA: ICD-10-CM

## 2025-07-28 PROCEDURE — 75571 CT HRT W/O DYE W/CA TEST: CPT

## 2025-08-05 ENCOUNTER — TELEPHONE (OUTPATIENT)
Age: 70
End: 2025-08-05

## (undated) DEVICE — TRAY FOLEY 16FR URIMETER SURESTEP

## (undated) DEVICE — DRESSING MEPILEX AG BORDER POST-OP 4 X 10 IN

## (undated) DEVICE — INTENDED FOR TISSUE SEPARATION, AND OTHER PROCEDURES THAT REQUIRE A SHARP SURGICAL BLADE TO PUNCTURE OR CUT.: Brand: BARD-PARKER SAFETY BLADES SIZE 11, STERILE

## (undated) DEVICE — STERILE SURGICAL LUBRICANT,  TUBE: Brand: SURGILUBE

## (undated) DEVICE — IMPERVIOUS STOCKINETTE: Brand: DEROYAL

## (undated) DEVICE — ALLENTOWN LAP CHOLE APP PACK: Brand: CARDINAL HEALTH

## (undated) DEVICE — VIAL DECANTER

## (undated) DEVICE — ENDOPOUCH RETRIEVER SPECIMEN RETRIEVAL BAGS: Brand: ENDOPOUCH RETRIEVER

## (undated) DEVICE — LIGAMAX 5 MM ENDOSCOPIC MULTIPLE CLIP APPLIER: Brand: LIGAMAX

## (undated) DEVICE — RECIPROCATING BLADE, DOUBLE SIDED, OFFSET  (70.0 X 0.64 X 12.6MM)

## (undated) DEVICE — SCD SEQUENTIAL COMPRESSION COMFORT SLEEVE MEDIUM KNEE LENGTH: Brand: KENDALL SCD

## (undated) DEVICE — 4-PORT MANIFOLD: Brand: NEPTUNE 2

## (undated) DEVICE — MAYO STAND COVER: Brand: CONVERTORS

## (undated) DEVICE — LINER FOOT PAD STERILE DISPOSABLE

## (undated) DEVICE — TUBING SMOKE EVAC W/FILTRATION DEVICE PLUMEPORT ACTIV

## (undated) DEVICE — HOOD: Brand: FLYTE, SURGICOOL

## (undated) DEVICE — ADHESIVE SKN CLSR HISTOACRYL FLEX 0.5ML LF

## (undated) DEVICE — SUT MONOCRYL 4-0 PS-2 27 IN Y426H

## (undated) DEVICE — SPONGE LAP 18 X 18 IN

## (undated) DEVICE — SUT STRATAFIX SPIRAL PDS PLUS 1 CTX 18 IN SXPP1A400

## (undated) DEVICE — COBAN 4 IN STERILE

## (undated) DEVICE — DUAL CUT SAGITTAL BLADE

## (undated) DEVICE — STRL PENROSE DRAIN 18" X 1/2": Brand: CARDINAL HEALTH

## (undated) DEVICE — CAPIT KNEE ATTUNE FB W/DOM

## (undated) DEVICE — 3M™ IOBAN™ 2 ANTIMICROBIAL INCISE DRAPE 6650EZ: Brand: IOBAN™ 2

## (undated) DEVICE — DRESSING MEPILEX AG BORDER 4 X 10 IN

## (undated) DEVICE — CEMENT MIXING SYSTEM WITH FEMORAL BREAKWAY NOZZLE: Brand: REVOLUTION

## (undated) DEVICE — ENDOPATH XCEL BLADELESS TROCARS WITH STABILITY SLEEVES: Brand: ENDOPATH XCEL

## (undated) DEVICE — NEEDLE 22 G X 1 1/2 SAFETY

## (undated) DEVICE — UNDYED BRAIDED (POLYGLACTIN 910), SYNTHETIC ABSORBABLE SUTURE: Brand: COATED VICRYL

## (undated) DEVICE — TOWEL SET X-RAY

## (undated) DEVICE — 3M™ STERI-STRIP™ REINFORCED ADHESIVE SKIN CLOSURES, R1546, 1/4 IN X 4 IN (6 MM X 100 MM), 10 STRIPS/ENVELOPE: Brand: 3M™ STERI-STRIP™

## (undated) DEVICE — HANDPIECE SET WITH RETRACTABLE COAXIAL FAN SPRAY TIP AND SUCTION TUBE: Brand: INTERPULSE

## (undated) DEVICE — 450 ML BOTTLE OF 0.05% CHLORHEXIDINE GLUCONATE IN 99.95% STERILE WATER FOR IRRIGATION, USP AND APPLICATOR.: Brand: IRRISEPT ANTIMICROBIAL WOUND LAVAGE

## (undated) DEVICE — CABLE ELECTROSURG AEM BURN PROTECTION SYSTEM DISP

## (undated) DEVICE — CHLORAPREP HI-LITE 26ML ORANGE

## (undated) DEVICE — GLOVE INDICATOR PI UNDERGLOVE SZ 7 BLUE

## (undated) DEVICE — GLOVE SRG BIOGEL ORTHOPEDIC 7

## (undated) DEVICE — SUT VICRYL 2-0 CT-1 27 IN J259H

## (undated) DEVICE — GLOVE SRG BIOGEL 7.5

## (undated) DEVICE — DRAPE EQUIPMENT RF WAND

## (undated) DEVICE — TROCAR APPPLE 5MM EXTENDED LENGTH

## (undated) DEVICE — 3M™ STERI-STRIP™ REINFORCED ADHESIVE SKIN CLOSURES, R1547, 1/2 IN X 4 IN (12 MM X 100 MM), 6 STRIPS/ENVELOPE: Brand: 3M™ STERI-STRIP™

## (undated) DEVICE — LIGHT HANDLE COVER SLEEVE DISP BLUE STELLAR

## (undated) DEVICE — GLOVE SRG BIOGEL ECLIPSE 7

## (undated) DEVICE — BETHLEHEM UNIV TOTAL KNEE, KIT: Brand: CARDINAL HEALTH

## (undated) DEVICE — SUT VICRYL 1 CT-1 27 IN J261H

## (undated) DEVICE — IRRIG ENDO FLO TUBING

## (undated) DEVICE — REM POLYHESIVE ADULT PATIENT RETURN ELECTRODE: Brand: VALLEYLAB

## (undated) DEVICE — GLOVE INDICATOR PI UNDERGLOVE SZ 7.5 BLUE

## (undated) DEVICE — NEPTUNE E-SEP SMOKE EVACUATION PENCIL, COATED, 70MM BLADE, PUSH BUTTON SWITCH: Brand: NEPTUNE E-SEP

## (undated) DEVICE — TIBURON SPLIT SHEET: Brand: CONVERTORS